# Patient Record
Sex: MALE | Race: WHITE | NOT HISPANIC OR LATINO | Employment: UNEMPLOYED | URBAN - METROPOLITAN AREA
[De-identification: names, ages, dates, MRNs, and addresses within clinical notes are randomized per-mention and may not be internally consistent; named-entity substitution may affect disease eponyms.]

---

## 2024-07-18 ENCOUNTER — APPOINTMENT (OUTPATIENT)
Dept: GENERAL RADIOLOGY | Facility: HOSPITAL | Age: 29
End: 2024-07-18
Payer: MEDICAID

## 2024-07-18 ENCOUNTER — HOSPITAL ENCOUNTER (INPATIENT)
Facility: HOSPITAL | Age: 29
LOS: 15 days | Discharge: HOME | End: 2024-08-02
Attending: INTERNAL MEDICINE | Admitting: INTERNAL MEDICINE
Payer: MEDICAID

## 2024-07-18 ENCOUNTER — APPOINTMENT (OUTPATIENT)
Dept: CT IMAGING | Facility: HOSPITAL | Age: 29
End: 2024-07-18
Payer: MEDICAID

## 2024-07-18 DIAGNOSIS — E87.6 ACUTE HYPOKALEMIA: Primary | ICD-10-CM

## 2024-07-18 DIAGNOSIS — F10.939 ALCOHOL WITHDRAWAL SYNDROME WITH COMPLICATION: ICD-10-CM

## 2024-07-18 DIAGNOSIS — R17 JAUNDICE: ICD-10-CM

## 2024-07-18 LAB
ALBUMIN SERPL-MCNC: 3 G/DL (ref 3.5–5.2)
ALBUMIN/GLOB SERPL: 0.9 G/DL
ALP SERPL-CCNC: 380 U/L (ref 39–117)
ALT SERPL W P-5'-P-CCNC: 33 U/L (ref 1–41)
ANION GAP SERPL CALCULATED.3IONS-SCNC: 16.3 MMOL/L (ref 5–15)
ANION GAP SERPL CALCULATED.3IONS-SCNC: 8.8 MMOL/L (ref 5–15)
APTT PPP: 41.9 SECONDS (ref 24–31)
AST SERPL-CCNC: 186 U/L (ref 1–40)
BACTERIA UR QL AUTO: ABNORMAL /HPF
BASOPHILS # BLD MANUAL: 0.2 10*3/MM3 (ref 0–0.2)
BASOPHILS NFR BLD MANUAL: 2 % (ref 0–1.5)
BILIRUB SERPL-MCNC: 15.8 MG/DL (ref 0–1.2)
BILIRUB UR QL STRIP: ABNORMAL
BUN SERPL-MCNC: 4 MG/DL (ref 6–20)
BUN SERPL-MCNC: 6 MG/DL (ref 6–20)
BUN/CREAT SERPL: 10.5 (ref 7–25)
BUN/CREAT SERPL: 15 (ref 7–25)
CALCIUM SPEC-SCNC: 7.4 MG/DL (ref 8.6–10.5)
CALCIUM SPEC-SCNC: 7.9 MG/DL (ref 8.6–10.5)
CHLORIDE SERPL-SCNC: 72 MMOL/L (ref 98–107)
CHLORIDE SERPL-SCNC: 80 MMOL/L (ref 98–107)
CK SERPL-CCNC: 45 U/L (ref 20–200)
CLARITY UR: ABNORMAL
CO2 SERPL-SCNC: 33.2 MMOL/L (ref 22–29)
CO2 SERPL-SCNC: 33.7 MMOL/L (ref 22–29)
COLOR UR: ABNORMAL
CORTIS SERPL-MCNC: 22.6 MCG/DL
CREAT SERPL-MCNC: 0.38 MG/DL (ref 0.76–1.27)
CREAT SERPL-MCNC: 0.4 MG/DL (ref 0.76–1.27)
D-LACTATE SERPL-SCNC: 1 MMOL/L (ref 0.5–2)
D-LACTATE SERPL-SCNC: 3.2 MMOL/L (ref 0.5–2)
D-LACTATE SERPL-SCNC: 4.9 MMOL/L (ref 0.3–2)
DACRYOCYTES BLD QL SMEAR: ABNORMAL
DEPRECATED RDW RBC AUTO: 40.9 FL (ref 37–54)
EGFRCR SERPLBLD CKD-EPI 2021: 151.5 ML/MIN/1.73
EGFRCR SERPLBLD CKD-EPI 2021: 153.8 ML/MIN/1.73
ERYTHROCYTE [DISTWIDTH] IN BLOOD BY AUTOMATED COUNT: 13 % (ref 12.3–15.4)
FERRITIN SERPL-MCNC: 3108 NG/ML (ref 30–400)
GLOBULIN UR ELPH-MCNC: 3.4 GM/DL
GLUCOSE SERPL-MCNC: 91 MG/DL (ref 65–99)
GLUCOSE SERPL-MCNC: 93 MG/DL (ref 65–99)
GLUCOSE UR STRIP-MCNC: NEGATIVE MG/DL
HCT VFR BLD AUTO: 26.4 % (ref 37.5–51)
HGB BLD-MCNC: 8.9 G/DL (ref 13–17.7)
HGB UR QL STRIP.AUTO: NEGATIVE
HOLD SPECIMEN: NORMAL
HYALINE CASTS UR QL AUTO: ABNORMAL /LPF
INR PPP: 1.59 (ref 0.93–1.1)
IRON 24H UR-MRATE: 100 MCG/DL (ref 59–158)
IRON SATN MFR SERPL: 92 % (ref 20–50)
KETONES UR QL STRIP: NEGATIVE
L PNEUMO1 AG UR QL IA: NEGATIVE
LEUKOCYTE ESTERASE UR QL STRIP.AUTO: ABNORMAL
LIPASE SERPL-CCNC: 100 U/L (ref 13–60)
LYMPHOCYTES # BLD MANUAL: 0.69 10*3/MM3 (ref 0.7–3.1)
LYMPHOCYTES NFR BLD MANUAL: 11 % (ref 5–12)
MAGNESIUM SERPL-MCNC: 1.2 MG/DL (ref 1.6–2.6)
MCH RBC QN AUTO: 29.3 PG (ref 26.6–33)
MCHC RBC AUTO-ENTMCNC: 33.6 G/DL (ref 31.5–35.7)
MCV RBC AUTO: 87.4 FL (ref 79–97)
METAMYELOCYTES NFR BLD MANUAL: 2 % (ref 0–0)
MONOCYTES # BLD: 1.08 10*3/MM3 (ref 0.1–0.9)
NEUTROPHILS # BLD AUTO: 7.69 10*3/MM3 (ref 1.7–7)
NEUTROPHILS NFR BLD MANUAL: 69 % (ref 42.7–76)
NEUTS BAND NFR BLD MANUAL: 9 % (ref 0–5)
NEUTS VAC BLD QL SMEAR: ABNORMAL
NITRITE UR QL STRIP: POSITIVE
OSMOLALITY UR: 338 MOSM/KG (ref 300–800)
PH UR STRIP.AUTO: 7 [PH] (ref 5–8)
PLATELET # BLD AUTO: 94 10*3/MM3 (ref 140–450)
PMV BLD AUTO: 9.8 FL (ref 6–12)
POTASSIUM SERPL-SCNC: 1.9 MMOL/L (ref 3.5–5.2)
POTASSIUM SERPL-SCNC: 2.7 MMOL/L (ref 3.5–5.2)
PROT SERPL-MCNC: 6.4 G/DL (ref 6–8.5)
PROT UR QL STRIP: ABNORMAL
PROTHROMBIN TIME: 16.7 SECONDS (ref 9.6–11.7)
QT INTERVAL: 383 MS
QTC INTERVAL: 533 MS
RBC # BLD AUTO: 3.02 10*6/MM3 (ref 4.14–5.8)
RBC # UR STRIP: ABNORMAL /HPF
REF LAB TEST METHOD: ABNORMAL
SCAN SLIDE: NORMAL
SMALL PLATELETS BLD QL SMEAR: ABNORMAL
SODIUM SERPL-SCNC: 122 MMOL/L (ref 136–145)
SODIUM SERPL-SCNC: 122 MMOL/L (ref 136–145)
SODIUM UR-SCNC: <20 MMOL/L
SP GR UR STRIP: 1.04 (ref 1–1.03)
SQUAMOUS #/AREA URNS HPF: ABNORMAL /HPF
TARGETS BLD QL SMEAR: ABNORMAL
TIBC SERPL-MCNC: 109 MCG/DL (ref 298–536)
TRANSFERRIN SERPL-MCNC: 73 MG/DL (ref 200–360)
TSH SERPL DL<=0.05 MIU/L-ACNC: 0.57 UIU/ML (ref 0.27–4.2)
URATE SERPL-MCNC: 1.3 MG/DL (ref 3.4–7)
UROBILINOGEN UR QL STRIP: ABNORMAL
VARIANT LYMPHS NFR BLD MANUAL: 7 % (ref 19.6–45.3)
WBC # UR STRIP: ABNORMAL /HPF
WBC NRBC COR # BLD AUTO: 9.86 10*3/MM3 (ref 3.4–10.8)
WHOLE BLOOD HOLD COAG: NORMAL
WHOLE BLOOD HOLD SPECIMEN: NORMAL
WHOLE BLOOD HOLD SPECIMEN: NORMAL

## 2024-07-18 PROCEDURE — 82533 TOTAL CORTISOL: CPT | Performed by: INTERNAL MEDICINE

## 2024-07-18 PROCEDURE — 80048 BASIC METABOLIC PNL TOTAL CA: CPT | Performed by: INTERNAL MEDICINE

## 2024-07-18 PROCEDURE — 82746 ASSAY OF FOLIC ACID SERUM: CPT | Performed by: INTERNAL MEDICINE

## 2024-07-18 PROCEDURE — 83935 ASSAY OF URINE OSMOLALITY: CPT | Performed by: INTERNAL MEDICINE

## 2024-07-18 PROCEDURE — 80050 GENERAL HEALTH PANEL: CPT | Performed by: INTERNAL MEDICINE

## 2024-07-18 PROCEDURE — 99291 CRITICAL CARE FIRST HOUR: CPT

## 2024-07-18 PROCEDURE — 85730 THROMBOPLASTIN TIME PARTIAL: CPT | Performed by: INTERNAL MEDICINE

## 2024-07-18 PROCEDURE — 83605 ASSAY OF LACTIC ACID: CPT | Performed by: INTERNAL MEDICINE

## 2024-07-18 PROCEDURE — 84550 ASSAY OF BLOOD/URIC ACID: CPT | Performed by: INTERNAL MEDICINE

## 2024-07-18 PROCEDURE — 84165 PROTEIN E-PHORESIS SERUM: CPT | Performed by: INTERNAL MEDICINE

## 2024-07-18 PROCEDURE — 83690 ASSAY OF LIPASE: CPT | Performed by: INTERNAL MEDICINE

## 2024-07-18 PROCEDURE — 87040 BLOOD CULTURE FOR BACTERIA: CPT | Performed by: INTERNAL MEDICINE

## 2024-07-18 PROCEDURE — 84155 ASSAY OF PROTEIN SERUM: CPT | Performed by: INTERNAL MEDICINE

## 2024-07-18 PROCEDURE — 25010000002 LORAZEPAM PER 2 MG: Performed by: PHYSICIAN ASSISTANT

## 2024-07-18 PROCEDURE — 82105 ALPHA-FETOPROTEIN SERUM: CPT | Performed by: NURSE PRACTITIONER

## 2024-07-18 PROCEDURE — 82728 ASSAY OF FERRITIN: CPT | Performed by: INTERNAL MEDICINE

## 2024-07-18 PROCEDURE — 85007 BL SMEAR W/DIFF WBC COUNT: CPT | Performed by: INTERNAL MEDICINE

## 2024-07-18 PROCEDURE — 84300 ASSAY OF URINE SODIUM: CPT | Performed by: INTERNAL MEDICINE

## 2024-07-18 PROCEDURE — 85610 PROTHROMBIN TIME: CPT | Performed by: INTERNAL MEDICINE

## 2024-07-18 PROCEDURE — 82607 VITAMIN B-12: CPT | Performed by: INTERNAL MEDICINE

## 2024-07-18 PROCEDURE — 25010000002 MAGNESIUM SULFATE 2 GM/50ML SOLUTION: Performed by: INTERNAL MEDICINE

## 2024-07-18 PROCEDURE — 82550 ASSAY OF CK (CPK): CPT | Performed by: INTERNAL MEDICINE

## 2024-07-18 PROCEDURE — P9047 ALBUMIN (HUMAN), 25%, 50ML: HCPCS | Performed by: INTERNAL MEDICINE

## 2024-07-18 PROCEDURE — 25810000003 SODIUM CHLORIDE 0.9 % SOLUTION: Performed by: PHYSICIAN ASSISTANT

## 2024-07-18 PROCEDURE — 83735 ASSAY OF MAGNESIUM: CPT | Performed by: PHYSICIAN ASSISTANT

## 2024-07-18 PROCEDURE — 25510000001 IOPAMIDOL PER 1 ML: Performed by: PHYSICIAN ASSISTANT

## 2024-07-18 PROCEDURE — 25010000002 POTASSIUM CHLORIDE PER 2 MEQ OF POTASSIUM: Performed by: INTERNAL MEDICINE

## 2024-07-18 PROCEDURE — 25010000002 ONDANSETRON PER 1 MG: Performed by: INTERNAL MEDICINE

## 2024-07-18 PROCEDURE — 25010000002 THIAMINE PER 100 MG: Performed by: PHYSICIAN ASSISTANT

## 2024-07-18 PROCEDURE — 25810000003 DEXTROSE 5 % AND SODIUM CHLORIDE 0.9 % 5-0.9 % SOLUTION 1,000 ML FLEX CONT: Performed by: INTERNAL MEDICINE

## 2024-07-18 PROCEDURE — 74177 CT ABD & PELVIS W/CONTRAST: CPT

## 2024-07-18 PROCEDURE — 25010000002 THIAMINE PER 100 MG: Performed by: INTERNAL MEDICINE

## 2024-07-18 PROCEDURE — 25010000002 ONDANSETRON PER 1 MG: Performed by: PHYSICIAN ASSISTANT

## 2024-07-18 PROCEDURE — 25010000002 ALBUMIN HUMAN 25% PER 50 ML: Performed by: INTERNAL MEDICINE

## 2024-07-18 PROCEDURE — 93010 ELECTROCARDIOGRAM REPORT: CPT | Performed by: INTERNAL MEDICINE

## 2024-07-18 PROCEDURE — 25010000002 CALCIUM GLUCONATE 2-0.675 GM/100ML-% SOLUTION: Performed by: INTERNAL MEDICINE

## 2024-07-18 PROCEDURE — 71046 X-RAY EXAM CHEST 2 VIEWS: CPT

## 2024-07-18 PROCEDURE — 93005 ELECTROCARDIOGRAM TRACING: CPT | Performed by: PHYSICIAN ASSISTANT

## 2024-07-18 PROCEDURE — 84466 ASSAY OF TRANSFERRIN: CPT | Performed by: INTERNAL MEDICINE

## 2024-07-18 PROCEDURE — 83540 ASSAY OF IRON: CPT | Performed by: INTERNAL MEDICINE

## 2024-07-18 PROCEDURE — 81001 URINALYSIS AUTO W/SCOPE: CPT | Performed by: INTERNAL MEDICINE

## 2024-07-18 PROCEDURE — 87449 NOS EACH ORGANISM AG IA: CPT | Performed by: INTERNAL MEDICINE

## 2024-07-18 RX ORDER — CALCIUM GLUCONATE 20 MG/ML
2000 INJECTION, SOLUTION INTRAVENOUS EVERY 12 HOURS
Qty: 200 ML | Refills: 0 | Status: COMPLETED | OUTPATIENT
Start: 2024-07-18 | End: 2024-07-19

## 2024-07-18 RX ORDER — LORAZEPAM 1 MG/1
1 TABLET ORAL EVERY 12 HOURS SCHEDULED
Qty: 2 TABLET | Refills: 0 | Status: COMPLETED | OUTPATIENT
Start: 2024-07-20 | End: 2024-07-21

## 2024-07-18 RX ORDER — ACETAMINOPHEN 160 MG/5ML
650 SOLUTION ORAL EVERY 4 HOURS PRN
Status: DISCONTINUED | OUTPATIENT
Start: 2024-07-18 | End: 2024-08-02 | Stop reason: HOSPADM

## 2024-07-18 RX ORDER — LORAZEPAM 2 MG/ML
2 INJECTION INTRAMUSCULAR
Status: ACTIVE | OUTPATIENT
Start: 2024-07-18 | End: 2024-07-23

## 2024-07-18 RX ORDER — BISACODYL 5 MG/1
5 TABLET, DELAYED RELEASE ORAL DAILY PRN
Status: DISCONTINUED | OUTPATIENT
Start: 2024-07-18 | End: 2024-08-02 | Stop reason: HOSPADM

## 2024-07-18 RX ORDER — LORAZEPAM 2 MG/ML
1 INJECTION INTRAMUSCULAR ONCE
Status: COMPLETED | OUTPATIENT
Start: 2024-07-18 | End: 2024-07-18

## 2024-07-18 RX ORDER — THIAMINE HYDROCHLORIDE 100 MG/ML
200 INJECTION, SOLUTION INTRAMUSCULAR; INTRAVENOUS EVERY 8 HOURS SCHEDULED
Qty: 30 ML | Refills: 0 | Status: COMPLETED | OUTPATIENT
Start: 2024-07-18 | End: 2024-07-23

## 2024-07-18 RX ORDER — SODIUM CHLORIDE 9 MG/ML
40 INJECTION, SOLUTION INTRAVENOUS AS NEEDED
Status: DISCONTINUED | OUTPATIENT
Start: 2024-07-18 | End: 2024-08-02 | Stop reason: HOSPADM

## 2024-07-18 RX ORDER — ONDANSETRON 2 MG/ML
4 INJECTION INTRAMUSCULAR; INTRAVENOUS ONCE
Status: COMPLETED | OUTPATIENT
Start: 2024-07-18 | End: 2024-07-18

## 2024-07-18 RX ORDER — FOLIC ACID 1 MG/1
1 TABLET ORAL DAILY
Status: DISCONTINUED | OUTPATIENT
Start: 2024-07-19 | End: 2024-08-02 | Stop reason: HOSPADM

## 2024-07-18 RX ORDER — ACETAMINOPHEN 650 MG/1
650 SUPPOSITORY RECTAL EVERY 4 HOURS PRN
Status: DISCONTINUED | OUTPATIENT
Start: 2024-07-18 | End: 2024-08-02 | Stop reason: HOSPADM

## 2024-07-18 RX ORDER — POTASSIUM CHLORIDE 1.5 G/1.58G
40 POWDER, FOR SOLUTION ORAL EVERY 4 HOURS
Qty: 6 PACKET | Refills: 0 | Status: COMPLETED | OUTPATIENT
Start: 2024-07-18 | End: 2024-07-18

## 2024-07-18 RX ORDER — ONDANSETRON 4 MG/1
4 TABLET, ORALLY DISINTEGRATING ORAL EVERY 6 HOURS PRN
Status: DISCONTINUED | OUTPATIENT
Start: 2024-07-18 | End: 2024-08-02 | Stop reason: HOSPADM

## 2024-07-18 RX ORDER — BISACODYL 10 MG
10 SUPPOSITORY, RECTAL RECTAL DAILY PRN
Status: DISCONTINUED | OUTPATIENT
Start: 2024-07-18 | End: 2024-08-02 | Stop reason: HOSPADM

## 2024-07-18 RX ORDER — AMOXICILLIN 250 MG
2 CAPSULE ORAL 2 TIMES DAILY PRN
Status: DISCONTINUED | OUTPATIENT
Start: 2024-07-18 | End: 2024-08-02 | Stop reason: HOSPADM

## 2024-07-18 RX ORDER — ALBUMIN (HUMAN) 12.5 G/50ML
25 SOLUTION INTRAVENOUS ONCE
Status: COMPLETED | OUTPATIENT
Start: 2024-07-18 | End: 2024-07-18

## 2024-07-18 RX ORDER — LORAZEPAM 1 MG/1
2 TABLET ORAL EVERY 6 HOURS
Qty: 8 TABLET | Refills: 0 | Status: COMPLETED | OUTPATIENT
Start: 2024-07-18 | End: 2024-07-19

## 2024-07-18 RX ORDER — PANTOPRAZOLE SODIUM 40 MG/10ML
80 INJECTION, POWDER, LYOPHILIZED, FOR SOLUTION INTRAVENOUS ONCE
Status: COMPLETED | OUTPATIENT
Start: 2024-07-18 | End: 2024-07-18

## 2024-07-18 RX ORDER — CHLORDIAZEPOXIDE HYDROCHLORIDE 25 MG/1
50 CAPSULE, GELATIN COATED ORAL 4 TIMES DAILY PRN
Status: ACTIVE | OUTPATIENT
Start: 2024-07-18 | End: 2024-07-23

## 2024-07-18 RX ORDER — LORAZEPAM 2 MG/ML
1 INJECTION INTRAMUSCULAR
Status: ACTIVE | OUTPATIENT
Start: 2024-07-18 | End: 2024-07-23

## 2024-07-18 RX ORDER — GABAPENTIN 100 MG/1
100 CAPSULE ORAL 2 TIMES DAILY
Status: DISCONTINUED | OUTPATIENT
Start: 2024-07-18 | End: 2024-07-18

## 2024-07-18 RX ORDER — THIAMINE HYDROCHLORIDE 100 MG/ML
200 INJECTION, SOLUTION INTRAMUSCULAR; INTRAVENOUS ONCE
Status: COMPLETED | OUTPATIENT
Start: 2024-07-18 | End: 2024-07-18

## 2024-07-18 RX ORDER — LORAZEPAM 1 MG/1
1 TABLET ORAL EVERY 6 HOURS
Qty: 4 TABLET | Refills: 0 | Status: COMPLETED | OUTPATIENT
Start: 2024-07-19 | End: 2024-07-20

## 2024-07-18 RX ORDER — SODIUM CHLORIDE 0.9 % (FLUSH) 0.9 %
10 SYRINGE (ML) INJECTION AS NEEDED
Status: DISCONTINUED | OUTPATIENT
Start: 2024-07-18 | End: 2024-08-02 | Stop reason: HOSPADM

## 2024-07-18 RX ORDER — LORAZEPAM 1 MG/1
1 TABLET ORAL DAILY
Qty: 1 TABLET | Refills: 0 | Status: COMPLETED | OUTPATIENT
Start: 2024-07-22 | End: 2024-07-22

## 2024-07-18 RX ORDER — POLYETHYLENE GLYCOL 3350 17 G/17G
17 POWDER, FOR SOLUTION ORAL DAILY PRN
Status: DISCONTINUED | OUTPATIENT
Start: 2024-07-18 | End: 2024-08-02 | Stop reason: HOSPADM

## 2024-07-18 RX ORDER — SODIUM CHLORIDE 0.9 % (FLUSH) 0.9 %
10 SYRINGE (ML) INJECTION EVERY 12 HOURS SCHEDULED
Status: DISCONTINUED | OUTPATIENT
Start: 2024-07-18 | End: 2024-08-02 | Stop reason: HOSPADM

## 2024-07-18 RX ORDER — LORAZEPAM 1 MG/1
2 TABLET ORAL
Status: ACTIVE | OUTPATIENT
Start: 2024-07-18 | End: 2024-07-23

## 2024-07-18 RX ORDER — SODIUM CHLORIDE 9 MG/ML
1000 INJECTION, SOLUTION INTRAVENOUS ONCE
Status: COMPLETED | OUTPATIENT
Start: 2024-07-18 | End: 2024-07-18

## 2024-07-18 RX ORDER — MULTIPLE VITAMINS W/ MINERALS TAB 9MG-400MCG
1 TAB ORAL DAILY
Status: DISCONTINUED | OUTPATIENT
Start: 2024-07-18 | End: 2024-08-02 | Stop reason: HOSPADM

## 2024-07-18 RX ORDER — LORAZEPAM 1 MG/1
1 TABLET ORAL
Status: DISPENSED | OUTPATIENT
Start: 2024-07-18 | End: 2024-07-23

## 2024-07-18 RX ORDER — ACETAMINOPHEN 325 MG/1
650 TABLET ORAL EVERY 4 HOURS PRN
Status: DISCONTINUED | OUTPATIENT
Start: 2024-07-18 | End: 2024-08-02 | Stop reason: HOSPADM

## 2024-07-18 RX ORDER — ONDANSETRON 2 MG/ML
4 INJECTION INTRAMUSCULAR; INTRAVENOUS EVERY 6 HOURS PRN
Status: DISCONTINUED | OUTPATIENT
Start: 2024-07-18 | End: 2024-08-02 | Stop reason: HOSPADM

## 2024-07-18 RX ORDER — PANTOPRAZOLE SODIUM 40 MG/10ML
40 INJECTION, POWDER, LYOPHILIZED, FOR SOLUTION INTRAVENOUS DAILY
Status: DISCONTINUED | OUTPATIENT
Start: 2024-07-19 | End: 2024-07-20

## 2024-07-18 RX ORDER — MAGNESIUM SULFATE HEPTAHYDRATE 40 MG/ML
2 INJECTION, SOLUTION INTRAVENOUS
Status: COMPLETED | OUTPATIENT
Start: 2024-07-18 | End: 2024-07-18

## 2024-07-18 RX ADMIN — PANTOPRAZOLE SODIUM 80 MG: 40 INJECTION, POWDER, FOR SOLUTION INTRAVENOUS at 09:55

## 2024-07-18 RX ADMIN — POTASSIUM CHLORIDE 40 MEQ: 1.5 POWDER, FOR SOLUTION ORAL at 21:48

## 2024-07-18 RX ADMIN — FOLIC ACID 1 MG: 5 INJECTION, SOLUTION INTRAMUSCULAR; INTRAVENOUS; SUBCUTANEOUS at 10:04

## 2024-07-18 RX ADMIN — SODIUM CHLORIDE 1000 ML: 9 INJECTION, SOLUTION INTRAVENOUS at 09:54

## 2024-07-18 RX ADMIN — MAGNESIUM SULFATE HEPTAHYDRATE 2 G: 40 INJECTION, SOLUTION INTRAVENOUS at 18:18

## 2024-07-18 RX ADMIN — LORAZEPAM 2 MG: 1 TABLET ORAL at 21:48

## 2024-07-18 RX ADMIN — ONDANSETRON 4 MG: 2 INJECTION INTRAMUSCULAR; INTRAVENOUS at 09:54

## 2024-07-18 RX ADMIN — MAGNESIUM SULFATE HEPTAHYDRATE 2 G: 40 INJECTION, SOLUTION INTRAVENOUS at 14:12

## 2024-07-18 RX ADMIN — LORAZEPAM 2 MG: 1 TABLET ORAL at 15:54

## 2024-07-18 RX ADMIN — POTASSIUM CHLORIDE 40 MEQ: 1.5 POWDER, FOR SOLUTION ORAL at 11:31

## 2024-07-18 RX ADMIN — Medication 1 TABLET: at 12:57

## 2024-07-18 RX ADMIN — MAGNESIUM SULFATE HEPTAHYDRATE 2 G: 40 INJECTION, SOLUTION INTRAVENOUS at 15:54

## 2024-07-18 RX ADMIN — IOPAMIDOL 100 ML: 755 INJECTION, SOLUTION INTRAVENOUS at 10:25

## 2024-07-18 RX ADMIN — POTASSIUM CHLORIDE: 2 INJECTION, SOLUTION, CONCENTRATE INTRAVENOUS at 19:40

## 2024-07-18 RX ADMIN — CALCIUM GLUCONATE 2000 MG: 20 INJECTION, SOLUTION INTRAVENOUS at 19:40

## 2024-07-18 RX ADMIN — THIAMINE HYDROCHLORIDE 200 MG: 100 INJECTION, SOLUTION INTRAMUSCULAR; INTRAVENOUS at 09:55

## 2024-07-18 RX ADMIN — THIAMINE HYDROCHLORIDE 200 MG: 100 INJECTION, SOLUTION INTRAMUSCULAR; INTRAVENOUS at 21:47

## 2024-07-18 RX ADMIN — LORAZEPAM 1 MG: 2 INJECTION INTRAMUSCULAR; INTRAVENOUS at 09:51

## 2024-07-18 RX ADMIN — SERTRALINE 50 MG: 50 TABLET, FILM COATED ORAL at 12:58

## 2024-07-18 RX ADMIN — ALBUMIN (HUMAN) 25 G: 0.25 INJECTION, SOLUTION INTRAVENOUS at 19:04

## 2024-07-18 RX ADMIN — GABAPENTIN 100 MG: 100 CAPSULE ORAL at 12:57

## 2024-07-18 RX ADMIN — ONDANSETRON 4 MG: 2 INJECTION INTRAMUSCULAR; INTRAVENOUS at 13:30

## 2024-07-18 RX ADMIN — POTASSIUM CHLORIDE 40 MEQ: 1.5 POWDER, FOR SOLUTION ORAL at 15:54

## 2024-07-18 NOTE — ED NOTES
"Patient states that he is here for ABD pain secondary to liver issue. He states that he is a alcoholic and has been for the last 9 years intermittently. He drinks almost a liter a day, last alcoholic drink was Wednesday morning around 0100. He also states that he has been having SI thoughts intermittently for the last 3-4. He states that his fiance left him 3-4 years ago and then his brother passed last year. Patient states \"the only thing keeping him from doing something, is the thought of his mom losing another son\" Dad and brother in triage with patient while room being prepared for patient, security called.  "

## 2024-07-18 NOTE — ED PROVIDER NOTES
Subjective   History of Present Illness  28-year-old male presents emergency department with family with complaints of abdominal pain.  Patient states that he has been a alcoholic for the past 9 years current alcohol consumption is around 750 mL/day.  States that he stopped drinking 2 days ago due to increased yellowing of skin and eyes, has been actively vomiting for 2 days.  Denies any recent seizure disorder or having seizure disorders when detoxing in the past.  Patient denies any head injury, neck stiffness neck pain.  Denies shortness of breath chest pain.  Patient denies headaches.        Review of Systems   Constitutional:  Positive for appetite change, diaphoresis and fatigue. Negative for fever and unexpected weight change.   Respiratory:  Negative for cough, chest tightness, shortness of breath and wheezing.    Cardiovascular:  Negative for chest pain, palpitations and leg swelling.   Gastrointestinal:  Positive for abdominal distention, abdominal pain, nausea and vomiting. Negative for blood in stool and diarrhea.   Genitourinary:  Negative for dysuria, frequency and hematuria.   Musculoskeletal:  Negative for neck pain.   Skin:  Positive for color change.   Neurological:  Positive for tremors and light-headedness. Negative for dizziness, seizures, syncope, speech difficulty and headaches.       History reviewed. No pertinent past medical history.    Allergies   Allergen Reactions    Reglan [Metoclopramide] Mental Status Change       History reviewed. No pertinent surgical history.    History reviewed. No pertinent family history.    Social History     Socioeconomic History    Marital status: Single           Objective   Physical Exam  Vitals and nursing note reviewed.   Constitutional:       General: He is not in acute distress.     Appearance: He is ill-appearing. He is not toxic-appearing.   Eyes:      General: Scleral icterus present.      Extraocular Movements:      Right eye: No nystagmus.       "Left eye: No nystagmus.      Conjunctiva/sclera:      Right eye: No hemorrhage.     Left eye: No hemorrhage.  Cardiovascular:      Rate and Rhythm: Normal rate and regular rhythm.      Heart sounds: Normal heart sounds.   Pulmonary:      Effort: Pulmonary effort is normal.      Breath sounds: Normal breath sounds.   Abdominal:      General: Bowel sounds are normal. There is distension.      Palpations: Abdomen is soft. There is hepatomegaly.      Tenderness: generalized  There is no guarding or rebound.   Skin:     Coloration: Skin is jaundiced.      Comments: Petechial rash around face secondary to vomiting   Neurological:      Mental Status: He is alert.         Procedures           ED Course    /83   Pulse (!) 121   Temp 98.4 °F (36.9 °C)   Resp 21   Ht 165.1 cm (65\")   Wt 55.7 kg (122 lb 12.7 oz)   SpO2 95%   BMI 20.43 kg/m²   Labs Reviewed   COMPREHENSIVE METABOLIC PANEL - Abnormal; Notable for the following components:       Result Value    Creatinine 0.40 (*)     Sodium 122 (*)     Potassium 1.9 (*)     Chloride 72 (*)     CO2 33.7 (*)     Calcium 7.9 (*)     Albumin 3.0 (*)     AST (SGOT) 186 (*)     Alkaline Phosphatase 380 (*)     Total Bilirubin 15.8 (*)     Anion Gap 16.3 (*)     All other components within normal limits    Narrative:     GFR Normal >60  Chronic Kidney Disease <60  Kidney Failure <15     CBC WITH AUTO DIFFERENTIAL - Abnormal; Notable for the following components:    RBC 3.02 (*)     Hemoglobin 8.9 (*)     Hematocrit 26.4 (*)     Platelets 94 (*)     All other components within normal limits   MANUAL DIFFERENTIAL - Abnormal; Notable for the following components:    Lymphocyte % 7.0 (*)     Basophil % 2.0 (*)     Bands %  9.0 (*)     Metamyelocyte % 2.0 (*)     Neutrophils Absolute 7.69 (*)     Lymphocytes Absolute 0.69 (*)     Monocytes Absolute 1.08 (*)     All other components within normal limits   PROTIME-INR - Abnormal; Notable for the following components:    Protime " 16.7 (*)     INR 1.59 (*)     All other components within normal limits   APTT - Abnormal; Notable for the following components:    PTT 41.9 (*)     All other components within normal limits   POC LACTATE - Abnormal; Notable for the following components:    Lactate 4.9 (*)     All other components within normal limits   BLOOD CULTURE   BLOOD CULTURE   RAINBOW DRAW    Narrative:     The following orders were created for panel order [place] Draw.  Procedure                               Abnormality         Status                     ---------                               -----------         ------                     Green Top (Gel)[[id number]]                                  Final result               Lavender Top[[id number]]                                     Final result               Gold Top - SST[[id number]]                                   Final result               Light Blue Top[[id number]]                                   Final result                 Please view results for these tests on the individual orders.   SCAN SLIDE   LACTIC ACID, REFLEX   MAGNESIUM   CBC AND DIFFERENTIAL    Narrative:     The following orders were created for panel order CBC & Differential.  Procedure                               Abnormality         Status                     ---------                               -----------         ------                     CBC Auto Differential[[id number]]        Abnormal            Final result               Scan Slide[[id number]]                                       Final result                 Please view results for these tests on the individual orders.   GREEN TOP   LAVENDER TOP   GOLD TOP - SST   LIGHT BLUE TOP   EXTRA TUBES    Narrative:     The following orders were created for panel order Extra Tubes.  Procedure                               Abnormality         Status                     ---------                               -----------         ------                     Lavender  Top[[id number]]                                     Final result               Green Top (Gel)[[id number]]                                  Final result                 Please view results for these tests on the individual orders.   LAVENDER TOP   GREEN TOP   EXTRA TUBES    Narrative:     The following orders were created for panel order Extra Tubes.  Procedure                               Abnormality         Status                     ---------                               -----------         ------                     Green Top (Gel)[[id number]]                                  Final result                 Please view results for these tests on the individual orders.   GREEN TOP     Medications   sodium chloride 0.9 % flush 10 mL (has no administration in time range)   Potassium Replacement - Follow Nurse / BPA Driven Protocol (has no administration in time range)   potassium chloride (KLOR-CON) packet 40 mEq (40 mEq Oral Given 7/18/24 1131)   thiamine (B-1) injection 200 mg (has no administration in time range)     Followed by   thiamine (VITAMIN B-1) tablet 100 mg (has no administration in time range)   folic acid (FOLVITE) tablet 1 mg (has no administration in time range)   multivitamin with minerals 1 tablet (has no administration in time range)   chlordiazePOXIDE (LIBRIUM) capsule 50 mg (has no administration in time range)   sertraline (ZOLOFT) tablet 50 mg (has no administration in time range)   gabapentin (NEURONTIN) capsule 100 mg (has no administration in time range)   sodium chloride 0.9 % bolus 1,000 mL (1,000 mL Intravenous New Bag 7/18/24 0954)   sodium chloride 0.9 % infusion 1,000 mL (0 mL Intravenous Stopped 7/18/24 1133)   folic acid 1 mg in sodium chloride 0.9 % 50 mL IVPB (0 mg Intravenous Stopped 7/18/24 1132)   thiamine (B-1) injection 200 mg (200 mg Intravenous Given 7/18/24 0955)   ondansetron (ZOFRAN) injection 4 mg (4 mg Intravenous Given 7/18/24 0954)   pantoprazole (PROTONIX) injection  80 mg (80 mg Intravenous Given 7/18/24 0922)   LORazepam (ATIVAN) injection 1 mg (1 mg Intravenous Given 7/18/24 0961)   iopamidol (ISOVUE-370) 76 % injection 100 mL (100 mL Intravenous Given 7/18/24 1025)     CT Abdomen Pelvis With Contrast    Result Date: 7/18/2024  1. Enlarged heterogeneous and markedly steatotic liver, which could reflect sequelae of alcoholic steatohepatitis. 2. Sequelae of portal hypertension noted including distal paraesophageal and intra-abdominal varices as well as trace ascites. Normal splenic size. Portal vasculature patent. 3. Gallbladder wall edema without other findings of acute cholecystitis likely secondary related to underlying hepatocellular dysfunction. 4. Colonic and rectal inflammatory changes which could reflect sequelae of portal colopathy. Differential would include a nonspecific infectious/inflammatory proctocolitis. No bowel obstruction.      Electronically Signed By-Armani Small MD On:7/18/2024 10:46 AM                                              Medical Decision Making  28-year-old male presents with family with complaint of abdominal pain abdominal distention, yellow skin and yellow eyes.  Patient states that he has been an alcoholic for the past 9 years typically consumes about 750 mL/day.  Patient states he has not had a drop of alcohol for the past 2 days is experiencing nausea and vomiting at this time.  No seizure disorder reported no active seizures in the past 48 hours.  Physical exam is consistent with an ill-appearing male is not acutely toxic.  He is jaundiced sclera is icterus.  Abdomen is soft and slightly distended.  Diffuse tenderness over right upper quadrant.  Positive hepatomegaly.  No rebound or guarding.  Lungs clear to auscultation heart regular rate and rhythm no swelling of the lower or upper extremities.  There is petechial rash noted on face secondary to vomiting.  Vital signs are blood pressure 141/83 temperature 98.4 heart rate is 121  respirations 21 right SpO2 on room air is 95%.  CBC, CMP show mild anemia and hypokalemia.  EKG was was obtained for hypokalemia, heart rate 116 sinus tachycardia no ST elevation no sign of STEMI.  EKG was also evaluated by Dr. Kuo.  Patient during the ER course was given Ativan 1 mg Zofran 4 mg IV, 1 L IV bolus normal saline.  80 mg Protonix.  IV, CT abdomen pelvis with IV contrast show hepatomegaly but no other acute processes noted.  Per radiologist.  Patient was admitted to the hospitalist diagnosis of hypokalemia EtOH withdrawal.  He was discharged in stable condition.  Treatment plan patient care was discussed and Dr. Kuo saw patient in ER.  Critical care time 38 minutes    Problems Addressed:  Acute hypokalemia: complicated acute illness or injury that poses a threat to life or bodily functions  Alcohol withdrawal syndrome with complication: complicated acute illness or injury that poses a threat to life or bodily functions  Jaundice: complicated acute illness or injury    Amount and/or Complexity of Data Reviewed  Labs: ordered. Decision-making details documented in ED Course.  Radiology: ordered. Decision-making details documented in ED Course.  ECG/medicine tests: ordered and independent interpretation performed. Decision-making details documented in ED Course.    Risk  Decision regarding hospitalization.    Critical Care  Total time providing critical care: 38 minutes        Final diagnoses:   Acute hypokalemia   Alcohol withdrawal syndrome with complication   Jaundice       ED Disposition  ED Disposition       ED Disposition   Decision to Admit    Condition   --    Comment   Level of Care: Telemetry [5]   Diagnosis: Alcohol withdrawal [291.81.ICD-9-CM]   Certification: I Certify That Inpatient Hospital Services Are Medically Necessary For Greater Than 2 Midnights                 No follow-up provider specified.       Medication List      No changes were made to your prescriptions during this  visit.            Micheal Deshpande PA-C  07/18/24 1229       Micheal Deshpande, DREW  07/18/24 1222

## 2024-07-18 NOTE — Clinical Note
Level of Care: Progressive Care [20]   Admitting Physician: LAILA RATLIFF [[id number]]   Attending Physician: LAILA RATLIFF [[id number]]

## 2024-07-18 NOTE — H&P
[facility] Medicine Services  History & Physical    Patient Name: Yonas Charles  : 1995  MRN: [id number]  Primary Care Physician:  Provider, No Known  Date of admission: 2024  Date and Time of Service: 2024    Subjective      Chief Complaint:  alcohol withdrawal    History of Present Illness:    This is a 28-year-old male who is alcoholic and also has history of acid reflux came to the hospital for alcohol detox.  Patient is also having abdominal pain and is jaundiced.  Patient is also going through personal issues.  Patient also has severe anxiety depression and has suicidal thoughts.  Patient started drinking 2 days ago and patient has been drinking alcohol since past 9 years.  Patient drinks almost 1 L hard liquor on a daily basis.  Patient has generalized weakness abdominal pain with episodes of nausea vomiting.  Patient has tremors and lightheadedness.      Lab work showed sodium of 122 with potassium 1.9 along with metabolic acidosis.  Patient has elevated AST and total bili is 15.8 and hemoglobin is 8.9.  In ER patient was tachycardic.  CT scan of abdomen showed enlarged liver along with portal hypertension and intra-abdominal varices along with gallbladder wall edema and chronic and rectal inflammatory changes    Review of Systems   Constitutional:  Negative for chills, diaphoresis and fever.   HENT:  Negative for dental problem, ear discharge, hearing loss, nosebleeds, rhinorrhea and sneezing.    Eyes:  Negative for photophobia, redness and itching.   Respiratory:  Negative for cough, chest tightness and stridor.    Cardiovascular:  Negative for leg swelling.   Gastrointestinal:  as above  Endocrine: Negative for heat intolerance.   Genitourinary: Negative for flank pain, frequency and hematuria.   Musculoskeletal:  Negative for back pain, joint swelling, and gait problem.   Skin:  Negative for color change.   Neurological:   speech difficulty, numbness and headaches.    Psychiatric/Behavioral:   as above    Personal History     History reviewed. No pertinent past medical history.    History reviewed. No pertinent surgical history.    Family History: family history is not on file. Otherwise pertinent FHx was reviewed and not pertinent to current issue.    Social History:      Home Medications:  Prior to Admission Medications       None              Allergies:  Allergies   Allergen Reactions    Reglan [Metoclopramide] Mental Status Change       Objective      Vitals:   Temp:  [98.4 °F (36.9 °C)] 98.4 °F (36.9 °C)  Heart Rate:  [114-132] 121  Resp:  [21] 21  BP: (119-158)/(61-91) 141/83  Body mass index is 20.43 kg/m².  Physical Exam:    Constitutional: Patient appears well-developed and well-nourished and in no acute distress   HEENT:   Head: Normocephalic and atraumatic.   Eyes:  Pupils are equal, round, and reactive to light. EOM are intact. Sclera are anicteric and non-injected.  Mouth and Throat: Patient has moist mucous membranes.      Neck: Neck supple.  No thyromegaly present. No lymphadenopathy present. No  masses.     Cardiovascular: Inspection: No JVD present. Palpation:bilaterally. No leg edema. Auscultation: Regular rate, regular rhythm, S1 normal and S2 normal. reveals no gallop and no friction rub. No Carotid bruit bilaterally.    Pulmonary/Chest: Inspection: No distress, no use of accessory muscles. Lungs are clear to auscultation bilaterally. No respiratory distress. No wheezes. No rhonchi. No rales.     Abdomen /Gastrointestinal: Inspection: no distension. Palpation: no masses, no organomegaly. Soft. There is no tenderness. Bowel sounds are normal.   Extremities no cyanosis clubbing or edema    Neurological: Patient is alert and oriented to person, place, and time. Cranial nerves II-XII are grossly intact with no focal deficits. Sensori-motor exam is normal. No cerebellar signs.    Skin: Skin is warm. No rash noted. Nails show no clubbing.  No cyanosis or erythema.  No bruising.      Diagnostic Data:  Results from last 7 days   Lab Units 07/18/24  1015   WBC 10*3/mm3 9.86   HEMOGLOBIN g/dL 8.9*   HEMATOCRIT % 26.4*   PLATELETS 10*3/mm3 94*   GLUCOSE mg/dL 93   CREATININE mg/dL 0.40*   BUN mg/dL 6   SODIUM mmol/L 122*   POTASSIUM mmol/L 1.9*   AST (SGOT) U/L 186*   ALT (SGPT) U/L 33   ALK PHOS U/L 380*   BILIRUBIN mg/dL 15.8*   ANION GAP mmol/L 16.3*       CT Abdomen Pelvis With Contrast    Result Date: 7/18/2024  1. Enlarged heterogeneous and markedly steatotic liver, which could reflect sequelae of alcoholic steatohepatitis. 2. Sequelae of portal hypertension noted including distal paraesophageal and intra-abdominal varices as well as trace ascites. Normal splenic size. Portal vasculature patent. 3. Gallbladder wall edema without other findings of acute cholecystitis likely secondary related to underlying hepatocellular dysfunction. 4. Colonic and rectal inflammatory changes which could reflect sequelae of portal colopathy. Differential would include a nonspecific infectious/inflammatory proctocolitis. No bowel obstruction.      Electronically Signed By-Armani Small MD On:7/18/2024 10:46 AM       No results found for this or any previous visit.    I have personally reviewed the patient's new results.       Assessment & Plan        Active and Resolved Problems    Alcohol withdrawal with delirium tremens  Normocytic anemia  Jaundice with elevated bilirubin level  Nausea vomiting  Severe hypokalemia  Hyponatremia   Metabolic acidosis  Esophagitis  Hepatomegaly with portal hypertension  Esophageal varices  Suicide ideation  Anxiety depression  Social issues    Suggestion    At this time will admit this patient in hospital  I will ask GI to see this patient   Replace electrolyte and check a magnesium level  Psychiatry consult   IV hydration      VTE Prophylaxis:  Mechanical VTE prophylaxis orders are signed & held.          The patient desires to be as follows:    CODE STATUS:     Code Status (Patient has no pulse and is not breathing): CPR (Attempt to Resuscitate)  Medical Interventions (Patient has pulse or is breathing): Full Support        Admission Status:      Expected Length of Stay:  4-5 days    PDMP and Medication Dispenses via Sidebar reviewed and consistent with patient reported medications.        Signature:     This document has been electronically signed by Mari Bustos MD on July 18, 2024 12:16 EDT   [facility] Hospitalist Team

## 2024-07-18 NOTE — ED NOTES
Security in room. Patient belongings locked up. Pt had phone, phone , wallet, lighter, red shorts, black top, shoes. Room SI safe, environment check list filled out. Charge nurse aware.

## 2024-07-18 NOTE — CONSULTS
NEPHROLOGY CONSULTATION-----[facility]    [facility]  [phone]  Navya Mcdonald MD    Patient Care Team:  Provider, No Known as PCP - General    CC/REASON FOR CONSULTATION: ELECTROLYTE ABNL    PHYSICIAN REQUESTING CONSULTATION:     History of Present Illness    HPI    Patient is a 28 y.o. WM whom I was asked to see in consultation for evaluation and management of renal failure/elevated serum creatinine. Patient was admitted after presenting to ER with c/o abdominal pain. Patient is an alcoholic with Cirrhosis.   No documented NSAIDs or recent IV dye exposure. No known h/o hepatitis, TB, rheumatic fever, jaundice, SLE, bleeding/bruising disorders.  No urinary sx. No edema or fluid retention.   Was not on diuretics prior to admission. Was not on ACE-I/ARB prior to admission. No herbal med use.     Review of Systems   Unable to perform ROS: Patient nonverbal          History reviewed. No pertinent past medical history.    History reviewed. No pertinent surgical history.    History reviewed. No pertinent family history.         Home Meds: (Not in a hospital admission)      Scheduled Meds:  [START ON 7/19/2024] folic acid, 1 mg, Oral, Daily  gabapentin, 100 mg, Oral, BID  LORazepam, 2 mg, Oral, Q6H   Followed by  [START ON 7/19/2024] LORazepam, 1 mg, Oral, Q6H   Followed by  [START ON 7/20/2024] LORazepam, 1 mg, Oral, Q12H   Followed by  [START ON 7/22/2024] LORazepam, 1 mg, Oral, Daily  magnesium sulfate, 2 g, Intravenous, Q2H  multivitamin with minerals, 1 tablet, Oral, Daily  potassium chloride, 40 mEq, Oral, Q4H  sertraline, 50 mg, Oral, Daily  sodium chloride, 10 mL, Intravenous, Q12H  thiamine (B-1) IV, 200 mg, Intravenous, Q8H   Followed by  [START ON 7/24/2024] thiamine, 100 mg, Oral, Daily        Continuous Infusions:       PRN Meds:    acetaminophen **OR** acetaminophen **OR** acetaminophen    senna-docusate sodium **AND**  "polyethylene glycol **AND** bisacodyl **AND** bisacodyl    Calcium Replacement - Follow Nurse / BPA Driven Protocol    chlordiazePOXIDE    LORazepam    LORazepam **OR** LORazepam **OR** LORazepam **OR** LORazepam **OR** LORazepam **OR** LORazepam    Magnesium Standard Dose Replacement - Follow Nurse / BPA Driven Protocol    ondansetron ODT **OR** ondansetron    Phosphorus Replacement - Follow Nurse / BPA Driven Protocol    Potassium Replacement - Follow Nurse / BPA Driven Protocol    sodium chloride    sodium chloride    sodium chloride    Allergies:  Reglan [metoclopramide]    OBJECTIVE    Vital Signs  Temp:  [98.4 °F (36.9 °C)-98.5 °F (36.9 °C)] 98.5 °F (36.9 °C)  Heart Rate:  [114-132] 124  Resp:  [21-24] 24  BP: (119-158)/(61-91) 122/69    I/O this shift:  In: 1050 [IV Piggyback:1050]  Out: -   No intake/output data recorded.    Physical Exam:  General Appearance: S/P ATIVAN AND WITHDRAWS TO PAIN ONLY  Head: normocephalic, without obvious abnormality and atraumatic +DRY OP  Eyes: conjunctivae and +ICTERIC SCLERAE  Neck: supple and no JVD  Lungs: +FEW SCATTERED RHONCHI  Heart: regular rhythm & normal rate and normal S1, S2   Chest Wall: no abnormalities observed  Abdomen: normal bowel sounds and soft   Extremities: moves extremities well, no edema, no cyanosis  Skin: +JAUNDICE  Neurologic: OBTUNDED POST ATIVAN GIVEN FOR AGITATION    Results Review:    I reviewed the patient's new clinical results.    WBC WBC   Date Value Ref Range Status   07/18/2024 9.86 3.40 - 10.80 10*3/mm3 Final      HGB Hemoglobin   Date Value Ref Range Status   07/18/2024 8.9 (L) 13.0 - 17.7 g/dL Final      HCT Hematocrit   Date Value Ref Range Status   07/18/2024 26.4 (L) 37.5 - 51.0 % Final      Platelets No results found for: \"LABPLAT\"   MCV MCV   Date Value Ref Range Status   07/18/2024 87.4 79.0 - 97.0 fL Final          Sodium Sodium   Date Value Ref Range Status   07/18/2024 122 (L) 136 - 145 mmol/L Final      Potassium Potassium " "  Date Value Ref Range Status   07/18/2024 1.9 (C) 3.5 - 5.2 mmol/L Final      Chloride Chloride   Date Value Ref Range Status   07/18/2024 72 (L) 98 - 107 mmol/L Final      CO2 CO2   Date Value Ref Range Status   07/18/2024 33.7 (H) 22.0 - 29.0 mmol/L Final      BUN BUN   Date Value Ref Range Status   07/18/2024 6 6 - 20 mg/dL Final      Creatinine Creatinine   Date Value Ref Range Status   07/18/2024 0.40 (L) 0.76 - 1.27 mg/dL Final      Calcium Calcium   Date Value Ref Range Status   07/18/2024 7.9 (L) 8.6 - 10.5 mg/dL Final      PO4 No results found for: \"CAPO4\"   Albumin Albumin   Date Value Ref Range Status   07/18/2024 3.0 (L) 3.5 - 5.2 g/dL Final      Magnesium Magnesium   Date Value Ref Range Status   07/18/2024 1.2 (L) 1.6 - 2.6 mg/dL Final      Uric Acid No results found for: \"URICACID\"       Imaging Results (Last 72 Hours)       Procedure Component Value Units Date/Time    XR Chest PA & Lateral [[id number]] Collected: 07/18/24 1259     Updated: 07/18/24 1302    Narrative:      DATE OF EXAM:  7/18/2024 12:36 PM     PROCEDURE:  XR CHEST PA AND LATERAL-     INDICATIONS:  rule out aspiration pneumonia; E87.6-Hypokalemia; F10.939-Alcohol use,  unspecified with withdrawal, unspecified; R17-Unspecified jaundice     COMPARISON:  No Comparisons Available     TECHNIQUE:   Two radiologic views of the chest , PA and lateral were obtained.     FINDINGS:  Heart size normal. Negative for lobar consolidation, edema, effusion or  pneumothorax. Osseous structures unremarkable.       Impression:      No active pulmonary process.        Electronically Signed By-Armani Small MD On:7/18/2024 1:00 PM       CT Abdomen Pelvis With Contrast [[id number]] Collected: 07/18/24 1036     Updated: 07/18/24 1048    Narrative:      EXAM: CT ABDOMEN PELVIS W CONTRAST-     DATE OF EXAM: 7/18/2024 10:24 AM     INDICATION: abd pain.     COMPARISON: None available.     TECHNIQUE: Contiguous axial CT images were obtained from the lung " bases  to the pubic symphysis obtained following the uneventful intravenous  administration of 100 mL of Isovue 370 contrast. Sagittal and coronal  reconstructions were performed.  Automated exposure control and  iterative reconstruction methods were used.     FINDINGS:  Heart size within normal limits. No pericardial effusion. Lower lungs  grossly clear.     Enlarged markedly heterogeneous and steatotic liver. Right hepatic lobe  measures nearly 22 cm in greatest CC dimension. Tiny low-density lesions  too small to accurately characterize favoring small cyst. 1.2 cm fluid  density lesion at hepatic dome again favoring cyst. Portal vasculature,  splenic vein and superior mesenteric vein patent. There are distal  paraesophageal, perigastric and perisplenic varices. Spleen within  normal limits in size. Trace intra-abdominal ascites.     Gallbladder wall edema without distention or visible stones most likely  related to adjacent hepatocellular dysfunction. No dilation of biliary  tree. No active pancreatitis or drainable collection. Adrenal glands  unremarkable. Normal size and contour of kidneys. No hydronephrosis or  solid mass. Urinary bladder and prostate unremarkable.     Probable small hiatal hernia. No evidence of bowel obstruction. Colonic  wall thickening and edema within ascending extending into hepatic  flexure, distribution favors sequelae of portal colopathy. In addition  there is mild thickening and edema as well as mucosal hyperemia in the  rectum and distal colon. No CT evidence of acute appendicitis.  Incidental areas of short segment jejunojejunal intussusception without  other complication by CT, most commonly transient and of no clinical  significance in adults.     Negative for abdominal aortic aneurysm. Minimal aortic atherosclerotic  disease. No suspicious adenopathy. No free air or organized collection.     Minimal reticular body wall edema. No acute displaced fracture or  aggressive bone  lesion.       Impression:      1. Enlarged heterogeneous and markedly steatotic liver, which could  reflect sequelae of alcoholic steatohepatitis.  2. Sequelae of portal hypertension noted including distal paraesophageal  and intra-abdominal varices as well as trace ascites. Normal splenic  size. Portal vasculature patent.  3. Gallbladder wall edema without other findings of acute cholecystitis  likely secondary related to underlying hepatocellular dysfunction.  4. Colonic and rectal inflammatory changes which could reflect sequelae  of portal colopathy. Differential would include a nonspecific  infectious/inflammatory proctocolitis. No bowel obstruction.                 Electronically Signed ByYuriy Small MD On:7/18/2024 10:46 AM                 Results for orders placed during the hospital encounter of 07/18/24    XR Chest PA & Lateral    Narrative  DATE OF EXAM:  7/18/2024 12:36 PM    PROCEDURE:  XR CHEST PA AND LATERAL-    INDICATIONS:  rule out aspiration pneumonia; E87.6-Hypokalemia; F10.939-Alcohol use,  unspecified with withdrawal, unspecified; R17-Unspecified jaundice    COMPARISON:  No Comparisons Available    TECHNIQUE:  Two radiologic views of the chest , PA and lateral were obtained.    FINDINGS:  Heart size normal. Negative for lobar consolidation, edema, effusion or  pneumothorax. Osseous structures unremarkable.    Impression  No active pulmonary process.      Electronically Signed ByYuriy Small MD On:7/18/2024 1:00 PM            ASSESSMENT / PLAN      Alcohol withdrawal    HYPONATREMIA------Acute. Hypotonic and clinically hypovolemic.  Secondary to poor solute intake and EtOH abuse (Beer Potomania). No pressing indication for use of hypertonic saline. Give IV NS and follow serial levels. Seizure/fall precautions. Neuro checks. On Telemetry. Check further urine and serum studies to rule out other contributing factors. No SSRI for now    2. HYPOKALEMIA-------Acute and severe whole body  total potassium depletion secondary to n/v and poor intake and severe magnesium deficiency. Replace po and IV. Give IV MgSO4 and follow    3. HYPOMAGNESEMIA-------Replace IV. Follow levels    4. HYPOCALCEMIA------Replace IV. Follow ionized levels. Give MgSO4    5. ALCOHOL ABUSE/CIRRHOSIS/JAUNDICE------Thiamine, Folate, IVFs, prn IV Ativan. Withdrawal prcautions    6. MIXED METABOLIC ALKALOSIS/METABOLIC ACIDOSIS-----  Elevated AGAP with elevated lactic and alcoholic ketoacidosis likely. Give Dextrose/IVFs. Avoid hypotension. Alkalosis secondary to effective intravascular volume depletion from n/v and poor oral intake    7. ANEMIA------Normocytic with normal RDW. Check Fe, SPEP, B12, Folate, and hemoccult. Follow H/H closely with hydration    8. DEPRESSION------Suicide precautions. Hold Zoloft given severe hyponatremia    9. HYPOALBUMINEMIA-----IV Albumin to temporize and po supplements    10. ELEVATED INR------Secondary to liver disease    11. THROMBOCYTOPENIA------No heparin. Secondary to liver disease    12. PUD PROPHYLAXIS-----IV PPI    13. DVT PROPHYLAXIS-----SCDs      I discussed the patient's findings and my recommendations with nursing staff    Will follow along closely. Thank you for allowing us to see this patient in renal consultation.    [facility]  [phone]  MD Navya Gilmore MD  07/18/24  17:39 EDT

## 2024-07-19 ENCOUNTER — APPOINTMENT (OUTPATIENT)
Dept: CT IMAGING | Facility: HOSPITAL | Age: 29
End: 2024-07-19
Payer: MEDICAID

## 2024-07-19 ENCOUNTER — INPATIENT HOSPITAL (OUTPATIENT)
Dept: URBAN - METROPOLITAN AREA HOSPITAL 84 | Facility: HOSPITAL | Age: 29
End: 2024-07-19
Payer: MEDICAID

## 2024-07-19 DIAGNOSIS — E80.6 OTHER DISORDERS OF BILIRUBIN METABOLISM: ICD-10-CM

## 2024-07-19 DIAGNOSIS — F10.139 ALCOHOL ABUSE WITH WITHDRAWAL, UNSPECIFIED: ICD-10-CM

## 2024-07-19 LAB
ALBUMIN SERPL-MCNC: 2.9 G/DL (ref 3.5–5.2)
ALBUMIN SERPL-MCNC: 3.1 G/DL (ref 3.5–5.2)
ALBUMIN/GLOB SERPL: 1 G/DL
ALBUMIN/GLOB SERPL: 1.1 G/DL
ALP SERPL-CCNC: 301 U/L (ref 39–117)
ALP SERPL-CCNC: 324 U/L (ref 39–117)
ALPHA-FETOPROTEIN: 5.94 NG/ML (ref 0–8.3)
ALPHA1 GLOB MFR UR ELPH: 214 MG/DL (ref 90–200)
ALT SERPL W P-5'-P-CCNC: 27 U/L (ref 1–41)
ALT SERPL W P-5'-P-CCNC: 28 U/L (ref 1–41)
AMMONIA BLD-SCNC: 90 UMOL/L (ref 16–60)
ANION GAP SERPL CALCULATED.3IONS-SCNC: 11.2 MMOL/L (ref 5–15)
ANION GAP SERPL CALCULATED.3IONS-SCNC: 7.5 MMOL/L (ref 5–15)
ANION GAP SERPL CALCULATED.3IONS-SCNC: 8.8 MMOL/L (ref 5–15)
AST SERPL-CCNC: 143 U/L (ref 1–40)
AST SERPL-CCNC: 159 U/L (ref 1–40)
BASOPHILS # BLD MANUAL: 0.18 10*3/MM3 (ref 0–0.2)
BASOPHILS NFR BLD MANUAL: 2 % (ref 0–1.5)
BILIRUB SERPL-MCNC: 17.4 MG/DL (ref 0–1.2)
BILIRUB SERPL-MCNC: 17.9 MG/DL (ref 0–1.2)
BUN SERPL-MCNC: 3 MG/DL (ref 6–20)
BUN/CREAT SERPL: 6.8 (ref 7–25)
BUN/CREAT SERPL: 7.3 (ref 7–25)
BUN/CREAT SERPL: 7.5 (ref 7–25)
C3 FRG RBC-MCNC: ABNORMAL
CA-I SERPL ISE-MCNC: 1.03 MMOL/L (ref 1.2–1.3)
CALCIUM SPEC-SCNC: 7.9 MG/DL (ref 8.6–10.5)
CALCIUM SPEC-SCNC: 7.9 MG/DL (ref 8.6–10.5)
CALCIUM SPEC-SCNC: 8.1 MG/DL (ref 8.6–10.5)
CERULOPLASMIN SERPL-MCNC: 18 MG/DL (ref 16–31)
CHLORIDE SERPL-SCNC: 86 MMOL/L (ref 98–107)
CHLORIDE SERPL-SCNC: 96 MMOL/L (ref 98–107)
CHLORIDE SERPL-SCNC: 96 MMOL/L (ref 98–107)
CO2 SERPL-SCNC: 23.8 MMOL/L (ref 22–29)
CO2 SERPL-SCNC: 29.2 MMOL/L (ref 22–29)
CO2 SERPL-SCNC: 32.5 MMOL/L (ref 22–29)
CREAT SERPL-MCNC: 0.4 MG/DL (ref 0.76–1.27)
CREAT SERPL-MCNC: 0.41 MG/DL (ref 0.76–1.27)
CREAT SERPL-MCNC: 0.44 MG/DL (ref 0.76–1.27)
D-LACTATE SERPL-SCNC: 1.3 MMOL/L (ref 0.5–2)
DACRYOCYTES BLD QL SMEAR: ABNORMAL
DEPRECATED RDW RBC AUTO: 45.1 FL (ref 37–54)
EGFRCR SERPLBLD CKD-EPI 2021: 147.2 ML/MIN/1.73
EGFRCR SERPLBLD CKD-EPI 2021: 150.3 ML/MIN/1.73
EGFRCR SERPLBLD CKD-EPI 2021: 151.5 ML/MIN/1.73
ERYTHROCYTE [DISTWIDTH] IN BLOOD BY AUTOMATED COUNT: 14.1 % (ref 12.3–15.4)
FOLATE SERPL-MCNC: 2.16 NG/ML (ref 4.78–24.2)
GLOBULIN UR ELPH-MCNC: 2.7 GM/DL
GLOBULIN UR ELPH-MCNC: 3 GM/DL
GLUCOSE SERPL-MCNC: 116 MG/DL (ref 65–99)
GLUCOSE SERPL-MCNC: 78 MG/DL (ref 65–99)
GLUCOSE SERPL-MCNC: 79 MG/DL (ref 65–99)
HAV IGM SERPL QL IA: NORMAL
HBV CORE IGM SERPL QL IA: NORMAL
HBV SURFACE AG SERPL QL IA: NORMAL
HCT VFR BLD AUTO: 24.5 % (ref 37.5–51)
HCV AB SER QL: NORMAL
HGB BLD-MCNC: 8.2 G/DL (ref 13–17.7)
INR PPP: 1.79 (ref 0.93–1.1)
LYMPHOCYTES # BLD MANUAL: 1.32 10*3/MM3 (ref 0.7–3.1)
LYMPHOCYTES NFR BLD MANUAL: 12 % (ref 5–12)
MAGNESIUM SERPL-MCNC: 1.7 MG/DL (ref 1.6–2.6)
MAGNESIUM SERPL-MCNC: 2.2 MG/DL (ref 1.6–2.6)
MCH RBC QN AUTO: 30 PG (ref 26.6–33)
MCHC RBC AUTO-ENTMCNC: 33.5 G/DL (ref 31.5–35.7)
MCV RBC AUTO: 89.4 FL (ref 79–97)
MONOCYTES # BLD: 1.05 10*3/MM3 (ref 0.1–0.9)
NEUTROPHILS # BLD AUTO: 6.23 10*3/MM3 (ref 1.7–7)
NEUTROPHILS NFR BLD MANUAL: 65 % (ref 42.7–76)
NEUTS BAND NFR BLD MANUAL: 6 % (ref 0–5)
PHOSPHATE SERPL-MCNC: 0.3 MG/DL (ref 2.5–4.5)
PHOSPHATE SERPL-MCNC: 1.6 MG/DL (ref 2.5–4.5)
PLATELET # BLD AUTO: 85 10*3/MM3 (ref 140–450)
PMV BLD AUTO: 10.1 FL (ref 6–12)
POTASSIUM SERPL-SCNC: 3 MMOL/L (ref 3.5–5.2)
POTASSIUM SERPL-SCNC: 3.6 MMOL/L (ref 3.5–5.2)
POTASSIUM SERPL-SCNC: 3.6 MMOL/L (ref 3.5–5.2)
POTASSIUM SERPL-SCNC: 3.8 MMOL/L (ref 3.5–5.2)
PROT SERPL-MCNC: 5.6 G/DL (ref 6–8.5)
PROT SERPL-MCNC: 6.1 G/DL (ref 6–8.5)
PROTHROMBIN TIME: 18.7 SECONDS (ref 9.6–11.7)
RBC # BLD AUTO: 2.74 10*6/MM3 (ref 4.14–5.8)
SMALL PLATELETS BLD QL SMEAR: ABNORMAL
SODIUM SERPL-SCNC: 126 MMOL/L (ref 136–145)
SODIUM SERPL-SCNC: 131 MMOL/L (ref 136–145)
SODIUM SERPL-SCNC: 134 MMOL/L (ref 136–145)
TARGETS BLD QL SMEAR: ABNORMAL
VARIANT LYMPHS NFR BLD MANUAL: 12 % (ref 19.6–45.3)
VARIANT LYMPHS NFR BLD MANUAL: 3 % (ref 0–5)
VIT B12 BLD-MCNC: 1997 PG/ML (ref 211–946)
WBC MORPH BLD: NORMAL
WBC NRBC COR # BLD AUTO: 8.78 10*3/MM3 (ref 3.4–10.8)

## 2024-07-19 PROCEDURE — 84132 ASSAY OF SERUM POTASSIUM: CPT | Performed by: INTERNAL MEDICINE

## 2024-07-19 PROCEDURE — 82330 ASSAY OF CALCIUM: CPT | Performed by: INTERNAL MEDICINE

## 2024-07-19 PROCEDURE — 80074 ACUTE HEPATITIS PANEL: CPT | Performed by: NURSE PRACTITIONER

## 2024-07-19 PROCEDURE — 84100 ASSAY OF PHOSPHORUS: CPT | Performed by: INTERNAL MEDICINE

## 2024-07-19 PROCEDURE — 25810000003 SODIUM CHLORIDE 0.9 % SOLUTION: Performed by: STUDENT IN AN ORGANIZED HEALTH CARE EDUCATION/TRAINING PROGRAM

## 2024-07-19 PROCEDURE — 85027 COMPLETE CBC AUTOMATED: CPT | Performed by: INTERNAL MEDICINE

## 2024-07-19 PROCEDURE — 86015 ACTIN ANTIBODY EACH: CPT | Performed by: NURSE PRACTITIONER

## 2024-07-19 PROCEDURE — 70450 CT HEAD/BRAIN W/O DYE: CPT

## 2024-07-19 PROCEDURE — 82140 ASSAY OF AMMONIA: CPT | Performed by: NURSE PRACTITIONER

## 2024-07-19 PROCEDURE — 82103 ALPHA-1-ANTITRYPSIN TOTAL: CPT | Performed by: NURSE PRACTITIONER

## 2024-07-19 PROCEDURE — 83605 ASSAY OF LACTIC ACID: CPT | Performed by: INTERNAL MEDICINE

## 2024-07-19 PROCEDURE — 25010000002 THIAMINE PER 100 MG: Performed by: INTERNAL MEDICINE

## 2024-07-19 PROCEDURE — 85610 PROTHROMBIN TIME: CPT | Performed by: INTERNAL MEDICINE

## 2024-07-19 PROCEDURE — 83735 ASSAY OF MAGNESIUM: CPT | Performed by: INTERNAL MEDICINE

## 2024-07-19 PROCEDURE — 51798 US URINE CAPACITY MEASURE: CPT

## 2024-07-19 PROCEDURE — 25810000003 DEXTROSE 5 % AND SODIUM CHLORIDE 0.9 % 5-0.9 % SOLUTION 1,000 ML FLEX CONT: Performed by: INTERNAL MEDICINE

## 2024-07-19 PROCEDURE — 86038 ANTINUCLEAR ANTIBODIES: CPT | Performed by: NURSE PRACTITIONER

## 2024-07-19 PROCEDURE — 25010000002 POTASSIUM CHLORIDE PER 2 MEQ OF POTASSIUM: Performed by: INTERNAL MEDICINE

## 2024-07-19 PROCEDURE — 80053 COMPREHEN METABOLIC PANEL: CPT | Performed by: INTERNAL MEDICINE

## 2024-07-19 PROCEDURE — 63710000001 PREDNISOLONE PER 5 MG: Performed by: NURSE PRACTITIONER

## 2024-07-19 PROCEDURE — 25810000003 SODIUM CHLORIDE 0.9 % SOLUTION: Performed by: INTERNAL MEDICINE

## 2024-07-19 PROCEDURE — 85007 BL SMEAR W/DIFF WBC COUNT: CPT | Performed by: INTERNAL MEDICINE

## 2024-07-19 PROCEDURE — 25010000002 CALCIUM GLUCONATE 2-0.675 GM/100ML-% SOLUTION: Performed by: INTERNAL MEDICINE

## 2024-07-19 PROCEDURE — 99223 1ST HOSP IP/OBS HIGH 75: CPT | Performed by: NURSE PRACTITIONER

## 2024-07-19 PROCEDURE — 25810000003 DEXTROSE 5 % AND SODIUM CHLORIDE 0.9 % 5-0.9 % SOLUTION: Performed by: INTERNAL MEDICINE

## 2024-07-19 PROCEDURE — 82390 ASSAY OF CERULOPLASMIN: CPT | Performed by: NURSE PRACTITIONER

## 2024-07-19 PROCEDURE — 86381 MITOCHONDRIAL ANTIBODY EACH: CPT | Performed by: NURSE PRACTITIONER

## 2024-07-19 PROCEDURE — 82977 ASSAY OF GGT: CPT | Performed by: NURSE PRACTITIONER

## 2024-07-19 RX ORDER — CALCIUM GLUCONATE 20 MG/ML
2000 INJECTION, SOLUTION INTRAVENOUS EVERY 8 HOURS
Status: COMPLETED | OUTPATIENT
Start: 2024-07-19 | End: 2024-07-20

## 2024-07-19 RX ORDER — FENTANYL/ROPIVACAINE/NS/PF 2-625MCG/1
15 PLASTIC BAG, INJECTION (ML) EPIDURAL
Status: COMPLETED | OUTPATIENT
Start: 2024-07-20 | End: 2024-07-20

## 2024-07-19 RX ORDER — POTASSIUM CHLORIDE 20 MEQ/1
40 TABLET, EXTENDED RELEASE ORAL EVERY 4 HOURS
Status: DISPENSED | OUTPATIENT
Start: 2024-07-19 | End: 2024-07-19

## 2024-07-19 RX ORDER — LACTULOSE 10 G/15ML
30 SOLUTION ORAL ONCE
Status: COMPLETED | OUTPATIENT
Start: 2024-07-19 | End: 2024-07-19

## 2024-07-19 RX ORDER — DEXTROSE MONOHYDRATE AND SODIUM CHLORIDE 5; .9 G/100ML; G/100ML
75 INJECTION, SOLUTION INTRAVENOUS CONTINUOUS
Status: DISCONTINUED | OUTPATIENT
Start: 2024-07-19 | End: 2024-07-21

## 2024-07-19 RX ORDER — ALBUMIN (HUMAN) 12.5 G/50ML
25 SOLUTION INTRAVENOUS ONCE
Status: DISCONTINUED | OUTPATIENT
Start: 2024-07-19 | End: 2024-07-21

## 2024-07-19 RX ORDER — ZINC SULFATE 50(220)MG
220 CAPSULE ORAL DAILY
Status: DISCONTINUED | OUTPATIENT
Start: 2024-07-19 | End: 2024-08-02 | Stop reason: HOSPADM

## 2024-07-19 RX ORDER — PREDNISOLONE SODIUM PHOSPHATE 15 MG/5ML
40 SOLUTION ORAL DAILY
Status: DISCONTINUED | OUTPATIENT
Start: 2024-07-19 | End: 2024-07-25

## 2024-07-19 RX ORDER — FENTANYL/ROPIVACAINE/NS/PF 2-625MCG/1
15 PLASTIC BAG, INJECTION (ML) EPIDURAL
Qty: 750 ML | Refills: 0 | Status: COMPLETED | OUTPATIENT
Start: 2024-07-19 | End: 2024-07-19

## 2024-07-19 RX ADMIN — POTASSIUM PHOSPHATE, MONOBASIC POTASSIUM PHOSPHATE, DIBASIC 15 MMOL: 224; 236 INJECTION, SOLUTION, CONCENTRATE INTRAVENOUS at 10:05

## 2024-07-19 RX ADMIN — DEXTROSE MONOHYDRATE AND SODIUM CHLORIDE 125 ML/HR: 5; .9 INJECTION, SOLUTION INTRAVENOUS at 20:16

## 2024-07-19 RX ADMIN — POTASSIUM CHLORIDE 40 MEQ: 1500 TABLET, EXTENDED RELEASE ORAL at 05:00

## 2024-07-19 RX ADMIN — Medication 1 TABLET: at 10:05

## 2024-07-19 RX ADMIN — THIAMINE HYDROCHLORIDE 200 MG: 100 INJECTION, SOLUTION INTRAMUSCULAR; INTRAVENOUS at 22:40

## 2024-07-19 RX ADMIN — POTASSIUM PHOSPHATE, MONOBASIC POTASSIUM PHOSPHATE, DIBASIC 15 MMOL: 224; 236 INJECTION, SOLUTION, CONCENTRATE INTRAVENOUS at 14:58

## 2024-07-19 RX ADMIN — LORAZEPAM 1 MG: 1 TABLET ORAL at 22:40

## 2024-07-19 RX ADMIN — PANTOPRAZOLE SODIUM 40 MG: 40 INJECTION, POWDER, FOR SOLUTION INTRAVENOUS at 10:05

## 2024-07-19 RX ADMIN — Medication 10 ML: at 20:17

## 2024-07-19 RX ADMIN — FOLIC ACID 1 MG: 1 TABLET ORAL at 10:05

## 2024-07-19 RX ADMIN — LACTULOSE 30 G: 10 SOLUTION ORAL at 05:00

## 2024-07-19 RX ADMIN — POTASSIUM PHOSPHATE, MONOBASIC POTASSIUM PHOSPHATE, DIBASIC 15 MMOL: 224; 236 INJECTION, SOLUTION, CONCENTRATE INTRAVENOUS at 04:31

## 2024-07-19 RX ADMIN — THIAMINE HYDROCHLORIDE 200 MG: 100 INJECTION, SOLUTION INTRAMUSCULAR; INTRAVENOUS at 05:48

## 2024-07-19 RX ADMIN — LORAZEPAM 1 MG: 1 TABLET ORAL at 15:40

## 2024-07-19 RX ADMIN — THIAMINE HYDROCHLORIDE 200 MG: 100 INJECTION, SOLUTION INTRAMUSCULAR; INTRAVENOUS at 14:58

## 2024-07-19 RX ADMIN — Medication 220 MG: at 16:23

## 2024-07-19 RX ADMIN — CALCIUM GLUCONATE 2000 MG: 20 INJECTION, SOLUTION INTRAVENOUS at 18:24

## 2024-07-19 RX ADMIN — Medication 10 ML: at 16:00

## 2024-07-19 RX ADMIN — POTASSIUM CHLORIDE: 2 INJECTION, SOLUTION, CONCENTRATE INTRAVENOUS at 04:30

## 2024-07-19 RX ADMIN — CALCIUM GLUCONATE 2000 MG: 20 INJECTION, SOLUTION INTRAVENOUS at 06:21

## 2024-07-19 RX ADMIN — LORAZEPAM 2 MG: 1 TABLET ORAL at 05:00

## 2024-07-19 RX ADMIN — POTASSIUM CHLORIDE 40 MEQ: 1500 TABLET, EXTENDED RELEASE ORAL at 10:05

## 2024-07-19 RX ADMIN — LORAZEPAM 2 MG: 1 TABLET ORAL at 10:05

## 2024-07-19 RX ADMIN — PREDNISOLONE SODIUM PHOSPHATE 40 MG: 15 SOLUTION ORAL at 16:23

## 2024-07-19 RX ADMIN — POTASSIUM PHOSPHATE, MONOBASIC POTASSIUM PHOSPHATE, DIBASIC 15 MMOL: 224; 236 INJECTION, SOLUTION, CONCENTRATE INTRAVENOUS at 23:51

## 2024-07-19 NOTE — PLAN OF CARE
Problem: Adult Inpatient Plan of Care  Goal: Optimal Comfort and Wellbeing  Intervention: Provide Person-Centered Care  Recent Flowsheet Documentation  Taken 7/18/2024 2000 by Sanjay Rojas, RN  Trust Relationship/Rapport:   care explained   questions answered     Problem: Adult Inpatient Plan of Care  Goal: Readiness for Transition of Care  Intervention: Mutually Develop Transition Plan  Recent Flowsheet Documentation  Taken 7/18/2024 2213 by Sanjay Rojas, RN  Transportation Anticipated:   car, drives self   family or friend will provide  Patient/Family Anticipated Services at Transition: none  Patient/Family Anticipates Transition to: home with family  Taken 7/18/2024 2208 by Sanjay Rojas, RN  Equipment Currently Used at Home: none   Goal Outcome Evaluation:

## 2024-07-19 NOTE — PROGRESS NOTES
"NEPHROLOGY PROGRESS NOTE------[facility]    [facility]  [phone]  Navya Mcdonald MD      Patient Care Team:  Provider, No Known as PCP - General      Provider:  Navya Mcdonald MD  Patient Name: Yonas Charles  :  1995    SUBJECTIVE:    F/U ELECTROLYTE ABNORMALITIES    Awake and alert this AM. No angina. No dysuria.     Medication:  folic acid, 1 mg, Oral, Daily  LORazepam, 2 mg, Oral, Q6H   Followed by  LORazepam, 1 mg, Oral, Q6H   Followed by  [START ON 2024] LORazepam, 1 mg, Oral, Q12H   Followed by  [START ON 2024] LORazepam, 1 mg, Oral, Daily  multivitamin with minerals, 1 tablet, Oral, Daily  pantoprazole, 40 mg, Intravenous, Daily  potassium chloride ER, 40 mEq, Oral, Q4H  potassium phosphate, 15 mmol, Intravenous, Q3H  [Held by provider] sertraline, 50 mg, Oral, Daily  sodium chloride, 10 mL, Intravenous, Q12H  thiamine (B-1) IV, 200 mg, Intravenous, Q8H   Followed by  [START ON 2024] thiamine, 100 mg, Oral, Daily      dextrose 5 % and sodium chloride 0.9 % 970 mL with multivitamin (adult) 10 mL, potassium chloride 40 mEq infusion, , Last Rate: 125 mL/hr at 24 0430        OBJECTIVE    Vital Sign Min/Max for last 24 hours  Temp  Min: 98 °F (36.7 °C)  Max: 98.6 °F (37 °C)   BP  Min: 110/58  Max: 158/87   Pulse  Min: 102  Max: 132   Resp  Min: 14  Max: 24   SpO2  Min: 90 %  Max: 97 %   No data recorded   Weight  Min: 55.7 kg (122 lb 12.7 oz)  Max: 55.7 kg (122 lb 12.7 oz)     Flowsheet Rows      Flowsheet Row First Filed Value   Admission Height 165.1 cm (65\") Documented at 2024 0855   Admission Weight 55.7 kg (122 lb 12.7 oz) Documented at 2024 0855            No intake/output data recorded.  I/O last 3 completed shifts:  In: 1050 [IV Piggyback:1050]  Out: 800 [Urine:800]    Physical Exam:  General Appearance: WEAK AND MALAISED  Head: normocephalic, without obvious abnormality and atraumatic +DRY " "OP  Eyes: conjunctivae and +ICTERIC SCLERAE  Neck: supple and no JVD  Lungs: +FEW SCATTERED RHONCHI  Heart: regular rhythm & normal rate and normal S1, S2   Chest Wall: no abnormalities observed  Abdomen: normal bowel sounds and soft   Extremities: moves extremities well, no edema, no cyanosis  Skin: +JAUNDICE  Neurologic: OBTUNDED POST ATIVAN GIVEN FOR AGITATION    Labs:    WBC WBC   Date Value Ref Range Status   07/19/2024 8.78 3.40 - 10.80 10*3/mm3 Final   07/18/2024 9.86 3.40 - 10.80 10*3/mm3 Final      HGB Hemoglobin   Date Value Ref Range Status   07/19/2024 8.2 (L) 13.0 - 17.7 g/dL Final   07/18/2024 8.9 (L) 13.0 - 17.7 g/dL Final      HCT Hematocrit   Date Value Ref Range Status   07/19/2024 24.5 (L) 37.5 - 51.0 % Final   07/18/2024 26.4 (L) 37.5 - 51.0 % Final      Platelets No results found for: \"LABPLAT\"   MCV MCV   Date Value Ref Range Status   07/19/2024 89.4 79.0 - 97.0 fL Final   07/18/2024 87.4 79.0 - 97.0 fL Final          Sodium Sodium   Date Value Ref Range Status   07/19/2024 126 (L) 136 - 145 mmol/L Final   07/18/2024 122 (L) 136 - 145 mmol/L Final   07/18/2024 122 (L) 136 - 145 mmol/L Final      Potassium Potassium   Date Value Ref Range Status   07/19/2024 3.0 (L) 3.5 - 5.2 mmol/L Final   07/18/2024 2.7 (L) 3.5 - 5.2 mmol/L Final   07/18/2024 1.9 (C) 3.5 - 5.2 mmol/L Final      Chloride Chloride   Date Value Ref Range Status   07/19/2024 86 (L) 98 - 107 mmol/L Final   07/18/2024 80 (L) 98 - 107 mmol/L Final   07/18/2024 72 (L) 98 - 107 mmol/L Final      CO2 CO2   Date Value Ref Range Status   07/19/2024 32.5 (H) 22.0 - 29.0 mmol/L Final   07/18/2024 33.2 (H) 22.0 - 29.0 mmol/L Final   07/18/2024 33.7 (H) 22.0 - 29.0 mmol/L Final      BUN BUN   Date Value Ref Range Status   07/19/2024 3 (L) 6 - 20 mg/dL Final   07/18/2024 4 (L) 6 - 20 mg/dL Final   07/18/2024 6 6 - 20 mg/dL Final      Creatinine Creatinine   Date Value Ref Range Status   07/19/2024 0.44 (L) 0.76 - 1.27 mg/dL Final   07/18/2024 " "0.38 (L) 0.76 - 1.27 mg/dL Final   07/18/2024 0.40 (L) 0.76 - 1.27 mg/dL Final      Calcium Calcium   Date Value Ref Range Status   07/19/2024 7.9 (L) 8.6 - 10.5 mg/dL Final   07/18/2024 7.4 (L) 8.6 - 10.5 mg/dL Final   07/18/2024 7.9 (L) 8.6 - 10.5 mg/dL Final      PO4 No components found for: \"PO4\"   Albumin Albumin   Date Value Ref Range Status   07/19/2024 3.1 (L) 3.5 - 5.2 g/dL Final   07/18/2024 3.0 (L) 3.5 - 5.2 g/dL Final      Magnesium Magnesium   Date Value Ref Range Status   07/19/2024 2.2 1.6 - 2.6 mg/dL Final   07/18/2024 1.2 (L) 1.6 - 2.6 mg/dL Final      Uric Acid No components found for: \"URIC ACID\"     Imaging Results (Last 72 Hours)       Procedure Component Value Units Date/Time    CT Head Without Contrast [[id number]] Collected: 07/19/24 0608     Updated: 07/19/24 0612    Narrative:      CT HEAD WO CONTRAST    Date of Exam: 7/19/2024 1:55 AM EDT    Indication: AMS, weakness LUE.    Comparison: None available.    Technique: Volumetric axial CT images of the head were obtained without administration of intravenous contrast. Coronal and sagittal reconstructions were performed.Automated exposure control and iterative reconstruction methods were utilized for CT dose   reduction.    Findings:  There is no evidence of hemorrhage. There is no mass effect or midline shift.    There is no extracerebral collection.    Ventricles are normal in size and configuration for patient's stated age.      Posterior fossa is within normal limits.    Calvarium and skull base appear intact.   Visualized sinuses show no air fluid levels. Visualized orbits are unremarkable.      Impression:      Impression:  No acute intracranial process.        Electronically Signed: Ghazala Ordonez MD    7/19/2024 6:10 AM EDT    Workstation ID: [id number]    XR Chest PA & Lateral [[id number]] Collected: 07/18/24 1259     Updated: 07/18/24 1302    Narrative:      DATE OF EXAM:  7/18/2024 12:36 PM     PROCEDURE:  XR CHEST PA AND LATERAL-   "   INDICATIONS:  rule out aspiration pneumonia; E87.6-Hypokalemia; F10.939-Alcohol use,  unspecified with withdrawal, unspecified; R17-Unspecified jaundice     COMPARISON:  No Comparisons Available     TECHNIQUE:   Two radiologic views of the chest , PA and lateral were obtained.     FINDINGS:  Heart size normal. Negative for lobar consolidation, edema, effusion or  pneumothorax. Osseous structures unremarkable.       Impression:      No active pulmonary process.        Electronically Signed By-Armani Small MD On:7/18/2024 1:00 PM       CT Abdomen Pelvis With Contrast [[id number]] Collected: 07/18/24 1036     Updated: 07/18/24 1048    Narrative:      EXAM: CT ABDOMEN PELVIS W CONTRAST-     DATE OF EXAM: 7/18/2024 10:24 AM     INDICATION: abd pain.     COMPARISON: None available.     TECHNIQUE: Contiguous axial CT images were obtained from the lung bases  to the pubic symphysis obtained following the uneventful intravenous  administration of 100 mL of Isovue 370 contrast. Sagittal and coronal  reconstructions were performed.  Automated exposure control and  iterative reconstruction methods were used.     FINDINGS:  Heart size within normal limits. No pericardial effusion. Lower lungs  grossly clear.     Enlarged markedly heterogeneous and steatotic liver. Right hepatic lobe  measures nearly 22 cm in greatest CC dimension. Tiny low-density lesions  too small to accurately characterize favoring small cyst. 1.2 cm fluid  density lesion at hepatic dome again favoring cyst. Portal vasculature,  splenic vein and superior mesenteric vein patent. There are distal  paraesophageal, perigastric and perisplenic varices. Spleen within  normal limits in size. Trace intra-abdominal ascites.     Gallbladder wall edema without distention or visible stones most likely  related to adjacent hepatocellular dysfunction. No dilation of biliary  tree. No active pancreatitis or drainable collection. Adrenal glands  unremarkable. Normal  size and contour of kidneys. No hydronephrosis or  solid mass. Urinary bladder and prostate unremarkable.     Probable small hiatal hernia. No evidence of bowel obstruction. Colonic  wall thickening and edema within ascending extending into hepatic  flexure, distribution favors sequelae of portal colopathy. In addition  there is mild thickening and edema as well as mucosal hyperemia in the  rectum and distal colon. No CT evidence of acute appendicitis.  Incidental areas of short segment jejunojejunal intussusception without  other complication by CT, most commonly transient and of no clinical  significance in adults.     Negative for abdominal aortic aneurysm. Minimal aortic atherosclerotic  disease. No suspicious adenopathy. No free air or organized collection.     Minimal reticular body wall edema. No acute displaced fracture or  aggressive bone lesion.       Impression:      1. Enlarged heterogeneous and markedly steatotic liver, which could  reflect sequelae of alcoholic steatohepatitis.  2. Sequelae of portal hypertension noted including distal paraesophageal  and intra-abdominal varices as well as trace ascites. Normal splenic  size. Portal vasculature patent.  3. Gallbladder wall edema without other findings of acute cholecystitis  likely secondary related to underlying hepatocellular dysfunction.  4. Colonic and rectal inflammatory changes which could reflect sequelae  of portal colopathy. Differential would include a nonspecific  infectious/inflammatory proctocolitis. No bowel obstruction.                 Electronically Signed By-Armani Small MD On:7/18/2024 10:46 AM               Results for orders placed during the hospital encounter of 07/18/24    XR Chest PA & Lateral    Narrative  DATE OF EXAM:  7/18/2024 12:36 PM    PROCEDURE:  XR CHEST PA AND LATERAL-    INDICATIONS:  rule out aspiration pneumonia; E87.6-Hypokalemia; F10.939-Alcohol use,  unspecified with withdrawal, unspecified; R17-Unspecified  jaundice    COMPARISON:  No Comparisons Available    TECHNIQUE:  Two radiologic views of the chest , PA and lateral were obtained.    FINDINGS:  Heart size normal. Negative for lobar consolidation, edema, effusion or  pneumothorax. Osseous structures unremarkable.    Impression  No active pulmonary process.      Electronically Signed By-Armani Small MD On:7/18/2024 1:00 PM            ASSESSMENT / PLAN      Alcohol withdrawal    HYPONATREMIA------Acute. Improving. Hypotonic and clinically hypovolemic.  Secondary to poor solute intake and EtOH abuse (Beer Potomania). No pressing indication for use of hypertonic saline. Give IV NS and follow serial levels. Seizure/fall precautions. Neuro checks. On Telemetry.  No SSRI for now     2. HYPOKALEMIA-------Improving. Acute and severe whole body total potassium depletion secondary to n/v and poor intake and severe magnesium deficiency. Replace po and IV. Give IV MgSO4 and follow     3. HYPOMAGNESEMIA-------Replaced     4. HYPOCALCEMIA------Replace IV. Follow ionized levels. Give MgSO4     5. ALCOHOL ABUSE/CIRRHOSIS/JAUNDICE------Thiamine, Folate, IVFs, prn IV Ativan. Withdrawal prcautions     6. MIXED METABOLIC ALKALOSIS/METABOLIC ACIDOSIS-----  Elevated AGAP with elevated lactic and alcoholic ketoacidosis likely. Give Dextrose/IVFs. Avoid hypotension. Alkalosis secondary to effective intravascular volume depletion from n/v and poor oral intake     7. ANEMIA------Normocytic with normal RDW. Check Fe, SPEP, B12, Folate, and hemoccult. Follow H/H closely with hydration     8. DEPRESSION------Suicide precautions. Hold Zoloft given severe hyponatremia     9. HYPOALBUMINEMIA-----IV Albumin to temporize and po supplements     10. ELEVATED INR------Secondary to liver disease     11. THROMBOCYTOPENIA------No heparin. Secondary to liver disease     12. PUD PROPHYLAXIS-----IV PPI     13. DVT PROPHYLAXIS-----SCDs        Navya Mcdonald MD  [facility]  [phone]  07/19/24  08:38 EDT

## 2024-07-19 NOTE — CASE MANAGEMENT/SOCIAL WORK
Social Work Assessment   Tio     Patient Name: Yonas Charles  MRN: [id number]  Today's Date: 7/19/2024    Admit Date: 7/18/2024     Substance Abuse       Row Name 07/19/24 1604       Substance Use    Substance Use Status current alcohol use      Substance Use Comment SW was consulted re: etoh abuse/SI. SW consulted with Psych and was informed pt will likely need IP psych placement but is not medically stable for d/c. Info placed in handoff.             Annamarie Montesinos, KATHERINE, LSW  Medical Social Worker  Ph [phone]  Fax [phone]  [email or web address]

## 2024-07-19 NOTE — PLAN OF CARE
Problem: Adult Inpatient Plan of Care  Goal: Patient-Specific Goal (Individualized)  7/19/2024 1825 by Floridalma Blandon, RN  Outcome: Ongoing, Progressing  7/19/2024 1824 by Floridalma Blandon, RN  Outcome: Ongoing, Progressing   Goal Outcome Evaluation:

## 2024-07-19 NOTE — PLAN OF CARE
Problem: Adult Inpatient Plan of Care  Goal: Absence of Hospital-Acquired Illness or Injury  Intervention: Identify and Manage Fall Risk  Recent Flowsheet Documentation  Taken 7/18/2024 2000 by Sanjay Rojas RN  Safety Promotion/Fall Prevention: safety round/check completed  Intervention: Prevent Skin Injury  Recent Flowsheet Documentation  Taken 7/18/2024 2000 by Sanjay Rojas RN  Body Position: position changed independently  Intervention: Prevent and Manage VTE (Venous Thromboembolism) Risk  Recent Flowsheet Documentation  Taken 7/18/2024 2000 by Sanjay Rojas RN  Activity Management: (CIWA Sleeping) --  VTE Prevention/Management:   compression stockings off   sequential compression devices off     Problem: Adult Inpatient Plan of Care  Goal: Absence of Hospital-Acquired Illness or Injury  Intervention: Identify and Manage Fall Risk  Recent Flowsheet Documentation  Taken 7/18/2024 2000 by Sanjay Rojas RN  Safety Promotion/Fall Prevention: safety round/check completed     Problem: Adult Inpatient Plan of Care  Goal: Optimal Comfort and Wellbeing  Intervention: Provide Person-Centered Care  Recent Flowsheet Documentation  Taken 7/18/2024 2000 by Sanjay Rojas RN  Trust Relationship/Rapport:   care explained   questions answered     Problem: Adult Inpatient Plan of Care  Goal: Optimal Comfort and Wellbeing  Intervention: Provide Person-Centered Care  Recent Flowsheet Documentation  Taken 7/18/2024 2000 by Sanjay Rojas RN  Trust Relationship/Rapport:   care explained   questions answered     Problem: Adult Inpatient Plan of Care  Goal: Readiness for Transition of Care  Intervention: Mutually Develop Transition Plan  Recent Flowsheet Documentation  Taken 7/18/2024 2213 by Sanjay Rojas RN  Transportation Anticipated:   car, drives self   family or friend will provide  Patient/Family Anticipated Services at Transition: none  Patient/Family Anticipates Transition to: home with family  Taken 7/18/2024  2208 by Sanjay Rojas, RN  Equipment Currently Used at Home: none   Goal Outcome Evaluation:

## 2024-07-20 ENCOUNTER — INPATIENT HOSPITAL (OUTPATIENT)
Dept: URBAN - METROPOLITAN AREA HOSPITAL 84 | Facility: HOSPITAL | Age: 29
End: 2024-07-20
Payer: MEDICAID

## 2024-07-20 DIAGNOSIS — K70.10 ALCOHOLIC HEPATITIS WITHOUT ASCITES: ICD-10-CM

## 2024-07-20 DIAGNOSIS — D69.59 OTHER SECONDARY THROMBOCYTOPENIA: ICD-10-CM

## 2024-07-20 DIAGNOSIS — D64.9 ANEMIA, UNSPECIFIED: ICD-10-CM

## 2024-07-20 DIAGNOSIS — E87.1 HYPO-OSMOLALITY AND HYPONATREMIA: ICD-10-CM

## 2024-07-20 DIAGNOSIS — R13.10 DYSPHAGIA, UNSPECIFIED: ICD-10-CM

## 2024-07-20 DIAGNOSIS — F10.230 ALCOHOL DEPENDENCE WITH WITHDRAWAL, UNCOMPLICATED: ICD-10-CM

## 2024-07-20 DIAGNOSIS — K21.9 GASTRO-ESOPHAGEAL REFLUX DISEASE WITHOUT ESOPHAGITIS: ICD-10-CM

## 2024-07-20 DIAGNOSIS — D72.829 ELEVATED WHITE BLOOD CELL COUNT, UNSPECIFIED: ICD-10-CM

## 2024-07-20 DIAGNOSIS — R79.1 ABNORMAL COAGULATION PROFILE: ICD-10-CM

## 2024-07-20 LAB
ALBUMIN SERPL-MCNC: 2.9 G/DL (ref 3.5–5.2)
ALBUMIN/GLOB SERPL: 1.1 G/DL
ALP SERPL-CCNC: 286 U/L (ref 39–117)
ALT SERPL W P-5'-P-CCNC: 23 U/L (ref 1–41)
ANION GAP SERPL CALCULATED.3IONS-SCNC: 8 MMOL/L (ref 5–15)
AST SERPL-CCNC: 122 U/L (ref 1–40)
BASOPHILS # BLD AUTO: 0.03 10*3/MM3 (ref 0–0.2)
BASOPHILS NFR BLD AUTO: 0.3 % (ref 0–1.5)
BILIRUB SERPL-MCNC: 18 MG/DL (ref 0–1.2)
BUN SERPL-MCNC: 3 MG/DL (ref 6–20)
BUN/CREAT SERPL: 6.5 (ref 7–25)
CA-I SERPL ISE-MCNC: 1.24 MMOL/L (ref 1.2–1.3)
CALCIUM SPEC-SCNC: 9.2 MG/DL (ref 8.6–10.5)
CHLORIDE SERPL-SCNC: 96 MMOL/L (ref 98–107)
CO2 SERPL-SCNC: 29 MMOL/L (ref 22–29)
CREAT SERPL-MCNC: 0.46 MG/DL (ref 0.76–1.27)
DEPRECATED RDW RBC AUTO: 49.1 FL (ref 37–54)
EGFRCR SERPLBLD CKD-EPI 2021: 145.2 ML/MIN/1.73
EOSINOPHIL # BLD AUTO: 0 10*3/MM3 (ref 0–0.4)
EOSINOPHIL NFR BLD AUTO: 0 % (ref 0.3–6.2)
ERYTHROCYTE [DISTWIDTH] IN BLOOD BY AUTOMATED COUNT: 15 % (ref 12.3–15.4)
GGT SERPL-CCNC: 777 U/L (ref 8–61)
GLOBULIN UR ELPH-MCNC: 2.6 GM/DL
GLUCOSE SERPL-MCNC: 126 MG/DL (ref 65–99)
HCT VFR BLD AUTO: 24.5 % (ref 37.5–51)
HGB BLD-MCNC: 9.1 G/DL (ref 13–17.7)
IMM GRANULOCYTES # BLD AUTO: 0.09 10*3/MM3 (ref 0–0.05)
IMM GRANULOCYTES NFR BLD AUTO: 0.8 % (ref 0–0.5)
INR PPP: 2.02 (ref 0.93–1.1)
LYMPHOCYTES # BLD AUTO: 1.08 10*3/MM3 (ref 0.7–3.1)
LYMPHOCYTES NFR BLD AUTO: 9.7 % (ref 19.6–45.3)
MCH RBC QN AUTO: 34.1 PG (ref 26.6–33)
MCHC RBC AUTO-ENTMCNC: 37.1 G/DL (ref 31.5–35.7)
MCV RBC AUTO: 91.8 FL (ref 79–97)
MONOCYTES # BLD AUTO: 0.75 10*3/MM3 (ref 0.1–0.9)
MONOCYTES NFR BLD AUTO: 6.7 % (ref 5–12)
NEUTROPHILS NFR BLD AUTO: 82.5 % (ref 42.7–76)
NEUTROPHILS NFR BLD AUTO: 9.23 10*3/MM3 (ref 1.7–7)
NRBC BLD AUTO-RTO: 0 /100 WBC (ref 0–0.2)
PHOSPHATE SERPL-MCNC: 2.6 MG/DL (ref 2.5–4.5)
PLATELET # BLD AUTO: 100 10*3/MM3 (ref 140–450)
PMV BLD AUTO: 10 FL (ref 6–12)
POTASSIUM SERPL-SCNC: 3.8 MMOL/L (ref 3.5–5.2)
PROT SERPL-MCNC: 5.5 G/DL (ref 6–8.5)
PROTHROMBIN TIME: 20.9 SECONDS (ref 9.6–11.7)
RBC # BLD AUTO: 2.67 10*6/MM3 (ref 4.14–5.8)
SODIUM SERPL-SCNC: 133 MMOL/L (ref 136–145)
WBC NRBC COR # BLD AUTO: 11.18 10*3/MM3 (ref 3.4–10.8)

## 2024-07-20 PROCEDURE — 99232 SBSQ HOSP IP/OBS MODERATE 35: CPT | Performed by: NURSE PRACTITIONER

## 2024-07-20 PROCEDURE — 25810000003 DEXTROSE 5 % AND SODIUM CHLORIDE 0.9 % 5-0.9 % SOLUTION: Performed by: INTERNAL MEDICINE

## 2024-07-20 PROCEDURE — 84100 ASSAY OF PHOSPHORUS: CPT | Performed by: STUDENT IN AN ORGANIZED HEALTH CARE EDUCATION/TRAINING PROGRAM

## 2024-07-20 PROCEDURE — 82330 ASSAY OF CALCIUM: CPT | Performed by: INTERNAL MEDICINE

## 2024-07-20 PROCEDURE — 25010000002 CALCIUM GLUCONATE 2-0.675 GM/100ML-% SOLUTION: Performed by: INTERNAL MEDICINE

## 2024-07-20 PROCEDURE — 25810000003 SODIUM CHLORIDE 0.9 % SOLUTION: Performed by: STUDENT IN AN ORGANIZED HEALTH CARE EDUCATION/TRAINING PROGRAM

## 2024-07-20 PROCEDURE — 63710000001 PREDNISOLONE PER 5 MG: Performed by: NURSE PRACTITIONER

## 2024-07-20 PROCEDURE — 25010000002 THIAMINE PER 100 MG: Performed by: INTERNAL MEDICINE

## 2024-07-20 PROCEDURE — 85610 PROTHROMBIN TIME: CPT | Performed by: NURSE PRACTITIONER

## 2024-07-20 PROCEDURE — 85025 COMPLETE CBC W/AUTO DIFF WBC: CPT | Performed by: NURSE PRACTITIONER

## 2024-07-20 PROCEDURE — 80053 COMPREHEN METABOLIC PANEL: CPT | Performed by: INTERNAL MEDICINE

## 2024-07-20 PROCEDURE — 25010000002 PHYTONADIONE 10 MG/ML SOLUTION: Performed by: NURSE PRACTITIONER

## 2024-07-20 PROCEDURE — 99232 SBSQ HOSP IP/OBS MODERATE 35: CPT

## 2024-07-20 RX ORDER — PANTOPRAZOLE SODIUM 40 MG/10ML
40 INJECTION, POWDER, LYOPHILIZED, FOR SOLUTION INTRAVENOUS
Status: DISCONTINUED | OUTPATIENT
Start: 2024-07-20 | End: 2024-07-23

## 2024-07-20 RX ORDER — LACTULOSE 10 G/15ML
10 SOLUTION ORAL 3 TIMES DAILY
Status: DISCONTINUED | OUTPATIENT
Start: 2024-07-20 | End: 2024-07-27

## 2024-07-20 RX ORDER — PHYTONADIONE 2 MG/ML
10 INJECTION, EMULSION INTRAMUSCULAR; INTRAVENOUS; SUBCUTANEOUS ONCE
Status: COMPLETED | OUTPATIENT
Start: 2024-07-20 | End: 2024-07-20

## 2024-07-20 RX ORDER — SUCRALFATE 1 G/1
1 TABLET ORAL
Status: DISCONTINUED | OUTPATIENT
Start: 2024-07-20 | End: 2024-08-02 | Stop reason: HOSPADM

## 2024-07-20 RX ADMIN — LACTULOSE 10 G: 20 SOLUTION ORAL at 16:24

## 2024-07-20 RX ADMIN — PHYTONADIONE 10 MG: 10 INJECTION, EMULSION INTRAMUSCULAR; INTRAVENOUS; SUBCUTANEOUS at 14:24

## 2024-07-20 RX ADMIN — LORAZEPAM 1 MG: 1 TABLET ORAL at 05:00

## 2024-07-20 RX ADMIN — PREDNISOLONE SODIUM PHOSPHATE 40 MG: 15 SOLUTION ORAL at 14:21

## 2024-07-20 RX ADMIN — Medication 220 MG: at 09:28

## 2024-07-20 RX ADMIN — PANTOPRAZOLE SODIUM 40 MG: 40 INJECTION, POWDER, FOR SOLUTION INTRAVENOUS at 09:28

## 2024-07-20 RX ADMIN — POTASSIUM PHOSPHATE, MONOBASIC POTASSIUM PHOSPHATE, DIBASIC 15 MMOL: 224; 236 INJECTION, SOLUTION, CONCENTRATE INTRAVENOUS at 03:54

## 2024-07-20 RX ADMIN — Medication 10 ML: at 09:30

## 2024-07-20 RX ADMIN — SUCRALFATE 1 G: 1 TABLET ORAL at 16:23

## 2024-07-20 RX ADMIN — LORAZEPAM 1 MG: 1 TABLET ORAL at 21:24

## 2024-07-20 RX ADMIN — SUCRALFATE 1 G: 1 TABLET ORAL at 21:24

## 2024-07-20 RX ADMIN — Medication 10 ML: at 21:25

## 2024-07-20 RX ADMIN — THIAMINE HYDROCHLORIDE 200 MG: 100 INJECTION, SOLUTION INTRAMUSCULAR; INTRAVENOUS at 05:09

## 2024-07-20 RX ADMIN — THIAMINE HYDROCHLORIDE 200 MG: 100 INJECTION, SOLUTION INTRAMUSCULAR; INTRAVENOUS at 13:35

## 2024-07-20 RX ADMIN — PANTOPRAZOLE SODIUM 40 MG: 40 INJECTION, POWDER, FOR SOLUTION INTRAVENOUS at 16:24

## 2024-07-20 RX ADMIN — FOLIC ACID 1 MG: 1 TABLET ORAL at 09:28

## 2024-07-20 RX ADMIN — CALCIUM GLUCONATE 2000 MG: 20 INJECTION, SOLUTION INTRAVENOUS at 09:30

## 2024-07-20 RX ADMIN — LACTULOSE 10 G: 20 SOLUTION ORAL at 21:29

## 2024-07-20 RX ADMIN — DEXTROSE MONOHYDRATE AND SODIUM CHLORIDE 75 ML/HR: 5; .9 INJECTION, SOLUTION INTRAVENOUS at 14:25

## 2024-07-20 RX ADMIN — Medication 1 TABLET: at 09:28

## 2024-07-20 RX ADMIN — CALCIUM GLUCONATE 2000 MG: 20 INJECTION, SOLUTION INTRAVENOUS at 02:37

## 2024-07-20 RX ADMIN — THIAMINE HYDROCHLORIDE 200 MG: 100 INJECTION, SOLUTION INTRAMUSCULAR; INTRAVENOUS at 21:25

## 2024-07-20 RX ADMIN — LORAZEPAM 1 MG: 1 TABLET ORAL at 09:29

## 2024-07-20 NOTE — NURSING NOTE
Mother of patient called and wanted to advise us that the patient has a hiatal hernia and esophageal sphincters. He has had vomiting all his life and dealing with these two issues which depressed him because he could not eat and started drinking.

## 2024-07-20 NOTE — PROGRESS NOTES
LOS: 2 days   Patient Care Team:  Provider, No Known as PCP - General      Subjective     Interval History:   LABS: Sodium 133, potassium 3.8, creatinine 0.46.  Total bilirubin up to 18, alk phos down to 286, AST slightly improved at 122 and ALT remains normal at 23.  INR up to 2.02.  WBCs 11.18, hemoglobin 9.1 (8.2), MCV 91.8, platelets 100.  Hepatitis panel negative.  Mother is at bedside and states that he has had problems with nausea and vomiting all of his life and used to see Dr. Newsome- pediatric GI in [place].   Mother states a lot of times he will eat and then have nausea and vomiting pretty frequently afterward.  Mother states he has a long history of reflux and takes Tums used to be on an antacid medicine.  Patient usually drinks 750 mL of vodka per day his last drink was 3 days ago.  Patient is having bowel movements.    ROS:   No chest pain, shortness of breath, or cough.         Medication Review:     Current Facility-Administered Medications:     acetaminophen (TYLENOL) tablet 650 mg, 650 mg, Oral, Q4H PRN **OR** acetaminophen (TYLENOL) 160 MG/5ML oral solution 650 mg, 650 mg, Oral, Q4H PRN **OR** acetaminophen (TYLENOL) suppository 650 mg, 650 mg, Rectal, Q4H PRN, Mari Bustos MD    albumin human 25 % IV SOLN 25 g, 25 g, Intravenous, Once, Allison Mcdonald MD    sennosides-docusate (PERICOLACE) 8.6-50 MG per tablet 2 tablet, 2 tablet, Oral, BID PRN **AND** polyethylene glycol (MIRALAX) packet 17 g, 17 g, Oral, Daily PRN **AND** bisacodyl (DULCOLAX) EC tablet 5 mg, 5 mg, Oral, Daily PRN **AND** bisacodyl (DULCOLAX) suppository 10 mg, 10 mg, Rectal, Daily PRN, Mari Bustos MD    Calcium Replacement - Follow Nurse / BPA Driven Protocol, , Does not apply, PRN, Mari Bustos MD    chlordiazePOXIDE (LIBRIUM) capsule 50 mg, 50 mg, Oral, 4x Daily PRN, Mari Bustos MD    dextrose 5 % and sodium chloride 0.9 % infusion, 75 mL/hr, Intravenous, Continuous, Harry,  MD Allison, Last Rate: 75 mL/hr at 07/20/24 1425, 75 mL/hr at 07/20/24 1425    folic acid (FOLVITE) tablet 1 mg, 1 mg, Oral, Daily, Mari Bustos MD, 1 mg at 07/20/24 0928    LORazepam (ATIVAN) injection 1 mg, 1 mg, Intravenous, Q2H PRN, Mari Bustos MD    LORazepam (ATIVAN) tablet 1 mg, 1 mg, Oral, Q1H PRN **OR** LORazepam (ATIVAN) injection 1 mg, 1 mg, Intravenous, Q1H PRN **OR** LORazepam (ATIVAN) tablet 2 mg, 2 mg, Oral, Q1H PRN **OR** LORazepam (ATIVAN) injection 2 mg, 2 mg, Intravenous, Q1H PRN **OR** LORazepam (ATIVAN) injection 2 mg, 2 mg, Intravenous, Q15 Min PRN **OR** LORazepam (ATIVAN) injection 2 mg, 2 mg, Intramuscular, Q15 Min PRN, Mari Bustos MD    [COMPLETED] LORazepam (ATIVAN) tablet 2 mg, 2 mg, Oral, Q6H, 2 mg at 07/19/24 1005 **FOLLOWED BY** [COMPLETED] LORazepam (ATIVAN) tablet 1 mg, 1 mg, Oral, Q6H, 1 mg at 07/20/24 0929 **FOLLOWED BY** LORazepam (ATIVAN) tablet 1 mg, 1 mg, Oral, Q12H **FOLLOWED BY** [START ON 7/22/2024] LORazepam (ATIVAN) tablet 1 mg, 1 mg, Oral, Daily, Mari Bustos MD    Magnesium Standard Dose Replacement - Follow Nurse / BPA Driven Protocol, , Does not apply, PRN, Mari Bustos MD    multivitamin with minerals 1 tablet, 1 tablet, Oral, Daily, Mari Bustos MD, 1 tablet at 07/20/24 0928    ondansetron ODT (ZOFRAN-ODT) disintegrating tablet 4 mg, 4 mg, Oral, Q6H PRN **OR** ondansetron (ZOFRAN) injection 4 mg, 4 mg, Intravenous, Q6H PRN, Mari Bustos MD, 4 mg at 07/18/24 1330    pantoprazole (PROTONIX) injection 40 mg, 40 mg, Intravenous, BID Christ GARCIA Donna Ann, APRN    Phosphorus Replacement - Follow Nurse / BPA Driven Protocol, , Does not apply, Juli HARTLEY Abbas Ali, MD    Potassium Replacement - Follow Nurse / BPA Driven Protocol, , Does not apply, Juli HARTLEY Abbas Ali, MD    prednisoLONE sodium phosphate (ORAPRED) 15 MG/5ML oral solution 40 mg, 40 mg, Oral, Daily, Waleska Chery APRN, 40 mg at 07/20/24  1421    [Held by provider] sertraline (ZOLOFT) tablet 50 mg, 50 mg, Oral, Daily, Mari Bustos MD, 50 mg at 07/18/24 1258    sodium chloride 0.9 % flush 10 mL, 10 mL, Intravenous, PRN, Mari Bustos MD    sodium chloride 0.9 % flush 10 mL, 10 mL, Intravenous, Q12H, Mari Bustos MD, 10 mL at 07/20/24 0930    sodium chloride 0.9 % flush 10 mL, 10 mL, Intravenous, PRN, Mari Bustos MD    sodium chloride 0.9 % infusion 40 mL, 40 mL, Intravenous, PRN, Mari Bustos MD    sucralfate (CARAFATE) tablet 1 g, 1 g, Oral, 4x Daily AC & at Bedtime, Kaci Polo APRN    thiamine (B-1) injection 200 mg, 200 mg, Intravenous, Q8H, 200 mg at 07/20/24 1335 **FOLLOWED BY** [START ON 7/24/2024] thiamine (VITAMIN B-1) tablet 100 mg, 100 mg, Oral, Daily, Mari Bustos MD    zinc sulfate (ZINCATE) capsule 220 mg, 220 mg, Oral, Daily, Waleska Chery APRN, 220 mg at 07/20/24 0928      Objective drowsy.  Resting in hospital bed.  Parents at bedside.  Room 264.    Vital Signs  Temp:  [96.8 °F (36 °C)-98 °F (36.7 °C)] 98 °F (36.7 °C)  Heart Rate:  [] 99  Resp:  [17-24] 19  BP: (100-143)/(55-92) 106/63  Physical Exam:    General Appearance:    Awake and alert, in no acute distress   Head:    Normocephalic, without obvious abnormality   Eyes:          Conjunctivae normal, icteric sclerae   Ears:    Hearing intact   Throat:   No oral lesions, no thrush, oral mucosa moist   Neck:   No adenopathy, supple, no JVD   Lungs:     respirations regular, even and unlabored        Abdomen:      soft, non-tender, no rebound or guarding, non-distended, no hepatosplenomegaly   Rectal:     Deferred   Extremities:   No edema, no cyanosis, no redness   Skin:   No bleeding, bruising or rash, + jaundice             Results Review:    CBC    Results from last 7 days   Lab Units 07/20/24  0509 07/19/24  0300 07/18/24  1015   WBC 10*3/mm3 11.18* 8.78 9.86   HEMOGLOBIN g/dL 9.1* 8.2* 8.9*   PLATELETS 10*3/mm3 100* 85*  "94*     CMP   Results from last 7 days   Lab Units 07/20/24  0509 07/19/24  1619 07/19/24  1458 07/19/24  0300 07/18/24  1829 07/18/24  1015   SODIUM mmol/L 133* 131* 134* 126* 122* 122*   POTASSIUM mmol/L 3.8 3.8  3.6 3.6 3.0* 2.7* 1.9*   CHLORIDE mmol/L 96* 96* 96* 86* 80* 72*   CO2 mmol/L 29.0 23.8 29.2* 32.5* 33.2* 33.7*   BUN mg/dL 3* 3* 3* 3* 4* 6   CREATININE mg/dL 0.46* 0.41* 0.40* 0.44* 0.38* 0.40*   GLUCOSE mg/dL 126* 78 79 116* 91 93   ALBUMIN g/dL 2.9*  --  2.9* 3.1*  --  3.0*   BILIRUBIN mg/dL 18.0*  --  17.9* 17.4*  --  15.8*   ALK PHOS U/L 286*  --  301* 324*  --  380*   AST (SGOT) U/L 122*  --  143* 159*  --  186*   ALT (SGPT) U/L 23  --  27 28  --  33   MAGNESIUM mg/dL  --   --  1.7 2.2  --  1.2*   PHOSPHORUS mg/dL 2.6 1.6*  --  0.3*  --   --    LIPASE U/L  --   --   --   --   --  100*   AMMONIA umol/L  --   --   --  90*  --   --      Cr Clearance Estimated Creatinine Clearance: 186.7 mL/min (A) (by C-G formula based on SCr of 0.46 mg/dL (L)).  Coag   Results from last 7 days   Lab Units 07/20/24  0509 07/19/24 0300 07/18/24  0923   INR  2.02* 1.79* 1.59*   APTT seconds  --   --  41.9*     HbA1C No results found for: \"HGBA1C\"  Blood Glucose No results found for: \"POCGLU\"  Infection   Results from last 7 days   Lab Units 07/18/24  0928 07/18/24  0923   BLOODCX  No growth at 2 days No growth at 2 days     UA    Results from last 7 days   Lab Units 07/18/24  1319   NITRITE UA  Positive*   WBC UA /HPF 0-2   BACTERIA UA /HPF None Seen   SQUAM EPITHEL UA /HPF 0-2     Radiology(recent) CT Head Without Contrast    Result Date: 7/19/2024  Impression: No acute intracranial process. Electronically Signed: Ghazala Ordonez MD  7/19/2024 6:10 AM EDT  Workstation ID: [id number]           Assessment & Plan   Alcohol hepatitis  Elevated LFTs related to above  Acute alcohol withdrawal  Nitrite positive UTI  Leukocytosis  Normocytic anemia  Thrombocytopenia consider related to alcohol abuse  Elevated INR due to alcohol " abuse  Hyponatremia related to alcohol abuse  Dysphagia could be related to esophageal stricture versus reflux esophagitis versus dysmotility  GERD    Plan:  Continue CIWA protocol  Increase PPI to twice a day.  Start Carafate.  Continue 2 g low-sodium diet with soft to chew meats.   Continue prednisolone, thiamine, zinc and folic acid.  Will consider EGD with possible dilation closer to discharge.  Platelets need to be at least 50.  Could consider barium swallow.           Kaci Polo, MARI  07/20/24  14:33 EDT

## 2024-07-20 NOTE — PLAN OF CARE
Goal Outcome Evaluation:  Plan of Care Reviewed With: patient        Progress: no change     Patient has been sleeping most of the shift did wake to take medication and a drink. Getting IV fluids and electrolytes replaced.  No nausea or vomiting this shift. Mother called to advise patient has had esophageal sphincters and a hiatal hernia that has always cause him nausea and vomiting.  Sitter at bedside. No issues or concerns noted at this time.

## 2024-07-20 NOTE — PROGRESS NOTES
"  Chief complaint suicidal ideation, alcohol withdrawal     Subjective .     History of present illness:  The patient is a 28 y.o. male who was admitted secondary to alcohol detox.     PMH: alcohol use disorder, GERD    Psych was consulted for depression, SI and alcohol withdrawal     The patient was seen this afternoon. He was accompanied by a sitter. The patient was lethargic, but did wake up to talk with me. He was alert and oriented. He reports that he has been drinking heavily for the past 10 years, up to 750ml of alcohol per day. He reports no periods of sobriety during that time. He recently moved here, 3 weeks ago from [place], to be closer to his family and have support. He is currently living with his parents.     The patient does want to stop drinking alcohol. I discussed an inpatient rehab treatment program with him as the best option for long term success at sobriety. He initially said no, and that he just wanted to go home with his parents. However, after discussing it with him more, he did agree to consider it and will let me know.     The patient does report a history of depression. He was supposed to start sertraline outpatient, but had not gotten to start it due to low sodium. His sodium is still low, but improving. Once medically appropriate, I do think starting an SSRI would benefit him. He does report suicidal ideation at time. He does state it is improving, and he last had thoughts about a week ago. At that time, he said he was having fleeting thoughts of killing himself, with fleeting plans. He mentioned \"it will just flash through my mind: guns, knives, needles.\" However, he states the thoughts quickly passed and he never came up with a definitive plan. He denies any serious SI for the last week. I did discuss with him discontinuing the sitter if he felt like he could tell nursing staff if the thoughts returned, and he was adamant that he would.       Review of Systems   All systems were " reviewed and negative except for:  Neurological: positive for  lethargic  Behavioral/Psych: positive for  depression    The following portions of the patient's history were reviewed and updated as appropriate: allergies, current medications, past family history, past medical history, past social history, past surgical history and problem list.    History    Past psychiatric history : depression, alcohol use        No medications prior to admission.        Scheduled Meds:  albumin human, 25 g, Intravenous, Once  folic acid, 1 mg, Oral, Daily  LORazepam, 1 mg, Oral, Q12H   Followed by  [START ON 7/22/2024] LORazepam, 1 mg, Oral, Daily  multivitamin with minerals, 1 tablet, Oral, Daily  pantoprazole, 40 mg, Intravenous, Daily  prednisoLONE sodium phosphate, 40 mg, Oral, Daily  [Held by provider] sertraline, 50 mg, Oral, Daily  sodium chloride, 10 mL, Intravenous, Q12H  thiamine (B-1) IV, 200 mg, Intravenous, Q8H   Followed by  [START ON 7/24/2024] thiamine, 100 mg, Oral, Daily  zinc sulfate, 220 mg, Oral, Daily         Continuous Infusions:  dextrose 5 % and sodium chloride 0.9 %, 75 mL/hr, Last Rate: 75 mL/hr (07/20/24 0914)        PRN Meds:    acetaminophen **OR** acetaminophen **OR** acetaminophen    senna-docusate sodium **AND** polyethylene glycol **AND** bisacodyl **AND** bisacodyl    Calcium Replacement - Follow Nurse / BPA Driven Protocol    chlordiazePOXIDE    LORazepam    LORazepam **OR** LORazepam **OR** LORazepam **OR** LORazepam **OR** LORazepam **OR** LORazepam    Magnesium Standard Dose Replacement - Follow Nurse / BPA Driven Protocol    ondansetron ODT **OR** ondansetron    Phosphorus Replacement - Follow Nurse / BPA Driven Protocol    Potassium Replacement - Follow Nurse / BPA Driven Protocol    sodium chloride    sodium chloride    sodium chloride      Allergies:  Reglan [metoclopramide]      Objective     Vital Signs   /66 (BP Location: Left arm, Patient Position: Lying)   Pulse 90   Temp 98  "°F (36.7 °C) (Oral)   Resp 19   Ht 165.1 cm (65\")   Wt 55.7 kg (122 lb 12.7 oz)   SpO2 93%   BMI 20.43 kg/m²     Physical Exam:    MENTAL STATUS EXAM   General Appearance:  Cleanly groomed and dressed  Eye Contact:  Fair  Attitude:  Cooperative  Muscle Strength:  Normal  Speech:  Other  Other Comment:  Slow to respond  Language:  Hesitant  Mood and affect:  Flat and depressed  Hopelessness:  Denies  Loneliness: Denies  Thought Process:  Goal-directed  Associations/ Thought Content:  No delusions  Hallucinations:  None  Suicidal Ideations:  Passive ideation  Homicidal Ideation:  Not present  Sensorium:  Alert  Orientation:  Person, place and time  Immediate Recall, Recent, and Remote Memory:  Intact  Attention Span/ Concentration:  Good  Fund of Knowledge:  Limited  Intellectual Functioning:  Average range  Insight:  Limited  Judgement:  Limited  Reliability:  Fair  Impulse Control:  Poor     Medications and allergies reviewed.    Result Review:  I have personally reviewed the results from the time of this admission to 7/20/2024 12:30 EDT and agree with these findings:  [x]  Laboratory  []  Microbiology  []  Radiology  [x]  EKG/Telemetry   []  Cardiology/Vascular   []  Pathology  []  Old records  []  Other:  Most notable findings include: Sodium 133    Assessment & Plan       Alcohol withdrawal     Assessment: major depressive disorder, alcohol use disorder, alcohol withdrawal   Treatment Plan: Patient presented with alcohol withdrawal, depression, SI. He reports heavy drinking for the past 10 years, but does desire to stop drinking. He also reports depression with SI at times. States last thoughts of suicide were about a week ago.     Discussed inpatient chemical dependency treatment with the patient. I tried to impress upon him the seriousness of his alcohol problem and the importance of getting treatment for successful long term sobriety. He was reluctant, but stated he would think about it.     He does " report depression and fleeting SI in the past, last a week ago. Patient would benefit from starting SSRI once sodium has normalized. Patient is agreeable. Ok to discontinue sitter and suicide precautions at this time as patient denies any current SI with any plan or intent. Patient advised to let nursing staff know if the thoughts return.     Will follow up with the patient regarding possible chemical dependency treatment when he is more medically stable, closer to discharge.     Continue supportive treatment.     Will continue to follow.   Treatment Plan discussed with: Patient    I discussed the patients findings and my recommendations with patient and nursing staff    I have reviewed and approved the behavioral health treatment plans and problem list. Yes     Referring MD has access to consult report and progress notes in EMR     MARI Loomis  07/20/24  12:30 EDT

## 2024-07-20 NOTE — PLAN OF CARE
Goal Outcome Evaluation:  Plan of Care Reviewed With: patient        Progress: improving  Outcome Evaluation: Pt is on seizure and fall precaution. Ciwar screening has been less than 4 the whole day. Pt complianed about difficulty swallow the food and acid reflux.  GI consulted. Planning to do EGD when Pt more stable to close to discharge.

## 2024-07-20 NOTE — PROGRESS NOTES
"NEPHROLOGY PROGRESS NOTE------[facility]    [facility]  [phone]  Navya Mcdonald MD      Patient Care Team:  Provider, No Known as PCP - General      Provider:  Navya Mcdonald MD  Patient Name: Yonas Charles  :  1995    SUBJECTIVE:    F/U ELECTROLYTE ABNORMALITIES    With sitter. Generally malaised. No n/v/d. No SOB    Medication:  albumin human, 25 g, Intravenous, Once  calcium gluconate, 2,000 mg, Intravenous, Q8H  folic acid, 1 mg, Oral, Daily  LORazepam, 1 mg, Oral, Q6H   Followed by  LORazepam, 1 mg, Oral, Q12H   Followed by  [START ON 2024] LORazepam, 1 mg, Oral, Daily  multivitamin with minerals, 1 tablet, Oral, Daily  pantoprazole, 40 mg, Intravenous, Daily  potassium phosphate, 15 mmol, Intravenous, Q3H  prednisoLONE sodium phosphate, 40 mg, Oral, Daily  [Held by provider] sertraline, 50 mg, Oral, Daily  sodium chloride, 10 mL, Intravenous, Q12H  thiamine (B-1) IV, 200 mg, Intravenous, Q8H   Followed by  [START ON 2024] thiamine, 100 mg, Oral, Daily  zinc sulfate, 220 mg, Oral, Daily      dextrose 5 % and sodium chloride 0.9 %, 125 mL/hr, Last Rate: 125 mL/hr (24)        OBJECTIVE    Vital Sign Min/Max for last 24 hours  Temp  Min: 97.1 °F (36.2 °C)  Max: 98.6 °F (37 °C)   BP  Min: 100/55  Max: 143/92   Pulse  Min: 90  Max: 123   Resp  Min: 17  Max: 24   SpO2  Min: 93 %  Max: 98 %   No data recorded   No data recorded     Flowsheet Rows      Flowsheet Row First Filed Value   Admission Height 165.1 cm (65\") Documented at 2024 0855   Admission Weight 55.7 kg (122 lb 12.7 oz) Documented at 2024 0855            I/O this shift:  In: 120 [P.O.:120]  Out: -   I/O last 3 completed shifts:  In: 1050 [IV Piggyback:1050]  Out: 800 [Urine:800]    Physical Exam:  General Appearance: WEAK AND MALAISED  Head: normocephalic, without obvious abnormality and atraumatic    Eyes: conjunctivae and +ICTERIC " "SCLERAE  Neck: supple and no JVD  Lungs: +FEW SCATTERED RHONCHI  Heart: regular rhythm & normal rate and normal S1, S2   Chest Wall: no abnormalities observed  Abdomen: normal bowel sounds and soft   Extremities: moves extremities well, no edema, no cyanosis  Skin: +JAUNDICE  Neurologic: A AND O X 4 WITHOUT FOCAL DEFICIT    Labs:    WBC WBC   Date Value Ref Range Status   07/20/2024 11.18 (H) 3.40 - 10.80 10*3/mm3 Final   07/19/2024 8.78 3.40 - 10.80 10*3/mm3 Final   07/18/2024 9.86 3.40 - 10.80 10*3/mm3 Final      HGB Hemoglobin   Date Value Ref Range Status   07/20/2024 9.1 (L) 13.0 - 17.7 g/dL Final   07/19/2024 8.2 (L) 13.0 - 17.7 g/dL Final   07/18/2024 8.9 (L) 13.0 - 17.7 g/dL Final      HCT Hematocrit   Date Value Ref Range Status   07/20/2024 24.5 (L) 37.5 - 51.0 % Final   07/19/2024 24.5 (L) 37.5 - 51.0 % Final   07/18/2024 26.4 (L) 37.5 - 51.0 % Final      Platelets No results found for: \"LABPLAT\"   MCV MCV   Date Value Ref Range Status   07/20/2024 91.8 79.0 - 97.0 fL Final   07/19/2024 89.4 79.0 - 97.0 fL Final   07/18/2024 87.4 79.0 - 97.0 fL Final          Sodium Sodium   Date Value Ref Range Status   07/20/2024 133 (L) 136 - 145 mmol/L Final   07/19/2024 131 (L) 136 - 145 mmol/L Final   07/19/2024 134 (L) 136 - 145 mmol/L Final   07/19/2024 126 (L) 136 - 145 mmol/L Final   07/18/2024 122 (L) 136 - 145 mmol/L Final   07/18/2024 122 (L) 136 - 145 mmol/L Final      Potassium Potassium   Date Value Ref Range Status   07/20/2024 3.8 3.5 - 5.2 mmol/L Final   07/19/2024 3.8 3.5 - 5.2 mmol/L Final   07/19/2024 3.6 3.5 - 5.2 mmol/L Final   07/19/2024 3.6 3.5 - 5.2 mmol/L Final   07/19/2024 3.0 (L) 3.5 - 5.2 mmol/L Final   07/18/2024 2.7 (L) 3.5 - 5.2 mmol/L Final   07/18/2024 1.9 (C) 3.5 - 5.2 mmol/L Final      Chloride Chloride   Date Value Ref Range Status   07/20/2024 96 (L) 98 - 107 mmol/L Final   07/19/2024 96 (L) 98 - 107 mmol/L Final   07/19/2024 96 (L) 98 - 107 mmol/L Final   07/19/2024 86 (L) 98 - 107 " "mmol/L Final   07/18/2024 80 (L) 98 - 107 mmol/L Final   07/18/2024 72 (L) 98 - 107 mmol/L Final      CO2 CO2   Date Value Ref Range Status   07/20/2024 29.0 22.0 - 29.0 mmol/L Final   07/19/2024 23.8 22.0 - 29.0 mmol/L Final   07/19/2024 29.2 (H) 22.0 - 29.0 mmol/L Final   07/19/2024 32.5 (H) 22.0 - 29.0 mmol/L Final   07/18/2024 33.2 (H) 22.0 - 29.0 mmol/L Final   07/18/2024 33.7 (H) 22.0 - 29.0 mmol/L Final      BUN BUN   Date Value Ref Range Status   07/20/2024 3 (L) 6 - 20 mg/dL Final   07/19/2024 3 (L) 6 - 20 mg/dL Final   07/19/2024 3 (L) 6 - 20 mg/dL Final   07/19/2024 3 (L) 6 - 20 mg/dL Final   07/18/2024 4 (L) 6 - 20 mg/dL Final   07/18/2024 6 6 - 20 mg/dL Final      Creatinine Creatinine   Date Value Ref Range Status   07/20/2024 0.46 (L) 0.76 - 1.27 mg/dL Final   07/19/2024 0.41 (L) 0.76 - 1.27 mg/dL Final   07/19/2024 0.40 (L) 0.76 - 1.27 mg/dL Final   07/19/2024 0.44 (L) 0.76 - 1.27 mg/dL Final   07/18/2024 0.38 (L) 0.76 - 1.27 mg/dL Final   07/18/2024 0.40 (L) 0.76 - 1.27 mg/dL Final      Calcium Calcium   Date Value Ref Range Status   07/20/2024 9.2 8.6 - 10.5 mg/dL Final   07/19/2024 7.9 (L) 8.6 - 10.5 mg/dL Final   07/19/2024 8.1 (L) 8.6 - 10.5 mg/dL Final   07/19/2024 7.9 (L) 8.6 - 10.5 mg/dL Final   07/18/2024 7.4 (L) 8.6 - 10.5 mg/dL Final   07/18/2024 7.9 (L) 8.6 - 10.5 mg/dL Final      PO4 No components found for: \"PO4\"   Albumin Albumin   Date Value Ref Range Status   07/20/2024 2.9 (L) 3.5 - 5.2 g/dL Final   07/19/2024 2.9 (L) 3.5 - 5.2 g/dL Final   07/19/2024 3.1 (L) 3.5 - 5.2 g/dL Final   07/18/2024 3.0 (L) 3.5 - 5.2 g/dL Final      Magnesium Magnesium   Date Value Ref Range Status   07/19/2024 1.7 1.6 - 2.6 mg/dL Final   07/19/2024 2.2 1.6 - 2.6 mg/dL Final   07/18/2024 1.2 (L) 1.6 - 2.6 mg/dL Final      Uric Acid No components found for: \"URIC ACID\"     Imaging Results (Last 72 Hours)       Procedure Component Value Units Date/Time    CT Head Without Contrast [[id number]] Collected: " 07/19/24 0608     Updated: 07/19/24 0612    Narrative:      CT HEAD WO CONTRAST    Date of Exam: 7/19/2024 1:55 AM EDT    Indication: AMS, weakness LUE.    Comparison: None available.    Technique: Volumetric axial CT images of the head were obtained without administration of intravenous contrast. Coronal and sagittal reconstructions were performed.Automated exposure control and iterative reconstruction methods were utilized for CT dose   reduction.    Findings:  There is no evidence of hemorrhage. There is no mass effect or midline shift.    There is no extracerebral collection.    Ventricles are normal in size and configuration for patient's stated age.      Posterior fossa is within normal limits.    Calvarium and skull base appear intact.   Visualized sinuses show no air fluid levels. Visualized orbits are unremarkable.      Impression:      Impression:  No acute intracranial process.        Electronically Signed: Ghazala Ordonez MD    7/19/2024 6:10 AM EDT    Workstation ID: [id number]    XR Chest PA & Lateral [[id number]] Collected: 07/18/24 1259     Updated: 07/18/24 1302    Narrative:      DATE OF EXAM:  7/18/2024 12:36 PM     PROCEDURE:  XR CHEST PA AND LATERAL-     INDICATIONS:  rule out aspiration pneumonia; E87.6-Hypokalemia; F10.939-Alcohol use,  unspecified with withdrawal, unspecified; R17-Unspecified jaundice     COMPARISON:  No Comparisons Available     TECHNIQUE:   Two radiologic views of the chest , PA and lateral were obtained.     FINDINGS:  Heart size normal. Negative for lobar consolidation, edema, effusion or  pneumothorax. Osseous structures unremarkable.       Impression:      No active pulmonary process.        Electronically Signed By-Armani Small MD On:7/18/2024 1:00 PM       CT Abdomen Pelvis With Contrast [[id number]] Collected: 07/18/24 1036     Updated: 07/18/24 1048    Narrative:      EXAM: CT ABDOMEN PELVIS W CONTRAST-     DATE OF EXAM: 7/18/2024 10:24 AM     INDICATION: abd pain.      COMPARISON: None available.     TECHNIQUE: Contiguous axial CT images were obtained from the lung bases  to the pubic symphysis obtained following the uneventful intravenous  administration of 100 mL of Isovue 370 contrast. Sagittal and coronal  reconstructions were performed.  Automated exposure control and  iterative reconstruction methods were used.     FINDINGS:  Heart size within normal limits. No pericardial effusion. Lower lungs  grossly clear.     Enlarged markedly heterogeneous and steatotic liver. Right hepatic lobe  measures nearly 22 cm in greatest CC dimension. Tiny low-density lesions  too small to accurately characterize favoring small cyst. 1.2 cm fluid  density lesion at hepatic dome again favoring cyst. Portal vasculature,  splenic vein and superior mesenteric vein patent. There are distal  paraesophageal, perigastric and perisplenic varices. Spleen within  normal limits in size. Trace intra-abdominal ascites.     Gallbladder wall edema without distention or visible stones most likely  related to adjacent hepatocellular dysfunction. No dilation of biliary  tree. No active pancreatitis or drainable collection. Adrenal glands  unremarkable. Normal size and contour of kidneys. No hydronephrosis or  solid mass. Urinary bladder and prostate unremarkable.     Probable small hiatal hernia. No evidence of bowel obstruction. Colonic  wall thickening and edema within ascending extending into hepatic  flexure, distribution favors sequelae of portal colopathy. In addition  there is mild thickening and edema as well as mucosal hyperemia in the  rectum and distal colon. No CT evidence of acute appendicitis.  Incidental areas of short segment jejunojejunal intussusception without  other complication by CT, most commonly transient and of no clinical  significance in adults.     Negative for abdominal aortic aneurysm. Minimal aortic atherosclerotic  disease. No suspicious adenopathy. No free air or organized  collection.     Minimal reticular body wall edema. No acute displaced fracture or  aggressive bone lesion.       Impression:      1. Enlarged heterogeneous and markedly steatotic liver, which could  reflect sequelae of alcoholic steatohepatitis.  2. Sequelae of portal hypertension noted including distal paraesophageal  and intra-abdominal varices as well as trace ascites. Normal splenic  size. Portal vasculature patent.  3. Gallbladder wall edema without other findings of acute cholecystitis  likely secondary related to underlying hepatocellular dysfunction.  4. Colonic and rectal inflammatory changes which could reflect sequelae  of portal colopathy. Differential would include a nonspecific  infectious/inflammatory proctocolitis. No bowel obstruction.                 Electronically Signed ByYuriy Small MD On:7/18/2024 10:46 AM               Results for orders placed during the hospital encounter of 07/18/24    XR Chest PA & Lateral    Narrative  DATE OF EXAM:  7/18/2024 12:36 PM    PROCEDURE:  XR CHEST PA AND LATERAL-    INDICATIONS:  rule out aspiration pneumonia; E87.6-Hypokalemia; F10.939-Alcohol use,  unspecified with withdrawal, unspecified; R17-Unspecified jaundice    COMPARISON:  No Comparisons Available    TECHNIQUE:  Two radiologic views of the chest , PA and lateral were obtained.    FINDINGS:  Heart size normal. Negative for lobar consolidation, edema, effusion or  pneumothorax. Osseous structures unremarkable.    Impression  No active pulmonary process.      Electronically Signed ByYuriy Small MD On:7/18/2024 1:00 PM            ASSESSMENT / PLAN      Alcohol withdrawal    HYPONATREMIA------Acute. Improving nicely. Hypotonic and clinically hypovolemic.  Secondary to poor solute intake and EtOH abuse (Beer Potomania). No pressing indication for use of hypertonic saline. Give IV NS and follow serial levels. Seizure/fall precautions. Neuro checks. On Telemetry.  No SSRI for now     2.  HYPOKALEMIA-------Resolved. Acute and severe whole body total potassium depletion secondary to n/v and poor intake and severe magnesium deficiency. Replace po and IV. Give IV MgSO4 and follow     3. HYPOMAGNESEMIA-------Replaced     4. HYPOCALCEMIA------Replaced     5. ALCOHOL ABUSE/CIRRHOSIS/JAUNDICE------Thiamine, Folate, IVFs, prn IV Ativan. Withdrawal prcautions     6. MIXED METABOLIC ALKALOSIS/METABOLIC ACIDOSIS-----  Elevated AGAP with elevated lactic and alcoholic ketoacidosis likely. Give Dextrose/IVFs. Avoid hypotension. Alkalosis secondary to effective intravascular volume depletion from n/v and poor oral intake     7. ANEMIA------Normocytic with normal RDW. Check Fe, SPEP, B12, Folate, and hemoccult. Follow H/H closely with hydration     8. DEPRESSION------Suicide precautions. Hold Zoloft given severe hyponatremia     9. HYPOALBUMINEMIA-----IV Albumin to temporize and po supplements     10. ELEVATED INR------Secondary to liver disease     11. THROMBOCYTOPENIA------No heparin. Secondary to liver disease     12. PUD PROPHYLAXIS-----IV PPI     13. DVT PROPHYLAXIS-----SCDs    14. HYPOPHOSPHATEMIA------Replaced        Navya Mcdonald MD  [facility]  [phone]  07/20/24  06:46 EDT

## 2024-07-20 NOTE — PROGRESS NOTES
[facility] MEDICINE SERVICE  DAILY PROGRESS NOTE    NAME: Yonas Charles  : 1995  MRN: [id number]      LOS: 2 days     PROVIDER OF SERVICE: Vicente Moscoso MD    Chief Complaint: Alcohol withdrawal    Subjective:     Interval History:  History taken from: patient  Patient Complaints: Seen and examined at bedside this morning. States that he is a bit foggy with the timeline of events that led to him coming to the hospital. Has been drinking alcohol for 9 years now, it has been months since he is having trouble walking. Drinks about 750ml of vodka daily  Patient Denies:       Review of Systems: negative except as daily  Review of Systems    Objective:     Vital Signs  Temp:  [96.8 °F (36 °C)-98 °F (36.7 °C)] 98 °F (36.7 °C)  Heart Rate:  [] 99  Resp:  [17-24] 19  BP: (100-143)/(55-92) 106/63  Flow (L/min):  [2] 2   Body mass index is 20.43 kg/m².    Physical Exam   Physical Exam  HENT:      Head: Normocephalic.   Eyes:      General: Scleral icterus present.   Cardiovascular:      Rate and Rhythm: Normal rate and regular rhythm.   Pulmonary:      Breath sounds: Normal breath sounds.   Abdominal:      General: Bowel sounds are normal.   Musculoskeletal:         General: Normal range of motion.   Skin:     Coloration: Skin is jaundiced.   Neurological:      General: No focal deficit present.      Mental Status: He is alert. Mental status is at baseline.   Psychiatric:         Mood and Affect: Mood normal.         Scheduled Meds   albumin human, 25 g, Intravenous, Once  folic acid, 1 mg, Oral, Daily  LORazepam, 1 mg, Oral, Q12H   Followed by  [START ON 2024] LORazepam, 1 mg, Oral, Daily  multivitamin with minerals, 1 tablet, Oral, Daily  pantoprazole, 40 mg, Intravenous, BID AC  prednisoLONE sodium phosphate, 40 mg, Oral, Daily  [Held by provider] sertraline, 50 mg, Oral, Daily  sodium chloride, 10 mL, Intravenous, Q12H  sucralfate, 1 g, Oral, 4x Daily AC & at Bedtime  thiamine (B-1)  IV, 200 mg, Intravenous, Q8H   Followed by  [START ON 7/24/2024] thiamine, 100 mg, Oral, Daily  zinc sulfate, 220 mg, Oral, Daily       PRN Meds     acetaminophen **OR** acetaminophen **OR** acetaminophen    senna-docusate sodium **AND** polyethylene glycol **AND** bisacodyl **AND** bisacodyl    Calcium Replacement - Follow Nurse / BPA Driven Protocol    chlordiazePOXIDE    LORazepam    LORazepam **OR** LORazepam **OR** LORazepam **OR** LORazepam **OR** LORazepam **OR** LORazepam    Magnesium Standard Dose Replacement - Follow Nurse / BPA Driven Protocol    ondansetron ODT **OR** ondansetron    Phosphorus Replacement - Follow Nurse / BPA Driven Protocol    Potassium Replacement - Follow Nurse / BPA Driven Protocol    sodium chloride    sodium chloride    sodium chloride   Infusions  dextrose 5 % and sodium chloride 0.9 %, 75 mL/hr, Last Rate: 75 mL/hr (07/20/24 1425)          Diagnostic Data    Results from last 7 days   Lab Units 07/20/24  0509   WBC 10*3/mm3 11.18*   HEMOGLOBIN g/dL 9.1*   HEMATOCRIT % 24.5*   PLATELETS 10*3/mm3 100*   GLUCOSE mg/dL 126*   CREATININE mg/dL 0.46*   BUN mg/dL 3*   SODIUM mmol/L 133*   POTASSIUM mmol/L 3.8   AST (SGOT) U/L 122*   ALT (SGPT) U/L 23   ALK PHOS U/L 286*   BILIRUBIN mg/dL 18.0*   ANION GAP mmol/L 8.0       CT Head Without Contrast    Result Date: 7/19/2024  Impression: No acute intracranial process. Electronically Signed: Ghazala Ordonez MD  7/19/2024 6:10 AM EDT  Workstation ID: [id number]       I reviewed the patient's new clinical results.    Assessment/Plan:     Active and Resolved Problems  Active Hospital Problems    Diagnosis  POA    **Alcohol withdrawal [F10.939]  Yes      Resolved Hospital Problems   No resolved problems to display.       Impression   Alcoholic Cirrhosis  Elevated liver enzymes 2/2 Alcoholic hepatitis  Hyperbilirubinemia 2/2 Cirrhosis due to chronic alcoholism  Hyponatremia 2/2 beer potomania  Elevated INR & thrombocytopenia 2/2  Cirrhosis  Leukocytosis, is on steroids  Dysphagia, h/o esophageal stricture?  Hepatic encepalopathy  Alcohol Withdrawal  Alcohol Abuse    Plan  [facility] Protocol  Psych consulted  GI following, state will need EGD down the road to r/o varices  Meld score= 28  MDF score= 42, currently on prednisolone, assess Lille score on Day 7  Hepatitis panel non reactive  Continue thiamine, folic acid  Continue PPI  Check Daily INR  Start patient on lactulose, titrate to 3 BM daily  PT/OT        VTE Prophylaxis:  Mechanical VTE prophylaxis orders are present.         Code status is   Code Status and Medical Interventions:   Ordered at: 07/18/24 1214     Code Status (Patient has no pulse and is not breathing):    CPR (Attempt to Resuscitate)     Medical Interventions (Patient has pulse or is breathing):    Full Support            Time: 30 minutes    Signature: Electronically signed by Vicente Moscoso MD, 07/20/24, 14:49 EDT.  [facility] Hospitalist Team

## 2024-07-21 ENCOUNTER — INPATIENT HOSPITAL (OUTPATIENT)
Dept: URBAN - METROPOLITAN AREA HOSPITAL 84 | Facility: HOSPITAL | Age: 29
End: 2024-07-21
Payer: MEDICAID

## 2024-07-21 DIAGNOSIS — D72.829 ELEVATED WHITE BLOOD CELL COUNT, UNSPECIFIED: ICD-10-CM

## 2024-07-21 DIAGNOSIS — K70.10 ALCOHOLIC HEPATITIS WITHOUT ASCITES: ICD-10-CM

## 2024-07-21 DIAGNOSIS — D64.9 ANEMIA, UNSPECIFIED: ICD-10-CM

## 2024-07-21 DIAGNOSIS — F10.230 ALCOHOL DEPENDENCE WITH WITHDRAWAL, UNCOMPLICATED: ICD-10-CM

## 2024-07-21 DIAGNOSIS — E87.1 HYPO-OSMOLALITY AND HYPONATREMIA: ICD-10-CM

## 2024-07-21 DIAGNOSIS — R13.10 DYSPHAGIA, UNSPECIFIED: ICD-10-CM

## 2024-07-21 DIAGNOSIS — D69.59 OTHER SECONDARY THROMBOCYTOPENIA: ICD-10-CM

## 2024-07-21 DIAGNOSIS — R79.1 ABNORMAL COAGULATION PROFILE: ICD-10-CM

## 2024-07-21 DIAGNOSIS — K21.9 GASTRO-ESOPHAGEAL REFLUX DISEASE WITHOUT ESOPHAGITIS: ICD-10-CM

## 2024-07-21 LAB
ALBUMIN SERPL-MCNC: 2.8 G/DL (ref 3.5–5.2)
ALBUMIN/GLOB SERPL: 1.1 G/DL
ALP SERPL-CCNC: 272 U/L (ref 39–117)
ALT SERPL W P-5'-P-CCNC: 23 U/L (ref 1–41)
ANION GAP SERPL CALCULATED.3IONS-SCNC: 10.1 MMOL/L (ref 5–15)
AST SERPL-CCNC: 115 U/L (ref 1–40)
BILIRUB SERPL-MCNC: 16.6 MG/DL (ref 0–1.2)
BUN SERPL-MCNC: 3 MG/DL (ref 6–20)
BUN/CREAT SERPL: 6.5 (ref 7–25)
CALCIUM SPEC-SCNC: 8.6 MG/DL (ref 8.6–10.5)
CHLORIDE SERPL-SCNC: 100 MMOL/L (ref 98–107)
CO2 SERPL-SCNC: 24.9 MMOL/L (ref 22–29)
CREAT SERPL-MCNC: 0.46 MG/DL (ref 0.76–1.27)
DEPRECATED RDW RBC AUTO: 50.8 FL (ref 37–54)
EGFRCR SERPLBLD CKD-EPI 2021: 145.2 ML/MIN/1.73
ERYTHROCYTE [DISTWIDTH] IN BLOOD BY AUTOMATED COUNT: 15.4 % (ref 12.3–15.4)
GLOBULIN UR ELPH-MCNC: 2.5 GM/DL
GLUCOSE SERPL-MCNC: 130 MG/DL (ref 65–99)
HCT VFR BLD AUTO: 23 % (ref 37.5–51)
HGB BLD-MCNC: 8.5 G/DL (ref 13–17.7)
INR PPP: 1.71 (ref 0.93–1.1)
MAGNESIUM SERPL-MCNC: 1.3 MG/DL (ref 1.6–2.6)
MCH RBC QN AUTO: 34.1 PG (ref 26.6–33)
MCHC RBC AUTO-ENTMCNC: 37 G/DL (ref 31.5–35.7)
MCV RBC AUTO: 92.4 FL (ref 79–97)
PHOSPHATE SERPL-MCNC: 2.8 MG/DL (ref 2.5–4.5)
PLATELET # BLD AUTO: 148 10*3/MM3 (ref 140–450)
PMV BLD AUTO: 10.2 FL (ref 6–12)
POTASSIUM SERPL-SCNC: 3.7 MMOL/L (ref 3.5–5.2)
PROT SERPL-MCNC: 5.3 G/DL (ref 6–8.5)
PROTHROMBIN TIME: 17.9 SECONDS (ref 9.6–11.7)
RBC # BLD AUTO: 2.49 10*6/MM3 (ref 4.14–5.8)
SODIUM SERPL-SCNC: 135 MMOL/L (ref 136–145)
WBC NRBC COR # BLD AUTO: 14.8 10*3/MM3 (ref 3.4–10.8)

## 2024-07-21 PROCEDURE — 63710000001 PREDNISOLONE PER 5 MG: Performed by: NURSE PRACTITIONER

## 2024-07-21 PROCEDURE — 99232 SBSQ HOSP IP/OBS MODERATE 35: CPT

## 2024-07-21 PROCEDURE — 99232 SBSQ HOSP IP/OBS MODERATE 35: CPT | Performed by: NURSE PRACTITIONER

## 2024-07-21 PROCEDURE — 25010000002 MAGNESIUM SULFATE 2 GM/50ML SOLUTION

## 2024-07-21 PROCEDURE — 84100 ASSAY OF PHOSPHORUS: CPT | Performed by: INTERNAL MEDICINE

## 2024-07-21 PROCEDURE — 25810000003 DEXTROSE 5 % AND SODIUM CHLORIDE 0.9 % 5-0.9 % SOLUTION: Performed by: INTERNAL MEDICINE

## 2024-07-21 PROCEDURE — P9047 ALBUMIN (HUMAN), 25%, 50ML: HCPCS | Performed by: INTERNAL MEDICINE

## 2024-07-21 PROCEDURE — 97166 OT EVAL MOD COMPLEX 45 MIN: CPT | Performed by: OCCUPATIONAL THERAPIST

## 2024-07-21 PROCEDURE — 85610 PROTHROMBIN TIME: CPT | Performed by: NURSE PRACTITIONER

## 2024-07-21 PROCEDURE — 85027 COMPLETE CBC AUTOMATED: CPT | Performed by: INTERNAL MEDICINE

## 2024-07-21 PROCEDURE — 25010000002 THIAMINE PER 100 MG: Performed by: INTERNAL MEDICINE

## 2024-07-21 PROCEDURE — 25010000002 ALBUMIN HUMAN 25% PER 50 ML: Performed by: INTERNAL MEDICINE

## 2024-07-21 PROCEDURE — 83735 ASSAY OF MAGNESIUM: CPT | Performed by: INTERNAL MEDICINE

## 2024-07-21 PROCEDURE — 80053 COMPREHEN METABOLIC PANEL: CPT | Performed by: INTERNAL MEDICINE

## 2024-07-21 RX ORDER — MAGNESIUM SULFATE HEPTAHYDRATE 40 MG/ML
2 INJECTION, SOLUTION INTRAVENOUS
Status: COMPLETED | OUTPATIENT
Start: 2024-07-21 | End: 2024-07-21

## 2024-07-21 RX ORDER — ALBUMIN (HUMAN) 12.5 G/50ML
25 SOLUTION INTRAVENOUS EVERY 8 HOURS
Status: COMPLETED | OUTPATIENT
Start: 2024-07-21 | End: 2024-07-21

## 2024-07-21 RX ORDER — MAGNESIUM SULFATE HEPTAHYDRATE 40 MG/ML
2 INJECTION, SOLUTION INTRAVENOUS
Qty: 150 ML | Refills: 0 | Status: DISCONTINUED | OUTPATIENT
Start: 2024-07-21 | End: 2024-07-21

## 2024-07-21 RX ADMIN — SUCRALFATE 1 G: 1 TABLET ORAL at 10:55

## 2024-07-21 RX ADMIN — LORAZEPAM 1 MG: 1 TABLET ORAL at 09:06

## 2024-07-21 RX ADMIN — PANTOPRAZOLE SODIUM 40 MG: 40 INJECTION, POWDER, FOR SOLUTION INTRAVENOUS at 16:07

## 2024-07-21 RX ADMIN — DEXTROSE MONOHYDRATE AND SODIUM CHLORIDE 75 ML/HR: 5; .9 INJECTION, SOLUTION INTRAVENOUS at 03:16

## 2024-07-21 RX ADMIN — Medication 10 ML: at 09:07

## 2024-07-21 RX ADMIN — PANTOPRAZOLE SODIUM 40 MG: 40 INJECTION, POWDER, FOR SOLUTION INTRAVENOUS at 07:22

## 2024-07-21 RX ADMIN — Medication 1000 MG: at 09:19

## 2024-07-21 RX ADMIN — ALBUMIN (HUMAN) 25 G: 0.25 INJECTION, SOLUTION INTRAVENOUS at 16:08

## 2024-07-21 RX ADMIN — Medication 220 MG: at 09:06

## 2024-07-21 RX ADMIN — THIAMINE HYDROCHLORIDE 200 MG: 100 INJECTION, SOLUTION INTRAMUSCULAR; INTRAVENOUS at 21:14

## 2024-07-21 RX ADMIN — LACTULOSE 10 G: 20 SOLUTION ORAL at 09:05

## 2024-07-21 RX ADMIN — MAGNESIUM SULFATE HEPTAHYDRATE 2 G: 40 INJECTION, SOLUTION INTRAVENOUS at 10:55

## 2024-07-21 RX ADMIN — LACTULOSE 10 G: 20 SOLUTION ORAL at 20:23

## 2024-07-21 RX ADMIN — FOLIC ACID 1 MG: 1 TABLET ORAL at 09:06

## 2024-07-21 RX ADMIN — LACTULOSE 10 G: 20 SOLUTION ORAL at 16:07

## 2024-07-21 RX ADMIN — SUCRALFATE 1 G: 1 TABLET ORAL at 07:22

## 2024-07-21 RX ADMIN — PREDNISOLONE SODIUM PHOSPHATE 40 MG: 15 SOLUTION ORAL at 09:06

## 2024-07-21 RX ADMIN — THIAMINE HYDROCHLORIDE 200 MG: 100 INJECTION, SOLUTION INTRAMUSCULAR; INTRAVENOUS at 05:08

## 2024-07-21 RX ADMIN — THIAMINE HYDROCHLORIDE 200 MG: 100 INJECTION, SOLUTION INTRAMUSCULAR; INTRAVENOUS at 13:12

## 2024-07-21 RX ADMIN — ALBUMIN (HUMAN) 25 G: 0.25 INJECTION, SOLUTION INTRAVENOUS at 09:19

## 2024-07-21 RX ADMIN — SUCRALFATE 1 G: 1 TABLET ORAL at 16:08

## 2024-07-21 RX ADMIN — Medication 10 ML: at 20:24

## 2024-07-21 RX ADMIN — MAGNESIUM SULFATE HEPTAHYDRATE 2 G: 40 INJECTION, SOLUTION INTRAVENOUS at 07:22

## 2024-07-21 RX ADMIN — Medication 1 TABLET: at 09:06

## 2024-07-21 RX ADMIN — MAGNESIUM SULFATE HEPTAHYDRATE 2 G: 40 INJECTION, SOLUTION INTRAVENOUS at 09:05

## 2024-07-21 RX ADMIN — SUCRALFATE 1 G: 1 TABLET ORAL at 20:23

## 2024-07-21 NOTE — PLAN OF CARE
Goal Outcome Evaluation:  Plan of Care Reviewed With: patient           Outcome Evaluation: Pt is a 28-year-old male admit to [facility] on 24 with alcohol withdrawal s/s  & c/o abd pain. Pt also expressing suicidal ideation upon admission. He is hypokalemic & has jaundice. At baseline, pt lives with his parents. He just recently moved back home from [place], [place]. He admits to stressors including a difficult breakup with his fiance a few years back & one of his brothers  last year. He is ind with all ADLs & mobility. He is not currently employed. His parents are home at all times. Upon eval, pt is A&O X4. He has no c/o pain. He requires min A for ambulation due to generalized weakness & dec activity tolerance. His mobility improved to CGA using a RW. He is supervision for toileting & LB dressing for safety when standing.  Anticipate pt will make good & quick progress as his activity  increases & medical status improves. Pt is considering and discussing the option of going to inpt psych rehab for his substance abuse. Pt should be safe to attend inpt treatment program, but if pt refuses inpt psych rehab, recommend home with HH or outpt therapy to inc strength & endurance.      Anticipated Discharge Disposition (OT): other (see comments) (inpt chemical dependency tx center; if refuses Home with HH or outpt therapy)

## 2024-07-21 NOTE — PROGRESS NOTES
[facility] MEDICINE SERVICE  DAILY PROGRESS NOTE    NAME: Yonas Charles  : 1995  MRN: [id number]      LOS: 3 days     PROVIDER OF SERVICE: Vicente Moscoso MD    Chief Complaint: Alcohol withdrawal    Subjective:     Interval History:  History taken from: patient  Patient Complaints: Patient seen and examined at bedside this morning.  States that he wants to stop drinking and move on with his life.  Patient Denies:       Review of Systems: Negative except described above  Review of Systems    Objective:     Vital Signs  Temp:  [97.4 °F (36.3 °C)-98.1 °F (36.7 °C)] 98.1 °F (36.7 °C)  Heart Rate:  [100-117] 104  Resp:  [16-31] 31  BP: (100-118)/(48-77) 118/77  Flow (L/min):  [2] 2   Body mass index is 20.43 kg/m².    Physical Exam  HENT:      Head: Normocephalic.   Eyes:      General: Scleral icterus present.   Cardiovascular:      Rate and Rhythm: Normal rate and regular rhythm.   Pulmonary:      Breath sounds: Normal breath sounds.   Abdominal:      General: Bowel sounds are normal.   Musculoskeletal:         General: Normal range of motion.   Skin:     Coloration: Skin is jaundiced.   Neurological:      General: No focal deficit present.      Mental Status: He is alert. Mental status is at baseline.   Psychiatric:         Mood and Affect: Mood normal.   Physical Exam    Scheduled Meds   albumin human, 25 g, Intravenous, Q8H  folic acid, 1 mg, Oral, Daily  lactulose, 10 g, Oral, TID  [START ON 2024] LORazepam, 1 mg, Oral, Daily  multivitamin with minerals, 1 tablet, Oral, Daily  pantoprazole, 40 mg, Intravenous, BID AC  prednisoLONE sodium phosphate, 40 mg, Oral, Daily  [Held by provider] sertraline, 50 mg, Oral, Daily  sodium chloride, 10 mL, Intravenous, Q12H  sucralfate, 1 g, Oral, 4x Daily AC & at Bedtime  thiamine (B-1) IV, 200 mg, Intravenous, Q8H   Followed by  [START ON 2024] thiamine, 100 mg, Oral, Daily  zinc sulfate, 220 mg, Oral, Daily       PRN Meds     acetaminophen  **OR** acetaminophen **OR** acetaminophen    senna-docusate sodium **AND** polyethylene glycol **AND** bisacodyl **AND** bisacodyl    Calcium Replacement - Follow Nurse / BPA Driven Protocol    chlordiazePOXIDE    LORazepam    LORazepam **OR** LORazepam **OR** LORazepam **OR** LORazepam **OR** LORazepam **OR** LORazepam    Magnesium Standard Dose Replacement - Follow Nurse / BPA Driven Protocol    ondansetron ODT **OR** ondansetron    Phosphorus Replacement - Follow Nurse / BPA Driven Protocol    Potassium Replacement - Follow Nurse / BPA Driven Protocol    sodium chloride    sodium chloride    sodium chloride   Infusions         Diagnostic Data    Results from last 7 days   Lab Units 07/21/24  0514   WBC 10*3/mm3 14.80*   HEMOGLOBIN g/dL 8.5*   HEMATOCRIT % 23.0*   PLATELETS 10*3/mm3 148   GLUCOSE mg/dL 130*   CREATININE mg/dL 0.46*   BUN mg/dL 3*   SODIUM mmol/L 135*   POTASSIUM mmol/L 3.7   AST (SGOT) U/L 115*   ALT (SGPT) U/L 23   ALK PHOS U/L 272*   BILIRUBIN mg/dL 16.6*   ANION GAP mmol/L 10.1       No radiology results for the last day      I reviewed the patient's new clinical results.    Assessment/Plan:     Active and Resolved Problems  Active Hospital Problems    Diagnosis  POA    **Alcohol withdrawal [F10.939]  Yes      Resolved Hospital Problems   No resolved problems to display.       Impression   Alcoholic Cirrhosis  Elevated liver enzymes 2/2 Alcoholic hepatitis  Hyperbilirubinemia 2/2 Cirrhosis due to chronic alcoholism  Hyponatremia 2/2 beer potomania  Elevated INR & thrombocytopenia 2/2 Cirrhosis  Leukocytosis, is on steroids  Dysphagia, h/o esophageal stricture?  Hepatic encepalopathy  Alcohol Withdrawal  Alcohol Abuse     Plan  [facility] Protocol  Psych consulted  GI following, state will need EGD down the road to r/o varices  Meld score= 28  MDF score= 42, currently on prednisolone, assess Lille score on Day 7  Hepatitis panel non reactive  Continue thiamine, folic acid  Continue PPI  Continue  patient on lactulose, titrate to 3 BM daily  Nephrology following for hyponatremia  PT/OT    VTE Prophylaxis:  Mechanical VTE prophylaxis orders are present.         Code status is   Code Status and Medical Interventions:   Ordered at: 07/18/24 1214     Code Status (Patient has no pulse and is not breathing):    CPR (Attempt to Resuscitate)     Medical Interventions (Patient has pulse or is breathing):    Full Support            Time: 30 minutes    Signature: Electronically signed by Vicente Moscoso MD, 07/21/24, 13:05 EDT.  [facility] Hospitalist Team

## 2024-07-21 NOTE — PLAN OF CARE
Goal Outcome Evaluation:  Plan of Care Reviewed With: patient        Progress: improving  Outcome Evaluation: low CIWAR score the whole day. Pt rested well with no complaints. Patient and family expressed the interest of going to rehab facility.  Runs tachycardia at times

## 2024-07-21 NOTE — THERAPY EVALUATION
Patient Name: Yonas Charles  : 1995    MRN: [id number]                              Today's Date: 2024       Admit Date: 2024    Visit Dx:     ICD-10-CM ICD-9-CM   1. Acute hypokalemia  E87.6 276.8   2. Alcohol withdrawal syndrome with complication  F10.939 291.81   3. Jaundice  R17 782.4     Patient Active Problem List   Diagnosis    Alcohol withdrawal     History reviewed. No pertinent past medical history.  History reviewed. No pertinent surgical history.   General Information       Row Name 24 1033          OT Time and Intention    Document Type evaluation  -DT     Mode of Treatment occupational therapy  -DT       Row Name 24 1033          General Information    Patient Profile Reviewed yes  -DT     Prior Level of Function independent:;all household mobility;ADL's;home management;yard work;driving  -DT     Existing Precautions/Restrictions seizures;fall  -DT     Barriers to Rehab medically complex;ineffective coping  -DT       Row Name 24 1033          Living Environment    People in Home parent(s)  Mom has MS. Dad works from home  -DT       Row Name 24 1033          Home Main Entrance    Number of Stairs, Main Entrance other (see comments)  ramp  -DT       Row Name 24 1033          Stairs Within Home, Primary    Number of Stairs, Within Home, Primary none  -DT       Row Name 24 1033          Cognition    Orientation Status (Cognition) oriented x 4  -DT       Row Name 24 1033          Safety Issues, Functional Mobility    Impairments Affecting Function (Mobility) balance;endurance/activity tolerance;strength  -DT               User Key  (r) = Recorded By, (t) = Taken By, (c) = Cosigned By      Initials Name Provider Type    DT Jackie Moon, OT Occupational Therapist                     Mobility/ADL's       Row Name 24 1035          Bed Mobility    Bed Mobility bed mobility (all) activities  -DT     All Activities, [place] (Bed  Mobility) independent  -DT       Row Name 07/21/24 1035          Transfers    Transfers sit-stand transfer;stand-sit transfer;toilet transfer  -DT       Row Name 07/21/24 1035          Sit-Stand Transfer    Sit-Stand [place] (Transfers) contact guard  -DT     Assistive Device (Sit-Stand Transfers) walker, front-wheeled  -DT       Row Name 07/21/24 1035          Stand-Sit Transfer    Stand-Sit [place] (Transfers) contact guard  -DT     Assistive Device (Stand-Sit Transfers) walker, front-wheeled  -DT       Row Name 07/21/24 1035          Toilet Transfer    [place] Level (Toilet Transfer) contact guard  -DT     Assistive Device (Toilet Transfer) commode;walker, front-wheeled  -DT       Row Name 07/21/24 1035          Functional Mobility    Functional Mobility- Ind. Level contact guard assist  -DT     Functional Mobility- Device walker, front-wheeled  -DT     Functional Mobility-Distance (Feet) 30  -DT     Patient was able to Ambulate yes  -DT       Row Name 07/21/24 1035          Activities of Daily Living    BADL Assessment/Intervention lower body dressing;feeding;toileting  -DT       Row Name 07/21/24 1035          Lower Body Dressing Assessment/Training    [place] Level (Lower Body Dressing) lower body dressing skills;modified independence  -DT       Row Name 07/21/24 1035          Self-Feeding Assessment/Training    [place] Level (Feeding) feeding skills;independent  -DT       Row Name 07/21/24 1035          Toileting Assessment/Training    [place] Level (Toileting) toileting skills;supervision  -DT               User Key  (r) = Recorded By, (t) = Taken By, (c) = Cosigned By      Initials Name Provider Type    DT Jackie Moon OT Occupational Therapist                   Obj/Interventions       Row Name 07/21/24 1036          Range of Motion Comprehensive    General Range of Motion bilateral upper extremity ROM WNL  -DT       Row Name 07/21/24 1036          Strength  Comprehensive (MMT)    General Manual Muscle Testing (MMT) Assessment upper extremity strength deficits identified  -DT     Comment, General Manual Muscle Testing (MMT) Assessment BUE=4/5  -DT       Row Name 07/21/24 1036          Balance    Balance Assessment sitting static balance;sitting dynamic balance;standing static balance;standing dynamic balance  -DT     Static Sitting Balance independent  -DT     Dynamic Sitting Balance supervision  -DT     Position, Sitting Balance sitting edge of bed  -DT     Static Standing Balance contact guard  -DT     Dynamic Standing Balance minimal assist;contact guard  -DT     Position/Device Used, Standing Balance walker, front-wheeled  -DT               User Key  (r) = Recorded By, (t) = Taken By, (c) = Cosigned By      Initials Name Provider Type    DT Jackie Moon, OT Occupational Therapist                   Goals/Plan       Row Name 07/21/24 1040          Transfer Goal 1 (OT)    Activity/Assistive Device (Transfer Goal 1, OT) transfers, all  -DT     [place] Level/Cues Needed (Transfer Goal 1, OT) modified independence  -DT     Time Frame (Transfer Goal 1, OT) 2 weeks  -DT       Row Name 07/21/24 1040          Bathing Goal 1 (OT)    Activity/Device (Bathing Goal 1, OT) bathing skills, all  -DT     [place] Level/Cues Needed (Bathing Goal 1, OT) modified independence  -DT     Time Frame (Bathing Goal 1, OT) 2 weeks  -DT       Row Name 07/21/24 1040          Problem Specific Goal 1 (OT)    Problem Specific Goal 1 (OT) Increase dynamic stand balance to good & endurance to 7-10 min for safe sinkside ADLs.  -DT     Time Frame (Problem Specific Goal 1, OT) 2 weeks  -DT       Row Name 07/21/24 1040          Therapy Assessment/Plan (OT)    Planned Therapy Interventions (OT) activity tolerance training;BADL retraining;functional balance retraining;occupation/activity based interventions;patient/caregiver education/training;ROM/therapeutic exercise;strengthening  exercise;transfer/mobility retraining  -DT               User Key  (r) = Recorded By, (t) = Taken By, (c) = Cosigned By      Initials Name Provider Type    DT Jackie Moon, OT Occupational Therapist                   Clinical Impression       Row Name 24 1037          Pain Assessment    Pretreatment Pain Rating 0/10 - no pain  -DT     Posttreatment Pain Rating 0/10 - no pain  -DT       Row Name 24 1037          Plan of Care Review    Plan of Care Reviewed With patient  -DT     Outcome Evaluation Pt is a 28-year-old male admit to [facility] on 24 with alcohol withdrawal s/s  & c/o abd pain. Pt also expressing suicidal ideation upon admission. He is hypokalemic & has jaundice. At baseline, pt lives with his parents. He just recently moved back home from [place], [place]. He admits to stressors including a difficult breakup with his fiance a few years back & one of his brothers  last year. He is ind with all ADLs & mobility. He is not currently employed. His parents are home at all times. Upon eval, pt is A&O X4. He has no c/o pain. He requires min A for ambulation due to generalized weakness & dec activity tolerance. His mobility improved to CGA using a RW. He is supervision for toileting & LB dressing for safety when standing.  Anticipate pt will make good & quick progress as his activity  increases & medical status improves. Pt is considering and discussing the option of going to inpt psych rehab for his substance abuse. Pt should be safe to attend inpt treatment program, but if pt refuses inpt psych rehab, recommend home with  or outpt therapy to inc strength & endurance.  -DT       Row Name 24 1037          Therapy Assessment/Plan (OT)    Rehab Potential (OT) good, to achieve stated therapy goals  -DT     Criteria for Skilled Therapeutic Interventions Met (OT) yes;meets criteria;skilled treatment is necessary  -DT     Therapy Frequency (OT) 5 times/wk  -DT     Predicted Duration of  Therapy Intervention (OT) until d/c  -DT       Row Name 07/21/24 1037          Therapy Plan Review/Discharge Plan (OT)    Anticipated Discharge Disposition (OT) other (see comments)  inpt chemical dependency tx center; if refuses Home with HH or outpt therapy  -DT       Row Name 07/21/24 1037          Vital Signs    Pre Systolic BP Rehab 127  -DT     Pre Treatment Diastolic BP 81  -DT     Pretreatment Heart Rate (beats/min) 111  -DT     Intratreatment Heart Rate (beats/min) 120  -DT     Posttreatment Heart Rate (beats/min) 112  -DT     Pre SpO2 (%) 93  -DT     O2 Delivery Pre Treatment room air  -DT     O2 Delivery Intra Treatment room air  -DT     Post SpO2 (%) 94  -DT     O2 Delivery Post Treatment room air  -DT       Row Name 07/21/24 1037          Positioning and Restraints    Pre-Treatment Position in bed  -DT     Post Treatment Position chair  -DT     In Chair notified nsg;sitting;call light within reach;encouraged to call for assist;exit alarm on;legs elevated  -DT               User Key  (r) = Recorded By, (t) = Taken By, (c) = Cosigned By      Initials Name Provider Type    Jackie Martinez, OT Occupational Therapist                   Outcome Measures    No documentation.                   Occupational Therapy Education       Title: PT OT SLP Therapies (Done)       Topic: Occupational Therapy (Done)       Point: ADL training (Done)       Description:   Instruct learner(s) on proper safety adaptation and remediation techniques during self care or transfers.   Instruct in proper use of assistive devices.                  Learning Progress Summary             Patient Acceptance, E,TB, VU by DT at 7/21/2024 1042    Comment: Role of OT, goals & POC; safety awareness                         Point: Home exercise program (Done)       Description:   Instruct learner(s) on appropriate technique for monitoring, assisting and/or progressing therapeutic exercises/activities.                  Learning Progress  Summary             Patient Acceptance, E,TB, VU by DT at 2024 1042    Comment: Role of OT, goals & POC; safety awareness                         Point: Precautions (Done)       Description:   Instruct learner(s) on prescribed precautions during self-care and functional transfers.                  Learning Progress Summary             Patient Acceptance, E,TB, VU by DT at 2024 1042    Comment: Role of OT, goals & POC; safety awareness                         Point: Body mechanics (Done)       Description:   Instruct learner(s) on proper positioning and spine alignment during self-care, functional mobility activities and/or exercises.                  Learning Progress Summary             Patient Acceptance, E,TB, VU by DT at 2024 1042    Comment: Role of OT, goals & POC; safety awareness                                         User Key       Initials Effective Dates Name Provider Type Discipline    DT 23 -  Jackie Moon, OT Occupational Therapist OT                  OT Recommendation and Plan  Planned Therapy Interventions (OT): activity tolerance training, BADL retraining, functional balance retraining, occupation/activity based interventions, patient/caregiver education/training, ROM/therapeutic exercise, strengthening exercise, transfer/mobility retraining  Therapy Frequency (OT): 5 times/wk  Plan of Care Review  Plan of Care Reviewed With: patient  Outcome Evaluation: Pt is a 28-year-old male admit to [facility] on 24 with alcohol withdrawal s/s  & c/o abd pain. Pt also expressing suicidal ideation upon admission. He is hypokalemic & has jaundice. At baseline, pt lives with his parents. He just recently moved back home from [place], [place]. He admits to stressors including a difficult breakup with his fiance a few years back & one of his brothers  last year. He is ind with all ADLs & mobility. He is not currently employed. His parents are home at all times. Upon eval, pt is A&O X4.  He has no c/o pain. He requires min A for ambulation due to generalized weakness & dec activity tolerance. His mobility improved to CGA using a RW. He is supervision for toileting & LB dressing for safety when standing.  Anticipate pt will make good & quick progress as his activity  increases & medical status improves. Pt is considering and discussing the option of going to inpt psych rehab for his substance abuse. Pt should be safe to attend inpt treatment program, but if pt refuses inpt psych rehab, recommend home with  or outpt therapy to inc strength & endurance.     Time Calculation:         Time Calculation- OT       Row Name 07/21/24 1042             Time Calculation- OT    OT Start Time 0822  -DT      OT Stop Time 0857  -DT      OT Time Calculation (min) 35 min  -DT      OT Received On 07/21/24  -DT      OT - Next Appointment 07/22/24  -DT      OT Goal Re-Cert Due Date 08/04/24  -DT                User Key  (r) = Recorded By, (t) = Taken By, (c) = Cosigned By      Initials Name Provider Type    Jackie Martinez, OT Occupational Therapist                           Jackie Moon, OT  7/21/2024

## 2024-07-21 NOTE — PROGRESS NOTES
" LOS: 3 days   Patient Care Team:  Provider, No Known as PCP - General      Subjective   \"Eating better than I have in a while\"    Interval History:   LABS: Sodium 135, creatinine 0.46.  TP 16.6 (18), alk phos 272 (286),  (122), ALT remains normal at 23.  WBCs 14.8 (11), hemoglobin 8.5 (9.1), platelets 148.  Hepatitis panel negative.  INR 1.71.  DF 45.1  Plan is for Inpatient rehab  Patient has not had any nausea or vomiting.  Dysphagia has improved.  Has had 2 bowel movements today.  No blood in stool.    ROS:   No chest pain, shortness of breath, or cough.         Medication Review:     Current Facility-Administered Medications:     acetaminophen (TYLENOL) tablet 650 mg, 650 mg, Oral, Q4H PRN **OR** acetaminophen (TYLENOL) 160 MG/5ML oral solution 650 mg, 650 mg, Oral, Q4H PRN **OR** acetaminophen (TYLENOL) suppository 650 mg, 650 mg, Rectal, Q4H PRN, Mari Bustos MD    albumin human 25 % IV SOLN 25 g, 25 g, Intravenous, Q8H, Allison Mcdonald MD, 25 g at 07/21/24 0919    sennosides-docusate (PERICOLACE) 8.6-50 MG per tablet 2 tablet, 2 tablet, Oral, BID PRN **AND** polyethylene glycol (MIRALAX) packet 17 g, 17 g, Oral, Daily PRN **AND** bisacodyl (DULCOLAX) EC tablet 5 mg, 5 mg, Oral, Daily PRN **AND** bisacodyl (DULCOLAX) suppository 10 mg, 10 mg, Rectal, Daily PRN, Mari Bustos MD    Calcium Replacement - Follow Nurse / BPA Driven Protocol, , Does not apply, PRN, Mari Bustos MD    chlordiazePOXIDE (LIBRIUM) capsule 50 mg, 50 mg, Oral, 4x Daily PRN, Mari Bustos MD    folic acid (FOLVITE) tablet 1 mg, 1 mg, Oral, Daily, Mari Bustos MD, 1 mg at 07/21/24 0906    lactulose (CHRONULAC) 10 GM/15ML solution 10 g, 10 g, Oral, TID, Vicente Moscsoo MD, 10 g at 07/21/24 0905    LORazepam (ATIVAN) injection 1 mg, 1 mg, Intravenous, Q2H PRN, Mari Bustos MD    LORazepam (ATIVAN) tablet 1 mg, 1 mg, Oral, Q1H PRN **OR** LORazepam (ATIVAN) injection 1 mg, 1 " mg, Intravenous, Q1H PRN **OR** LORazepam (ATIVAN) tablet 2 mg, 2 mg, Oral, Q1H PRN **OR** LORazepam (ATIVAN) injection 2 mg, 2 mg, Intravenous, Q1H PRN **OR** LORazepam (ATIVAN) injection 2 mg, 2 mg, Intravenous, Q15 Min PRN **OR** LORazepam (ATIVAN) injection 2 mg, 2 mg, Intramuscular, Q15 Min PRN, Mari Bustos MD    [COMPLETED] LORazepam (ATIVAN) tablet 2 mg, 2 mg, Oral, Q6H, 2 mg at 07/19/24 1005 **FOLLOWED BY** [COMPLETED] LORazepam (ATIVAN) tablet 1 mg, 1 mg, Oral, Q6H, 1 mg at 07/20/24 0929 **FOLLOWED BY** [COMPLETED] LORazepam (ATIVAN) tablet 1 mg, 1 mg, Oral, Q12H, 1 mg at 07/21/24 0906 **FOLLOWED BY** [START ON 7/22/2024] LORazepam (ATIVAN) tablet 1 mg, 1 mg, Oral, Daily, Mari Bustos MD    Magnesium Standard Dose Replacement - Follow Nurse / BPA Driven Protocol, , Does not apply, Juli HARTLEY Abbas Ali, MD    multivitamin with minerals 1 tablet, 1 tablet, Oral, Daily, Mari Bustos MD, 1 tablet at 07/21/24 0906    ondansetron ODT (ZOFRAN-ODT) disintegrating tablet 4 mg, 4 mg, Oral, Q6H PRN **OR** ondansetron (ZOFRAN) injection 4 mg, 4 mg, Intravenous, Q6H PRN, Mari Bustos MD, 4 mg at 07/18/24 1330    pantoprazole (PROTONIX) injection 40 mg, 40 mg, Intravenous, BID Christ GARCIA Donna Ann, APRN, 40 mg at 07/21/24 0722    Phosphorus Replacement - Follow Nurse / BPA Driven Protocol, , Does not apply, Juli HARTLEY Abbas Ali, MD    Potassium Replacement - Follow Nurse / BPA Driven Protocol, , Does not apply, Juli HARTLEY Abbas Ali, MD    prednisoLONE sodium phosphate (ORAPRED) 15 MG/5ML oral solution 40 mg, 40 mg, Oral, Daily, Waleska Chery APRN, 40 mg at 07/21/24 0906    [Held by provider] sertraline (ZOLOFT) tablet 50 mg, 50 mg, Oral, Daily, Mari Bustos MD, 50 mg at 07/18/24 1258    sodium chloride 0.9 % flush 10 mL, 10 mL, Intravenous, PRN, Mari Bustos MD    sodium chloride 0.9 % flush 10 mL, 10 mL, Intravenous, Q12H, Mari Bustos MD, 10 mL at 07/21/24  0907    sodium chloride 0.9 % flush 10 mL, 10 mL, Intravenous, PRN, Mari Bustos MD    sodium chloride 0.9 % infusion 40 mL, 40 mL, Intravenous, PRN, Mari Bustos MD    sucralfate (CARAFATE) tablet 1 g, 1 g, Oral, 4x Daily AC & at Bedtime, Christ Kacivasquez Sandoval, APRN, 1 g at 07/21/24 1055    thiamine (B-1) injection 200 mg, 200 mg, Intravenous, Q8H, 200 mg at 07/21/24 1312 **FOLLOWED BY** [START ON 7/24/2024] thiamine (VITAMIN B-1) tablet 100 mg, 100 mg, Oral, Daily, Mari Bustos MD    zinc sulfate (ZINCATE) capsule 220 mg, 220 mg, Oral, Daily, Waleska Chery, APRN, 220 mg at 07/21/24 0906      Objective more awake and alert today.  Resting in hospital bed. Room 264.    Vital Signs  Temp:  [97.4 °F (36.3 °C)-98.1 °F (36.7 °C)] 98.1 °F (36.7 °C)  Heart Rate:  [100-117] 104  Resp:  [16-31] 31  BP: (100-118)/(48-77) 118/77  Physical Exam:    General Appearance:    Awake and alert, in no acute distress   Head:    Normocephalic, without obvious abnormality   Eyes:          Conjunctivae normal, icteric sclerae   Ears:    Hearing intact   Throat:   No oral lesions, no thrush, oral mucosa moist   Neck:   No adenopathy, supple, no JVD   Lungs:     respirations regular, even and unlabored        Abdomen:      soft, non-tender, no rebound or guarding, non-distended, no hepatosplenomegaly   Rectal:     Deferred   Extremities:   No edema, no cyanosis, no redness   Skin:   No bleeding, bruising or rash, + jaundice             Results Review:    CBC    Results from last 7 days   Lab Units 07/21/24  0514 07/20/24  0509 07/19/24  0300 07/18/24  1015   WBC 10*3/mm3 14.80* 11.18* 8.78 9.86   HEMOGLOBIN g/dL 8.5* 9.1* 8.2* 8.9*   PLATELETS 10*3/mm3 148 100* 85* 94*     CMP   Results from last 7 days   Lab Units 07/21/24  0514 07/20/24  0509 07/19/24  1619 07/19/24  1458 07/19/24  0300 07/18/24  1829 07/18/24  1015   SODIUM mmol/L 135* 133* 131* 134* 126* 122* 122*   POTASSIUM mmol/L 3.7 3.8 3.8  3.6 3.6 3.0* 2.7* 1.9*  "  CHLORIDE mmol/L 100 96* 96* 96* 86* 80* 72*   CO2 mmol/L 24.9 29.0 23.8 29.2* 32.5* 33.2* 33.7*   BUN mg/dL 3* 3* 3* 3* 3* 4* 6   CREATININE mg/dL 0.46* 0.46* 0.41* 0.40* 0.44* 0.38* 0.40*   GLUCOSE mg/dL 130* 126* 78 79 116* 91 93   ALBUMIN g/dL 2.8* 2.9*  --  2.9* 3.1*  --  3.0*   BILIRUBIN mg/dL 16.6* 18.0*  --  17.9* 17.4*  --  15.8*   ALK PHOS U/L 272* 286*  --  301* 324*  --  380*   AST (SGOT) U/L 115* 122*  --  143* 159*  --  186*   ALT (SGPT) U/L 23 23  --  27 28  --  33   MAGNESIUM mg/dL 1.3*  --   --  1.7 2.2  --  1.2*   PHOSPHORUS mg/dL 2.8 2.6 1.6*  --  0.3*  --   --    LIPASE U/L  --   --   --   --   --   --  100*   AMMONIA umol/L  --   --   --   --  90*  --   --      Cr Clearance Estimated Creatinine Clearance: 186.7 mL/min (A) (by C-G formula based on SCr of 0.46 mg/dL (L)).  Coag   Results from last 7 days   Lab Units 07/20/24  0509 07/19/24  0300 07/18/24  0923   INR  2.02* 1.79* 1.59*   APTT seconds  --   --  41.9*     HbA1C No results found for: \"HGBA1C\"  Blood Glucose No results found for: \"POCGLU\"  Infection   Results from last 7 days   Lab Units 07/18/24  0928 07/18/24  0923   BLOODCX  No growth at 3 days No growth at 3 days     UA    Results from last 7 days   Lab Units 07/18/24  1319   NITRITE UA  Positive*   WBC UA /HPF 0-2   BACTERIA UA /HPF None Seen   SQUAM EPITHEL UA /HPF 0-2     Radiology(recent) No radiology results for the last day          Assessment & Plan   Alcohol hepatitis  Elevated LFTs related to above  Acute alcohol withdrawal  Nitrite positive UTI  Leukocytosis  Normocytic anemia  Thrombocytopenia consider related to alcohol abuse  Elevated INR due to alcohol abuse  Hyponatremia related to alcohol abuse  Dysphagia could be related to esophageal stricture versus reflux esophagitis versus dysmotility  GERD    Plan:  Patient is swallowing better.  PPI twice a day and Carafate seem to be helping.  Continue 2 g low-sodium diet with soft to chew meats.  Continue CIWA " protocol  Discriminant function 45.1.  Continue prednisolone, thiamine, zinc and folic acid.  Check [place] score tomorrow which will be day 4.  Will hold on endoscopy for now.   Complete alcohol cessation.  He is considering inpatient rehab at discharge which I do think is a good idea.    Kaci Polo, MARI  07/21/24  13:46 EDT

## 2024-07-21 NOTE — PLAN OF CARE
Goal Outcome Evaluation:  Plan of Care Reviewed With: patient        Progress: improving          Patient has had no issues or concerns noted this shift, is able to make needs known. Awaiting to see if patient will be willing to go through rehab.

## 2024-07-21 NOTE — PROGRESS NOTES
"NEPHROLOGY PROGRESS NOTE------[facility]    [facility]  [phone]  Navya Mcdonald MD      Patient Care Team:  Provider, No Known as PCP - General      Provider:  Navya Mcdonald MD  Patient Name: Yonas Charles  :  1995    SUBJECTIVE:    F/U ELECTROLYTE ABNORMALITIES    No complaints this AM. No SOB, CP, dysuria.     Medication:  albumin human, 25 g, Intravenous, Once  folic acid, 1 mg, Oral, Daily  lactulose, 10 g, Oral, TID  LORazepam, 1 mg, Oral, Q12H   Followed by  [START ON 2024] LORazepam, 1 mg, Oral, Daily  magnesium sulfate, 2 g, Intravenous, Q2H  multivitamin with minerals, 1 tablet, Oral, Daily  pantoprazole, 40 mg, Intravenous, BID AC  prednisoLONE sodium phosphate, 40 mg, Oral, Daily  [Held by provider] sertraline, 50 mg, Oral, Daily  sodium chloride, 10 mL, Intravenous, Q12H  sucralfate, 1 g, Oral, 4x Daily AC & at Bedtime  thiamine (B-1) IV, 200 mg, Intravenous, Q8H   Followed by  [START ON 2024] thiamine, 100 mg, Oral, Daily  zinc sulfate, 220 mg, Oral, Daily      dextrose 5 % and sodium chloride 0.9 %, 75 mL/hr, Last Rate: 75 mL/hr (24 0316)        OBJECTIVE    Vital Sign Min/Max for last 24 hours  Temp  Min: 97.4 °F (36.3 °C)  Max: 98 °F (36.7 °C)   BP  Min: 100/48  Max: 110/76   Pulse  Min: 99  Max: 117   Resp  Min: 16  Max: 29   SpO2  Min: 89 %  Max: 94 %   No data recorded   No data recorded     Flowsheet Rows      Flowsheet Row First Filed Value   Admission Height 165.1 cm (65\") Documented at 2024 0855   Admission Weight 55.7 kg (122 lb 12.7 oz) Documented at 2024 0855            No intake/output data recorded.  I/O last 3 completed shifts:  In: 4040 [P.O.:560; I.V.:3480]  Out: -     Physical Exam:  General Appearance: WEAK AND MALAISED  Head: normocephalic, without obvious abnormality and atraumatic    Eyes: conjunctivae and +ICTERIC SCLERAE  Neck: supple and no JVD  Lungs: +FEW " "SCATTERED RHONCHI  Heart: regular rhythm & normal rate and normal S1, S2   Chest Wall: no abnormalities observed  Abdomen: normal bowel sounds and soft   Extremities: moves extremities well, no edema, no cyanosis  Skin: +JAUNDICE  Neurologic: A AND O X 4 WITHOUT FOCAL DEFICIT    Labs:    WBC WBC   Date Value Ref Range Status   07/21/2024 14.80 (H) 3.40 - 10.80 10*3/mm3 Final   07/20/2024 11.18 (H) 3.40 - 10.80 10*3/mm3 Final   07/19/2024 8.78 3.40 - 10.80 10*3/mm3 Final   07/18/2024 9.86 3.40 - 10.80 10*3/mm3 Final      HGB Hemoglobin   Date Value Ref Range Status   07/21/2024 8.5 (L) 13.0 - 17.7 g/dL Final   07/20/2024 9.1 (L) 13.0 - 17.7 g/dL Final   07/19/2024 8.2 (L) 13.0 - 17.7 g/dL Final   07/18/2024 8.9 (L) 13.0 - 17.7 g/dL Final      HCT Hematocrit   Date Value Ref Range Status   07/21/2024 23.0 (L) 37.5 - 51.0 % Final   07/20/2024 24.5 (L) 37.5 - 51.0 % Final   07/19/2024 24.5 (L) 37.5 - 51.0 % Final   07/18/2024 26.4 (L) 37.5 - 51.0 % Final      Platelets No results found for: \"LABPLAT\"   MCV MCV   Date Value Ref Range Status   07/21/2024 92.4 79.0 - 97.0 fL Final   07/20/2024 91.8 79.0 - 97.0 fL Final   07/19/2024 89.4 79.0 - 97.0 fL Final   07/18/2024 87.4 79.0 - 97.0 fL Final          Sodium Sodium   Date Value Ref Range Status   07/21/2024 135 (L) 136 - 145 mmol/L Final   07/20/2024 133 (L) 136 - 145 mmol/L Final   07/19/2024 131 (L) 136 - 145 mmol/L Final   07/19/2024 134 (L) 136 - 145 mmol/L Final   07/19/2024 126 (L) 136 - 145 mmol/L Final   07/18/2024 122 (L) 136 - 145 mmol/L Final   07/18/2024 122 (L) 136 - 145 mmol/L Final      Potassium Potassium   Date Value Ref Range Status   07/21/2024 3.7 3.5 - 5.2 mmol/L Final   07/20/2024 3.8 3.5 - 5.2 mmol/L Final   07/19/2024 3.8 3.5 - 5.2 mmol/L Final   07/19/2024 3.6 3.5 - 5.2 mmol/L Final   07/19/2024 3.6 3.5 - 5.2 mmol/L Final   07/19/2024 3.0 (L) 3.5 - 5.2 mmol/L Final   07/18/2024 2.7 (L) 3.5 - 5.2 mmol/L Final   07/18/2024 1.9 (C) 3.5 - 5.2 mmol/L " "Final      Chloride Chloride   Date Value Ref Range Status   07/21/2024 100 98 - 107 mmol/L Final   07/20/2024 96 (L) 98 - 107 mmol/L Final   07/19/2024 96 (L) 98 - 107 mmol/L Final   07/19/2024 96 (L) 98 - 107 mmol/L Final   07/19/2024 86 (L) 98 - 107 mmol/L Final   07/18/2024 80 (L) 98 - 107 mmol/L Final   07/18/2024 72 (L) 98 - 107 mmol/L Final      CO2 CO2   Date Value Ref Range Status   07/21/2024 24.9 22.0 - 29.0 mmol/L Final   07/20/2024 29.0 22.0 - 29.0 mmol/L Final   07/19/2024 23.8 22.0 - 29.0 mmol/L Final   07/19/2024 29.2 (H) 22.0 - 29.0 mmol/L Final   07/19/2024 32.5 (H) 22.0 - 29.0 mmol/L Final   07/18/2024 33.2 (H) 22.0 - 29.0 mmol/L Final   07/18/2024 33.7 (H) 22.0 - 29.0 mmol/L Final      BUN BUN   Date Value Ref Range Status   07/21/2024 3 (L) 6 - 20 mg/dL Final   07/20/2024 3 (L) 6 - 20 mg/dL Final   07/19/2024 3 (L) 6 - 20 mg/dL Final   07/19/2024 3 (L) 6 - 20 mg/dL Final   07/19/2024 3 (L) 6 - 20 mg/dL Final   07/18/2024 4 (L) 6 - 20 mg/dL Final   07/18/2024 6 6 - 20 mg/dL Final      Creatinine Creatinine   Date Value Ref Range Status   07/21/2024 0.46 (L) 0.76 - 1.27 mg/dL Final   07/20/2024 0.46 (L) 0.76 - 1.27 mg/dL Final   07/19/2024 0.41 (L) 0.76 - 1.27 mg/dL Final   07/19/2024 0.40 (L) 0.76 - 1.27 mg/dL Final   07/19/2024 0.44 (L) 0.76 - 1.27 mg/dL Final   07/18/2024 0.38 (L) 0.76 - 1.27 mg/dL Final   07/18/2024 0.40 (L) 0.76 - 1.27 mg/dL Final      Calcium Calcium   Date Value Ref Range Status   07/21/2024 8.6 8.6 - 10.5 mg/dL Final   07/20/2024 9.2 8.6 - 10.5 mg/dL Final   07/19/2024 7.9 (L) 8.6 - 10.5 mg/dL Final   07/19/2024 8.1 (L) 8.6 - 10.5 mg/dL Final   07/19/2024 7.9 (L) 8.6 - 10.5 mg/dL Final   07/18/2024 7.4 (L) 8.6 - 10.5 mg/dL Final   07/18/2024 7.9 (L) 8.6 - 10.5 mg/dL Final      PO4 No components found for: \"PO4\"   Albumin Albumin   Date Value Ref Range Status   07/21/2024 2.8 (L) 3.5 - 5.2 g/dL Final   07/20/2024 2.9 (L) 3.5 - 5.2 g/dL Final   07/19/2024 2.9 (L) 3.5 - 5.2 " "g/dL Final   07/19/2024 3.1 (L) 3.5 - 5.2 g/dL Final   07/18/2024 3.0 (L) 3.5 - 5.2 g/dL Final      Magnesium Magnesium   Date Value Ref Range Status   07/21/2024 1.3 (L) 1.6 - 2.6 mg/dL Final   07/19/2024 1.7 1.6 - 2.6 mg/dL Final   07/19/2024 2.2 1.6 - 2.6 mg/dL Final   07/18/2024 1.2 (L) 1.6 - 2.6 mg/dL Final      Uric Acid No components found for: \"URIC ACID\"     Imaging Results (Last 72 Hours)       Procedure Component Value Units Date/Time    CT Head Without Contrast [[id number]] Collected: 07/19/24 0608     Updated: 07/19/24 0612    Narrative:      CT HEAD WO CONTRAST    Date of Exam: 7/19/2024 1:55 AM EDT    Indication: AMS, weakness LUE.    Comparison: None available.    Technique: Volumetric axial CT images of the head were obtained without administration of intravenous contrast. Coronal and sagittal reconstructions were performed.Automated exposure control and iterative reconstruction methods were utilized for CT dose   reduction.    Findings:  There is no evidence of hemorrhage. There is no mass effect or midline shift.    There is no extracerebral collection.    Ventricles are normal in size and configuration for patient's stated age.      Posterior fossa is within normal limits.    Calvarium and skull base appear intact.   Visualized sinuses show no air fluid levels. Visualized orbits are unremarkable.      Impression:      Impression:  No acute intracranial process.        Electronically Signed: Ghazala Ordonez MD    7/19/2024 6:10 AM EDT    Workstation ID: [id number]    XR Chest PA & Lateral [[id number]] Collected: 07/18/24 1259     Updated: 07/18/24 1302    Narrative:      DATE OF EXAM:  7/18/2024 12:36 PM     PROCEDURE:  XR CHEST PA AND LATERAL-     INDICATIONS:  rule out aspiration pneumonia; E87.6-Hypokalemia; F10.939-Alcohol use,  unspecified with withdrawal, unspecified; R17-Unspecified jaundice     COMPARISON:  No Comparisons Available     TECHNIQUE:   Two radiologic views of the chest , PA and " lateral were obtained.     FINDINGS:  Heart size normal. Negative for lobar consolidation, edema, effusion or  pneumothorax. Osseous structures unremarkable.       Impression:      No active pulmonary process.        Electronically Signed By-Armani Small MD On:7/18/2024 1:00 PM       CT Abdomen Pelvis With Contrast [[id number]] Collected: 07/18/24 1036     Updated: 07/18/24 1048    Narrative:      EXAM: CT ABDOMEN PELVIS W CONTRAST-     DATE OF EXAM: 7/18/2024 10:24 AM     INDICATION: abd pain.     COMPARISON: None available.     TECHNIQUE: Contiguous axial CT images were obtained from the lung bases  to the pubic symphysis obtained following the uneventful intravenous  administration of 100 mL of Isovue 370 contrast. Sagittal and coronal  reconstructions were performed.  Automated exposure control and  iterative reconstruction methods were used.     FINDINGS:  Heart size within normal limits. No pericardial effusion. Lower lungs  grossly clear.     Enlarged markedly heterogeneous and steatotic liver. Right hepatic lobe  measures nearly 22 cm in greatest CC dimension. Tiny low-density lesions  too small to accurately characterize favoring small cyst. 1.2 cm fluid  density lesion at hepatic dome again favoring cyst. Portal vasculature,  splenic vein and superior mesenteric vein patent. There are distal  paraesophageal, perigastric and perisplenic varices. Spleen within  normal limits in size. Trace intra-abdominal ascites.     Gallbladder wall edema without distention or visible stones most likely  related to adjacent hepatocellular dysfunction. No dilation of biliary  tree. No active pancreatitis or drainable collection. Adrenal glands  unremarkable. Normal size and contour of kidneys. No hydronephrosis or  solid mass. Urinary bladder and prostate unremarkable.     Probable small hiatal hernia. No evidence of bowel obstruction. Colonic  wall thickening and edema within ascending extending into hepatic  flexure,  distribution favors sequelae of portal colopathy. In addition  there is mild thickening and edema as well as mucosal hyperemia in the  rectum and distal colon. No CT evidence of acute appendicitis.  Incidental areas of short segment jejunojejunal intussusception without  other complication by CT, most commonly transient and of no clinical  significance in adults.     Negative for abdominal aortic aneurysm. Minimal aortic atherosclerotic  disease. No suspicious adenopathy. No free air or organized collection.     Minimal reticular body wall edema. No acute displaced fracture or  aggressive bone lesion.       Impression:      1. Enlarged heterogeneous and markedly steatotic liver, which could  reflect sequelae of alcoholic steatohepatitis.  2. Sequelae of portal hypertension noted including distal paraesophageal  and intra-abdominal varices as well as trace ascites. Normal splenic  size. Portal vasculature patent.  3. Gallbladder wall edema without other findings of acute cholecystitis  likely secondary related to underlying hepatocellular dysfunction.  4. Colonic and rectal inflammatory changes which could reflect sequelae  of portal colopathy. Differential would include a nonspecific  infectious/inflammatory proctocolitis. No bowel obstruction.                 Electronically Signed By-Armani Small MD On:7/18/2024 10:46 AM               Results for orders placed during the hospital encounter of 07/18/24    XR Chest PA & Lateral    Narrative  DATE OF EXAM:  7/18/2024 12:36 PM    PROCEDURE:  XR CHEST PA AND LATERAL-    INDICATIONS:  rule out aspiration pneumonia; E87.6-Hypokalemia; F10.939-Alcohol use,  unspecified with withdrawal, unspecified; R17-Unspecified jaundice    COMPARISON:  No Comparisons Available    TECHNIQUE:  Two radiologic views of the chest , PA and lateral were obtained.    FINDINGS:  Heart size normal. Negative for lobar consolidation, edema, effusion or  pneumothorax. Osseous structures  unremarkable.    Impression  No active pulmonary process.      Electronically Signed By-Armani Small MD On:7/18/2024 1:00 PM            ASSESSMENT / PLAN      Alcohol withdrawal    HYPONATREMIA------Acute. Improving nicely. Follow off of NS     2. HYPOKALEMIA-------Resolved.       3. HYPOMAGNESEMIA-------Replace IV     4. HYPOCALCEMIA------Replaced     5. ALCOHOL ABUSE/CIRRHOSIS/JAUNDICE------Thiamine, Folate, IVFs, prn IV Ativan. Withdrawal prcautions     6. MIXED METABOLIC ALKALOSIS/METABOLIC ACIDOSIS-----Better     7. ANEMIA------Normocytic with normal RDW. Follow for PRBC need     8. DEPRESSION------Suicide precautions. Hold Zoloft given severe hyponatremia     9. HYPOALBUMINEMIA-----IV Albumin to temporize and po supplements     10. ELEVATED INR------Secondary to liver disease     11. THROMBOCYTOPENIA------No heparin. Secondary to liver disease     12. PUD PROPHYLAXIS-----IV PPI     13. DVT PROPHYLAXIS-----SCDs    14. HYPOPHOSPHATEMIA------Replaced        Navya Mcdonald MD  [facility]  [phone]  07/21/24  08:56 EDT

## 2024-07-21 NOTE — PROGRESS NOTES
"  Chief complaint suicidal ideation, alcohol withdrawal     Subjective .     History of present illness:  The patient is a 28 y.o. male who was admitted secondary to alcohol detox.     PMH: alcohol use disorder, GERD    Psych was consulted for depression, SI and alcohol withdrawal     The patient was seen this afternoon. He was accompanied by a sitter. The patient was lethargic, but did wake up to talk with me. He was alert and oriented. He reports that he has been drinking heavily for the past 10 years, up to 750ml of alcohol per day. He reports no periods of sobriety during that time. He recently moved here, 3 weeks ago from [place], to be closer to his family and have support. He is currently living with his parents.     The patient does want to stop drinking alcohol. I discussed an inpatient rehab treatment program with him as the best option for long term success at sobriety. He initially said no, and that he just wanted to go home with his parents. However, after discussing it with him more, he did agree to consider it and will let me know.     The patient does report a history of depression. He was supposed to start sertraline outpatient, but had not gotten to start it due to low sodium. His sodium is still low, but improving. Once medically appropriate, I do think starting an SSRI would benefit him. He does report suicidal ideation at time. He does state it is improving, and he last had thoughts about a week ago. At that time, he said he was having fleeting thoughts of killing himself, with fleeting plans. He mentioned \"it will just flash through my mind: guns, knives, needles.\" However, he states the thoughts quickly passed and he never came up with a definitive plan. He denies any serious SI for the last week. I did discuss with him discontinuing the sitter if he felt like he could tell nursing staff if the thoughts returned, and he was adamant that he would.     7/21/24: Patient seen this morning. He was " accompanied by his brother. I offered him privacy, but he agreed to have his brother stay in the room. The patient states he is going to defer to his parents and family about whether he does inpatient treatment. His brother states his family want him to do a rehab program. The patient is also agreeable to starting a medication for depression once his sodium normalizes. I advised the patient I would discuss possible options with case management, and follow up with him tomorrow with options. He has to be medically stable for discharge before facilities will consider him.       Review of Systems   All systems were reviewed and negative except for:  Neurological: positive for  lethargic  Behavioral/Psych: positive for  depression    The following portions of the patient's history were reviewed and updated as appropriate: allergies, current medications, past family history, past medical history, past social history, past surgical history and problem list.    History    Past psychiatric history : depression, alcohol use        No medications prior to admission.        Scheduled Meds:  albumin human, 25 g, Intravenous, Q8H  folic acid, 1 mg, Oral, Daily  lactulose, 10 g, Oral, TID  [START ON 7/22/2024] LORazepam, 1 mg, Oral, Daily  magnesium sulfate, 2 g, Intravenous, Q2H  multivitamin with minerals, 1 tablet, Oral, Daily  pantoprazole, 40 mg, Intravenous, BID AC  prednisoLONE sodium phosphate, 40 mg, Oral, Daily  [Held by provider] sertraline, 50 mg, Oral, Daily  sodium chloride, 10 mL, Intravenous, Q12H  sucralfate, 1 g, Oral, 4x Daily AC & at Bedtime  thiamine (B-1) IV, 200 mg, Intravenous, Q8H   Followed by  [START ON 7/24/2024] thiamine, 100 mg, Oral, Daily  zinc sulfate, 220 mg, Oral, Daily         Continuous Infusions:         PRN Meds:    acetaminophen **OR** acetaminophen **OR** acetaminophen    senna-docusate sodium **AND** polyethylene glycol **AND** bisacodyl **AND** bisacodyl    Calcium Replacement - Follow  "Nurse / BPA Driven Protocol    [email or web address]    LORazepam    LORazepam **OR** LORazepam **OR** LORazepam **OR** LORazepam **OR** LORazepam **OR** LORazepam    Magnesium Standard Dose Replacement - Follow Nurse / BPA Driven Protocol    ondansetron ODT **OR** ondansetron    Phosphorus Replacement - Follow Nurse / BPA Driven Protocol    Potassium Replacement - Follow Nurse / BPA Driven Protocol    sodium chloride    sodium chloride    sodium chloride      Allergies:  Reglan [metoclopramide]      Objective     Vital Signs   /77 (BP Location: Left arm, Patient Position: Lying)   Pulse 104   Temp 98.1 °F (36.7 °C) (Oral)   Resp (!) 31   Ht 165.1 cm (65\")   Wt 55.7 kg (122 lb 12.7 oz)   SpO2 91%   BMI 20.43 kg/m²     Physical Exam:    MENTAL STATUS EXAM   General Appearance:  Cleanly groomed and dressed  Eye Contact:  Fair  Attitude:  Cooperative  Muscle Strength:  Normal  Speech:  Other  Other Comment:  Slow to respond  Language:  Hesitant  Mood and affect:  Flat and depressed  Hopelessness:  Denies  Loneliness: Denies  Thought Process:  Goal-directed  Associations/ Thought Content:  No delusions  Hallucinations:  None  Suicidal Ideations:  Passive ideation  Homicidal Ideation:  Not present  Sensorium:  Alert  Orientation:  Person, place and time  Immediate Recall, Recent, and Remote Memory:  Intact  Attention Span/ Concentration:  Good  Fund of Knowledge:  Limited  Intellectual Functioning:  Average range  Insight:  Limited  Judgement:  Limited  Reliability:  Fair  Impulse Control:  Poor     Medications and allergies reviewed.    Result Review:  I have personally reviewed the results from the time of this admission to 7/21/2024 12:28 EDT and agree with these findings:  [x]  Laboratory  []  Microbiology  []  Radiology  [x]  EKG/Telemetry   []  Cardiology/Vascular   []  Pathology  []  Old records  []  Other:  Most notable findings include: Sodium 133    Assessment & Plan       Alcohol withdrawal   "   Assessment: major depressive disorder, alcohol use disorder, alcohol withdrawal   Treatment Plan: Patient presented with alcohol withdrawal, depression, SI. He reports heavy drinking for the past 10 years, but does desire to stop drinking. He also reports depression with SI at times. States last thoughts of suicide were about a week ago.     Discussed inpatient chemical dependency treatment with the patient. He initially was reluctant to do any treatment. Today, he states he will defer to his family and what they think is best for him because he says he knows his judgement is clouded. The  patient does report wanting to stop drinking alcohol.     He does report depression and fleeting SI in the past, last a week ago. Patient would benefit from starting SSRI once sodium has normalized. Patient is agreeable. Ok to discontinue sitter and suicide precautions at this time as patient denies any current SI with any plan or intent. Patient advised to let nursing staff know if the thoughts return.     Will follow up with the patient regarding possible chemical dependency treatment when he is more medically stable, closer to discharge. Will discuss options with case management.      Continue supportive treatment.     Will continue to follow.   Treatment Plan discussed with: Patient    I discussed the patients findings and my recommendations with patient and nursing staff    I have reviewed and approved the behavioral health treatment plans and problem list. Yes     Referring MD has access to consult report and progress notes in EMR     MARI Loomis  07/21/24  12:28 EDT

## 2024-07-21 NOTE — NURSING NOTE
Patient mentioned about wanting to get his belongings from security. Since he is out of suicide precaution, called security. Returned all belongs to patient at bedside

## 2024-07-22 ENCOUNTER — INPATIENT HOSPITAL (OUTPATIENT)
Dept: URBAN - METROPOLITAN AREA HOSPITAL 84 | Facility: HOSPITAL | Age: 29
End: 2024-07-22
Payer: MEDICAID

## 2024-07-22 DIAGNOSIS — F10.230 ALCOHOL DEPENDENCE WITH WITHDRAWAL, UNCOMPLICATED: ICD-10-CM

## 2024-07-22 DIAGNOSIS — D69.59 OTHER SECONDARY THROMBOCYTOPENIA: ICD-10-CM

## 2024-07-22 DIAGNOSIS — K70.10 ALCOHOLIC HEPATITIS WITHOUT ASCITES: ICD-10-CM

## 2024-07-22 DIAGNOSIS — E87.1 HYPO-OSMOLALITY AND HYPONATREMIA: ICD-10-CM

## 2024-07-22 DIAGNOSIS — R79.1 ABNORMAL COAGULATION PROFILE: ICD-10-CM

## 2024-07-22 DIAGNOSIS — K70.30 ALCOHOLIC CIRRHOSIS OF LIVER WITHOUT ASCITES: ICD-10-CM

## 2024-07-22 DIAGNOSIS — D72.829 ELEVATED WHITE BLOOD CELL COUNT, UNSPECIFIED: ICD-10-CM

## 2024-07-22 DIAGNOSIS — D64.9 ANEMIA, UNSPECIFIED: ICD-10-CM

## 2024-07-22 LAB
ALBUMIN SERPL ELPH-MCNC: 2.4 G/DL (ref 2.9–4.4)
ALBUMIN SERPL-MCNC: 3.1 G/DL (ref 3.5–5.2)
ALBUMIN/GLOB SERPL: 0.8 {RATIO} (ref 0.7–1.7)
ALBUMIN/GLOB SERPL: 1.2 G/DL
ALP SERPL-CCNC: 258 U/L (ref 39–117)
ALPHA1 GLOB SERPL ELPH-MCNC: 0.4 G/DL (ref 0–0.4)
ALPHA2 GLOB SERPL ELPH-MCNC: 0.7 G/DL (ref 0.4–1)
ALT SERPL W P-5'-P-CCNC: 27 U/L (ref 1–41)
ANA SER QL: NEGATIVE
ANION GAP SERPL CALCULATED.3IONS-SCNC: 8.1 MMOL/L (ref 5–15)
AST SERPL-CCNC: 107 U/L (ref 1–40)
B-GLOBULIN SERPL ELPH-MCNC: 0.5 G/DL (ref 0.7–1.3)
BILIRUB SERPL-MCNC: 16 MG/DL (ref 0–1.2)
BUN SERPL-MCNC: 4 MG/DL (ref 6–20)
BUN/CREAT SERPL: 8.5 (ref 7–25)
CALCIUM SPEC-SCNC: 8.5 MG/DL (ref 8.6–10.5)
CHLORIDE SERPL-SCNC: 98 MMOL/L (ref 98–107)
CO2 SERPL-SCNC: 26.9 MMOL/L (ref 22–29)
CREAT SERPL-MCNC: 0.47 MG/DL (ref 0.76–1.27)
DEPRECATED RDW RBC AUTO: 53 FL (ref 37–54)
EGFRCR SERPLBLD CKD-EPI 2021: 144.3 ML/MIN/1.73
ERYTHROCYTE [DISTWIDTH] IN BLOOD BY AUTOMATED COUNT: 16 % (ref 12.3–15.4)
GAMMA GLOB SERPL ELPH-MCNC: 1.5 G/DL (ref 0.4–1.8)
GLOBULIN SER CALC-MCNC: 3.1 G/DL (ref 2.2–3.9)
GLOBULIN UR ELPH-MCNC: 2.5 GM/DL
GLUCOSE SERPL-MCNC: 89 MG/DL (ref 65–99)
HCT VFR BLD AUTO: 23.2 % (ref 37.5–51)
HGB BLD-MCNC: 8.4 G/DL (ref 13–17.7)
INR PPP: 1.71 (ref 0.93–1.1)
LABORATORY COMMENT REPORT: ABNORMAL
M PROTEIN SERPL ELPH-MCNC: ABNORMAL G/DL
MAGNESIUM SERPL-MCNC: 2.2 MG/DL (ref 1.6–2.6)
MCH RBC QN AUTO: 33.9 PG (ref 26.6–33)
MCHC RBC AUTO-ENTMCNC: 36.2 G/DL (ref 31.5–35.7)
MCV RBC AUTO: 93.5 FL (ref 79–97)
MITOCHONDRIA M2 IGG SER-ACNC: <20 UNITS (ref 0–20)
PHOSPHATE SERPL-MCNC: 2.5 MG/DL (ref 2.5–4.5)
PLATELET # BLD AUTO: 147 10*3/MM3 (ref 140–450)
PMV BLD AUTO: 9.8 FL (ref 6–12)
POTASSIUM SERPL-SCNC: 3.6 MMOL/L (ref 3.5–5.2)
POTASSIUM SERPL-SCNC: 4.5 MMOL/L (ref 3.5–5.2)
PROT PATTERN SERPL ELPH-IMP: ABNORMAL
PROT SERPL-MCNC: 5.5 G/DL (ref 6–8.5)
PROT SERPL-MCNC: 5.6 G/DL (ref 6–8.5)
PROTHROMBIN TIME: 17.9 SECONDS (ref 9.6–11.7)
RBC # BLD AUTO: 2.48 10*6/MM3 (ref 4.14–5.8)
SMA IGG SER-ACNC: 16 UNITS (ref 0–19)
SODIUM SERPL-SCNC: 133 MMOL/L (ref 136–145)
WBC NRBC COR # BLD AUTO: 15.08 10*3/MM3 (ref 3.4–10.8)

## 2024-07-22 PROCEDURE — 84132 ASSAY OF SERUM POTASSIUM: CPT | Performed by: INTERNAL MEDICINE

## 2024-07-22 PROCEDURE — 63710000001 PREDNISOLONE PER 5 MG: Performed by: NURSE PRACTITIONER

## 2024-07-22 PROCEDURE — 99232 SBSQ HOSP IP/OBS MODERATE 35: CPT | Performed by: NURSE PRACTITIONER

## 2024-07-22 PROCEDURE — 85027 COMPLETE CBC AUTOMATED: CPT | Performed by: INTERNAL MEDICINE

## 2024-07-22 PROCEDURE — 25010000002 CALCIUM GLUCONATE-NACL 1-0.675 GM/50ML-% SOLUTION: Performed by: INTERNAL MEDICINE

## 2024-07-22 PROCEDURE — 80053 COMPREHEN METABOLIC PANEL: CPT | Performed by: NURSE PRACTITIONER

## 2024-07-22 PROCEDURE — 99232 SBSQ HOSP IP/OBS MODERATE 35: CPT

## 2024-07-22 PROCEDURE — 25810000003 SODIUM CHLORIDE 0.9 % SOLUTION: Performed by: INTERNAL MEDICINE

## 2024-07-22 PROCEDURE — 97161 PT EVAL LOW COMPLEX 20 MIN: CPT

## 2024-07-22 PROCEDURE — 85610 PROTHROMBIN TIME: CPT | Performed by: NURSE PRACTITIONER

## 2024-07-22 PROCEDURE — 97110 THERAPEUTIC EXERCISES: CPT | Performed by: OCCUPATIONAL THERAPIST

## 2024-07-22 PROCEDURE — 84100 ASSAY OF PHOSPHORUS: CPT | Performed by: INTERNAL MEDICINE

## 2024-07-22 PROCEDURE — 25010000002 THIAMINE PER 100 MG: Performed by: INTERNAL MEDICINE

## 2024-07-22 PROCEDURE — 83735 ASSAY OF MAGNESIUM: CPT | Performed by: INTERNAL MEDICINE

## 2024-07-22 RX ORDER — FAMOTIDINE 20 MG/1
40 TABLET, FILM COATED ORAL NIGHTLY
Status: DISCONTINUED | OUTPATIENT
Start: 2024-07-22 | End: 2024-08-02 | Stop reason: HOSPADM

## 2024-07-22 RX ORDER — CODEINE PHOSPHATE/GUAIFENESIN 10-100MG/5
5 LIQUID (ML) ORAL EVERY 4 HOURS PRN
Status: DISCONTINUED | OUTPATIENT
Start: 2024-07-22 | End: 2024-08-02 | Stop reason: HOSPADM

## 2024-07-22 RX ORDER — SODIUM CHLORIDE 9 MG/ML
60 INJECTION, SOLUTION INTRAVENOUS CONTINUOUS
Status: DISCONTINUED | OUTPATIENT
Start: 2024-07-22 | End: 2024-07-23

## 2024-07-22 RX ORDER — CALCIUM GLUCONATE 20 MG/ML
1000 INJECTION, SOLUTION INTRAVENOUS EVERY 12 HOURS
Status: COMPLETED | OUTPATIENT
Start: 2024-07-22 | End: 2024-07-22

## 2024-07-22 RX ORDER — POTASSIUM CHLORIDE 20 MEQ/1
40 TABLET, EXTENDED RELEASE ORAL EVERY 4 HOURS
Status: COMPLETED | OUTPATIENT
Start: 2024-07-22 | End: 2024-07-22

## 2024-07-22 RX ADMIN — PANTOPRAZOLE SODIUM 40 MG: 40 INJECTION, POWDER, FOR SOLUTION INTRAVENOUS at 09:12

## 2024-07-22 RX ADMIN — LACTULOSE 10 G: 20 SOLUTION ORAL at 20:27

## 2024-07-22 RX ADMIN — POTASSIUM CHLORIDE 40 MEQ: 1500 TABLET, EXTENDED RELEASE ORAL at 09:12

## 2024-07-22 RX ADMIN — SUCRALFATE 1 G: 1 TABLET ORAL at 20:27

## 2024-07-22 RX ADMIN — PREDNISOLONE SODIUM PHOSPHATE 40 MG: 15 SOLUTION ORAL at 09:12

## 2024-07-22 RX ADMIN — THIAMINE HYDROCHLORIDE 200 MG: 100 INJECTION, SOLUTION INTRAMUSCULAR; INTRAVENOUS at 04:50

## 2024-07-22 RX ADMIN — FOLIC ACID 1 MG: 1 TABLET ORAL at 09:25

## 2024-07-22 RX ADMIN — LORAZEPAM 1 MG: 1 TABLET ORAL at 09:13

## 2024-07-22 RX ADMIN — SODIUM CHLORIDE 60 ML/HR: 9 INJECTION, SOLUTION INTRAVENOUS at 09:25

## 2024-07-22 RX ADMIN — SUCRALFATE 1 G: 1 TABLET ORAL at 09:13

## 2024-07-22 RX ADMIN — LACTULOSE 10 G: 20 SOLUTION ORAL at 15:55

## 2024-07-22 RX ADMIN — PANTOPRAZOLE SODIUM 40 MG: 40 INJECTION, POWDER, FOR SOLUTION INTRAVENOUS at 16:43

## 2024-07-22 RX ADMIN — CALCIUM GLUCONATE 1000 MG: 20 INJECTION, SOLUTION INTRAVENOUS at 20:27

## 2024-07-22 RX ADMIN — Medication 1 TABLET: at 09:12

## 2024-07-22 RX ADMIN — Medication 220 MG: at 09:13

## 2024-07-22 RX ADMIN — THIAMINE HYDROCHLORIDE 200 MG: 100 INJECTION, SOLUTION INTRAMUSCULAR; INTRAVENOUS at 14:08

## 2024-07-22 RX ADMIN — Medication 10 ML: at 20:40

## 2024-07-22 RX ADMIN — Medication 10 ML: at 09:14

## 2024-07-22 RX ADMIN — FAMOTIDINE 40 MG: 20 TABLET, FILM COATED ORAL at 20:27

## 2024-07-22 RX ADMIN — CALCIUM GLUCONATE 1000 MG: 20 INJECTION, SOLUTION INTRAVENOUS at 09:14

## 2024-07-22 RX ADMIN — LACTULOSE 10 G: 20 SOLUTION ORAL at 09:12

## 2024-07-22 RX ADMIN — SUCRALFATE 1 G: 1 TABLET ORAL at 10:53

## 2024-07-22 RX ADMIN — THIAMINE HYDROCHLORIDE 200 MG: 100 INJECTION, SOLUTION INTRAMUSCULAR; INTRAVENOUS at 21:00

## 2024-07-22 RX ADMIN — POTASSIUM CHLORIDE 40 MEQ: 1500 TABLET, EXTENDED RELEASE ORAL at 10:53

## 2024-07-22 RX ADMIN — GUAIFENESIN AND CODEINE PHOSPHATE 5 ML: 100; 10 SOLUTION ORAL at 10:53

## 2024-07-22 RX ADMIN — SUCRALFATE 1 G: 1 TABLET ORAL at 16:43

## 2024-07-22 NOTE — CONSULTS
Nutrition Services    Patient Name: Yonas Charles  YOB: 1995  MRN: [id number]  Admission date: 7/18/2024    Nutrition Counseling & Education       Reason for Visit: APRN/PA Consult     Session topic: Diet rationale, Key food habit change, and Sodium Restriction     Session comments: Patient accepting of diet education at time of RD visit.  Patient reports eating most meals at home that his mother cooks.  Discussed changes such as discontinue using the salt shaker at the table, buying no salt added canned vegetables, limited processed meats.  Patient appreciative of information and reports his mother is on board to help him with these changes.  RD provided low Na+ handout and RD contact information.       Provided print material via: [employer]-Nutrition Care Manual      Goals: Increase knowledge on diet/nutrition effects on condition/status      Monitoring/Follow Up: RD to follow up PRN    Patient to follow up PRN on outpatient basis       Electronically signed by:  Joyce Gardiner RD  07/22/24 11:02 EDT

## 2024-07-22 NOTE — THERAPY EVALUATION
Patient Name: Yonas Charles  : 1995    MRN: [id number]                              Today's Date: 2024       Admit Date: 2024    Visit Dx:     ICD-10-CM ICD-9-CM   1. Acute hypokalemia  E87.6 276.8   2. Alcohol withdrawal syndrome with complication  F10.939 291.81   3. Jaundice  R17 782.4     Patient Active Problem List   Diagnosis    Alcohol withdrawal     History reviewed. No pertinent past medical history.  History reviewed. No pertinent surgical history.   General Information       [facility] Name 24 1608          Physical Therapy Time and Intention    Document Type evaluation  -     Mode of Treatment physical therapy  -[facility] Name 24 1608          General Information    Patient Profile Reviewed yes  -     Prior Level of Function independent:;all household mobility;community mobility;ADL's;driving;yard work  -     Existing Precautions/Restrictions seizures;fall  -     Barriers to Rehab medically complex;impaired sensation;ineffective coping  -[facility] Name 24 1608          Living Environment    People in Home parent(s)  -AH       Row Name 24 1608          Home Main Entrance    Number of Stairs, Main Entrance none  ramp entry  -AH       Row Name 24 1608          Stairs Within Home, Primary    Number of Stairs, Within Home, Primary none  -[facility] Name 24 1608          Cognition    Orientation Status (Cognition) oriented x 4  -[facility] Name 24 1608          Safety Issues, Functional Mobility    Impairments Affecting Function (Mobility) balance;endurance/activity tolerance;strength  -     Comment, Safety Issues/Impairments (Mobility) no safety issues noted.  -               User Key  (r) = Recorded By, (t) = Taken By, (c) = Cosigned By      Initials Name Provider Type     Paige Melvin PT Physical Therapist                   Mobility       Row Name 24 1609          Bed Mobility    Bed Mobility bed mobility (all) activities   -     All Activities, [place] (Bed Mobility) independent  -[facility] Name 07/22/24 1609          Sit-Stand Transfer    Sit-Stand [place] (Transfers) contact guard  -     Assistive Device (Sit-Stand Transfers) walker, front-wheeled  -[facility] Name 07/22/24 1609          Gait/Stairs (Locomotion)    Patient was able to Ambulate yes  -     Distance in Feet (Gait) 30  -     Deviations/Abnormal Patterns (Gait) festinating/shuffling;gait speed decreased;stride length decreased  -     Bilateral Gait Deviations decreased arm swing  -     Comment, (Gait/Stairs) 2nd gait trial completed with RW with improved rakel and distance (180'). Pt continues to shuffle feet slightly - likely due to sensation deficits.  -               User Key  (r) = Recorded By, (t) = Taken By, (c) = Cosigned By      Initials Name Provider Type     Paige Melvin PT Physical Therapist                   Obj/Interventions       [facility] Name 07/22/24 1610          Range of Motion Comprehensive    General Range of Motion bilateral lower extremity ROM WFL  -AH       Row Name 07/22/24 1610          Strength Comprehensive (MMT)    General Manual Muscle Testing (MMT) Assessment lower extremity strength deficits identified  -     Comment, General Manual Muscle Testing (MMT) Assessment grossly 4/5 per seated screening.  -[facility] Name 07/22/24 1610          Balance    Balance Assessment sitting static balance;sitting dynamic balance;standing static balance;standing dynamic balance  -     Static Sitting Balance independent  -     Dynamic Sitting Balance independent  -     Position, Sitting Balance sitting edge of bed  -     Static Standing Balance contact guard  -     Dynamic Standing Balance contact guard  -     Position/Device Used, Standing Balance unsupported  -[facility] Name 07/22/24 1610          Sensory Assessment (Somatosensory)    Sensory Assessment (Somatosensory) bilateral LE  -AH     Bilateral LE  Sensory Assessment proprioception;impaired  neuropathy in bilateral feet, R worse than L.  -               User Key  (r) = Recorded By, (t) = Taken By, (c) = Cosigned By      Initials Name Provider Type     Paige Melvin, PT Physical Therapist                   Goals/Plan       Row Name 07/22/24 1622          Transfer Goal 1 (PT)    Activity/Assistive Device (Transfer Goal 1, PT) transfers, all  -     [place] Level/Cues Needed (Transfer Goal 1, PT) independent  -     Time Frame (Transfer Goal 1, PT) long term goal (LTG);2 weeks  -AH       Row Name 07/22/24 1622          Gait Training Goal 1 (PT)    Activity/Assistive Device (Gait Training Goal 1, PT) gait (walking locomotion);diminish gait deviation  without walker  -     [place] Level (Gait Training Goal 1, PT) standby assist  -     Distance (Gait Training Goal 1, PT) 200'  -     Time Frame (Gait Training Goal 1, PT) long term goal (LTG);2 weeks  -AH       Row Name 07/22/24 1622          Problem Specific Goal 1 (PT)    Problem Specific Goal 1 (PT) Tinetti score >=25/28  -     Time Frame (Problem Specific Goal 1, PT) long-term goal (LTG);2 weeks  -AH       Row Name 07/22/24 1622          Therapy Assessment/Plan (PT)    Planned Therapy Interventions (PT) balance training;bed mobility training;gait training;home exercise program;patient/family education;strengthening;transfer training  -               User Key  (r) = Recorded By, (t) = Taken By, (c) = Cosigned By      Initials Name Provider Type    Paige Aguirre, PT Physical Therapist                   Clinical Impression       Row Name 07/22/24 1612          Pain    Pretreatment Pain Rating 3/10  -     Posttreatment Pain Rating 3/10  -     Pain Location - Side/Orientation Right  -     Pain Location - foot  -     Pain Intervention(s) Repositioned;Therapeutic presence  -AH       Row Name 07/22/24 1612          Plan of Care Review    Plan of Care Reviewed With patient  -      Outcome Evaluation 27 y/o male admitted to [facility] on 7/18/24 with alcohol withdrawal symptoms. Also with acute hypokalemia, jaundice, electrolyte abnormality, and severe alcohol hepatitis. Pt is usually independent at home with mobility and ADLs without AD. Pt reports family recently noticed impairments in his walking and balance. Pt presents with impaired sensation B feet, impaired standing balance, and impaired gait. Gait was initially assessed without AD due to pt's baseline; however he presents with increased fall risk. Quality significantly improved today with walker use. Pt is pleasant and cooperative. His goal is to get back to normal and to stop drinking. PT will follow for balance activities and additional gait training. Pt will benefit from RW use at DC. He will be safe to DC to inpatient alcohol rehab. If pt decides against inpatient rehab, he'll benefit from outpatient physical therapy.  -       Row Name 07/22/24 1612          Therapy Assessment/Plan (PT)    Patient/Family Therapy Goals Statement (PT) get back to normal and to stop drinking  -     Rehab Potential (PT) good, to achieve stated therapy goals  -     Criteria for Skilled Interventions Met (PT) yes;meets criteria  -     Therapy Frequency (PT) 3 times/wk  -     Predicted Duration of Therapy Intervention (PT) until DC or goals met.  -       Row Name 07/22/24 1612          Vital Signs    Pretreatment Heart Rate (beats/min) 108  -     Intratreatment Heart Rate (beats/min) 118  -     Posttreatment Heart Rate (beats/min) 105  -     Intra SpO2 (%) 98  -AH     O2 Delivery Intra Treatment room air  -               User Key  (r) = Recorded By, (t) = Taken By, (c) = Cosigned By      Initials Name Provider Type     Paige Melvin, PT Physical Therapist                   Outcome Measures       Row Name 07/22/24 1624 07/22/24 0825       How much help from another person do you currently need...    Turning from your back to your side while  in flat bed without using bedrails? 4  - 4  -TA    Moving from lying on back to sitting on the side of a flat bed without bedrails? 4  - 4  -TA    Moving to and from a bed to a chair (including a wheelchair)? 4  - 3  -TA    Standing up from a chair using your arms (e.g., wheelchair, bedside chair)? 4  - 3  -TA    Climbing 3-5 steps with a railing? 3  - 3  -TA    To walk in hospital room? 3  - 3  -TA    AM-[facility] 6 Clicks Score (PT) 22  - 20  -TA    Highest Level of Mobility Goal 7 --> Walk 25 feet or more  - 6 --> Walk 10 steps or more  -      Row Name 07/22/24 1624          Functional Assessment    Outcome Measure Options AM-[facility] 6 Clicks Basic Mobility (PT)  -               User Key  (r) = Recorded By, (t) = Taken By, (c) = Cosigned By      Initials Name Provider Type     Isabell Ibarra, RN Registered Nurse     Paige Melvin, PT Physical Therapist                                 Physical Therapy Education       Title: PT OT SLP Therapies (Done)       Topic: Physical Therapy (Done)       Point: Mobility training (Done)       Learning Progress Summary             Patient Acceptance, E, VU by  at 7/22/2024 1624                         Point: Home exercise program (Done)       Learning Progress Summary             Patient Acceptance, E, VU by  at 7/22/2024 1624                         Point: Body mechanics (Done)       Learning Progress Summary             Patient Acceptance, E, VU by  at 7/22/2024 1624                         Point: Precautions (Done)       Learning Progress Summary             Patient Acceptance, E, VU by  at 7/22/2024 1624                                         User Key       Initials Effective Dates Name Provider Type [facility] 12/04/23 -  Paige Melvin, PT Physical Therapist PT                  PT Recommendation and Plan  Planned Therapy Interventions (PT): balance training, bed mobility training, gait training, home exercise program, patient/family education,  strengthening, transfer training  Plan of Care Reviewed With: patient  Outcome Evaluation: 27 y/o male admitted to [facility] on 7/18/24 with alcohol withdrawal symptoms. Also with acute hypokalemia, jaundice, electrolyte abnormality, and severe alcohol hepatitis. Pt is usually independent at home with mobility and ADLs without AD. Pt reports family recently noticed impairments in his walking and balance. Pt presents with impaired sensation B feet, impaired standing balance, and impaired gait. Gait was initially assessed without AD due to pt's baseline; however he presents with increased fall risk. Quality significantly improved today with walker use. Pt is pleasant and cooperative. His goal is to get back to normal and to stop drinking. PT will follow for balance activities and additional gait training. Pt will benefit from RW use at SD. He will be safe to DC to inpatient alcohol rehab. If pt decides against inpatient rehab, he'll benefit from outpatient physical therapy.     Time Calculation:         PT Charges       Row Name 07/22/24 1625             Time Calculation    Start Time 1100  -      Stop Time 1123  -      Time Calculation (min) 23 min  -      PT Non-Billable Time (min) 0 min  -      PT Received On 07/22/24  -      PT - Next Appointment 07/24/24  -      PT Goal Re-Cert Due Date 08/05/24  -         Time Calculation- PT    Total Timed Code Minutes- PT 0 minute(s)  -                User Key  (r) = Recorded By, (t) = Taken By, (c) = Cosigned By      Initials Name Provider Type     Paige Melvin, PT Physical Therapist                  Therapy Charges for Today       Code Description Service Date Service Provider Modifiers Qty    [id number]  PT EVAL LOW COMPLEXITY 4 7/22/2024 Paige Melvin, PT GP 1            PT G-Codes  Outcome Measure Options: AM-PAC 6 Clicks Basic Mobility (PT)  AM-PAC 6 Clicks Score (PT): 22  PT Discharge Summary  Anticipated Discharge Disposition (PT):  (TBD - inpatient  alcohol rehab vs. home with family and outpatient therapy.)    Paige Melvin, PT  7/22/2024

## 2024-07-22 NOTE — PROGRESS NOTES
LOS: 4 days   Patient Care Team:  Provider, No Known as PCP - General      Subjective     Interval History:     Subjective: Patient reports that he is feeling some better.  Tolerating diet.  Reports bowel movements overnight without overt GI bleeding.  No nausea/vomiting.  His only complaint is some wheezing when he wakes from sleeping.      ROS:   No chest pain, shortness of breath, or cough.        Medication Review:     Current Facility-Administered Medications:     acetaminophen (TYLENOL) tablet 650 mg, 650 mg, Oral, Q4H PRN **OR** acetaminophen (TYLENOL) 160 MG/5ML oral solution 650 mg, 650 mg, Oral, Q4H PRN **OR** acetaminophen (TYLENOL) suppository 650 mg, 650 mg, Rectal, Q4H PRN, Mari Bustos MD    sennosides-docusate (PERICOLACE) 8.6-50 MG per tablet 2 tablet, 2 tablet, Oral, BID PRN **AND** polyethylene glycol (MIRALAX) packet 17 g, 17 g, Oral, Daily PRN **AND** bisacodyl (DULCOLAX) EC tablet 5 mg, 5 mg, Oral, Daily PRN **AND** bisacodyl (DULCOLAX) suppository 10 mg, 10 mg, Rectal, Daily PRN, Mari Bustos MD    calcium gluconate 1000 Mg/50ml 0.675% NaCl IV SOLN, 1,000 mg, Intravenous, Q12H, Allison Mcdonald MD, 1,000 mg at 07/22/24 0914    Calcium Replacement - Follow Nurse / BPA Driven Protocol, , Does not apply, PRN, Mari Bustos MD    chlordiazePOXIDE (LIBRIUM) capsule 50 mg, 50 mg, Oral, 4x Daily PRN, Mari Bustos MD    folic acid (FOLVITE) tablet 1 mg, 1 mg, Oral, Daily, Mari Bustos MD, 1 mg at 07/22/24 0925    guaiFENesin-codeine (GUAIFENESIN AC) 100-10 MG/5ML liquid 5 mL, 5 mL, Oral, Q4H PRN, Frank Wild MD, 5 mL at 07/22/24 1053    lactulose (CHRONULAC) 10 GM/15ML solution 10 g, 10 g, Oral, TID, Vicnete Moscoso MD, 10 g at 07/22/24 0912    LORazepam (ATIVAN) injection 1 mg, 1 mg, Intravenous, Q2H PRN, Mari Bustos MD    LORazepam (ATIVAN) tablet 1 mg, 1 mg, Oral, Q1H PRN **OR** LORazepam (ATIVAN) injection 1 mg, 1 mg, Intravenous,  Q1H PRN **OR** LORazepam (ATIVAN) tablet 2 mg, 2 mg, Oral, Q1H PRN **OR** LORazepam (ATIVAN) injection 2 mg, 2 mg, Intravenous, Q1H PRN **OR** LORazepam (ATIVAN) injection 2 mg, 2 mg, Intravenous, Q15 Min PRN **OR** LORazepam (ATIVAN) injection 2 mg, 2 mg, Intramuscular, Q15 Min PRN, Mari Bustos MD    Magnesium Standard Dose Replacement - Follow Nurse / BPA Driven Protocol, , Does not apply, Juli HARTLEY Abbas Ali, MD    multivitamin with minerals 1 tablet, 1 tablet, Oral, Daily, Mari Bustos MD, 1 tablet at 07/22/24 0912    ondansetron ODT (ZOFRAN-ODT) disintegrating tablet 4 mg, 4 mg, Oral, Q6H PRN **OR** ondansetron (ZOFRAN) injection 4 mg, 4 mg, Intravenous, Q6H PRN, Mari Bustos MD, 4 mg at 07/18/24 1330    pantoprazole (PROTONIX) injection 40 mg, 40 mg, Intravenous, BID AC, Kaci Polo APRN, 40 mg at 07/22/24 0912    Phosphorus Replacement - Follow Nurse / BPA Driven Protocol, , Does not apply, NAEEM, Mari Bustos MD    Potassium Replacement - Follow Nurse / BPA Driven Protocol, , Does not apply, NAEEM, Mari Bustos MD    prednisoLONE sodium phosphate (ORAPRED) 15 MG/5ML oral solution 40 mg, 40 mg, Oral, Daily, Waleska Chery APRN, 40 mg at 07/22/24 0912    [Held by provider] sertraline (ZOLOFT) tablet 50 mg, 50 mg, Oral, Daily, Mari Bustos MD, 50 mg at 07/18/24 1258    sodium chloride 0.9 % flush 10 mL, 10 mL, Intravenous, PRN, Mari Bustos MD    sodium chloride 0.9 % flush 10 mL, 10 mL, Intravenous, Q12H, Mari Bustos MD, 10 mL at 07/22/24 0914    sodium chloride 0.9 % flush 10 mL, 10 mL, Intravenous, PRN, Mari Bustos MD    sodium chloride 0.9 % infusion 40 mL, 40 mL, Intravenous, PRN, Mari Bustos MD    sodium chloride 0.9 % infusion, 60 mL/hr, Intravenous, Continuous, Allison Mcdonald MD, Last Rate: 60 mL/hr at 07/22/24 0925, 60 mL/hr at 07/22/24 0925    sucralfate (CARAFATE) tablet 1 g, 1 g, Oral, 4x Daily AC & at  Bedtime, Albino PoloMARI Jimenez, 1 g at 07/22/24 1053    thiamine (B-1) injection 200 mg, 200 mg, Intravenous, Q8H, 200 mg at 07/22/24 0450 **FOLLOWED BY** [START ON 7/24/2024] thiamine (VITAMIN B-1) tablet 100 mg, 100 mg, Oral, Daily, Mari Bustos MD    zinc sulfate (ZINCATE) capsule 220 mg, 220 mg, Oral, Daily, Waleska Chery APRN, 220 mg at 07/22/24 0913      Objective     Vital Signs  Vitals:    07/21/24 1926 07/21/24 2355 07/22/24 0352 07/22/24 1037   BP: 113/71 115/72 104/64 123/78   BP Location: Left arm Left arm Left arm Left arm   Patient Position: Sitting Sitting Lying Lying   Pulse: 120 108 118 112   Resp: 19 18 18 19   Temp: 97.7 °F (36.5 °C) 97.9 °F (36.6 °C) 98.1 °F (36.7 °C) 98 °F (36.7 °C)   TempSrc: Oral Oral Axillary Oral   SpO2: 93% 90% 90% 97%   Weight:       Height:           Physical Exam:     General Appearance:    Awake and alert, in no acute distress   Head:    Normocephalic, without obvious abnormality   Eyes:          Conjunctivae normal, icteric sclera   Throat:   No oral lesions, no thrush, oral mucosa moist   Neck:   No adenopathy, supple, no JVD   Lungs:     respirations regular, even and unlabored   Abdomen:     Soft, non-tender, no rebound or guarding, non-distended   Rectal:     Deferred   Extremities:   No edema, no cyanosis   Skin:   No bruising or rash, jaundice        Results Review:    CBC    Results from last 7 days   Lab Units 07/22/24  0449 07/21/24  0514 07/20/24  0509 07/19/24  0300 07/18/24  1015   WBC 10*3/mm3 15.08* 14.80* 11.18* 8.78 9.86   HEMOGLOBIN g/dL 8.4* 8.5* 9.1* 8.2* 8.9*   PLATELETS 10*3/mm3 147 148 100* 85* 94*     CMP   Results from last 7 days   Lab Units 07/22/24  0449 07/21/24  0514 07/20/24  0509 07/19/24  1619 07/19/24  1458 07/19/24  0300 07/18/24  1829 07/18/24  1015   SODIUM mmol/L 133* 135* 133* 131* 134* 126* 122* 122*   POTASSIUM mmol/L 3.6 3.7 3.8 3.8  3.6 3.6 3.0* 2.7* 1.9*   CHLORIDE mmol/L 98 100 96* 96* 96* 86* 80* 72*   CO2 mmol/L  "26.9 24.9 29.0 23.8 29.2* 32.5* 33.2* 33.7*   BUN mg/dL 4* 3* 3* 3* 3* 3* 4* 6   CREATININE mg/dL 0.47* 0.46* 0.46* 0.41* 0.40* 0.44* 0.38* 0.40*   GLUCOSE mg/dL 89 130* 126* 78 79 116* 91 93   ALBUMIN g/dL 3.1* 2.8* 2.9*  --  2.9* 3.1*  --  3.0*   BILIRUBIN mg/dL 16.0* 16.6* 18.0*  --  17.9* 17.4*  --  15.8*   ALK PHOS U/L 258* 272* 286*  --  301* 324*  --  380*   AST (SGOT) U/L 107* 115* 122*  --  143* 159*  --  186*   ALT (SGPT) U/L 27 23 23  --  27 28  --  33   MAGNESIUM mg/dL 2.2 1.3*  --   --  1.7 2.2  --  1.2*   PHOSPHORUS mg/dL 2.5 2.8 2.6 1.6*  --  0.3*  --   --    LIPASE U/L  --   --   --   --   --   --   --  100*   AMMONIA umol/L  --   --   --   --   --  90*  --   --      Cr Clearance Estimated Creatinine Clearance: 182.7 mL/min (A) (by C-G formula based on SCr of 0.47 mg/dL (L)).  Coag   Results from last 7 days   Lab Units 07/22/24  0449 07/21/24  1506 07/20/24  0509 07/19/24  0300 07/18/24  0923   INR  1.71* 1.71* 2.02* 1.79* 1.59*   APTT seconds  --   --   --   --  41.9*     HbA1C No results found for: \"HGBA1C\"  Results from last 7 days   Lab Units 07/18/24  1015   CK TOTAL U/L 45     Infection   Results from last 7 days   Lab Units 07/18/24  0928 07/18/24  0923   BLOODCX  No growth at 4 days No growth at 4 days     UA    Results from last 7 days   Lab Units 07/18/24  1319   NITRITE UA  Positive*   WBC UA /HPF 0-2   BACTERIA UA /HPF None Seen   SQUAM EPITHEL UA /HPF 0-2     Microbiology Results (last 10 days)       Procedure Component Value - Date/Time    Legionella Antigen, Urine - Urine, Urine, Clean Catch [[id number]]  (Normal) Collected: 07/18/24 1319    Lab Status: Final result Specimen: Urine, Clean Catch Updated: 07/18/24 1852     LEGIONELLA ANTIGEN, URINE Negative    Blood Culture - Blood, Hand, Right [[id number]]  (Normal) Collected: 07/18/24 0928    Lab Status: Preliminary result Specimen: Blood from Hand, Right Updated: 07/22/24 0942     Blood Culture No growth at 4 days    Blood Culture - " Blood, Arm, Right [[id number]]  (Normal) Collected: 07/18/24 0923    Lab Status: Preliminary result Specimen: Blood from Arm, Right Updated: 07/22/24 0942     Blood Culture No growth at 4 days          Imaging Results (Last 72 Hours)       ** No results found for the last 72 hours. **            Assessment & Plan     ASSESSMENT:  -Alcohol hepatitis/cirrhosis   -Elevated LFTs -related to above  -Acute alcohol withdrawal  -Nitrite positive UTI  -Leukocytosis  -Normocytic anemia  -Thrombocytopenia - due to cirrhosis  -Elevated INR - due to cirrhosis  -Hyponatremia related to alcohol abuse  -Dysphagia could be related to esophageal stricture versus reflux esophagitis versus dysmotility  -GERD    PLAN:  Patient is a 29 y/o male with history of ETOH abuse who presented on 7/18 with complaints of ETOH withdrawal. Found to have significantly elevated LFTs on admission with total bilirubin 17.4, alk phos 324, , and ALT 28. CT abd/pelvis W on admission showed fatty liver,  sequelae of portal hypertension noted including distal paraesophageal  and intra-abdominal varices as well as trace ascites , and patent portal vasculature. He was started on prednisolone on 7/19 due to Maddrey's DF of 33.    Patient reports that he is feeling some better. No nausea/vomiting, abdominal pain, or overt GI bleeding.   LFTs improving with total bilirubin 16.0 from 16.6, alk phos 258 from 272,  from 115, and ALT 27 from 23.  Continue prednisolone. Will calculate Lille score on day 7.   Encourage nutrition.   Continue 2g low-sodium diet.   Continue thiamine, zinc, and folic acid.   Recommend complete ETOH cessation.   Liver serologies unremarkable. AFP normal.   Will need screening EGD at some point as inpatient vs outpatient.   Continue supportive care.     Electronically signed by MARI Mobley, 07/22/24, 2:07 PM EDT.

## 2024-07-22 NOTE — PLAN OF CARE
"Goal Outcome Evaluation:  Assessment: Yonas Charles presents with ADL impairments affecting function including endurance / activity tolerance and strength. Pt demo generalized weakness. Focused on UB & LB strengthening using resistive theraband today. Instructed pt on HEP that he can continue on his own in room & upon discharge. Pt alert, attentive & asking appropriate questions. He averaged between tolerating 5-10 reps & required freq rest breaks due to general fatigue, but tolerated exercises well. Demonstrated functioning below baseline abilities indicate the need for continued skilled intervention while inpatient. Tolerating session today without incident. Will continue to follow and progress as tolerated.     Plan/Recommendations:   Low Intensity Therapy recommended post-acute care - This is recommended as therapy feels this patient would require 2-3 visits per week. OP or HH would be the best option depending on patient's home bound status. Consider, if the patient has other  \"skilled\" needs such as wounds, IV antibiotics, etc. Combined with \"low intensity\" could also equate to a SNF. If patient is medically complex, consider LTAC.. Pt requires no DME at discharge.     Pt desires inpt substance abuse program at discharge. Pt cooperative; agreeable to therapeutic recommendations and plan of care.     "

## 2024-07-22 NOTE — PROGRESS NOTES
"  Chief complaint suicidal ideation, alcohol withdrawal     Subjective .     History of present illness:  The patient is a 28 y.o. male who was admitted secondary to alcohol detox.     PMH: alcohol use disorder, GERD    Psych was consulted for depression, SI and alcohol withdrawal     The patient was seen this afternoon. He was accompanied by a sitter. The patient was lethargic, but did wake up to talk with me. He was alert and oriented. He reports that he has been drinking heavily for the past 10 years, up to 750ml of alcohol per day. He reports no periods of sobriety during that time. He recently moved here, 3 weeks ago from [place], to be closer to his family and have support. He is currently living with his parents.     The patient does want to stop drinking alcohol. I discussed an inpatient rehab treatment program with him as the best option for long term success at sobriety. He initially said no, and that he just wanted to go home with his parents. However, after discussing it with him more, he did agree to consider it and will let me know.     The patient does report a history of depression. He was supposed to start sertraline outpatient, but had not gotten to start it due to low sodium. His sodium is still low, but improving. Once medically appropriate, I do think starting an SSRI would benefit him. He does report suicidal ideation at time. He does state it is improving, and he last had thoughts about a week ago. At that time, he said he was having fleeting thoughts of killing himself, with fleeting plans. He mentioned \"it will just flash through my mind: guns, knives, needles.\" However, he states the thoughts quickly passed and he never came up with a definitive plan. He denies any serious SI for the last week. I did discuss with him discontinuing the sitter if he felt like he could tell nursing staff if the thoughts returned, and he was adamant that he would.     7/21/24: Patient seen this morning. He was " accompanied by his brother. I offered him privacy, but he agreed to have his brother stay in the room. The patient states he is going to defer to his parents and family about whether he does inpatient treatment. His brother states his family want him to do a rehab program. The patient is also agreeable to starting a medication for depression once his sodium normalizes. I advised the patient I would discuss possible options with case management, and follow up with him tomorrow with options. He has to be medically stable for discharge before facilities will consider him.     7/22/24: Patient seen this morning. He was alert and oriented. He states he has talked with his family and they have agreed he needs treatment for the alcohol use. I discussed with him that he has to be medically stable before facilities will consider him, so we will just have to follow until that time. If he becomes ineligible for inpatient rehab, due to length of stay, I discussed possible partial hospitalization programs with him. He is agreeable to either. Denies any SI with any plan or intent.       Review of Systems   All systems were reviewed and negative except for:  Neurological: positive for  lethargic  Behavioral/Psych: positive for  depression    The following portions of the patient's history were reviewed and updated as appropriate: allergies, current medications, past family history, past medical history, past social history, past surgical history and problem list.    History    Past psychiatric history : depression, alcohol use        No medications prior to admission.        Scheduled Meds:  calcium gluconate, 1,000 mg, Intravenous, Q12H  folic acid, 1 mg, Oral, Daily  lactulose, 10 g, Oral, TID  LORazepam, 1 mg, Oral, Daily  multivitamin with minerals, 1 tablet, Oral, Daily  pantoprazole, 40 mg, Intravenous, BID AC  potassium chloride ER, 40 mEq, Oral, Q4H  prednisoLONE sodium phosphate, 40 mg, Oral, Daily  [Held by provider]  "sertraline, 50 mg, Oral, Daily  sodium chloride, 10 mL, Intravenous, Q12H  sucralfate, 1 g, Oral, 4x Daily AC & at Bedtime  thiamine (B-1) IV, 200 mg, Intravenous, Q8H   Followed by  [START ON 7/24/2024] thiamine, 100 mg, Oral, Daily  zinc sulfate, 220 mg, Oral, Daily        Continuous Infusions:  sodium chloride, 60 mL/hr      PRN Meds:    acetaminophen **OR** acetaminophen **OR** acetaminophen    senna-docusate sodium **AND** polyethylene glycol **AND** bisacodyl **AND** bisacodyl    Calcium Replacement - Follow Nurse / BPA Driven Protocol    chlordiazePOXIDE    LORazepam    LORazepam **OR** LORazepam **OR** LORazepam **OR** LORazepam **OR** LORazepam **OR** LORazepam    Magnesium Standard Dose Replacement - Follow Nurse / BPA Driven Protocol    ondansetron ODT **OR** ondansetron    Phosphorus Replacement - Follow Nurse / BPA Driven Protocol    Potassium Replacement - Follow Nurse / BPA Driven Protocol    sodium chloride    sodium chloride    sodium chloride      Allergies:  Reglan [metoclopramide]      Objective     Vital Signs   /64 (BP Location: Left arm, Patient Position: Lying)   Pulse 118   Temp 98.1 °F (36.7 °C) (Axillary)   Resp 18   Ht 165.1 cm (65\")   Wt 55.7 kg (122 lb 12.7 oz)   SpO2 90%   BMI 20.43 kg/m²     Physical Exam:    MENTAL STATUS EXAM   General Appearance:  Cleanly groomed and dressed  Eye Contact:  Good eye contact  Attitude:  Cooperative  Motor Activity:  Normal gait, posture  Speech:  Normal rate, tone, volume  Language:  Spontaneous  Mood and affect:  Depressed  Hopelessness:  Denies  Loneliness: Denies  Thought Process:  Logical and goal-directed  Associations/ Thought Content:  No delusions  Hallucinations:  None  Suicidal Ideations:  Not present  Homicidal Ideation:  Not present  Sensorium:  Alert  Orientation:  Person, place, time and situation  Immediate Recall, Recent, and Remote Memory:  Intact  Attention Span/ Concentration:  Good  Fund of Knowledge:  Appropriate for " age and educational level  Intellectual Functioning:  Average range  Insight:  Fair  Judgement:  Fair  Reliability:  Fair  Impulse Control:  Fair     Medications and allergies reviewed.    Result Review:  I have personally reviewed the results from the time of this admission to 7/22/2024 07:56 EDT and agree with these findings:  [x]  Laboratory  []  Microbiology  []  Radiology  [x]  EKG/Telemetry   []  Cardiology/Vascular   []  Pathology  []  Old records  []  Other:  Most notable findings include: Sodium 133 today     Assessment & Plan       Alcohol withdrawal     Assessment: major depressive disorder, alcohol use disorder, alcohol withdrawal   Treatment Plan: Patient presented with alcohol withdrawal, depression, SI. He reports heavy drinking for the past 10 years, but does desire to stop drinking. He also reports depression with SI at times. States last thoughts of suicide were about a week ago.     Patient is agreeable to inpatient rehab for chemical dependency. Facilities will not consider him until he is medically stable, but I have discussed his desires with case management. Once he is closer to being medically ready for discharge, case management can explore either 30 day alcohol programs or possibly an intensive outpatient setting.     He does report depression and fleeting SI in the past, last a week ago. Patient would benefit from starting SSRI once sodium has normalized.      Continue supportive treatment.     Will continue to follow.   Treatment Plan discussed with: Patient    I discussed the patients findings and my recommendations with patient and nursing staff    I have reviewed and approved the behavioral health treatment plans and problem list. Yes     Referring MD has access to consult report and progress notes in EMR     MARI Loomis  07/22/24  07:56 EDT

## 2024-07-22 NOTE — PLAN OF CARE
Problem: Adult Inpatient Plan of Care  Goal: Absence of Hospital-Acquired Illness or Injury  Intervention: Identify and Manage Fall Risk  Recent Flowsheet Documentation  Taken 7/22/2024 1615 by Isabell Ibarra RN  Safety Promotion/Fall Prevention:   assistive device/personal items within reach   clutter free environment maintained   fall prevention program maintained   lighting adjusted   nonskid shoes/slippers when out of bed   room organization consistent   safety round/check completed  Taken 7/22/2024 1409 by Isabell Ibarra, URSULA  Safety Promotion/Fall Prevention:   clutter free environment maintained   assistive device/personal items within reach   fall prevention program maintained   lighting adjusted   nonskid shoes/slippers when out of bed   room organization consistent   safety round/check completed  Taken 7/22/2024 1241 by Isabell Ibarra RN  Safety Promotion/Fall Prevention:   assistive device/personal items within reach   clutter free environment maintained   fall prevention program maintained   lighting adjusted   nonskid shoes/slippers when out of bed   room organization consistent   safety round/check completed  Taken 7/22/2024 1032 by Isabell Ibarra RN  Safety Promotion/Fall Prevention:   assistive device/personal items within reach   clutter free environment maintained   fall prevention program maintained   lighting adjusted   nonskid shoes/slippers when out of bed   room organization consistent   safety round/check completed  Taken 7/22/2024 0850 by Isabell Ibarra RN  Safety Promotion/Fall Prevention:   assistive device/personal items within reach   clutter free environment maintained   fall prevention program maintained   lighting adjusted   nonskid shoes/slippers when out of bed   room organization consistent   safety round/check completed  Intervention: Prevent Skin Injury  Recent Flowsheet Documentation  Taken 7/22/2024 1615 by Isabell Ibarra, RN  Body Position: position changed independently  Skin  Protection: adhesive use limited  Taken 7/22/2024 1409 by Isabell Ibarra RN  Body Position: position changed independently  Taken 7/22/2024 1241 by Isabell Ibarra RN  Body Position: position changed independently  Taken 7/22/2024 1032 by Isabell Ibarra RN  Body Position: position changed independently  Taken 7/22/2024 0850 by Isabell Ibarra RN  Body Position: dangle, side of bed  Skin Protection: adhesive use limited  Intervention: Prevent and Manage VTE (Venous Thromboembolism) Risk  Recent Flowsheet Documentation  Taken 7/22/2024 1615 by Isabell Ibarra RN  Activity Management: activity encouraged  Taken 7/22/2024 1409 by Isabell Ibarra RN  Activity Management: activity encouraged  Taken 7/22/2024 1241 by Isabell Ibarra RN  Activity Management: activity encouraged  Taken 7/22/2024 1032 by Isabell Ibarra RN  Activity Management: activity encouraged  Taken 7/22/2024 0850 by Isabell Ibarra RN  Activity Management: activity encouraged  Range of Motion: active ROM (range of motion) encouraged  Intervention: Prevent Infection  Recent Flowsheet Documentation  Taken 7/22/2024 1615 by Isabell Ibarra RN  Infection Prevention:   hand hygiene promoted   personal protective equipment utilized  Taken 7/22/2024 1409 by Isabell Ibarra RN  Infection Prevention:   personal protective equipment utilized   hand hygiene promoted  Taken 7/22/2024 1241 by sIabell Ibarra RN  Infection Prevention:   hand hygiene promoted   personal protective equipment utilized  Taken 7/22/2024 1032 by Isabell Ibarra RN  Infection Prevention:   hand hygiene promoted   personal protective equipment utilized  Taken 7/22/2024 0850 by Isabell Ibarra RN  Infection Prevention:   hand hygiene promoted   personal protective equipment utilized  Goal: Optimal Comfort and Wellbeing  Intervention: Provide Person-Centered Care  Recent Flowsheet Documentation  Taken 7/22/2024 0850 by Isabell Ibarra RN  Trust Relationship/Rapport: care explained   Goal Outcome Evaluation:         Pt  with no complaints at this time. CIWA score 0 this afternoon. Pt resting at this time. Call light in reach. Plan of care to continue.

## 2024-07-22 NOTE — PAYOR COMM NOTE
"Trenton Charles (28 y.o. Male)       Date of Birth   1995    Social Security Number       Address   [address] [place] [address] [place] [facility]    Home Phone   [phone]    MRN   [id number]       Caodaism   None    Marital Status   Single                            Admission Date   24    Admission Type   Emergency    Admitting Provider   Raquel Puri MD    Attending Provider   Frank Wild MD    Department, Room/Bed   [facility] 2D, 264/1       Discharge Date       Discharge Disposition       Discharge Destination                                 Attending Provider: Frank Wild MD    Allergies: Reglan [Metoclopramide]    Isolation: None   Infection: None   Code Status: CPR    Ht: 165.1 cm (65\")   Wt: 55.7 kg (122 lb 12.7 oz)    Admission Cmt: None   Principal Problem: Alcohol withdrawal [F10.939]                   Active Insurance as of 2024       Primary Coverage       Payor Plan Insurance Group Employer/Plan Group    [place]        Payor Plan Address Payor Plan Phone Number Payor Plan Fax Number Effective Dates    [address]   2024 - None Entered    [place] [address]         Subscriber Name Subscriber Birth Date Member ID       TRENTON CHARLES 1995 [id number]                     Emergency Contacts        (Rel.) Home Phone Work Phone Mobile Phone    Wilbur Charles (Father) [phone] -- --                 History & Physical        Mari Bustos MD at [address] [place] [address] Medicine Services  History & Physical    Patient Name: Trenton Charles  : 1995  MRN: [id number]  Primary Care Physician:  Provider, No Known  Date of admission: 2024  Date and Time of Service: 2024    Subjective      Chief Complaint:  alcohol withdrawal    History of Present Illness:    This is a 28-year-old male who is alcoholic and also has history of acid reflux came to the hospital for alcohol " detox.  Patient is also having abdominal pain and is jaundiced.  Patient is also going through personal issues.  Patient also has severe anxiety depression and has suicidal thoughts.  Patient started drinking 2 days ago and patient has been drinking alcohol since past 9 years.  Patient drinks almost 1 L hard liquor on a daily basis.  Patient has generalized weakness abdominal pain with episodes of nausea vomiting.  Patient has tremors and lightheadedness.      Lab work showed sodium of 122 with potassium 1.9 along with metabolic acidosis.  Patient has elevated AST and total bili is 15.8 and hemoglobin is 8.9.  In ER patient was tachycardic.  CT scan of abdomen showed enlarged liver along with portal hypertension and intra-abdominal varices along with gallbladder wall edema and chronic and rectal inflammatory changes    Review of Systems   Constitutional:  Negative for chills, diaphoresis and fever.   HENT:  Negative for dental problem, ear discharge, hearing loss, nosebleeds, rhinorrhea and sneezing.    Eyes:  Negative for photophobia, redness and itching.   Respiratory:  Negative for cough, chest tightness and stridor.    Cardiovascular:  Negative for leg swelling.   Gastrointestinal:  as above  Endocrine: Negative for heat intolerance.   Genitourinary: Negative for flank pain, frequency and hematuria.   Musculoskeletal:  Negative for back pain, joint swelling, and gait problem.   Skin:  Negative for color change.   Neurological:   speech difficulty, numbness and headaches.   Psychiatric/Behavioral:   as above    Personal History     History reviewed. No pertinent past medical history.    History reviewed. No pertinent surgical history.    Family History: family history is not on file. Otherwise pertinent FHx was reviewed and not pertinent to current issue.    Social History:      Home Medications:  Prior to Admission Medications       None              Allergies:  Allergies   Allergen Reactions    Reglan  [Metoclopramide] Mental Status Change       Objective      Vitals:   Temp:  [98.4 °F (36.9 °C)] 98.4 °F (36.9 °C)  Heart Rate:  [114-132] 121  Resp:  [21] 21  BP: (119-158)/(61-91) 141/83  Body mass index is 20.43 kg/m².  Physical Exam:    Constitutional: Patient appears well-developed and well-nourished and in no acute distress   HEENT:   Head: Normocephalic and atraumatic.   Eyes:  Pupils are equal, round, and reactive to light. EOM are intact. Sclera are anicteric and non-injected.  Mouth and Throat: Patient has moist mucous membranes.      Neck: Neck supple.  No thyromegaly present. No lymphadenopathy present. No  masses.     Cardiovascular: Inspection: No JVD present. Palpation:bilaterally. No leg edema. Auscultation: Regular rate, regular rhythm, S1 normal and S2 normal. reveals no gallop and no friction rub. No Carotid bruit bilaterally.    Pulmonary/Chest: Inspection: No distress, no use of accessory muscles. Lungs are clear to auscultation bilaterally. No respiratory distress. No wheezes. No rhonchi. No rales.     Abdomen /Gastrointestinal: Inspection: no distension. Palpation: no masses, no organomegaly. Soft. There is no tenderness. Bowel sounds are normal.   Extremities no cyanosis clubbing or edema    Neurological: Patient is alert and oriented to person, place, and time. Cranial nerves II-XII are grossly intact with no focal deficits. Sensori-motor exam is normal. No cerebellar signs.    Skin: Skin is warm. No rash noted. Nails show no clubbing.  No cyanosis or erythema. No bruising.      Diagnostic Data:  Results from last 7 days   Lab Units 07/18/24  1015   WBC 10*3/mm3 9.86   HEMOGLOBIN g/dL 8.9*   HEMATOCRIT % 26.4*   PLATELETS 10*3/mm3 94*   GLUCOSE mg/dL 93   CREATININE mg/dL 0.40*   BUN mg/dL 6   SODIUM mmol/L 122*   POTASSIUM mmol/L 1.9*   AST (SGOT) U/L 186*   ALT (SGPT) U/L 33   ALK PHOS U/L 380*   BILIRUBIN mg/dL 15.8*   ANION GAP mmol/L 16.3*       CT Abdomen Pelvis With Contrast    Result  Date: 7/18/2024  1. Enlarged heterogeneous and markedly steatotic liver, which could reflect sequelae of alcoholic steatohepatitis. 2. Sequelae of portal hypertension noted including distal paraesophageal and intra-abdominal varices as well as trace ascites. Normal splenic size. Portal vasculature patent. 3. Gallbladder wall edema without other findings of acute cholecystitis likely secondary related to underlying hepatocellular dysfunction. 4. Colonic and rectal inflammatory changes which could reflect sequelae of portal colopathy. Differential would include a nonspecific infectious/inflammatory proctocolitis. No bowel obstruction.      Electronically Signed By-Armani Small MD On:7/18/2024 10:46 AM       No results found for this or any previous visit.    I have personally reviewed the patient's new results.       Assessment & Plan        Active and Resolved Problems    Alcohol withdrawal with delirium tremens  Normocytic anemia  Jaundice with elevated bilirubin level  Nausea vomiting  Severe hypokalemia  Hyponatremia   Metabolic acidosis  Esophagitis  Hepatomegaly with portal hypertension  Esophageal varices  Suicide ideation  Anxiety depression  Social issues    Suggestion    At this time will admit this patient in hospital  I will ask GI to see this patient   Replace electrolyte and check a magnesium level  Psychiatry consult   IV hydration      VTE Prophylaxis:  Mechanical VTE prophylaxis orders are signed & held.          The patient desires to be as follows:    CODE STATUS:    Code Status (Patient has no pulse and is not breathing): CPR (Attempt to Resuscitate)  Medical Interventions (Patient has pulse or is breathing): Full Support        Admission Status:      Expected Length of Stay:  4-5 days    PDMP and Medication Dispenses via Sidebar reviewed and consistent with patient reported medications.        Signature:     This document has been electronically signed by Mari Bustos MD on July 18,  2024 12:16 EDT   [facility] [place] Hospitalist Team    Electronically signed by Mari Bustos MD at 07/18/24 1729          Emergency Department Notes        Olivia Flowers RN at 07/18/24 1451          Gave report to lalo st still at bedside     Electronically signed by Olivia Flowers RN at 07/18/24 1451       Olivia Flowers RN at 07/18/24 1006          Tech tonia watching pt. While security is gone     Electronically signed by Olivia Flowers RN at 07/18/24 1007       Micheal Deshpande PA-C at 07/18/24 0964          Subjective   History of Present Illness  28-year-old male presents emergency department with family with complaints of abdominal pain.  Patient states that he has been a alcoholic for the past 9 years current alcohol consumption is around 750 mL/day.  States that he stopped drinking 2 days ago due to increased yellowing of skin and eyes, has been actively vomiting for 2 days.  Denies any recent seizure disorder or having seizure disorders when detoxing in the past.  Patient denies any head injury, neck stiffness neck pain.  Denies shortness of breath chest pain.  Patient denies headaches.        Review of Systems   Constitutional:  Positive for appetite change, diaphoresis and fatigue. Negative for fever and unexpected weight change.   Respiratory:  Negative for cough, chest tightness, shortness of breath and wheezing.    Cardiovascular:  Negative for chest pain, palpitations and leg swelling.   Gastrointestinal:  Positive for abdominal distention, abdominal pain, nausea and vomiting. Negative for blood in stool and diarrhea.   Genitourinary:  Negative for dysuria, frequency and hematuria.   Musculoskeletal:  Negative for neck pain.   Skin:  Positive for color change.   Neurological:  Positive for tremors and light-headedness. Negative for dizziness, seizures, syncope, speech difficulty and headaches.       History reviewed. No pertinent past medical history.    Allergies   Allergen Reactions  "   Reglan [Metoclopramide] Mental Status Change       History reviewed. No pertinent surgical history.    History reviewed. No pertinent family history.    Social History     Socioeconomic History    Marital status: Single           Objective   Physical Exam  Vitals and nursing note reviewed.   Constitutional:       General: He is not in acute distress.     Appearance: He is ill-appearing. He is not toxic-appearing.   Eyes:      General: Scleral icterus present.      Extraocular Movements:      Right eye: No nystagmus.      Left eye: No nystagmus.      Conjunctiva/sclera:      Right eye: No hemorrhage.     Left eye: No hemorrhage.  Cardiovascular:      Rate and Rhythm: Normal rate and regular rhythm.      Heart sounds: Normal heart sounds.   Pulmonary:      Effort: Pulmonary effort is normal.      Breath sounds: Normal breath sounds.   Abdominal:      General: Bowel sounds are normal. There is distension.      Palpations: Abdomen is soft. There is hepatomegaly.      Tenderness: generalized  There is no guarding or rebound.   Skin:     Coloration: Skin is jaundiced.      Comments: Petechial rash around face secondary to vomiting   Neurological:      Mental Status: He is alert.         Procedures          ED Course    /83   Pulse (!) 121   Temp 98.4 °F (36.9 °C)   Resp 21   Ht 165.1 cm (65\")   Wt 55.7 kg (122 lb 12.7 oz)   SpO2 95%   BMI 20.43 kg/m²   Labs Reviewed   COMPREHENSIVE METABOLIC PANEL - Abnormal; Notable for the following components:       Result Value    Creatinine 0.40 (*)     Sodium 122 (*)     Potassium 1.9 (*)     Chloride 72 (*)     CO2 33.7 (*)     Calcium 7.9 (*)     Albumin 3.0 (*)     AST (SGOT) 186 (*)     Alkaline Phosphatase 380 (*)     Total Bilirubin 15.8 (*)     Anion Gap 16.3 (*)     All other components within normal limits    Narrative:     GFR Normal >60  Chronic Kidney Disease <60  Kidney Failure <15     CBC WITH AUTO DIFFERENTIAL - Abnormal; Notable for the following " components:    RBC 3.02 (*)     Hemoglobin 8.9 (*)     Hematocrit 26.4 (*)     Platelets 94 (*)     All other components within normal limits   MANUAL DIFFERENTIAL - Abnormal; Notable for the following components:    Lymphocyte % 7.0 (*)     Basophil % 2.0 (*)     Bands %  9.0 (*)     Metamyelocyte % 2.0 (*)     Neutrophils Absolute 7.69 (*)     Lymphocytes Absolute 0.69 (*)     Monocytes Absolute 1.08 (*)     All other components within normal limits   PROTIME-INR - Abnormal; Notable for the following components:    Protime 16.7 (*)     INR 1.59 (*)     All other components within normal limits   APTT - Abnormal; Notable for the following components:    PTT 41.9 (*)     All other components within normal limits   POC LACTATE - Abnormal; Notable for the following components:    Lactate 4.9 (*)     All other components within normal limits   BLOOD CULTURE   BLOOD CULTURE   RAINBOW DRAW    Narrative:     The following orders were created for panel order [place] Draw.  Procedure                               Abnormality         Status                     ---------                               -----------         ------                     Green Top (Gel)[[id number]]                                  Final result               Lavender Top[[id number]]                                     Final result               Gold Top - SST[[id number]]                                   Final result               Light Blue Top[[id number]]                                   Final result                 Please view results for these tests on the individual orders.   SCAN SLIDE   LACTIC ACID, REFLEX   MAGNESIUM   CBC AND DIFFERENTIAL    Narrative:     The following orders were created for panel order CBC & Differential.  Procedure                               Abnormality         Status                     ---------                               -----------         ------                     CBC Auto Differential[[id number]]        Abnormal             Final result               Scan Slide[[id number]]                                       Final result                 Please view results for these tests on the individual orders.   GREEN TOP   LAVENDER TOP   GOLD TOP - SST   LIGHT BLUE TOP   EXTRA TUBES    Narrative:     The following orders were created for panel order Extra Tubes.  Procedure                               Abnormality         Status                     ---------                               -----------         ------                     Lavender Top[[id number]]                                     Final result               Green Top (Gel)[[id number]]                                  Final result                 Please view results for these tests on the individual orders.   LAVENDER TOP   GREEN TOP   EXTRA TUBES    Narrative:     The following orders were created for panel order Extra Tubes.  Procedure                               Abnormality         Status                     ---------                               -----------         ------                     Green Top (Gel)[[id number]]                                  Final result                 Please view results for these tests on the individual orders.   GREEN TOP     Medications   sodium chloride 0.9 % flush 10 mL (has no administration in time range)   Potassium Replacement - Follow Nurse / BPA Driven Protocol (has no administration in time range)   potassium chloride (KLOR-CON) packet 40 mEq (40 mEq Oral Given 7/18/24 1131)   thiamine (B-1) injection 200 mg (has no administration in time range)     Followed by   thiamine (VITAMIN B-1) tablet 100 mg (has no administration in time range)   folic acid (FOLVITE) tablet 1 mg (has no administration in time range)   multivitamin with minerals 1 tablet (has no administration in time range)   chlordiazePOXIDE (LIBRIUM) capsule 50 mg (has no administration in time range)   sertraline (ZOLOFT) tablet 50 mg (has no administration in time  range)   gabapentin (NEURONTIN) capsule 100 mg (has no administration in time range)   sodium chloride 0.9 % bolus 1,000 mL (1,000 mL Intravenous New Bag 7/18/24 0954)   sodium chloride 0.9 % infusion 1,000 mL (0 mL Intravenous Stopped 7/18/24 1133)   folic acid 1 mg in sodium chloride 0.9 % 50 mL IVPB (0 mg Intravenous Stopped 7/18/24 1132)   thiamine (B-1) injection 200 mg (200 mg Intravenous Given 7/18/24 0955)   ondansetron (ZOFRAN) injection 4 mg (4 mg Intravenous Given 7/18/24 0954)   pantoprazole (PROTONIX) injection 80 mg (80 mg Intravenous Given 7/18/24 0955)   LORazepam (ATIVAN) injection 1 mg (1 mg Intravenous Given 7/18/24 0951)   iopamidol (ISOVUE-370) 76 % injection 100 mL (100 mL Intravenous Given 7/18/24 1025)     CT Abdomen Pelvis With Contrast    Result Date: 7/18/2024  1. Enlarged heterogeneous and markedly steatotic liver, which could reflect sequelae of alcoholic steatohepatitis. 2. Sequelae of portal hypertension noted including distal paraesophageal and intra-abdominal varices as well as trace ascites. Normal splenic size. Portal vasculature patent. 3. Gallbladder wall edema without other findings of acute cholecystitis likely secondary related to underlying hepatocellular dysfunction. 4. Colonic and rectal inflammatory changes which could reflect sequelae of portal colopathy. Differential would include a nonspecific infectious/inflammatory proctocolitis. No bowel obstruction.      Electronically Signed By-Armani Small MD On:7/18/2024 10:46 AM                                              Medical Decision Making  28-year-old male presents with family with complaint of abdominal pain abdominal distention, yellow skin and yellow eyes.  Patient states that he has been an alcoholic for the past 9 years typically consumes about 750 mL/day.  Patient states he has not had a drop of alcohol for the past 2 days is experiencing nausea and vomiting at this time.  No seizure disorder reported no  active seizures in the past 48 hours.  Physical exam is consistent with an ill-appearing male is not acutely toxic.  He is jaundiced sclera is icterus.  Abdomen is soft and slightly distended.  Diffuse tenderness over right upper quadrant.  Positive hepatomegaly.  No rebound or guarding.  Lungs clear to auscultation heart regular rate and rhythm no swelling of the lower or upper extremities.  There is petechial rash noted on face secondary to vomiting.  Vital signs are blood pressure 141/83 temperature 98.4 heart rate is 121 respirations 21 right SpO2 on room air is 95%.  CBC, CMP show mild anemia and hypokalemia.  EKG was was obtained for hypokalemia, heart rate 116 sinus tachycardia no ST elevation no sign of STEMI.  EKG was also evaluated by Dr. Kuo.  Patient during the ER course was given Ativan 1 mg Zofran 4 mg IV, 1 L IV bolus normal saline.  80 mg Protonix.  IV, CT abdomen pelvis with IV contrast show hepatomegaly but no other acute processes noted.  Per radiologist.  Patient was admitted to the hospitalist diagnosis of hypokalemia EtOH withdrawal.  He was discharged in stable condition.  Treatment plan patient care was discussed and Dr. Kuo saw patient in ER.  Critical care time 38 minutes    Problems Addressed:  Acute hypokalemia: complicated acute illness or injury that poses a threat to life or bodily functions  Alcohol withdrawal syndrome with complication: complicated acute illness or injury that poses a threat to life or bodily functions  Jaundice: complicated acute illness or injury    Amount and/or Complexity of Data Reviewed  Labs: ordered. Decision-making details documented in ED Course.  Radiology: ordered. Decision-making details documented in ED Course.  ECG/medicine tests: ordered and independent interpretation performed. Decision-making details documented in ED Course.    Risk  Decision regarding hospitalization.    Critical Care  Total time providing critical care: 38  "minutes        Final diagnoses:   Acute hypokalemia   Alcohol withdrawal syndrome with complication   Jaundice       ED Disposition  ED Disposition       ED Disposition   Decision to Admit    Condition   --    Comment   Level of Care: Telemetry [5]   Diagnosis: Alcohol withdrawal [291.81.ICD-9-CM]   Certification: I Certify That Inpatient Hospital Services Are Medically Necessary For Greater Than 2 Midnights                 No follow-up provider specified.       Medication List      No changes were made to your prescriptions during this visit.            Micheal Deshpande PA-C  07/18/24 1225       Micheal Deshpande PA-C  07/18/24 1229      Electronically signed by Micheal Deshpande PA-C at 07/18/24 1229       Olivia Flowers RN at 07/18/24 0944          Pt. Is on monitor due to sepsis.     Electronically signed by Olivia Flowers RN at 07/18/24 0945       Olivia Flowers RN at 07/18/24 0943          Pt screened simple sepsis. Immediatly got 2 IV's sent down cultures.     Electronically signed by Olivia Flowers RN at 07/18/24 0944       Olivia Flowers RN at 07/18/24 0917          Security in room. Patient belongings locked up. Pt had phone, phone , wallet, lighter, red shorts, black top, shoes. Room SI safe, environment check list filled out. Charge nurse aware.     Electronically signed by Olivia Flowers RN at 07/18/24 0918       Padma Brady RN at 07/18/24 0903          Patient states that he is here for ABD pain secondary to liver issue. He states that he is a alcoholic and has been for the last 9 years intermittently. He drinks almost a liter a day, last alcoholic drink was Wednesday morning around 0100. He also states that he has been having SI thoughts intermittently for the last 3-4. He states that his fiance left him 3-4 years ago and then his brother passed last year. Patient states \"the only thing keeping him from doing something, is the thought of his mom losing another son\" Dad and brother in " triage with patient while room being prepared for patient, security called.    Electronically signed by Padma Brady RN at 07/18/24 0907       Vital Signs (last 3 days)       Date/Time Temp Temp src Pulse Resp BP Patient Position SpO2    07/22/24 1037 98 (36.7) Oral 112 19 123/78 Lying 97    07/22/24 0352 98.1 (36.7) Axillary 118 18 104/64 Lying 90    07/21/24 2355 97.9 (36.6) Oral 108 18 115/72 Sitting 90    07/21/24 1926 97.7 (36.5) Oral 120 19 113/71 Sitting 93     SpO2: SpO2 was 93 at 07/21/24 1926    07/21/24 1700 -- -- 109 16 -- -- --    07/21/24 1553 97.9 (36.6) Oral 104 27 107/63 Lying 93    07/21/24 1209 98.1 (36.7) Oral 104 31 118/77 Lying 91    07/21/24 0718 97.4 (36.3) Oral 100 29 110/76 Sitting 90    07/21/24 0343 97.5 (36.4) Oral 109 29 108/71 Lying 94    07/21/24 0019 97.6 (36.4) Oral 117 22 102/65 Lying 91    07/20/24 1941 97.4 (36.3) Oral 114 16 103/62 Lying 93    07/20/24 1700 -- -- 103 -- -- -- 93    07/20/24 1549 98 (36.7) Oral 105 16 100/48 Lying 89    07/20/24 1540 -- -- 110 -- -- -- 91    07/20/24 1539 -- -- 108 -- -- -- 91    07/20/24 1130 98 (36.7) Oral 99 19 106/63 Lying 93    07/20/24 0719 -- -- -- -- -- -- 93    07/20/24 0718 96.8 (36) Oral -- 24 101/66 Lying 93    07/20/24 0400 97.1 (36.2) Oral 90 17 115/74 Lying 97    07/20/24 0000 97.8 (36.6) Oral 105 20 100/55 Lying 97    07/19/24 2300 -- -- 105 -- -- -- 98    07/19/24 2200 -- -- 103 -- 107/60 -- 95    07/19/24 2100 -- -- 104 -- -- -- 95    07/19/24 2000 -- -- 120 -- 111/70 -- 97    07/19/24 1926 97.5 (36.4) Oral 123 24 143/92 Lying 93    07/19/24 1752 -- -- 115 -- 119/78 -- 95    07/19/24 0807 98.6 (37) Axillary 111 19 112/67 Lying 96    07/19/24 0000 -- -- 109 22 134/86 -- 97          Oxygen Therapy (last 3 days)       Date/Time SpO2 Device (Oxygen Therapy) Flow (L/min) Oxygen Concentration (%) ETCO2 (mmHg)    07/22/24 1037 97 room air -- -- --    07/22/24 0850 -- room air -- -- --    07/22/24 0400 -- nasal cannula 2 -- --     07/22/24 0352 90 -- -- -- --    07/21/24 2355 90 -- -- -- --    07/21/24 2000 -- nasal cannula 2 -- --    07/21/24 1926 93 nasal cannula 2 -- --    07/21/24 1600 -- room air -- -- --    07/21/24 1553 93 room air -- -- --    07/21/24 1209 91 room air -- -- --    07/21/24 1203 -- room air -- -- --    07/21/24 0732 -- room air -- -- --    07/21/24 0718 90 room air -- -- --    07/21/24 0400 -- nasal cannula 2 -- --    07/21/24 0343 94 nasal cannula 2 -- --    07/21/24 0200 -- nasal cannula 2 -- --    07/21/24 0019 91 nasal cannula 2 -- --    07/21/24 0000 -- nasal cannula 2 -- --    07/20/24 2200 -- nasal cannula 2 -- --    07/20/24 2000 -- nasal cannula 2 -- --    07/20/24 1941 93 nasal cannula 2 -- --    07/20/24 1700 93 nasal cannula 2 -- --    07/20/24 1549 89 room air -- -- --    07/20/24 1540 91 -- -- -- --    07/20/24 1539 91 -- -- -- --    07/20/24 1401 -- room air -- -- --    07/20/24 1130 93 nasal cannula 2 -- --    07/20/24 0846 -- room air -- -- --    07/20/24 0719 93 -- -- -- --    07/20/24 0718 93 room air -- -- --    07/20/24 0400 97 nasal cannula 2 -- --    07/20/24 0000 97 nasal cannula 2 -- --    07/19/24 2300 98 -- -- -- --    07/19/24 2200 95 nasal cannula 2 -- --    07/19/24 2100 95 -- -- -- --    07/19/24 2000 97 nasal cannula 2 -- --    07/19/24 1926 93 nasal cannula 2 -- --    07/19/24 1757 -- nasal cannula 2 -- --    07/19/24 1752 95 -- -- -- --    07/19/24 0807 96 nasal cannula 2 -- --    07/19/24 0030 -- nasal cannula 2 -- --    07/19/24 0000 97 nasal cannula 2 -- --          CIWA (since admission)        Date/Time CIWA-Ar Score    07/22/24 0850 4     07/22/24 0400 0     07/21/24 2000 5     07/21/24 1600 0     07/21/24 1203 0     07/21/24 0732 1     07/20/24 2000 6     07/20/24 1600 1     07/20/24 0846 3     07/20/24 0000 2     07/19/24 2000 1     07/19/24 1757 2     07/19/24 1610 5     07/19/24 0800 5     07/19/24 0400 5     07/19/24 0030 0     07/18/24 2000 0     07/18/24 1543 11      07/18/24 12:47:26 6                   Facility-Administered Medications as of 7/22/2024   Medication Dose Route Frequency Provider Last Rate Last Admin    acetaminophen (TYLENOL) tablet 650 mg  650 mg Oral Q4H PRN Mari Bustos MD        Or    acetaminophen (TYLENOL) 160 MG/5ML oral solution 650 mg  650 mg Oral Q4H PRN Mari Bustos MD        Or    acetaminophen (TYLENOL) suppository 650 mg  650 mg Rectal Q4H PRN Mari Bustos MD        [COMPLETED] albumin human 25 % IV SOLN 25 g  25 g Intravenous Once Allison Mcdonald MD   Stopped at 07/18/24 1930    [COMPLETED] albumin human 25 % IV SOLN 25 g  25 g Intravenous Q8H Allison Mcdonald MD   25 g at 07/21/24 1608    sennosides-docusate (PERICOLACE) 8.6-50 MG per tablet 2 tablet  2 tablet Oral BID PRN Mari Bustos MD        And    polyethylene glycol (MIRALAX) packet 17 g  17 g Oral Daily PRN Mari Bustos MD        And    bisacodyl (DULCOLAX) EC tablet 5 mg  5 mg Oral Daily PRN Mari Bustos MD        And    bisacodyl (DULCOLAX) suppository 10 mg  10 mg Rectal Daily PRN Mari Bustos MD        calcium gluconate 1000 Mg/50ml 0.675% NaCl IV SOLN  1,000 mg Intravenous Q12H Allison Mcdonald MD   1,000 mg at 07/22/24 0914    [COMPLETED] calcium gluconate 2000-675 MG/100ML NACL IVPB  2,000 mg Intravenous Q12H Allison Mcdonald MD 0 mL/hr at 07/18/24 2100 2,000 mg at 07/19/24 0621    [COMPLETED] calcium gluconate 2000-675 MG/100ML NACL IVPB  2,000 mg Intravenous Q8H Allison Mcdonald MD   2,000 mg at 07/20/24 0930    Calcium Replacement - Follow Nurse / BPA Driven Protocol   Does not apply PRN Mari Bustos MD        chlordiazePOXIDE (LIBRIUM) capsule 50 mg  50 mg Oral 4x Daily PRN Mari Bustos MD        famotidine (PEPCID) tablet 40 mg  40 mg Oral Nightly Waleska Chery APRN        folic acid (FOLVITE) tablet 1 mg  1 mg Oral Daily Mari Bustos MD   1 mg at  07/22/24 0925    [COMPLETED] folic acid 1 mg in sodium chloride 0.9 % 50 mL IVPB  1 mg Intravenous Once Micheal Deshpande PA-C   Stopped at 07/18/24 1132    guaiFENesin-codeine (GUAIFENESIN AC) 100-10 MG/5ML liquid 5 mL  5 mL Oral Q4H PRN Frank Wild MD   5 mL at 07/22/24 1053    [COMPLETED] iopamidol (ISOVUE-370) 76 % injection 100 mL  100 mL Intravenous Once in imaging Micheal Deshpande PA-C   100 mL at 07/18/24 1025    lactulose (CHRONULAC) 10 GM/15ML solution 10 g  10 g Oral TID Vicente Moscoso MD   10 g at 07/22/24 0912    [COMPLETED] lactulose (CHRONULAC) 10 GM/15ML solution 30 g  30 g Oral Once Genie Ybarra APRN   30 g at 07/19/24 0500    [COMPLETED] LORazepam (ATIVAN) injection 1 mg  1 mg Intravenous Once Micheal Deshpande PA-C   1 mg at 07/18/24 0951    LORazepam (ATIVAN) injection 1 mg  1 mg Intravenous Q2H PRN Mari Bustos MD        LORazepam (ATIVAN) tablet 1 mg  1 mg Oral Q1H PRN Mari Bustos MD        Or    LORazepam (ATIVAN) injection 1 mg  1 mg Intravenous Q1H PRN Mari Bustos MD        Or    LORazepam (ATIVAN) tablet 2 mg  2 mg Oral Q1H PRN Mari Bustos MD        Or    LORazepam (ATIVAN) injection 2 mg  2 mg Intravenous Q1H PRN Mari Bustos MD        Or    LORazepam (ATIVAN) injection 2 mg  2 mg Intravenous Q15 Min PRN Mari Bustos MD        Or    LORazepam (ATIVAN) injection 2 mg  2 mg Intramuscular Q15 Min PRN Mari Bustos MD        [COMPLETED] LORazepam (ATIVAN) tablet 2 mg  2 mg Oral Q6H Mari Bustos MD   2 mg at 07/19/24 1005    Followed by    [COMPLETED] LORazepam (ATIVAN) tablet 1 mg  1 mg Oral Q6H Mari Bustos MD   1 mg at 07/20/24 0929    Followed by    [COMPLETED] LORazepam (ATIVAN) tablet 1 mg  1 mg Oral Q12H Mari Bustos MD   1 mg at 07/21/24 0906    Followed by    [COMPLETED] LORazepam (ATIVAN) tablet 1 mg  1 mg Oral Daily Mari Bustos MD   1 mg at 07/22/24 0913    Magnesium Standard Dose  Replacement - Follow Nurse / BPA Driven Protocol   Does not apply PRN Mari Bustos MD        [COMPLETED] magnesium sulfate 2g/50 mL (PREMIX) infusion  2 g Intravenous Q2H Mari Bustos MD   Stopped at 24 1930    [COMPLETED] magnesium sulfate 2g/50 mL (PREMIX) infusion  2 g Intravenous Q2H Vicente Moscoso MD   2 g at 24 1055    multivitamin with minerals 1 tablet  1 tablet Oral Daily Mari Bustos MD   1 tablet at 24 0912    [COMPLETED] ondansetron (ZOFRAN) injection 4 mg  4 mg Intravenous Once Micheal Deshpande PA-C   4 mg at 24 0954    ondansetron ODT (ZOFRAN-ODT) disintegrating tablet 4 mg  4 mg Oral Q6H PRN Mari Bustos MD        Or    ondansetron (ZOFRAN) injection 4 mg  4 mg Intravenous Q6H PRN Mari Bustos MD   4 mg at 24 1330    pantoprazole (PROTONIX) injection 40 mg  40 mg Intravenous BID AC Kaci Polo APRN   40 mg at 24 0912    [COMPLETED] pantoprazole (PROTONIX) injection 80 mg  80 mg Intravenous Once Micheal Deshpande PA-C   80 mg at 24 0955    Phosphorus Replacement - Follow Nurse / BPA Driven Protocol   Does not apply PRN Mari Bustos MD        [] potassium chloride (KLOR-CON M20) CR tablet 40 mEq  40 mEq Oral Q4H Mari Bustos MD   40 mEq at 24 1005    [COMPLETED] potassium chloride (KLOR-CON M20) CR tablet 40 mEq  40 mEq Oral Q4H Frank Wild MD   40 mEq at 24 1053    [COMPLETED] potassium chloride (KLOR-CON) packet 40 mEq  40 mEq Oral Q4H Mari Bustos MD   40 mEq at 24 2148    [COMPLETED] potassium phosphate (monobasic) (K-PHOS) tablet 1,000 mg  1,000 mg Oral Once Allison Mcdonald MD   1,000 mg at 24 0919    [COMPLETED] potassium phosphate 15 mmol in 0.9% normal saline 250 mL IVPB  15 mmol Intravenous Q3H Mari Bustos MD   15 mmol at 24 1458    [COMPLETED] potassium phosphate 15 mmol in 0.9% normal saline 250 mL IVPB  15 mmol Intravenous  Q3H Codie Rivas MD   15 mmol at 07/20/24 0354    Potassium Replacement - Follow Nurse / BPA Driven Protocol   Does not apply PRN Mari Bustos MD        prednisoLONE sodium phosphate (ORAPRED) 15 MG/5ML oral solution 40 mg  40 mg Oral Daily Waleska Chery APRN   40 mg at 07/22/24 0912    [Held by provider] sertraline (ZOLOFT) tablet 50 mg  50 mg Oral Daily Mari Bustos MD   50 mg at 07/18/24 1258    [COMPLETED] sodium chloride 0.9 % bolus 1,000 mL  1,000 mL Intravenous Once Micheal Deshpande PA-C   Stopped at 07/18/24 1300    sodium chloride 0.9 % flush 10 mL  10 mL Intravenous PRN Mari Bustos MD        sodium chloride 0.9 % flush 10 mL  10 mL Intravenous Q12H Mari Bustos MD   10 mL at 07/22/24 0914    sodium chloride 0.9 % flush 10 mL  10 mL Intravenous PRN Mari Bustos MD        [COMPLETED] sodium chloride 0.9 % infusion 1,000 mL  1,000 mL Intravenous Once Micheal Deshpande PA-C   Stopped at 07/18/24 1133    sodium chloride 0.9 % infusion 40 mL  40 mL Intravenous PRN Mari Bustos MD        sodium chloride 0.9 % infusion  60 mL/hr Intravenous Continuous Allison Mcdonald MD 60 mL/hr at 07/22/24 0925 60 mL/hr at 07/22/24 0925    sucralfate (CARAFATE) tablet 1 g  1 g Oral 4x Daily AC & at Bedtime Kaci Polo APRN   1 g at 07/22/24 1053    [COMPLETED] thiamine (B-1) injection 200 mg  200 mg Intravenous Once Micheal Deshpande PA-C   200 mg at 07/18/24 0955    thiamine (B-1) injection 200 mg  200 mg Intravenous Q8H Mari Bustos MD   200 mg at 07/22/24 1408    Followed by    [START ON 7/24/2024] thiamine (VITAMIN B-1) tablet 100 mg  100 mg Oral Daily Mari Bustos MD        [COMPLETED] vitamin K1 (PHYTONADIONE) 1 MG/1 ML oral solution 10 mg  10 mg Oral Once Kaci Polo APRN   10 mg at 07/20/24 1424    zinc sulfate (ZINCATE) capsule 220 mg  220 mg Oral Daily Waleska Chery APRN   220 mg at 07/22/24 0913     Operative/Procedure Notes (all)     No notes of this type exist for this encounter.          Physician Progress Notes (all)        Waleska Chery, APRN at 07/22/24 1406           LOS: 4 days   Patient Care Team:  Provider, No Known as PCP - General      Subjective     Interval History:     Subjective: Patient reports that he is feeling some better.  Tolerating diet.  Reports bowel movements overnight without overt GI bleeding.  No nausea/vomiting.  His only complaint is some wheezing when he wakes from sleeping.      ROS:   No chest pain, shortness of breath, or cough.        Medication Review:     Current Facility-Administered Medications:     acetaminophen (TYLENOL) tablet 650 mg, 650 mg, Oral, Q4H PRN **OR** acetaminophen (TYLENOL) 160 MG/5ML oral solution 650 mg, 650 mg, Oral, Q4H PRN **OR** acetaminophen (TYLENOL) suppository 650 mg, 650 mg, Rectal, Q4H PRN, Mari Bustos MD    sennosides-docusate (PERICOLACE) 8.6-50 MG per tablet 2 tablet, 2 tablet, Oral, BID PRN **AND** polyethylene glycol (MIRALAX) packet 17 g, 17 g, Oral, Daily PRN **AND** bisacodyl (DULCOLAX) EC tablet 5 mg, 5 mg, Oral, Daily PRN **AND** bisacodyl (DULCOLAX) suppository 10 mg, 10 mg, Rectal, Daily PRN, Mari Bustos MD    calcium gluconate 1000 Mg/50ml 0.675% NaCl IV SOLN, 1,000 mg, Intravenous, Q12H, Allison Mcdonald MD, 1,000 mg at 07/22/24 0914    Calcium Replacement - Follow Nurse / BPA Driven Protocol, , Does not apply, PRN, Mari Bustos MD    chlordiazePOXIDE (LIBRIUM) capsule 50 mg, 50 mg, Oral, 4x Daily PRN, Mari Bustos MD    folic acid (FOLVITE) tablet 1 mg, 1 mg, Oral, Daily, Mari Bustos MD, 1 mg at 07/22/24 0925    guaiFENesin-codeine (GUAIFENESIN AC) 100-10 MG/5ML liquid 5 mL, 5 mL, Oral, Q4H PRN, Frank Wild MD, 5 mL at 07/22/24 1053    lactulose (CHRONULAC) 10 GM/15ML solution 10 g, 10 g, Oral, TID, Vicente Moscoso MD, 10 g at 07/22/24 0912    LORazepam (ATIVAN) injection 1 mg, 1 mg, Intravenous, Q2H  Juli HARTLEY Abbas Ali, MD    LORazepam (ATIVAN) tablet 1 mg, 1 mg, Oral, Q1H PRN **OR** LORazepam (ATIVAN) injection 1 mg, 1 mg, Intravenous, Q1H PRN **OR** LORazepam (ATIVAN) tablet 2 mg, 2 mg, Oral, Q1H PRN **OR** LORazepam (ATIVAN) injection 2 mg, 2 mg, Intravenous, Q1H PRN **OR** LORazepam (ATIVAN) injection 2 mg, 2 mg, Intravenous, Q15 Min PRN **OR** LORazepam (ATIVAN) injection 2 mg, 2 mg, Intramuscular, Q15 Min PRN, Mari Bustos MD    Magnesium Standard Dose Replacement - Follow Nurse / BPA Driven Protocol, , Does not apply, Juli HARTLEY Abbas Ali, MD    multivitamin with minerals 1 tablet, 1 tablet, Oral, Daily, Mari Bustos MD, 1 tablet at 07/22/24 0912    ondansetron ODT (ZOFRAN-ODT) disintegrating tablet 4 mg, 4 mg, Oral, Q6H PRN **OR** ondansetron (ZOFRAN) injection 4 mg, 4 mg, Intravenous, Q6H PRN, Mari Bustos MD, 4 mg at 07/18/24 1330    pantoprazole (PROTONIX) injection 40 mg, 40 mg, Intravenous, BID Christ GARCIA Donna Ann APRN, 40 mg at 07/22/24 0912    Phosphorus Replacement - Follow Nurse / BPA Driven Protocol, , Does not apply, Juli HARTLEY Abbas Ali, MD    Potassium Replacement - Follow Nurse / BPA Driven Protocol, , Does not apply, Juli HARTLEY Abbas Ali, MD    prednisoLONE sodium phosphate (ORAPRED) 15 MG/5ML oral solution 40 mg, 40 mg, Oral, Daily, Waleska Chery APRN, 40 mg at 07/22/24 0912    [Held by provider] sertraline (ZOLOFT) tablet 50 mg, 50 mg, Oral, Daily, Mari Bustos MD, 50 mg at 07/18/24 1258    sodium chloride 0.9 % flush 10 mL, 10 mL, Intravenous, PRN, Mari Bustos MD    sodium chloride 0.9 % flush 10 mL, 10 mL, Intravenous, Q12H, Mari Bustos MD, 10 mL at 07/22/24 0914    sodium chloride 0.9 % flush 10 mL, 10 mL, Intravenous, PRN, Mari Bustos MD    sodium chloride 0.9 % infusion 40 mL, 40 mL, Intravenous, PRN, Mari Bustos MD    sodium chloride 0.9 % infusion, 60 mL/hr, Intravenous, Continuous, Harry,  MD Allison, Last Rate: 60 mL/hr at 07/22/24 0925, 60 mL/hr at 07/22/24 0925    sucralfate (CARAFATE) tablet 1 g, 1 g, Oral, 4x Daily AC & at Bedtime, Kaci Polo APRN, 1 g at 07/22/24 1053    thiamine (B-1) injection 200 mg, 200 mg, Intravenous, Q8H, 200 mg at 07/22/24 0450 **FOLLOWED BY** [START ON 7/24/2024] thiamine (VITAMIN B-1) tablet 100 mg, 100 mg, Oral, Daily, Mari Bustos MD    zinc sulfate (ZINCATE) capsule 220 mg, 220 mg, Oral, Daily, Waleska Chery APRN, 220 mg at 07/22/24 0913      Objective     Vital Signs  Vitals:    07/21/24 1926 07/21/24 2355 07/22/24 0352 07/22/24 1037   BP: 113/71 115/72 104/64 123/78   BP Location: Left arm Left arm Left arm Left arm   Patient Position: Sitting Sitting Lying Lying   Pulse: 120 108 118 112   Resp: 19 18 18 19   Temp: 97.7 °F (36.5 °C) 97.9 °F (36.6 °C) 98.1 °F (36.7 °C) 98 °F (36.7 °C)   TempSrc: Oral Oral Axillary Oral   SpO2: 93% 90% 90% 97%   Weight:       Height:           Physical Exam:     General Appearance:    Awake and alert, in no acute distress   Head:    Normocephalic, without obvious abnormality   Eyes:          Conjunctivae normal, icteric sclera   Throat:   No oral lesions, no thrush, oral mucosa moist   Neck:   No adenopathy, supple, no JVD   Lungs:     respirations regular, even and unlabored   Abdomen:     Soft, non-tender, no rebound or guarding, non-distended   Rectal:     Deferred   Extremities:   No edema, no cyanosis   Skin:   No bruising or rash, jaundice        Results Review:    CBC    Results from last 7 days   Lab Units 07/22/24  0449 07/21/24  0514 07/20/24  0509 07/19/24  0300 07/18/24  1015   WBC 10*3/mm3 15.08* 14.80* 11.18* 8.78 9.86   HEMOGLOBIN g/dL 8.4* 8.5* 9.1* 8.2* 8.9*   PLATELETS 10*3/mm3 147 148 100* 85* 94*     CMP   Results from last 7 days   Lab Units 07/22/24  0449 07/21/24  0514 07/20/24  0509 07/19/24  1619 07/19/24  1458 07/19/24  0300 07/18/24  1829 07/18/24  1015   SODIUM mmol/L 133* 135*  "133* 131* 134* 126* 122* 122*   POTASSIUM mmol/L 3.6 3.7 3.8 3.8  3.6 3.6 3.0* 2.7* 1.9*   CHLORIDE mmol/L 98 100 96* 96* 96* 86* 80* 72*   CO2 mmol/L 26.9 24.9 29.0 23.8 29.2* 32.5* 33.2* 33.7*   BUN mg/dL 4* 3* 3* 3* 3* 3* 4* 6   CREATININE mg/dL 0.47* 0.46* 0.46* 0.41* 0.40* 0.44* 0.38* 0.40*   GLUCOSE mg/dL 89 130* 126* 78 79 116* 91 93   ALBUMIN g/dL 3.1* 2.8* 2.9*  --  2.9* 3.1*  --  3.0*   BILIRUBIN mg/dL 16.0* 16.6* 18.0*  --  17.9* 17.4*  --  15.8*   ALK PHOS U/L 258* 272* 286*  --  301* 324*  --  380*   AST (SGOT) U/L 107* 115* 122*  --  143* 159*  --  186*   ALT (SGPT) U/L 27 23 23  --  27 28  --  33   MAGNESIUM mg/dL 2.2 1.3*  --   --  1.7 2.2  --  1.2*   PHOSPHORUS mg/dL 2.5 2.8 2.6 1.6*  --  0.3*  --   --    LIPASE U/L  --   --   --   --   --   --   --  100*   AMMONIA umol/L  --   --   --   --   --  90*  --   --      Cr Clearance Estimated Creatinine Clearance: 182.7 mL/min (A) (by C-G formula based on SCr of 0.47 mg/dL (L)).  Coag   Results from last 7 days   Lab Units 07/22/24  0449 07/21/24  1506 07/20/24  0509 07/19/24  0300 07/18/24  0923   INR  1.71* 1.71* 2.02* 1.79* 1.59*   APTT seconds  --   --   --   --  41.9*     HbA1C No results found for: \"HGBA1C\"  Results from last 7 days   Lab Units 07/18/24  1015   CK TOTAL U/L 45     Infection   Results from last 7 days   Lab Units 07/18/24  0928 07/18/24  0923   BLOODCX  No growth at 4 days No growth at 4 days     UA    Results from last 7 days   Lab Units 07/18/24  1319   NITRITE UA  Positive*   WBC UA /HPF 0-2   BACTERIA UA /HPF None Seen   SQUAM EPITHEL UA /HPF 0-2     Microbiology Results (last 10 days)       Procedure Component Value - Date/Time    Legionella Antigen, Urine - Urine, Urine, Clean Catch [[id number]]  (Normal) Collected: 07/18/24 1319    Lab Status: Final result Specimen: Urine, Clean Catch Updated: 07/18/24 1852     LEGIONELLA ANTIGEN, URINE Negative    Blood Culture - Blood, Hand, Right [[id number]]  (Normal) Collected: 07/18/24 " 0928    Lab Status: Preliminary result Specimen: Blood from Hand, Right Updated: 07/22/24 0942     Blood Culture No growth at 4 days    Blood Culture - Blood, Arm, Right [[id number]]  (Normal) Collected: 07/18/24 0923    Lab Status: Preliminary result Specimen: Blood from Arm, Right Updated: 07/22/24 0942     Blood Culture No growth at 4 days          Imaging Results (Last 72 Hours)       ** No results found for the last 72 hours. **            Assessment & Plan     ASSESSMENT:  -Alcohol hepatitis/cirrhosis   -Elevated LFTs -related to above  -Acute alcohol withdrawal  -Nitrite positive UTI  -Leukocytosis  -Normocytic anemia  -Thrombocytopenia - due to cirrhosis  -Elevated INR - due to cirrhosis  -Hyponatremia related to alcohol abuse  -Dysphagia could be related to esophageal stricture versus reflux esophagitis versus dysmotility  -GERD    PLAN:  Patient is a 29 y/o male with history of ETOH abuse who presented on 7/18 with complaints of ETOH withdrawal. Found to have significantly elevated LFTs on admission with total bilirubin 17.4, alk phos 324, , and ALT 28. CT abd/pelvis W on admission showed fatty liver,  sequelae of portal hypertension noted including distal paraesophageal  and intra-abdominal varices as well as trace ascites , and patent portal vasculature. He was started on prednisolone on 7/19 due to Maddrey's DF of 33.    Patient reports that he is feeling some better. No nausea/vomiting, abdominal pain, or overt GI bleeding.   LFTs improving with total bilirubin 16.0 from 16.6, alk phos 258 from 272,  from 115, and ALT 27 from 23.  Continue prednisolone. Will calculate Lille score on day 7.   Encourage nutrition.   Continue 2g low-sodium diet.   Continue thiamine, zinc, and folic acid.   Recommend complete ETOH cessation.   Liver serologies unremarkable. AFP normal.   Will need screening EGD at some point as inpatient vs outpatient.   Continue supportive care.     Electronically signed by  "MARI Mobley, 24, 2:07 PM EDT.             Electronically signed by Waleska Chery APRN at 24 1426       Allison Mcdonald MD at 24 0788          NEPHROLOGY PROGRESS NOTE------[facility]    [facility]  [phone]  Navya Mcdonald MD      Patient Care Team:  Provider, No Known as PCP - General      Provider:  Navya Mcdonald MD  Patient Name: Yonas Charles  :  1995    SUBJECTIVE:    F/U ELECTROLYTE ABNORMALITIES    Feeling fairly well. No angina. No dysuria. Appetite ok. Getting up and moving around    Medication:  folic acid, 1 mg, Oral, Daily  lactulose, 10 g, Oral, TID  LORazepam, 1 mg, Oral, Daily  multivitamin with minerals, 1 tablet, Oral, Daily  pantoprazole, 40 mg, Intravenous, BID AC  potassium chloride ER, 40 mEq, Oral, Q4H  prednisoLONE sodium phosphate, 40 mg, Oral, Daily  [Held by provider] sertraline, 50 mg, Oral, Daily  sodium chloride, 10 mL, Intravenous, Q12H  sucralfate, 1 g, Oral, 4x Daily AC & at Bedtime  thiamine (B-1) IV, 200 mg, Intravenous, Q8H   Followed by  [START ON 2024] thiamine, 100 mg, Oral, Daily  zinc sulfate, 220 mg, Oral, Daily             OBJECTIVE    Vital Sign Min/Max for last 24 hours  Temp  Min: 97.7 °F (36.5 °C)  Max: 98.1 °F (36.7 °C)   BP  Min: 104/64  Max: 118/77   Pulse  Min: 104  Max: 120   Resp  Min: 16  Max: 31   SpO2  Min: 90 %  Max: 93 %   No data recorded   No data recorded     Flowsheet Rows      Flowsheet Row First Filed Value   Admission Height 165.1 cm (65\") Documented at 2024 0855   Admission Weight 55.7 kg (122 lb 12.7 oz) Documented at 2024 0855            No intake/output data recorded.  I/O last 3 completed shifts:  In: 1946 [P.O.:560; I.V.:1386]  Out: -     Physical Exam:  General Appearance: WEAK AND MALAISED  Head: normocephalic, without obvious abnormality and atraumatic    Eyes: conjunctivae and +ICTERIC SCLERAE  Neck: " "supple and no JVD  Lungs: +FEW SCATTERED RHONCHI  Heart: regular rhythm & normal rate and normal S1, S2   Chest Wall: no abnormalities observed  Abdomen: normal bowel sounds and soft   Extremities: moves extremities well, no edema, no cyanosis  Skin: +JAUNDICE  Neurologic: A AND O X 4 WITHOUT FOCAL DEFICIT    Labs:    WBC WBC   Date Value Ref Range Status   07/22/2024 15.08 (H) 3.40 - 10.80 10*3/mm3 Final   07/21/2024 14.80 (H) 3.40 - 10.80 10*3/mm3 Final   07/20/2024 11.18 (H) 3.40 - 10.80 10*3/mm3 Final      HGB Hemoglobin   Date Value Ref Range Status   07/22/2024 8.4 (L) 13.0 - 17.7 g/dL Final   07/21/2024 8.5 (L) 13.0 - 17.7 g/dL Final   07/20/2024 9.1 (L) 13.0 - 17.7 g/dL Final      HCT Hematocrit   Date Value Ref Range Status   07/22/2024 23.2 (L) 37.5 - 51.0 % Final   07/21/2024 23.0 (L) 37.5 - 51.0 % Final   07/20/2024 24.5 (L) 37.5 - 51.0 % Final      Platelets No results found for: \"LABPLAT\"   MCV MCV   Date Value Ref Range Status   07/22/2024 93.5 79.0 - 97.0 fL Final   07/21/2024 92.4 79.0 - 97.0 fL Final   07/20/2024 91.8 79.0 - 97.0 fL Final          Sodium Sodium   Date Value Ref Range Status   07/22/2024 133 (L) 136 - 145 mmol/L Final   07/21/2024 135 (L) 136 - 145 mmol/L Final   07/20/2024 133 (L) 136 - 145 mmol/L Final   07/19/2024 131 (L) 136 - 145 mmol/L Final   07/19/2024 134 (L) 136 - 145 mmol/L Final      Potassium Potassium   Date Value Ref Range Status   07/22/2024 3.6 3.5 - 5.2 mmol/L Final   07/21/2024 3.7 3.5 - 5.2 mmol/L Final   07/20/2024 3.8 3.5 - 5.2 mmol/L Final   07/19/2024 3.8 3.5 - 5.2 mmol/L Final   07/19/2024 3.6 3.5 - 5.2 mmol/L Final   07/19/2024 3.6 3.5 - 5.2 mmol/L Final      Chloride Chloride   Date Value Ref Range Status   07/22/2024 98 98 - 107 mmol/L Final   07/21/2024 100 98 - 107 mmol/L Final   07/20/2024 96 (L) 98 - 107 mmol/L Final   07/19/2024 96 (L) 98 - 107 mmol/L Final   07/19/2024 96 (L) 98 - 107 mmol/L Final      CO2 CO2   Date Value Ref Range Status " "  07/22/2024 26.9 22.0 - 29.0 mmol/L Final   07/21/2024 24.9 22.0 - 29.0 mmol/L Final   07/20/2024 29.0 22.0 - 29.0 mmol/L Final   07/19/2024 23.8 22.0 - 29.0 mmol/L Final   07/19/2024 29.2 (H) 22.0 - 29.0 mmol/L Final      BUN BUN   Date Value Ref Range Status   07/22/2024 4 (L) 6 - 20 mg/dL Final   07/21/2024 3 (L) 6 - 20 mg/dL Final   07/20/2024 3 (L) 6 - 20 mg/dL Final   07/19/2024 3 (L) 6 - 20 mg/dL Final   07/19/2024 3 (L) 6 - 20 mg/dL Final      Creatinine Creatinine   Date Value Ref Range Status   07/22/2024 0.47 (L) 0.76 - 1.27 mg/dL Final   07/21/2024 0.46 (L) 0.76 - 1.27 mg/dL Final   07/20/2024 0.46 (L) 0.76 - 1.27 mg/dL Final   07/19/2024 0.41 (L) 0.76 - 1.27 mg/dL Final   07/19/2024 0.40 (L) 0.76 - 1.27 mg/dL Final      Calcium Calcium   Date Value Ref Range Status   07/22/2024 8.5 (L) 8.6 - 10.5 mg/dL Final   07/21/2024 8.6 8.6 - 10.5 mg/dL Final   07/20/2024 9.2 8.6 - 10.5 mg/dL Final   07/19/2024 7.9 (L) 8.6 - 10.5 mg/dL Final   07/19/2024 8.1 (L) 8.6 - 10.5 mg/dL Final      PO4 No components found for: \"PO4\"   Albumin Albumin   Date Value Ref Range Status   07/22/2024 3.1 (L) 3.5 - 5.2 g/dL Final   07/21/2024 2.8 (L) 3.5 - 5.2 g/dL Final   07/20/2024 2.9 (L) 3.5 - 5.2 g/dL Final   07/19/2024 2.9 (L) 3.5 - 5.2 g/dL Final      Magnesium Magnesium   Date Value Ref Range Status   07/22/2024 2.2 1.6 - 2.6 mg/dL Final   07/21/2024 1.3 (L) 1.6 - 2.6 mg/dL Final   07/19/2024 1.7 1.6 - 2.6 mg/dL Final      Uric Acid No components found for: \"URIC ACID\"     Imaging Results (Last 72 Hours)       ** No results found for the last 72 hours. **            Results for orders placed during the hospital encounter of 07/18/24    XR Chest PA & Lateral    Narrative  DATE OF EXAM:  7/18/2024 12:36 PM    PROCEDURE:  XR CHEST PA AND LATERAL-    INDICATIONS:  rule out aspiration pneumonia; E87.6-Hypokalemia; F10.939-Alcohol use,  unspecified with withdrawal, unspecified; R17-Unspecified jaundice    COMPARISON:  No " Comparisons Available    TECHNIQUE:  Two radiologic views of the chest , PA and lateral were obtained.    FINDINGS:  Heart size normal. Negative for lobar consolidation, edema, effusion or  pneumothorax. Osseous structures unremarkable.    Impression  No active pulmonary process.      Electronically Signed By-Armani Small MD On:7/18/2024 1:00 PM            ASSESSMENT / PLAN      Alcohol withdrawal    HYPONATREMIA------Acute.Restart little NS     2. HYPOKALEMIA-------Resolved.       3. HYPOMAGNESEMIA-------Replaced     4. HYPOCALCEMIA------Replace IV     5. ALCOHOL ABUSE/CIRRHOSIS/JAUNDICE------Thiamine, Folate, IVFs, prn IV Ativan. Withdrawal prcautions     6. MIXED METABOLIC ALKALOSIS/METABOLIC ACIDOSIS-----Better     7. ANEMIA------Normocytic with normal RDW. Follow for PRBC need     8. DEPRESSION------Suicide precautions. Hold Zoloft given severe hyponatremia     9. HYPOALBUMINEMIA-----IV Albumin to temporize and po supplements     10. ELEVATED INR------Secondary to liver disease     11. THROMBOCYTOPENIA------No heparin. Secondary to liver disease     12. PUD PROPHYLAXIS-----IV PPI     13. DVT PROPHYLAXIS-----SCDs    14. HYPOPHOSPHATEMIA------Replaced        Navya Mcdonald MD  [facility]  [phone]  07/22/24  07:31 EDT      Electronically signed by Allison Mcdonald MD at 07/22/24 0920       Leanna Rose APRN at 07/22/24 0715       Attestation signed by Ana Gupta MD at 07/22/24 1102    I have reviewed the mid-level documentation, and agree with the documentation, medical decision making and treatment plan as outlined by the mid-level provider.    This document has been electronically signed by Ana Gupta MD  July 22, 2024 11:02 EDT                                                                 Chief complaint suicidal ideation, alcohol withdrawal     Subjective .     History of present illness:  The patient is a 28 y.o. male who was  "admitted secondary to alcohol detox.     PMH: alcohol use disorder, GERD    Psych was consulted for depression, SI and alcohol withdrawal     The patient was seen this afternoon. He was accompanied by a sitter. The patient was lethargic, but did wake up to talk with me. He was alert and oriented. He reports that he has been drinking heavily for the past 10 years, up to 750ml of alcohol per day. He reports no periods of sobriety during that time. He recently moved here, 3 weeks ago from [place], to be closer to his family and have support. He is currently living with his parents.     The patient does want to stop drinking alcohol. I discussed an inpatient rehab treatment program with him as the best option for long term success at sobriety. He initially said no, and that he just wanted to go home with his parents. However, after discussing it with him more, he did agree to consider it and will let me know.     The patient does report a history of depression. He was supposed to start sertraline outpatient, but had not gotten to start it due to low sodium. His sodium is still low, but improving. Once medically appropriate, I do think starting an SSRI would benefit him. He does report suicidal ideation at time. He does state it is improving, and he last had thoughts about a week ago. At that time, he said he was having fleeting thoughts of killing himself, with fleeting plans. He mentioned \"it will just flash through my mind: guns, knives, needles.\" However, he states the thoughts quickly passed and he never came up with a definitive plan. He denies any serious SI for the last week. I did discuss with him discontinuing the sitter if he felt like he could tell nursing staff if the thoughts returned, and he was adamant that he would.     7/21/24: Patient seen this morning. He was accompanied by his brother. I offered him privacy, but he agreed to have his brother stay in the room. The patient states he is going to defer " to his parents and family about whether he does inpatient treatment. His brother states his family want him to do a rehab program. The patient is also agreeable to starting a medication for depression once his sodium normalizes. I advised the patient I would discuss possible options with case management, and follow up with him tomorrow with options. He has to be medically stable for discharge before facilities will consider him.     7/22/24: Patient seen this morning. He was alert and oriented. He states he has talked with his family and they have agreed he needs treatment for the alcohol use. I discussed with him that he has to be medically stable before facilities will consider him, so we will just have to follow until that time. If he becomes ineligible for inpatient rehab, due to length of stay, I discussed possible partial hospitalization programs with him. He is agreeable to either. Denies any SI with any plan or intent.       Review of Systems   All systems were reviewed and negative except for:  Neurological: positive for  lethargic  Behavioral/Psych: positive for  depression    The following portions of the patient's history were reviewed and updated as appropriate: allergies, current medications, past family history, past medical history, past social history, past surgical history and problem list.    History    Past psychiatric history : depression, alcohol use        No medications prior to admission.        Scheduled Meds:  calcium gluconate, 1,000 mg, Intravenous, Q12H  folic acid, 1 mg, Oral, Daily  lactulose, 10 g, Oral, TID  LORazepam, 1 mg, Oral, Daily  multivitamin with minerals, 1 tablet, Oral, Daily  pantoprazole, 40 mg, Intravenous, BID AC  potassium chloride ER, 40 mEq, Oral, Q4H  prednisoLONE sodium phosphate, 40 mg, Oral, Daily  [Held by provider] sertraline, 50 mg, Oral, Daily  sodium chloride, 10 mL, Intravenous, Q12H  sucralfate, 1 g, Oral, 4x Daily AC & at Bedtime  thiamine (B-1) IV, 200  "mg, Intravenous, Q8H   Followed by  [START ON 7/24/2024] thiamine, 100 mg, Oral, Daily  zinc sulfate, 220 mg, Oral, Daily        Continuous Infusions:  sodium chloride, 60 mL/hr      PRN Meds:    acetaminophen **OR** acetaminophen **OR** acetaminophen    senna-docusate sodium **AND** polyethylene glycol **AND** bisacodyl **AND** bisacodyl    Calcium Replacement - Follow Nurse / BPA Driven Protocol    chlordiazePOXIDE    LORazepam    LORazepam **OR** LORazepam **OR** LORazepam **OR** LORazepam **OR** LORazepam **OR** LORazepam    Magnesium Standard Dose Replacement - Follow Nurse / BPA Driven Protocol    ondansetron ODT **OR** ondansetron    Phosphorus Replacement - Follow Nurse / BPA Driven Protocol    Potassium Replacement - Follow Nurse / BPA Driven Protocol    sodium chloride    sodium chloride    sodium chloride      Allergies:  Reglan [metoclopramide]      Objective     Vital Signs   /64 (BP Location: Left arm, Patient Position: Lying)   Pulse 118   Temp 98.1 °F (36.7 °C) (Axillary)   Resp 18   Ht 165.1 cm (65\")   Wt 55.7 kg (122 lb 12.7 oz)   SpO2 90%   BMI 20.43 kg/m²     Physical Exam:    MENTAL STATUS EXAM   General Appearance:  Cleanly groomed and dressed  Eye Contact:  Good eye contact  Attitude:  Cooperative  Motor Activity:  Normal gait, posture  Speech:  Normal rate, tone, volume  Language:  Spontaneous  Mood and affect:  Depressed  Hopelessness:  Denies  Loneliness: Denies  Thought Process:  Logical and goal-directed  Associations/ Thought Content:  No delusions  Hallucinations:  None  Suicidal Ideations:  Not present  Homicidal Ideation:  Not present  Sensorium:  Alert  Orientation:  Person, place, time and situation  Immediate Recall, Recent, and Remote Memory:  Intact  Attention Span/ Concentration:  Good  Fund of Knowledge:  Appropriate for age and educational level  Intellectual Functioning:  Average range  Insight:  Fair  Judgement:  Fair  Reliability:  Fair  Impulse Control:  " Fair     Medications and allergies reviewed.    Result Review:  I have personally reviewed the results from the time of this admission to 7/22/2024 07:56 EDT and agree with these findings:  [x]  Laboratory  []  Microbiology  []  Radiology  [x]  EKG/Telemetry   []  Cardiology/Vascular   []  Pathology  []  Old records  []  Other:  Most notable findings include: Sodium 133 today     Assessment & Plan       Alcohol withdrawal     Assessment: major depressive disorder, alcohol use disorder, alcohol withdrawal   Treatment Plan: Patient presented with alcohol withdrawal, depression, SI. He reports heavy drinking for the past 10 years, but does desire to stop drinking. He also reports depression with SI at times. States last thoughts of suicide were about a week ago.     Patient is agreeable to inpatient rehab for chemical dependency. Facilities will not consider him until he is medically stable, but I have discussed his desires with case management. Once he is closer to being medically ready for discharge, case management can explore either 30 day alcohol programs or possibly an intensive outpatient setting.     He does report depression and fleeting SI in the past, last a week ago. Patient would benefit from starting SSRI once sodium has normalized.      Continue supportive treatment.     Will continue to follow.   Treatment Plan discussed with: Patient    I discussed the patients findings and my recommendations with patient and nursing staff    I have reviewed and approved the behavioral health treatment plans and problem list. Yes     Referring MD has access to consult report and progress notes in EMR     MARI Loomis  07/22/24  07:56 EDT              Electronically signed by Ana Gupta MD at 07/22/24 1102       Kaci Polo APRN at 07/21/24 1344       Attestation signed by HAYDEE Sylvester MD at 07/21/24 1915    Electronically signed on 07/21/24 19:14 EDT by CRYSTAL Sylvester MD  The patient was  "seen and examined with an APRN. I personally performed the entire medical decision making, and physical exam. Labs and imaging and outpatient records reviewed, ordered.    Subjectively eating better, still with mild dysphagia.  On exam abdomen is soft and nontender.  Labs and imaging reviewed, bilirubin 16 from 18 yesterday, not much improvement from admission.  INR 1.71.  For severe alcoholic hepatitis continue encouraging nutrition.  Remains on steroids, calculate Mai score tomorrow, day 4, suspect nonresponder and may consider stopping steroids.  Needs EGD soon to assess for esophageal varices or esophageal stricture and possible dilation, but without any overt bleeding this is nonurgent and can be done prior to discharge.  Will reassess tomorrow and decide on timing.  Needs complete cessation from alcohol.  Agree with inpatient rehab.                   LOS: 3 days   Patient Care Team:  Provider, No Known as PCP - General      Subjective   \"Eating better than I have in a while\"    Interval History:   LABS: Sodium 135, creatinine 0.46.  TP 16.6 (18), alk phos 272 (286),  (122), ALT remains normal at 23.  WBCs 14.8 (11), hemoglobin 8.5 (9.1), platelets 148.  Hepatitis panel negative.  INR 1.71.  DF 45.1  Plan is for Inpatient rehab  Patient has not had any nausea or vomiting.  Dysphagia has improved.  Has had 2 bowel movements today.  No blood in stool.    ROS:   No chest pain, shortness of breath, or cough.         Medication Review:     Current Facility-Administered Medications:     acetaminophen (TYLENOL) tablet 650 mg, 650 mg, Oral, Q4H PRN **OR** acetaminophen (TYLENOL) 160 MG/5ML oral solution 650 mg, 650 mg, Oral, Q4H PRN **OR** acetaminophen (TYLENOL) suppository 650 mg, 650 mg, Rectal, Q4H PRN, Mari Bustos MD    albumin human 25 % IV SOLN 25 g, 25 g, Intravenous, Q8H, Allison Mcdonald MD, 25 g at 07/21/24 0919    sennosides-docusate (PERICOLACE) 8.6-50 MG per tablet 2 tablet, 2 " tablet, Oral, BID PRN **AND** polyethylene glycol (MIRALAX) packet 17 g, 17 g, Oral, Daily PRN **AND** bisacodyl (DULCOLAX) EC tablet 5 mg, 5 mg, Oral, Daily PRN **AND** bisacodyl (DULCOLAX) suppository 10 mg, 10 mg, Rectal, Daily PRN, Mari Bustos MD    Calcium Replacement - Follow Nurse / BPA Driven Protocol, , Does not apply, PRN, Mari Bustos MD    chlordiazePOXIDE (LIBRIUM) capsule 50 mg, 50 mg, Oral, 4x Daily PRN, Mari Bustos MD    folic acid (FOLVITE) tablet 1 mg, 1 mg, Oral, Daily, Mari Bustos MD, 1 mg at 07/21/24 0906    lactulose (CHRONULAC) 10 GM/15ML solution 10 g, 10 g, Oral, TID, Vicente Moscoso MD, 10 g at 07/21/24 0905    LORazepam (ATIVAN) injection 1 mg, 1 mg, Intravenous, Q2H PRN, Mari Bustos MD    LORazepam (ATIVAN) tablet 1 mg, 1 mg, Oral, Q1H PRN **OR** LORazepam (ATIVAN) injection 1 mg, 1 mg, Intravenous, Q1H PRN **OR** LORazepam (ATIVAN) tablet 2 mg, 2 mg, Oral, Q1H PRN **OR** LORazepam (ATIVAN) injection 2 mg, 2 mg, Intravenous, Q1H PRN **OR** LORazepam (ATIVAN) injection 2 mg, 2 mg, Intravenous, Q15 Min PRN **OR** LORazepam (ATIVAN) injection 2 mg, 2 mg, Intramuscular, Q15 Min PRN, Mari Bustos MD    [COMPLETED] LORazepam (ATIVAN) tablet 2 mg, 2 mg, Oral, Q6H, 2 mg at 07/19/24 1005 **FOLLOWED BY** [COMPLETED] LORazepam (ATIVAN) tablet 1 mg, 1 mg, Oral, Q6H, 1 mg at 07/20/24 0929 **FOLLOWED BY** [COMPLETED] LORazepam (ATIVAN) tablet 1 mg, 1 mg, Oral, Q12H, 1 mg at 07/21/24 0906 **FOLLOWED BY** [START ON 7/22/2024] LORazepam (ATIVAN) tablet 1 mg, 1 mg, Oral, Daily, Mari Bustos MD    Magnesium Standard Dose Replacement - Follow Nurse / BPA Driven Protocol, , Does not apply, PRN, Mari Bustos MD    multivitamin with minerals 1 tablet, 1 tablet, Oral, Daily, Mari Bustos MD, 1 tablet at 07/21/24 0906    ondansetron ODT (ZOFRAN-ODT) disintegrating tablet 4 mg, 4 mg, Oral, Q6H PRN **OR** ondansetron (ZOFRAN) injection 4  mg, 4 mg, Intravenous, Q6H PRN, Mari Bustos MD, 4 mg at 07/18/24 1330    pantoprazole (PROTONIX) injection 40 mg, 40 mg, Intravenous, BID AC, Kaci Polo APRN, 40 mg at 07/21/24 0722    Phosphorus Replacement - Follow Nurse / BPA Driven Protocol, , Does not apply, Juli HARTLEY Abbas Ali, MD    Potassium Replacement - Follow Nurse / BPA Driven Protocol, , Does not apply, Juli HARTLEY Abbas Ali, MD    prednisoLONE sodium phosphate (ORAPRED) 15 MG/5ML oral solution 40 mg, 40 mg, Oral, Daily, Waleska Chery, APRN, 40 mg at 07/21/24 0906    [Held by provider] sertraline (ZOLOFT) tablet 50 mg, 50 mg, Oral, Daily, Mari Bustos MD, 50 mg at 07/18/24 1258    sodium chloride 0.9 % flush 10 mL, 10 mL, Intravenous, PRN, Mari Bustos MD    sodium chloride 0.9 % flush 10 mL, 10 mL, Intravenous, Q12H, Mari Bustos MD, 10 mL at 07/21/24 0907    sodium chloride 0.9 % flush 10 mL, 10 mL, Intravenous, PRN, Mari Bustos MD    sodium chloride 0.9 % infusion 40 mL, 40 mL, Intravenous, PRN, Mari uBstos MD    sucralfate (CARAFATE) tablet 1 g, 1 g, Oral, 4x Daily AC & at Bedtime, Kaci Polo APRN, 1 g at 07/21/24 1055    thiamine (B-1) injection 200 mg, 200 mg, Intravenous, Q8H, 200 mg at 07/21/24 1312 **FOLLOWED BY** [START ON 7/24/2024] thiamine (VITAMIN B-1) tablet 100 mg, 100 mg, Oral, Daily, Mari Bustos MD    zinc sulfate (ZINCATE) capsule 220 mg, 220 mg, Oral, Daily, Waleska Chery APRN, 220 mg at 07/21/24 0906      Objective more awake and alert today.  Resting in hospital bed. Room 264.    Vital Signs  Temp:  [97.4 °F (36.3 °C)-98.1 °F (36.7 °C)] 98.1 °F (36.7 °C)  Heart Rate:  [100-117] 104  Resp:  [16-31] 31  BP: (100-118)/(48-77) 118/77  Physical Exam:    General Appearance:    Awake and alert, in no acute distress   Head:    Normocephalic, without obvious abnormality   Eyes:          Conjunctivae normal, icteric sclerae   Ears:    Hearing intact   Throat:   No  "oral lesions, no thrush, oral mucosa moist   Neck:   No adenopathy, supple, no JVD   Lungs:     respirations regular, even and unlabored        Abdomen:      soft, non-tender, no rebound or guarding, non-distended, no hepatosplenomegaly   Rectal:     Deferred   Extremities:   No edema, no cyanosis, no redness   Skin:   No bleeding, bruising or rash, + jaundice             Results Review:    CBC    Results from last 7 days   Lab Units 07/21/24  0514 07/20/24  0509 07/19/24  0300 07/18/24  1015   WBC 10*3/mm3 14.80* 11.18* 8.78 9.86   HEMOGLOBIN g/dL 8.5* 9.1* 8.2* 8.9*   PLATELETS 10*3/mm3 148 100* 85* 94*     CMP   Results from last 7 days   Lab Units 07/21/24  0514 07/20/24  0509 07/19/24  1619 07/19/24  1458 07/19/24  0300 07/18/24  1829 07/18/24  1015   SODIUM mmol/L 135* 133* 131* 134* 126* 122* 122*   POTASSIUM mmol/L 3.7 3.8 3.8  3.6 3.6 3.0* 2.7* 1.9*   CHLORIDE mmol/L 100 96* 96* 96* 86* 80* 72*   CO2 mmol/L 24.9 29.0 23.8 29.2* 32.5* 33.2* 33.7*   BUN mg/dL 3* 3* 3* 3* 3* 4* 6   CREATININE mg/dL 0.46* 0.46* 0.41* 0.40* 0.44* 0.38* 0.40*   GLUCOSE mg/dL 130* 126* 78 79 116* 91 93   ALBUMIN g/dL 2.8* 2.9*  --  2.9* 3.1*  --  3.0*   BILIRUBIN mg/dL 16.6* 18.0*  --  17.9* 17.4*  --  15.8*   ALK PHOS U/L 272* 286*  --  301* 324*  --  380*   AST (SGOT) U/L 115* 122*  --  143* 159*  --  186*   ALT (SGPT) U/L 23 23  --  27 28  --  33   MAGNESIUM mg/dL 1.3*  --   --  1.7 2.2  --  1.2*   PHOSPHORUS mg/dL 2.8 2.6 1.6*  --  0.3*  --   --    LIPASE U/L  --   --   --   --   --   --  100*   AMMONIA umol/L  --   --   --   --  90*  --   --      Cr Clearance Estimated Creatinine Clearance: 186.7 mL/min (A) (by C-G formula based on SCr of 0.46 mg/dL (L)).  Coag   Results from last 7 days   Lab Units 07/20/24  0509 07/19/24  0300 07/18/24  0923   INR  2.02* 1.79* 1.59*   APTT seconds  --   --  41.9*     HbA1C No results found for: \"HGBA1C\"  Blood Glucose No results found for: \"POCGLU\"  Infection   Results from last 7 days "   Lab Units 24  0928 24  0923   BLOODCX  No growth at 3 days No growth at 3 days     UA    Results from last 7 days   Lab Units 24  1319   NITRITE UA  Positive*   WBC UA /HPF 0-2   BACTERIA UA /HPF None Seen   SQUAM EPITHEL UA /HPF 0-2     Radiology(recent) No radiology results for the last day          Assessment & Plan   Alcohol hepatitis  Elevated LFTs related to above  Acute alcohol withdrawal  Nitrite positive UTI  Leukocytosis  Normocytic anemia  Thrombocytopenia consider related to alcohol abuse  Elevated INR due to alcohol abuse  Hyponatremia related to alcohol abuse  Dysphagia could be related to esophageal stricture versus reflux esophagitis versus dysmotility  GERD    Plan:  Patient is swallowing better.  PPI twice a day and Carafate seem to be helping.  Continue 2 g low-sodium diet with soft to chew meats.  Continue CIWA protocol  Discriminant function 45.1.  Continue prednisolone, thiamine, zinc and folic acid.  Check Lille score tomorrow which will be day 4.  Will hold on endoscopy for now.   Complete alcohol cessation.  He is considering inpatient rehab at discharge which I do think is a good idea.    Kaci Polo, APRN  24  13:46 EDT              Electronically signed by HAYDEE Sylvester MD at 24 191       Vicente Moscoso MD at 24 1305              [facility] MEDICINE SERVICE  DAILY PROGRESS NOTE    NAME: Yonas Charles  : 1995  MRN: [id number]      LOS: 3 days     PROVIDER OF SERVICE: Vicente Moscoso MD    Chief Complaint: Alcohol withdrawal    Subjective:     Interval History:  History taken from: patient  Patient Complaints: Patient seen and examined at bedside this morning.  States that he wants to stop drinking and move on with his life.  Patient Denies:       Review of Systems: Negative except described above  Review of Systems    Objective:     Vital Signs  Temp:  [97.4 °F (36.3 °C)-98.1 °F (36.7 °C)] 98.1 °F (36.7  °C)  Heart Rate:  [100-117] 104  Resp:  [16-31] 31  BP: (100-118)/(48-77) 118/77  Flow (L/min):  [2] 2   Body mass index is 20.43 kg/m².    Physical Exam  HENT:      Head: Normocephalic.   Eyes:      General: Scleral icterus present.   Cardiovascular:      Rate and Rhythm: Normal rate and regular rhythm.   Pulmonary:      Breath sounds: Normal breath sounds.   Abdominal:      General: Bowel sounds are normal.   Musculoskeletal:         General: Normal range of motion.   Skin:     Coloration: Skin is jaundiced.   Neurological:      General: No focal deficit present.      Mental Status: He is alert. Mental status is at baseline.   Psychiatric:         Mood and Affect: Mood normal.   Physical Exam    Scheduled Meds   albumin human, 25 g, Intravenous, Q8H  folic acid, 1 mg, Oral, Daily  lactulose, 10 g, Oral, TID  [START ON 7/22/2024] LORazepam, 1 mg, Oral, Daily  multivitamin with minerals, 1 tablet, Oral, Daily  pantoprazole, 40 mg, Intravenous, BID AC  prednisoLONE sodium phosphate, 40 mg, Oral, Daily  [Held by provider] sertraline, 50 mg, Oral, Daily  sodium chloride, 10 mL, Intravenous, Q12H  sucralfate, 1 g, Oral, 4x Daily AC & at Bedtime  thiamine (B-1) IV, 200 mg, Intravenous, Q8H   Followed by  [START ON 7/24/2024] thiamine, 100 mg, Oral, Daily  zinc sulfate, 220 mg, Oral, Daily       PRN Meds     acetaminophen **OR** acetaminophen **OR** acetaminophen    senna-docusate sodium **AND** polyethylene glycol **AND** bisacodyl **AND** bisacodyl    Calcium Replacement - Follow Nurse / BPA Driven Protocol    chlordiazePOXIDE    LORazepam    LORazepam **OR** LORazepam **OR** LORazepam **OR** LORazepam **OR** LORazepam **OR** LORazepam    Magnesium Standard Dose Replacement - Follow Nurse / BPA Driven Protocol    ondansetron ODT **OR** ondansetron    Phosphorus Replacement - Follow Nurse / BPA Driven Protocol    Potassium Replacement - Follow Nurse / BPA Driven Protocol    sodium chloride    sodium chloride    sodium  chloride   Infusions         Diagnostic Data    Results from last 7 days   Lab Units 07/21/24  0514   WBC 10*3/mm3 14.80*   HEMOGLOBIN g/dL 8.5*   HEMATOCRIT % 23.0*   PLATELETS 10*3/mm3 148   GLUCOSE mg/dL 130*   CREATININE mg/dL 0.46*   BUN mg/dL 3*   SODIUM mmol/L 135*   POTASSIUM mmol/L 3.7   AST (SGOT) U/L 115*   ALT (SGPT) U/L 23   ALK PHOS U/L 272*   BILIRUBIN mg/dL 16.6*   ANION GAP mmol/L 10.1       No radiology results for the last day      I reviewed the patient's new clinical results.    Assessment/Plan:     Active and Resolved Problems  Active Hospital Problems    Diagnosis  POA    **Alcohol withdrawal [F10.939]  Yes      Resolved Hospital Problems   No resolved problems to display.       Impression   Alcoholic Cirrhosis  Elevated liver enzymes 2/2 Alcoholic hepatitis  Hyperbilirubinemia 2/2 Cirrhosis due to chronic alcoholism  Hyponatremia 2/2 beer potomania  Elevated INR & thrombocytopenia 2/2 Cirrhosis  Leukocytosis, is on steroids  Dysphagia, h/o esophageal stricture?  Hepatic encepalopathy  Alcohol Withdrawal  Alcohol Abuse     Plan  [facility] Protocol  Psych consulted  GI following, state will need EGD down the road to r/o varices  Meld score= 28  MDF score= 42, currently on prednisolone, assess Lille score on Day 7  Hepatitis panel non reactive  Continue thiamine, folic acid  Continue PPI  Continue patient on lactulose, titrate to 3 BM daily  Nephrology following for hyponatremia  PT/OT    VTE Prophylaxis:  Mechanical VTE prophylaxis orders are present.         Code status is   Code Status and Medical Interventions:   Ordered at: 07/18/24 1214     Code Status (Patient has no pulse and is not breathing):    CPR (Attempt to Resuscitate)     Medical Interventions (Patient has pulse or is breathing):    Full Support            Time: 30 minutes    Signature: Electronically signed by Vicente Moscoso MD, 07/21/24, 13:05 EDT.  [facility] Hospitalist Team      Electronically signed by Danis  Vicente Judd MD at 07/21/24 1308       Leanna Rose APRN at 07/21/24 1228       Attestation signed by Ana Gupta MD at 07/22/24 1101    I have reviewed the mid-level documentation, and agree with the documentation, medical decision making and treatment plan as outlined by the mid-level provider.    This document has been electronically signed by Ana Gupta MD  July 22, 2024 11:01 EDT                                                                 Chief complaint suicidal ideation, alcohol withdrawal     Subjective .     History of present illness:  The patient is a 28 y.o. male who was admitted secondary to alcohol detox.     PMH: alcohol use disorder, GERD    Psych was consulted for depression, SI and alcohol withdrawal     The patient was seen this afternoon. He was accompanied by a sitter. The patient was lethargic, but did wake up to talk with me. He was alert and oriented. He reports that he has been drinking heavily for the past 10 years, up to 750ml of alcohol per day. He reports no periods of sobriety during that time. He recently moved here, 3 weeks ago from [place], to be closer to his family and have support. He is currently living with his parents.     The patient does want to stop drinking alcohol. I discussed an inpatient rehab treatment program with him as the best option for long term success at sobriety. He initially said no, and that he just wanted to go home with his parents. However, after discussing it with him more, he did agree to consider it and will let me know.     The patient does report a history of depression. He was supposed to start sertraline outpatient, but had not gotten to start it due to low sodium. His sodium is still low, but improving. Once medically appropriate, I do think starting an SSRI would benefit him. He does report suicidal ideation at time. He does state it is improving, and he last had thoughts about a week ago. At that time, he said he  "was having fleeting thoughts of killing himself, with fleeting plans. He mentioned \"it will just flash through my mind: guns, knives, needles.\" However, he states the thoughts quickly passed and he never came up with a definitive plan. He denies any serious SI for the last week. I did discuss with him discontinuing the sitter if he felt like he could tell nursing staff if the thoughts returned, and he was adamant that he would.     7/21/24: Patient seen this morning. He was accompanied by his brother. I offered him privacy, but he agreed to have his brother stay in the room. The patient states he is going to defer to his parents and family about whether he does inpatient treatment. His brother states his family want him to do a rehab program. The patient is also agreeable to starting a medication for depression once his sodium normalizes. I advised the patient I would discuss possible options with case management, and follow up with him tomorrow with options. He has to be medically stable for discharge before facilities will consider him.       Review of Systems   All systems were reviewed and negative except for:  Neurological: positive for  lethargic  Behavioral/Psych: positive for  depression    The following portions of the patient's history were reviewed and updated as appropriate: allergies, current medications, past family history, past medical history, past social history, past surgical history and problem list.    History    Past psychiatric history : depression, alcohol use        No medications prior to admission.        Scheduled Meds:  albumin human, 25 g, Intravenous, Q8H  folic acid, 1 mg, Oral, Daily  lactulose, 10 g, Oral, TID  [START ON 7/22/2024] LORazepam, 1 mg, Oral, Daily  magnesium sulfate, 2 g, Intravenous, Q2H  multivitamin with minerals, 1 tablet, Oral, Daily  pantoprazole, 40 mg, Intravenous, BID AC  prednisoLONE sodium phosphate, 40 mg, Oral, Daily  [Held by provider] sertraline, 50 mg, " "Oral, Daily  sodium chloride, 10 mL, Intravenous, Q12H  sucralfate, 1 g, Oral, 4x Daily AC & at Bedtime  thiamine (B-1) IV, 200 mg, Intravenous, Q8H   Followed by  [START ON 7/24/2024] thiamine, 100 mg, Oral, Daily  zinc sulfate, 220 mg, Oral, Daily         Continuous Infusions:         PRN Meds:    acetaminophen **OR** acetaminophen **OR** acetaminophen    senna-docusate sodium **AND** polyethylene glycol **AND** bisacodyl **AND** bisacodyl    Calcium Replacement - Follow Nurse / BPA Driven Protocol    chlordiazePOXIDE    LORazepam    LORazepam **OR** LORazepam **OR** LORazepam **OR** LORazepam **OR** LORazepam **OR** LORazepam    Magnesium Standard Dose Replacement - Follow Nurse / BPA Driven Protocol    ondansetron ODT **OR** ondansetron    Phosphorus Replacement - Follow Nurse / BPA Driven Protocol    Potassium Replacement - Follow Nurse / BPA Driven Protocol    sodium chloride    sodium chloride    sodium chloride      Allergies:  Reglan [metoclopramide]      Objective     Vital Signs   /77 (BP Location: Left arm, Patient Position: Lying)   Pulse 104   Temp 98.1 °F (36.7 °C) (Oral)   Resp (!) 31   Ht 165.1 cm (65\")   Wt 55.7 kg (122 lb 12.7 oz)   SpO2 91%   BMI 20.43 kg/m²     Physical Exam:    MENTAL STATUS EXAM   General Appearance:  Cleanly groomed and dressed  Eye Contact:  Fair  Attitude:  Cooperative  Muscle Strength:  Normal  Speech:  Other  Other Comment:  Slow to respond  Language:  Hesitant  Mood and affect:  Flat and depressed  Hopelessness:  Denies  Loneliness: Denies  Thought Process:  Goal-directed  Associations/ Thought Content:  No delusions  Hallucinations:  None  Suicidal Ideations:  Passive ideation  Homicidal Ideation:  Not present  Sensorium:  Alert  Orientation:  Person, place and time  Immediate Recall, Recent, and Remote Memory:  Intact  Attention Span/ Concentration:  Good  Fund of Knowledge:  Limited  Intellectual Functioning:  Average range  Insight:  Limited  Judgement:  " Limited  Reliability:  Fair  Impulse Control:  Poor     Medications and allergies reviewed.    Result Review:  I have personally reviewed the results from the time of this admission to 7/21/2024 12:28 EDT and agree with these findings:  [x]  Laboratory  []  Microbiology  []  Radiology  [x]  EKG/Telemetry   []  Cardiology/Vascular   []  Pathology  []  Old records  []  Other:  Most notable findings include: Sodium 133    Assessment & Plan       Alcohol withdrawal     Assessment: major depressive disorder, alcohol use disorder, alcohol withdrawal   Treatment Plan: Patient presented with alcohol withdrawal, depression, SI. He reports heavy drinking for the past 10 years, but does desire to stop drinking. He also reports depression with SI at times. States last thoughts of suicide were about a week ago.     Discussed inpatient chemical dependency treatment with the patient. He initially was reluctant to do any treatment. Today, he states he will defer to his family and what they think is best for him because he says he knows his judgement is clouded. The  patient does report wanting to stop drinking alcohol.     He does report depression and fleeting SI in the past, last a week ago. Patient would benefit from starting SSRI once sodium has normalized. Patient is agreeable. Ok to discontinue sitter and suicide precautions at this time as patient denies any current SI with any plan or intent. Patient advised to let nursing staff know if the thoughts return.     Will follow up with the patient regarding possible chemical dependency treatment when he is more medically stable, closer to discharge. Will discuss options with case management.      Continue supportive treatment.     Will continue to follow.   Treatment Plan discussed with: Patient    I discussed the patients findings and my recommendations with patient and nursing staff    I have reviewed and approved the behavioral health treatment plans and problem list. Yes    "  Referring MD has access to consult report and progress notes in EMR     MARI Loomis  24  12:28 EDT              Electronically signed by Ana Gupta MD at 24 1109       Allison Mcdonald MD at 24 0856          NEPHROLOGY PROGRESS NOTE------[facility]    [facility]  [phone]  Navya Mcdonald MD      Patient Care Team:  Provider, No Known as PCP - General      Provider:  Navya Mcdonald MD  Patient Name: Yonas Charles  :  1995    SUBJECTIVE:    F/U ELECTROLYTE ABNORMALITIES    No complaints this AM. No SOB, CP, dysuria.     Medication:  albumin human, 25 g, Intravenous, Once  folic acid, 1 mg, Oral, Daily  lactulose, 10 g, Oral, TID  LORazepam, 1 mg, Oral, Q12H   Followed by  [START ON 2024] LORazepam, 1 mg, Oral, Daily  magnesium sulfate, 2 g, Intravenous, Q2H  multivitamin with minerals, 1 tablet, Oral, Daily  pantoprazole, 40 mg, Intravenous, BID AC  prednisoLONE sodium phosphate, 40 mg, Oral, Daily  [Held by provider] sertraline, 50 mg, Oral, Daily  sodium chloride, 10 mL, Intravenous, Q12H  sucralfate, 1 g, Oral, 4x Daily AC & at Bedtime  thiamine (B-1) IV, 200 mg, Intravenous, Q8H   Followed by  [START ON 2024] thiamine, 100 mg, Oral, Daily  zinc sulfate, 220 mg, Oral, Daily      dextrose 5 % and sodium chloride 0.9 %, 75 mL/hr, Last Rate: 75 mL/hr (24 0316)        OBJECTIVE    Vital Sign Min/Max for last 24 hours  Temp  Min: 97.4 °F (36.3 °C)  Max: 98 °F (36.7 °C)   BP  Min: 100/48  Max: 110/76   Pulse  Min: 99  Max: 117   Resp  Min: 16  Max: 29   SpO2  Min: 89 %  Max: 94 %   No data recorded   No data recorded     Flowsheet Rows      Flowsheet Row First Filed Value   Admission Height 165.1 cm (65\") Documented at 2024 0855   Admission Weight 55.7 kg (122 lb 12.7 oz) Documented at 2024 0855            No intake/output data recorded.  I/O last 3 completed " "shifts:  In: 4040 [P.O.:560; I.V.:3480]  Out: -     Physical Exam:  General Appearance: WEAK AND MALAISED  Head: normocephalic, without obvious abnormality and atraumatic    Eyes: conjunctivae and +ICTERIC SCLERAE  Neck: supple and no JVD  Lungs: +FEW SCATTERED RHONCHI  Heart: regular rhythm & normal rate and normal S1, S2   Chest Wall: no abnormalities observed  Abdomen: normal bowel sounds and soft   Extremities: moves extremities well, no edema, no cyanosis  Skin: +JAUNDICE  Neurologic: A AND O X 4 WITHOUT FOCAL DEFICIT    Labs:    WBC WBC   Date Value Ref Range Status   07/21/2024 14.80 (H) 3.40 - 10.80 10*3/mm3 Final   07/20/2024 11.18 (H) 3.40 - 10.80 10*3/mm3 Final   07/19/2024 8.78 3.40 - 10.80 10*3/mm3 Final   07/18/2024 9.86 3.40 - 10.80 10*3/mm3 Final      HGB Hemoglobin   Date Value Ref Range Status   07/21/2024 8.5 (L) 13.0 - 17.7 g/dL Final   07/20/2024 9.1 (L) 13.0 - 17.7 g/dL Final   07/19/2024 8.2 (L) 13.0 - 17.7 g/dL Final   07/18/2024 8.9 (L) 13.0 - 17.7 g/dL Final      HCT Hematocrit   Date Value Ref Range Status   07/21/2024 23.0 (L) 37.5 - 51.0 % Final   07/20/2024 24.5 (L) 37.5 - 51.0 % Final   07/19/2024 24.5 (L) 37.5 - 51.0 % Final   07/18/2024 26.4 (L) 37.5 - 51.0 % Final      Platelets No results found for: \"LABPLAT\"   MCV MCV   Date Value Ref Range Status   07/21/2024 92.4 79.0 - 97.0 fL Final   07/20/2024 91.8 79.0 - 97.0 fL Final   07/19/2024 89.4 79.0 - 97.0 fL Final   07/18/2024 87.4 79.0 - 97.0 fL Final          Sodium Sodium   Date Value Ref Range Status   07/21/2024 135 (L) 136 - 145 mmol/L Final   07/20/2024 133 (L) 136 - 145 mmol/L Final   07/19/2024 131 (L) 136 - 145 mmol/L Final   07/19/2024 134 (L) 136 - 145 mmol/L Final   07/19/2024 126 (L) 136 - 145 mmol/L Final   07/18/2024 122 (L) 136 - 145 mmol/L Final   07/18/2024 122 (L) 136 - 145 mmol/L Final      Potassium Potassium   Date Value Ref Range Status   07/21/2024 3.7 3.5 - 5.2 mmol/L Final   07/20/2024 3.8 3.5 - 5.2 mmol/L " Final   07/19/2024 3.8 3.5 - 5.2 mmol/L Final   07/19/2024 3.6 3.5 - 5.2 mmol/L Final   07/19/2024 3.6 3.5 - 5.2 mmol/L Final   07/19/2024 3.0 (L) 3.5 - 5.2 mmol/L Final   07/18/2024 2.7 (L) 3.5 - 5.2 mmol/L Final   07/18/2024 1.9 (C) 3.5 - 5.2 mmol/L Final      Chloride Chloride   Date Value Ref Range Status   07/21/2024 100 98 - 107 mmol/L Final   07/20/2024 96 (L) 98 - 107 mmol/L Final   07/19/2024 96 (L) 98 - 107 mmol/L Final   07/19/2024 96 (L) 98 - 107 mmol/L Final   07/19/2024 86 (L) 98 - 107 mmol/L Final   07/18/2024 80 (L) 98 - 107 mmol/L Final   07/18/2024 72 (L) 98 - 107 mmol/L Final      CO2 CO2   Date Value Ref Range Status   07/21/2024 24.9 22.0 - 29.0 mmol/L Final   07/20/2024 29.0 22.0 - 29.0 mmol/L Final   07/19/2024 23.8 22.0 - 29.0 mmol/L Final   07/19/2024 29.2 (H) 22.0 - 29.0 mmol/L Final   07/19/2024 32.5 (H) 22.0 - 29.0 mmol/L Final   07/18/2024 33.2 (H) 22.0 - 29.0 mmol/L Final   07/18/2024 33.7 (H) 22.0 - 29.0 mmol/L Final      BUN BUN   Date Value Ref Range Status   07/21/2024 3 (L) 6 - 20 mg/dL Final   07/20/2024 3 (L) 6 - 20 mg/dL Final   07/19/2024 3 (L) 6 - 20 mg/dL Final   07/19/2024 3 (L) 6 - 20 mg/dL Final   07/19/2024 3 (L) 6 - 20 mg/dL Final   07/18/2024 4 (L) 6 - 20 mg/dL Final   07/18/2024 6 6 - 20 mg/dL Final      Creatinine Creatinine   Date Value Ref Range Status   07/21/2024 0.46 (L) 0.76 - 1.27 mg/dL Final   07/20/2024 0.46 (L) 0.76 - 1.27 mg/dL Final   07/19/2024 0.41 (L) 0.76 - 1.27 mg/dL Final   07/19/2024 0.40 (L) 0.76 - 1.27 mg/dL Final   07/19/2024 0.44 (L) 0.76 - 1.27 mg/dL Final   07/18/2024 0.38 (L) 0.76 - 1.27 mg/dL Final   07/18/2024 0.40 (L) 0.76 - 1.27 mg/dL Final      Calcium Calcium   Date Value Ref Range Status   07/21/2024 8.6 8.6 - 10.5 mg/dL Final   07/20/2024 9.2 8.6 - 10.5 mg/dL Final   07/19/2024 7.9 (L) 8.6 - 10.5 mg/dL Final   07/19/2024 8.1 (L) 8.6 - 10.5 mg/dL Final   07/19/2024 7.9 (L) 8.6 - 10.5 mg/dL Final   07/18/2024 7.4 (L) 8.6 - 10.5 mg/dL  "Final   07/18/2024 7.9 (L) 8.6 - 10.5 mg/dL Final      PO4 No components found for: \"PO4\"   Albumin Albumin   Date Value Ref Range Status   07/21/2024 2.8 (L) 3.5 - 5.2 g/dL Final   07/20/2024 2.9 (L) 3.5 - 5.2 g/dL Final   07/19/2024 2.9 (L) 3.5 - 5.2 g/dL Final   07/19/2024 3.1 (L) 3.5 - 5.2 g/dL Final   07/18/2024 3.0 (L) 3.5 - 5.2 g/dL Final      Magnesium Magnesium   Date Value Ref Range Status   07/21/2024 1.3 (L) 1.6 - 2.6 mg/dL Final   07/19/2024 1.7 1.6 - 2.6 mg/dL Final   07/19/2024 2.2 1.6 - 2.6 mg/dL Final   07/18/2024 1.2 (L) 1.6 - 2.6 mg/dL Final      Uric Acid No components found for: \"URIC ACID\"     Imaging Results (Last 72 Hours)       Procedure Component Value Units Date/Time    CT Head Without Contrast [[id number]] Collected: 07/19/24 0608     Updated: 07/19/24 0612    Narrative:      CT HEAD WO CONTRAST    Date of Exam: 7/19/2024 1:55 AM EDT    Indication: AMS, weakness LUE.    Comparison: None available.    Technique: Volumetric axial CT images of the head were obtained without administration of intravenous contrast. Coronal and sagittal reconstructions were performed.Automated exposure control and iterative reconstruction methods were utilized for CT dose   reduction.    Findings:  There is no evidence of hemorrhage. There is no mass effect or midline shift.    There is no extracerebral collection.    Ventricles are normal in size and configuration for patient's stated age.      Posterior fossa is within normal limits.    Calvarium and skull base appear intact.   Visualized sinuses show no air fluid levels. Visualized orbits are unremarkable.      Impression:      Impression:  No acute intracranial process.        Electronically Signed: Ghazala Ordonez MD    7/19/2024 6:10 AM EDT    Workstation ID: [id number]    XR Chest PA & Lateral [[id number]] Collected: 07/18/24 1259     Updated: 07/18/24 1302    Narrative:      DATE OF EXAM:  7/18/2024 12:36 PM     PROCEDURE:  XR CHEST PA AND LATERAL-   "   INDICATIONS:  rule out aspiration pneumonia; E87.6-Hypokalemia; F10.939-Alcohol use,  unspecified with withdrawal, unspecified; R17-Unspecified jaundice     COMPARISON:  No Comparisons Available     TECHNIQUE:   Two radiologic views of the chest , PA and lateral were obtained.     FINDINGS:  Heart size normal. Negative for lobar consolidation, edema, effusion or  pneumothorax. Osseous structures unremarkable.       Impression:      No active pulmonary process.        Electronically Signed By-Armani Small MD On:7/18/2024 1:00 PM       CT Abdomen Pelvis With Contrast [[id number]] Collected: 07/18/24 1036     Updated: 07/18/24 1048    Narrative:      EXAM: CT ABDOMEN PELVIS W CONTRAST-     DATE OF EXAM: 7/18/2024 10:24 AM     INDICATION: abd pain.     COMPARISON: None available.     TECHNIQUE: Contiguous axial CT images were obtained from the lung bases  to the pubic symphysis obtained following the uneventful intravenous  administration of 100 mL of Isovue 370 contrast. Sagittal and coronal  reconstructions were performed.  Automated exposure control and  iterative reconstruction methods were used.     FINDINGS:  Heart size within normal limits. No pericardial effusion. Lower lungs  grossly clear.     Enlarged markedly heterogeneous and steatotic liver. Right hepatic lobe  measures nearly 22 cm in greatest CC dimension. Tiny low-density lesions  too small to accurately characterize favoring small cyst. 1.2 cm fluid  density lesion at hepatic dome again favoring cyst. Portal vasculature,  splenic vein and superior mesenteric vein patent. There are distal  paraesophageal, perigastric and perisplenic varices. Spleen within  normal limits in size. Trace intra-abdominal ascites.     Gallbladder wall edema without distention or visible stones most likely  related to adjacent hepatocellular dysfunction. No dilation of biliary  tree. No active pancreatitis or drainable collection. Adrenal glands  unremarkable. Normal  size and contour of kidneys. No hydronephrosis or  solid mass. Urinary bladder and prostate unremarkable.     Probable small hiatal hernia. No evidence of bowel obstruction. Colonic  wall thickening and edema within ascending extending into hepatic  flexure, distribution favors sequelae of portal colopathy. In addition  there is mild thickening and edema as well as mucosal hyperemia in the  rectum and distal colon. No CT evidence of acute appendicitis.  Incidental areas of short segment jejunojejunal intussusception without  other complication by CT, most commonly transient and of no clinical  significance in adults.     Negative for abdominal aortic aneurysm. Minimal aortic atherosclerotic  disease. No suspicious adenopathy. No free air or organized collection.     Minimal reticular body wall edema. No acute displaced fracture or  aggressive bone lesion.       Impression:      1. Enlarged heterogeneous and markedly steatotic liver, which could  reflect sequelae of alcoholic steatohepatitis.  2. Sequelae of portal hypertension noted including distal paraesophageal  and intra-abdominal varices as well as trace ascites. Normal splenic  size. Portal vasculature patent.  3. Gallbladder wall edema without other findings of acute cholecystitis  likely secondary related to underlying hepatocellular dysfunction.  4. Colonic and rectal inflammatory changes which could reflect sequelae  of portal colopathy. Differential would include a nonspecific  infectious/inflammatory proctocolitis. No bowel obstruction.                 Electronically Signed By-Armani Small MD On:7/18/2024 10:46 AM               Results for orders placed during the hospital encounter of 07/18/24    XR Chest PA & Lateral    Narrative  DATE OF EXAM:  7/18/2024 12:36 PM    PROCEDURE:  XR CHEST PA AND LATERAL-    INDICATIONS:  rule out aspiration pneumonia; E87.6-Hypokalemia; F10.939-Alcohol use,  unspecified with withdrawal, unspecified; R17-Unspecified  jaundice    COMPARISON:  No Comparisons Available    TECHNIQUE:  Two radiologic views of the chest , PA and lateral were obtained.    FINDINGS:  Heart size normal. Negative for lobar consolidation, edema, effusion or  pneumothorax. Osseous structures unremarkable.    Impression  No active pulmonary process.      Electronically Signed By-Armani Small MD On:2024 1:00 PM            ASSESSMENT / PLAN      Alcohol withdrawal    HYPONATREMIA------Acute. Improving nicely. Follow off of NS     2. HYPOKALEMIA-------Resolved.       3. HYPOMAGNESEMIA-------Replace IV     4. HYPOCALCEMIA------Replaced     5. ALCOHOL ABUSE/CIRRHOSIS/JAUNDICE------Thiamine, Folate, IVFs, prn IV Ativan. Withdrawal prcautions     6. MIXED METABOLIC ALKALOSIS/METABOLIC ACIDOSIS-----Better     7. ANEMIA------Normocytic with normal RDW. Follow for PRBC need     8. DEPRESSION------Suicide precautions. Hold Zoloft given severe hyponatremia     9. HYPOALBUMINEMIA-----IV Albumin to temporize and po supplements     10. ELEVATED INR------Secondary to liver disease     11. THROMBOCYTOPENIA------No heparin. Secondary to liver disease     12. PUD PROPHYLAXIS-----IV PPI     13. DVT PROPHYLAXIS-----SCDs    14. HYPOPHOSPHATEMIA------Replaced        Navya Mcdonald MD  [facility]  [phone]  24  08:56 EDT      Electronically signed by Allison Mcdonald MD at 24 1042       Vicente Moscoso MD at 24 1449              [facility] MEDICINE SERVICE  DAILY PROGRESS NOTE    NAME: Yonas Charles  : 1995  MRN: [id number]      LOS: 2 days     PROVIDER OF SERVICE: Vicente Moscoso MD    Chief Complaint: Alcohol withdrawal    Subjective:     Interval History:  History taken from: patient  Patient Complaints: Seen and examined at bedside this morning. States that he is a bit foggy with the timeline of events that led to him coming to the hospital. Has been drinking alcohol  for 9 years now, it has been months since he is having trouble walking. Drinks about 750ml of vodka daily  Patient Denies:       Review of Systems: negative except as daily  Review of Systems    Objective:     Vital Signs  Temp:  [96.8 °F (36 °C)-98 °F (36.7 °C)] 98 °F (36.7 °C)  Heart Rate:  [] 99  Resp:  [17-24] 19  BP: (100-143)/(55-92) 106/63  Flow (L/min):  [2] 2   Body mass index is 20.43 kg/m².    Physical Exam   Physical Exam  HENT:      Head: Normocephalic.   Eyes:      General: Scleral icterus present.   Cardiovascular:      Rate and Rhythm: Normal rate and regular rhythm.   Pulmonary:      Breath sounds: Normal breath sounds.   Abdominal:      General: Bowel sounds are normal.   Musculoskeletal:         General: Normal range of motion.   Skin:     Coloration: Skin is jaundiced.   Neurological:      General: No focal deficit present.      Mental Status: He is alert. Mental status is at baseline.   Psychiatric:         Mood and Affect: Mood normal.         Scheduled Meds   albumin human, 25 g, Intravenous, Once  folic acid, 1 mg, Oral, Daily  LORazepam, 1 mg, Oral, Q12H   Followed by  [START ON 7/22/2024] LORazepam, 1 mg, Oral, Daily  multivitamin with minerals, 1 tablet, Oral, Daily  pantoprazole, 40 mg, Intravenous, BID AC  prednisoLONE sodium phosphate, 40 mg, Oral, Daily  [Held by provider] sertraline, 50 mg, Oral, Daily  sodium chloride, 10 mL, Intravenous, Q12H  sucralfate, 1 g, Oral, 4x Daily AC & at Bedtime  thiamine (B-1) IV, 200 mg, Intravenous, Q8H   Followed by  [START ON 7/24/2024] thiamine, 100 mg, Oral, Daily  zinc sulfate, 220 mg, Oral, Daily       PRN Meds     acetaminophen **OR** acetaminophen **OR** acetaminophen    senna-docusate sodium **AND** polyethylene glycol **AND** bisacodyl **AND** bisacodyl    Calcium Replacement - Follow Nurse / BPA Driven Protocol    chlordiazePOXIDE    LORazepam    LORazepam **OR** LORazepam **OR** LORazepam **OR** LORazepam **OR** LORazepam **OR**  LORazepam    Magnesium Standard Dose Replacement - Follow Nurse / BPA Driven Protocol    ondansetron ODT **OR** ondansetron    Phosphorus Replacement - Follow Nurse / BPA Driven Protocol    Potassium Replacement - Follow Nurse / BPA Driven Protocol    sodium chloride    sodium chloride    sodium chloride   Infusions  dextrose 5 % and sodium chloride 0.9 %, 75 mL/hr, Last Rate: 75 mL/hr (07/20/24 1425)          Diagnostic Data    Results from last 7 days   Lab Units 07/20/24  0509   WBC 10*3/mm3 11.18*   HEMOGLOBIN g/dL 9.1*   HEMATOCRIT % 24.5*   PLATELETS 10*3/mm3 100*   GLUCOSE mg/dL 126*   CREATININE mg/dL 0.46*   BUN mg/dL 3*   SODIUM mmol/L 133*   POTASSIUM mmol/L 3.8   AST (SGOT) U/L 122*   ALT (SGPT) U/L 23   ALK PHOS U/L 286*   BILIRUBIN mg/dL 18.0*   ANION GAP mmol/L 8.0       CT Head Without Contrast    Result Date: 7/19/2024  Impression: No acute intracranial process. Electronically Signed: Ghazala Ordonez MD  7/19/2024 6:10 AM EDT  Workstation ID: [id number]       I reviewed the patient's new clinical results.    Assessment/Plan:     Active and Resolved Problems  Active Hospital Problems    Diagnosis  POA    **Alcohol withdrawal [F10.939]  Yes      Resolved Hospital Problems   No resolved problems to display.       Impression   Alcoholic Cirrhosis  Elevated liver enzymes 2/2 Alcoholic hepatitis  Hyperbilirubinemia 2/2 Cirrhosis due to chronic alcoholism  Hyponatremia 2/2 beer potomania  Elevated INR & thrombocytopenia 2/2 Cirrhosis  Leukocytosis, is on steroids  Dysphagia, h/o esophageal stricture?  Hepatic encepalopathy  Alcohol Withdrawal  Alcohol Abuse    Plan  [facility] Protocol  Psych consulted  GI following, state will need EGD down the road to r/o varices  Meld score= 28  MDF score= 42, currently on prednisolone, assess Lille score on Day 7  Hepatitis panel non reactive  Continue thiamine, folic acid  Continue PPI  Check Daily INR  Start patient on lactulose, titrate to 3 BM daily  PT/OT        VTE  Prophylaxis:  Mechanical VTE prophylaxis orders are present.         Code status is   Code Status and Medical Interventions:   Ordered at: 07/18/24 1214     Code Status (Patient has no pulse and is not breathing):    CPR (Attempt to Resuscitate)     Medical Interventions (Patient has pulse or is breathing):    Full Support            Time: 30 minutes    Signature: Electronically signed by Vicente Moscoso MD, 07/20/24, 14:49 EDT.  [facility] Hospitalist Team      Electronically signed by Vicente Moscoso MD at 07/20/24 1503       Kaci Polo APRN at 07/20/24 1433       Attestation signed by HAYDEE Sylvester MD at 07/20/24 1445    Electronically signed on 07/20/24 14:43 EDT by CRYSTAL Sylvester MD  The patient was seen and examined with an APRN. I personally performed the entire medical decision making, and physical exam. Labs and imaging and outpatient records reviewed, ordered.    Subjectively doing about the same, not eating much, complaining of dysphagia.  Feels nauseous but denies vomiting.  On exam he is severely jaundiced with scleral icterus, awake but slow to respond.  Abdomen is distended, soft and nontender.  Labs and imaging reviewed, bilirubin remains high 18.  Slight improvement in alk phos and AST.  INR remains high, worse 2.0.  Hemoglobin remains low but stable 9.1.  Suspect alcoholic hepatitis, likely portal hypertension.  Needs EGD soon to evaluate for esophageal varices when INR is lower.  Suspect vitamin K deficiency, will give vitamin K today and monitor.  Continue soft food diet, encourage nutrition.  Continue PPI twice a day, start Carafate.  Continue prednisolone, calculate Lille score on day 4, so far no benefit.  Needs complete cessation from alcohol.                   LOS: 2 days   Patient Care Team:  Provider, No Known as PCP - General      Subjective     Interval History:   LABS: Sodium 133, potassium 3.8, creatinine 0.46.  Total bilirubin up to 18, alk phos down  to 286, AST slightly improved at 122 and ALT remains normal at 23.  INR up to 2.02.  WBCs 11.18, hemoglobin 9.1 (8.2), MCV 91.8, platelets 100.  Hepatitis panel negative.  Mother is at bedside and states that he has had problems with nausea and vomiting all of his life and used to see Dr. Newsome- pediatric GI in [place].   Mother states a lot of times he will eat and then have nausea and vomiting pretty frequently afterward.  Mother states he has a long history of reflux and takes Tums used to be on an antacid medicine.  Patient usually drinks 750 mL of vodka per day his last drink was 3 days ago.  Patient is having bowel movements.    ROS:   No chest pain, shortness of breath, or cough.         Medication Review:     Current Facility-Administered Medications:     acetaminophen (TYLENOL) tablet 650 mg, 650 mg, Oral, Q4H PRN **OR** acetaminophen (TYLENOL) 160 MG/5ML oral solution 650 mg, 650 mg, Oral, Q4H PRN **OR** acetaminophen (TYLENOL) suppository 650 mg, 650 mg, Rectal, Q4H PRN, Mari Bustos MD    albumin human 25 % IV SOLN 25 g, 25 g, Intravenous, Once, Allison Mcdonald MD    sennosides-docusate (PERICOLACE) 8.6-50 MG per tablet 2 tablet, 2 tablet, Oral, BID PRN **AND** polyethylene glycol (MIRALAX) packet 17 g, 17 g, Oral, Daily PRN **AND** bisacodyl (DULCOLAX) EC tablet 5 mg, 5 mg, Oral, Daily PRN **AND** bisacodyl (DULCOLAX) suppository 10 mg, 10 mg, Rectal, Daily PRN, Mari Bustos MD    Calcium Replacement - Follow Nurse / BPA Driven Protocol, , Does not apply, PRN, Mari Bustos MD    chlordiazePOXIDE (LIBRIUM) capsule 50 mg, 50 mg, Oral, 4x Daily PRN, Mari Bustos MD    dextrose 5 % and sodium chloride 0.9 % infusion, 75 mL/hr, Intravenous, Continuous, Allison Mcdonald MD, Last Rate: 75 mL/hr at 07/20/24 1425, 75 mL/hr at 07/20/24 1425    folic acid (FOLVITE) tablet 1 mg, 1 mg, Oral, Daily, Mari Bustos MD, 1 mg at 07/20/24 0928    LORazepam  (ATIVAN) injection 1 mg, 1 mg, Intravenous, Q2H PRN, Mari Bustos MD    LORazepam (ATIVAN) tablet 1 mg, 1 mg, Oral, Q1H PRN **OR** LORazepam (ATIVAN) injection 1 mg, 1 mg, Intravenous, Q1H PRN **OR** LORazepam (ATIVAN) tablet 2 mg, 2 mg, Oral, Q1H PRN **OR** LORazepam (ATIVAN) injection 2 mg, 2 mg, Intravenous, Q1H PRN **OR** LORazepam (ATIVAN) injection 2 mg, 2 mg, Intravenous, Q15 Min PRN **OR** LORazepam (ATIVAN) injection 2 mg, 2 mg, Intramuscular, Q15 Min PRN, Mari Bustos MD    [COMPLETED] LORazepam (ATIVAN) tablet 2 mg, 2 mg, Oral, Q6H, 2 mg at 07/19/24 1005 **FOLLOWED BY** [COMPLETED] LORazepam (ATIVAN) tablet 1 mg, 1 mg, Oral, Q6H, 1 mg at 07/20/24 0929 **FOLLOWED BY** LORazepam (ATIVAN) tablet 1 mg, 1 mg, Oral, Q12H **FOLLOWED BY** [START ON 7/22/2024] LORazepam (ATIVAN) tablet 1 mg, 1 mg, Oral, Daily, Mari Bustos MD    Magnesium Standard Dose Replacement - Follow Nurse / BPA Driven Protocol, , Does not apply, Juli HARTLEY Abbas Ali, MD    multivitamin with minerals 1 tablet, 1 tablet, Oral, Daily, Mari Bustos MD, 1 tablet at 07/20/24 0928    ondansetron ODT (ZOFRAN-ODT) disintegrating tablet 4 mg, 4 mg, Oral, Q6H PRN **OR** ondansetron (ZOFRAN) injection 4 mg, 4 mg, Intravenous, Q6H PRN, Mari Bustos MD, 4 mg at 07/18/24 1330    pantoprazole (PROTONIX) injection 40 mg, 40 mg, Intravenous, BID Christ GARCIA Donna Ann, APRN    Phosphorus Replacement - Follow Nurse / BPA Driven Protocol, , Does not apply, Juli HARTLEY Abbas Ali, MD    Potassium Replacement - Follow Nurse / BPA Driven Protocol, , Does not apply, PRN, Mari Bustos MD    prednisoLONE sodium phosphate (ORAPRED) 15 MG/5ML oral solution 40 mg, 40 mg, Oral, Daily, Waleska Chery APRN, 40 mg at 07/20/24 1421    [Held by provider] sertraline (ZOLOFT) tablet 50 mg, 50 mg, Oral, Daily, Mari Bustos MD, 50 mg at 07/18/24 1258    sodium chloride 0.9 % flush 10 mL, 10 mL, Intravenous, PRN, Mari Bustos  MD Nadira    sodium chloride 0.9 % flush 10 mL, 10 mL, Intravenous, Q12H, Mari Bustos MD, 10 mL at 07/20/24 0930    sodium chloride 0.9 % flush 10 mL, 10 mL, Intravenous, PRN, Mari Bustos MD    sodium chloride 0.9 % infusion 40 mL, 40 mL, Intravenous, PRN, Mari Bustos MD    sucralfate (CARAFATE) tablet 1 g, 1 g, Oral, 4x Daily AC & at Bedtime, Kaci Polo APRN    thiamine (B-1) injection 200 mg, 200 mg, Intravenous, Q8H, 200 mg at 07/20/24 1335 **FOLLOWED BY** [START ON 7/24/2024] thiamine (VITAMIN B-1) tablet 100 mg, 100 mg, Oral, Daily, Mari Bustos MD    zinc sulfate (ZINCATE) capsule 220 mg, 220 mg, Oral, Daily, Waleska Chery APRN, 220 mg at 07/20/24 0928      Objective drowsy.  Resting in hospital bed.  Parents at bedside.  Room 264.    Vital Signs  Temp:  [96.8 °F (36 °C)-98 °F (36.7 °C)] 98 °F (36.7 °C)  Heart Rate:  [] 99  Resp:  [17-24] 19  BP: (100-143)/(55-92) 106/63  Physical Exam:    General Appearance:    Awake and alert, in no acute distress   Head:    Normocephalic, without obvious abnormality   Eyes:          Conjunctivae normal, icteric sclerae   Ears:    Hearing intact   Throat:   No oral lesions, no thrush, oral mucosa moist   Neck:   No adenopathy, supple, no JVD   Lungs:     respirations regular, even and unlabored        Abdomen:      soft, non-tender, no rebound or guarding, non-distended, no hepatosplenomegaly   Rectal:     Deferred   Extremities:   No edema, no cyanosis, no redness   Skin:   No bleeding, bruising or rash, + jaundice             Results Review:    CBC    Results from last 7 days   Lab Units 07/20/24  0509 07/19/24  0300 07/18/24  1015   WBC 10*3/mm3 11.18* 8.78 9.86   HEMOGLOBIN g/dL 9.1* 8.2* 8.9*   PLATELETS 10*3/mm3 100* 85* 94*     CMP   Results from last 7 days   Lab Units 07/20/24  0509 07/19/24  1619 07/19/24  1458 07/19/24  0300 07/18/24  1829 07/18/24  1015   SODIUM mmol/L 133* 131* 134* 126* 122* 122*   POTASSIUM mmol/L  "3.8 3.8  3.6 3.6 3.0* 2.7* 1.9*   CHLORIDE mmol/L 96* 96* 96* 86* 80* 72*   CO2 mmol/L 29.0 23.8 29.2* 32.5* 33.2* 33.7*   BUN mg/dL 3* 3* 3* 3* 4* 6   CREATININE mg/dL 0.46* 0.41* 0.40* 0.44* 0.38* 0.40*   GLUCOSE mg/dL 126* 78 79 116* 91 93   ALBUMIN g/dL 2.9*  --  2.9* 3.1*  --  3.0*   BILIRUBIN mg/dL 18.0*  --  17.9* 17.4*  --  15.8*   ALK PHOS U/L 286*  --  301* 324*  --  380*   AST (SGOT) U/L 122*  --  143* 159*  --  186*   ALT (SGPT) U/L 23  --  27 28  --  33   MAGNESIUM mg/dL  --   --  1.7 2.2  --  1.2*   PHOSPHORUS mg/dL 2.6 1.6*  --  0.3*  --   --    LIPASE U/L  --   --   --   --   --  100*   AMMONIA umol/L  --   --   --  90*  --   --      Cr Clearance Estimated Creatinine Clearance: 186.7 mL/min (A) (by C-G formula based on SCr of 0.46 mg/dL (L)).  Coag   Results from last 7 days   Lab Units 07/20/24  0509 07/19/24  0300 07/18/24  0923   INR  2.02* 1.79* 1.59*   APTT seconds  --   --  41.9*     HbA1C No results found for: \"HGBA1C\"  Blood Glucose No results found for: \"POCGLU\"  Infection   Results from last 7 days   Lab Units 07/18/24  0928 07/18/24  0923   BLOODCX  No growth at 2 days No growth at 2 days     UA    Results from last 7 days   Lab Units 07/18/24  1319   NITRITE UA  Positive*   WBC UA /HPF 0-2   BACTERIA UA /HPF None Seen   SQUAM EPITHEL UA /HPF 0-2     Radiology(recent) CT Head Without Contrast    Result Date: 7/19/2024  Impression: No acute intracranial process. Electronically Signed: Ghazala Ordonez MD  7/19/2024 6:10 AM EDT  Workstation ID: [id number]           Assessment & Plan   Alcohol hepatitis  Elevated LFTs related to above  Acute alcohol withdrawal  Nitrite positive UTI  Leukocytosis  Normocytic anemia  Thrombocytopenia consider related to alcohol abuse  Elevated INR due to alcohol abuse  Hyponatremia related to alcohol abuse  Dysphagia could be related to esophageal stricture versus reflux esophagitis versus dysmotility  GERD    Plan:  Continue CIWA protocol  Increase PPI to twice a " day.  Start Carafate.  Continue 2 g low-sodium diet with soft to chew meats.   Continue prednisolone, thiamine, zinc and folic acid.  Will consider EGD with possible dilation closer to discharge.  Platelets need to be at least 50.  Could consider barium swallow.           Kaci MARI Sanchez  07/20/24  14:33 EDT              Electronically signed by HAYDEE Sylvester MD at 07/20/24 1445       Leanna Rose APRN at 07/20/24 1230       Attestation signed by Ana Gupta MD at 07/22/24 1101    I have reviewed the mid-level documentation, and agree with the documentation, medical decision making and treatment plan as outlined by the mid-level provider.    This document has been electronically signed by Ana Gupta MD  July 22, 2024 11:01 EDT                                                                 Chief complaint suicidal ideation, alcohol withdrawal     Subjective .     History of present illness:  The patient is a 28 y.o. male who was admitted secondary to alcohol detox.     PMH: alcohol use disorder, GERD    Psych was consulted for depression, SI and alcohol withdrawal     The patient was seen this afternoon. He was accompanied by a sitter. The patient was lethargic, but did wake up to talk with me. He was alert and oriented. He reports that he has been drinking heavily for the past 10 years, up to 750ml of alcohol per day. He reports no periods of sobriety during that time. He recently moved here, 3 weeks ago from [place], to be closer to his family and have support. He is currently living with his parents.     The patient does want to stop drinking alcohol. I discussed an inpatient rehab treatment program with him as the best option for long term success at sobriety. He initially said no, and that he just wanted to go home with his parents. However, after discussing it with him more, he did agree to consider it and will let me know.     The patient does report a history of depression.  "He was supposed to start sertraline outpatient, but had not gotten to start it due to low sodium. His sodium is still low, but improving. Once medically appropriate, I do think starting an SSRI would benefit him. He does report suicidal ideation at time. He does state it is improving, and he last had thoughts about a week ago. At that time, he said he was having fleeting thoughts of killing himself, with fleeting plans. He mentioned \"it will just flash through my mind: guns, knives, needles.\" However, he states the thoughts quickly passed and he never came up with a definitive plan. He denies any serious SI for the last week. I did discuss with him discontinuing the sitter if he felt like he could tell nursing staff if the thoughts returned, and he was adamant that he would.       Review of Systems   All systems were reviewed and negative except for:  Neurological: positive for  lethargic  Behavioral/Psych: positive for  depression    The following portions of the patient's history were reviewed and updated as appropriate: allergies, current medications, past family history, past medical history, past social history, past surgical history and problem list.    History    Past psychiatric history : depression, alcohol use        No medications prior to admission.        Scheduled Meds:  albumin human, 25 g, Intravenous, Once  folic acid, 1 mg, Oral, Daily  LORazepam, 1 mg, Oral, Q12H   Followed by  [START ON 7/22/2024] LORazepam, 1 mg, Oral, Daily  multivitamin with minerals, 1 tablet, Oral, Daily  pantoprazole, 40 mg, Intravenous, Daily  prednisoLONE sodium phosphate, 40 mg, Oral, Daily  [Held by provider] sertraline, 50 mg, Oral, Daily  sodium chloride, 10 mL, Intravenous, Q12H  thiamine (B-1) IV, 200 mg, Intravenous, Q8H   Followed by  [START ON 7/24/2024] thiamine, 100 mg, Oral, Daily  zinc sulfate, 220 mg, Oral, Daily         Continuous Infusions:  dextrose 5 % and sodium chloride 0.9 %, 75 mL/hr, Last Rate: 75 " "mL/hr (07/20/24 0914)        PRN Meds:    acetaminophen **OR** acetaminophen **OR** acetaminophen    senna-docusate sodium **AND** polyethylene glycol **AND** bisacodyl **AND** bisacodyl    Calcium Replacement - Follow Nurse / BPA Driven Protocol    chlordiazePOXIDE    LORazepam    LORazepam **OR** LORazepam **OR** LORazepam **OR** LORazepam **OR** LORazepam **OR** LORazepam    Magnesium Standard Dose Replacement - Follow Nurse / BPA Driven Protocol    ondansetron ODT **OR** ondansetron    Phosphorus Replacement - Follow Nurse / BPA Driven Protocol    Potassium Replacement - Follow Nurse / BPA Driven Protocol    sodium chloride    sodium chloride    sodium chloride      Allergies:  Reglan [metoclopramide]      Objective     Vital Signs   /66 (BP Location: Left arm, Patient Position: Lying)   Pulse 90   Temp 98 °F (36.7 °C) (Oral)   Resp 19   Ht 165.1 cm (65\")   Wt 55.7 kg (122 lb 12.7 oz)   SpO2 93%   BMI 20.43 kg/m²     Physical Exam:    MENTAL STATUS EXAM   General Appearance:  Cleanly groomed and dressed  Eye Contact:  Fair  Attitude:  Cooperative  Muscle Strength:  Normal  Speech:  Other  Other Comment:  Slow to respond  Language:  Hesitant  Mood and affect:  Flat and depressed  Hopelessness:  Denies  Loneliness: Denies  Thought Process:  Goal-directed  Associations/ Thought Content:  No delusions  Hallucinations:  None  Suicidal Ideations:  Passive ideation  Homicidal Ideation:  Not present  Sensorium:  Alert  Orientation:  Person, place and time  Immediate Recall, Recent, and Remote Memory:  Intact  Attention Span/ Concentration:  Good  Fund of Knowledge:  Limited  Intellectual Functioning:  Average range  Insight:  Limited  Judgement:  Limited  Reliability:  Fair  Impulse Control:  Poor     Medications and allergies reviewed.    Result Review:  I have personally reviewed the results from the time of this admission to 7/20/2024 12:30 EDT and agree with these findings:  [x]  Laboratory  []  " Microbiology  []  Radiology  [x]  EKG/Telemetry   []  Cardiology/Vascular   []  Pathology  []  Old records  []  Other:  Most notable findings include: Sodium 133    Assessment & Plan       Alcohol withdrawal     Assessment: major depressive disorder, alcohol use disorder, alcohol withdrawal   Treatment Plan: Patient presented with alcohol withdrawal, depression, SI. He reports heavy drinking for the past 10 years, but does desire to stop drinking. He also reports depression with SI at times. States last thoughts of suicide were about a week ago.     Discussed inpatient chemical dependency treatment with the patient. I tried to impress upon him the seriousness of his alcohol problem and the importance of getting treatment for successful long term sobriety. He was reluctant, but stated he would think about it.     He does report depression and fleeting SI in the past, last a week ago. Patient would benefit from starting SSRI once sodium has normalized. Patient is agreeable. Ok to discontinue sitter and suicide precautions at this time as patient denies any current SI with any plan or intent. Patient advised to let nursing staff know if the thoughts return.     Will follow up with the patient regarding possible chemical dependency treatment when he is more medically stable, closer to discharge.     Continue supportive treatment.     Will continue to follow.   Treatment Plan discussed with: Patient    I discussed the patients findings and my recommendations with patient and nursing staff    I have reviewed and approved the behavioral health treatment plans and problem list. Yes     Referring MD has access to consult report and progress notes in EMR     MARI Loomis  07/20/24  12:30 EDT              Electronically signed by Ana Gupta MD at 07/22/24 1100       Allison Mcdonald MD at 07/20/24 0672          NEPHROLOGY PROGRESS NOTE------[facility]    [facility]  [phone]  Navya Mcdonald MD      Patient Care Team:  Provider, No Known as PCP - General      Provider:  Navya Mcdonald MD  Patient Name: Yonas Charles  :  1995    SUBJECTIVE:    F/U ELECTROLYTE ABNORMALITIES    With sitter. Generally malaised. No n/v/d. No SOB    Medication:  albumin human, 25 g, Intravenous, Once  calcium gluconate, 2,000 mg, Intravenous, Q8H  folic acid, 1 mg, Oral, Daily  LORazepam, 1 mg, Oral, Q6H   Followed by  LORazepam, 1 mg, Oral, Q12H   Followed by  [START ON 2024] LORazepam, 1 mg, Oral, Daily  multivitamin with minerals, 1 tablet, Oral, Daily  pantoprazole, 40 mg, Intravenous, Daily  potassium phosphate, 15 mmol, Intravenous, Q3H  prednisoLONE sodium phosphate, 40 mg, Oral, Daily  [Held by provider] sertraline, 50 mg, Oral, Daily  sodium chloride, 10 mL, Intravenous, Q12H  thiamine (B-1) IV, 200 mg, Intravenous, Q8H   Followed by  [START ON 2024] thiamine, 100 mg, Oral, Daily  zinc sulfate, 220 mg, Oral, Daily      dextrose 5 % and sodium chloride 0.9 %, 125 mL/hr, Last Rate: 125 mL/hr (24)        OBJECTIVE    Vital Sign Min/Max for last 24 hours  Temp  Min: 97.1 °F (36.2 °C)  Max: 98.6 °F (37 °C)   BP  Min: 100/55  Max: 143/92   Pulse  Min: 90  Max: 123   Resp  Min: 17  Max: 24   SpO2  Min: 93 %  Max: 98 %   No data recorded   No data recorded     Flowsheet Rows      Flowsheet Row First Filed Value   Admission Height 165.1 cm (65\") Documented at 2024 0855   Admission Weight 55.7 kg (122 lb 12.7 oz) Documented at 2024 0855            I/O this shift:  In: 120 [P.O.:120]  Out: -   I/O last 3 completed shifts:  In: 1050 [IV Piggyback:1050]  Out: 800 [Urine:800]    Physical Exam:  General Appearance: WEAK AND MALAISED  Head: normocephalic, without obvious abnormality and atraumatic    Eyes: conjunctivae and +ICTERIC SCLERAE  Neck: supple and no JVD  Lungs: +FEW SCATTERED RHONCHI  Heart: regular " "rhythm & normal rate and normal S1, S2   Chest Wall: no abnormalities observed  Abdomen: normal bowel sounds and soft   Extremities: moves extremities well, no edema, no cyanosis  Skin: +JAUNDICE  Neurologic: A AND O X 4 WITHOUT FOCAL DEFICIT    Labs:    WBC WBC   Date Value Ref Range Status   07/20/2024 11.18 (H) 3.40 - 10.80 10*3/mm3 Final   07/19/2024 8.78 3.40 - 10.80 10*3/mm3 Final   07/18/2024 9.86 3.40 - 10.80 10*3/mm3 Final      HGB Hemoglobin   Date Value Ref Range Status   07/20/2024 9.1 (L) 13.0 - 17.7 g/dL Final   07/19/2024 8.2 (L) 13.0 - 17.7 g/dL Final   07/18/2024 8.9 (L) 13.0 - 17.7 g/dL Final      HCT Hematocrit   Date Value Ref Range Status   07/20/2024 24.5 (L) 37.5 - 51.0 % Final   07/19/2024 24.5 (L) 37.5 - 51.0 % Final   07/18/2024 26.4 (L) 37.5 - 51.0 % Final      Platelets No results found for: \"LABPLAT\"   MCV MCV   Date Value Ref Range Status   07/20/2024 91.8 79.0 - 97.0 fL Final   07/19/2024 89.4 79.0 - 97.0 fL Final   07/18/2024 87.4 79.0 - 97.0 fL Final          Sodium Sodium   Date Value Ref Range Status   07/20/2024 133 (L) 136 - 145 mmol/L Final   07/19/2024 131 (L) 136 - 145 mmol/L Final   07/19/2024 134 (L) 136 - 145 mmol/L Final   07/19/2024 126 (L) 136 - 145 mmol/L Final   07/18/2024 122 (L) 136 - 145 mmol/L Final   07/18/2024 122 (L) 136 - 145 mmol/L Final      Potassium Potassium   Date Value Ref Range Status   07/20/2024 3.8 3.5 - 5.2 mmol/L Final   07/19/2024 3.8 3.5 - 5.2 mmol/L Final   07/19/2024 3.6 3.5 - 5.2 mmol/L Final   07/19/2024 3.6 3.5 - 5.2 mmol/L Final   07/19/2024 3.0 (L) 3.5 - 5.2 mmol/L Final   07/18/2024 2.7 (L) 3.5 - 5.2 mmol/L Final   07/18/2024 1.9 (C) 3.5 - 5.2 mmol/L Final      Chloride Chloride   Date Value Ref Range Status   07/20/2024 96 (L) 98 - 107 mmol/L Final   07/19/2024 96 (L) 98 - 107 mmol/L Final   07/19/2024 96 (L) 98 - 107 mmol/L Final   07/19/2024 86 (L) 98 - 107 mmol/L Final   07/18/2024 80 (L) 98 - 107 mmol/L Final   07/18/2024 72 (L) 98 - " "107 mmol/L Final      CO2 CO2   Date Value Ref Range Status   07/20/2024 29.0 22.0 - 29.0 mmol/L Final   07/19/2024 23.8 22.0 - 29.0 mmol/L Final   07/19/2024 29.2 (H) 22.0 - 29.0 mmol/L Final   07/19/2024 32.5 (H) 22.0 - 29.0 mmol/L Final   07/18/2024 33.2 (H) 22.0 - 29.0 mmol/L Final   07/18/2024 33.7 (H) 22.0 - 29.0 mmol/L Final      BUN BUN   Date Value Ref Range Status   07/20/2024 3 (L) 6 - 20 mg/dL Final   07/19/2024 3 (L) 6 - 20 mg/dL Final   07/19/2024 3 (L) 6 - 20 mg/dL Final   07/19/2024 3 (L) 6 - 20 mg/dL Final   07/18/2024 4 (L) 6 - 20 mg/dL Final   07/18/2024 6 6 - 20 mg/dL Final      Creatinine Creatinine   Date Value Ref Range Status   07/20/2024 0.46 (L) 0.76 - 1.27 mg/dL Final   07/19/2024 0.41 (L) 0.76 - 1.27 mg/dL Final   07/19/2024 0.40 (L) 0.76 - 1.27 mg/dL Final   07/19/2024 0.44 (L) 0.76 - 1.27 mg/dL Final   07/18/2024 0.38 (L) 0.76 - 1.27 mg/dL Final   07/18/2024 0.40 (L) 0.76 - 1.27 mg/dL Final      Calcium Calcium   Date Value Ref Range Status   07/20/2024 9.2 8.6 - 10.5 mg/dL Final   07/19/2024 7.9 (L) 8.6 - 10.5 mg/dL Final   07/19/2024 8.1 (L) 8.6 - 10.5 mg/dL Final   07/19/2024 7.9 (L) 8.6 - 10.5 mg/dL Final   07/18/2024 7.4 (L) 8.6 - 10.5 mg/dL Final   07/18/2024 7.9 (L) 8.6 - 10.5 mg/dL Final      PO4 No components found for: \"PO4\"   Albumin Albumin   Date Value Ref Range Status   07/20/2024 2.9 (L) 3.5 - 5.2 g/dL Final   07/19/2024 2.9 (L) 3.5 - 5.2 g/dL Final   07/19/2024 3.1 (L) 3.5 - 5.2 g/dL Final   07/18/2024 3.0 (L) 3.5 - 5.2 g/dL Final      Magnesium Magnesium   Date Value Ref Range Status   07/19/2024 1.7 1.6 - 2.6 mg/dL Final   07/19/2024 2.2 1.6 - 2.6 mg/dL Final   07/18/2024 1.2 (L) 1.6 - 2.6 mg/dL Final      Uric Acid No components found for: \"URIC ACID\"     Imaging Results (Last 72 Hours)       Procedure Component Value Units Date/Time    CT Head Without Contrast [[id number]] Collected: 07/19/24 0608     Updated: 07/19/24 0612    Narrative:      CT HEAD WO " CONTRAST    Date of Exam: 7/19/2024 1:55 AM EDT    Indication: AMS, weakness LUE.    Comparison: None available.    Technique: Volumetric axial CT images of the head were obtained without administration of intravenous contrast. Coronal and sagittal reconstructions were performed.Automated exposure control and iterative reconstruction methods were utilized for CT dose   reduction.    Findings:  There is no evidence of hemorrhage. There is no mass effect or midline shift.    There is no extracerebral collection.    Ventricles are normal in size and configuration for patient's stated age.      Posterior fossa is within normal limits.    Calvarium and skull base appear intact.   Visualized sinuses show no air fluid levels. Visualized orbits are unremarkable.      Impression:      Impression:  No acute intracranial process.        Electronically Signed: Ghazala Ordonez MD    7/19/2024 6:10 AM EDT    Workstation ID: [id number]    XR Chest PA & Lateral [[id number]] Collected: 07/18/24 1259     Updated: 07/18/24 1302    Narrative:      DATE OF EXAM:  7/18/2024 12:36 PM     PROCEDURE:  XR CHEST PA AND LATERAL-     INDICATIONS:  rule out aspiration pneumonia; E87.6-Hypokalemia; F10.939-Alcohol use,  unspecified with withdrawal, unspecified; R17-Unspecified jaundice     COMPARISON:  No Comparisons Available     TECHNIQUE:   Two radiologic views of the chest , PA and lateral were obtained.     FINDINGS:  Heart size normal. Negative for lobar consolidation, edema, effusion or  pneumothorax. Osseous structures unremarkable.       Impression:      No active pulmonary process.        Electronically Signed ByYuriy Small MD On:7/18/2024 1:00 PM       CT Abdomen Pelvis With Contrast [[id number]] Collected: 07/18/24 1036     Updated: 07/18/24 1048    Narrative:      EXAM: CT ABDOMEN PELVIS W CONTRAST-     DATE OF EXAM: 7/18/2024 10:24 AM     INDICATION: abd pain.     COMPARISON: None available.     TECHNIQUE: Contiguous axial CT images  were obtained from the lung bases  to the pubic symphysis obtained following the uneventful intravenous  administration of 100 mL of Isovue 370 contrast. Sagittal and coronal  reconstructions were performed.  Automated exposure control and  iterative reconstruction methods were used.     FINDINGS:  Heart size within normal limits. No pericardial effusion. Lower lungs  grossly clear.     Enlarged markedly heterogeneous and steatotic liver. Right hepatic lobe  measures nearly 22 cm in greatest CC dimension. Tiny low-density lesions  too small to accurately characterize favoring small cyst. 1.2 cm fluid  density lesion at hepatic dome again favoring cyst. Portal vasculature,  splenic vein and superior mesenteric vein patent. There are distal  paraesophageal, perigastric and perisplenic varices. Spleen within  normal limits in size. Trace intra-abdominal ascites.     Gallbladder wall edema without distention or visible stones most likely  related to adjacent hepatocellular dysfunction. No dilation of biliary  tree. No active pancreatitis or drainable collection. Adrenal glands  unremarkable. Normal size and contour of kidneys. No hydronephrosis or  solid mass. Urinary bladder and prostate unremarkable.     Probable small hiatal hernia. No evidence of bowel obstruction. Colonic  wall thickening and edema within ascending extending into hepatic  flexure, distribution favors sequelae of portal colopathy. In addition  there is mild thickening and edema as well as mucosal hyperemia in the  rectum and distal colon. No CT evidence of acute appendicitis.  Incidental areas of short segment jejunojejunal intussusception without  other complication by CT, most commonly transient and of no clinical  significance in adults.     Negative for abdominal aortic aneurysm. Minimal aortic atherosclerotic  disease. No suspicious adenopathy. No free air or organized collection.     Minimal reticular body wall edema. No acute displaced  fracture or  aggressive bone lesion.       Impression:      1. Enlarged heterogeneous and markedly steatotic liver, which could  reflect sequelae of alcoholic steatohepatitis.  2. Sequelae of portal hypertension noted including distal paraesophageal  and intra-abdominal varices as well as trace ascites. Normal splenic  size. Portal vasculature patent.  3. Gallbladder wall edema without other findings of acute cholecystitis  likely secondary related to underlying hepatocellular dysfunction.  4. Colonic and rectal inflammatory changes which could reflect sequelae  of portal colopathy. Differential would include a nonspecific  infectious/inflammatory proctocolitis. No bowel obstruction.                 Electronically Signed ByYuriy Small MD On:7/18/2024 10:46 AM               Results for orders placed during the hospital encounter of 07/18/24    XR Chest PA & Lateral    Narrative  DATE OF EXAM:  7/18/2024 12:36 PM    PROCEDURE:  XR CHEST PA AND LATERAL-    INDICATIONS:  rule out aspiration pneumonia; E87.6-Hypokalemia; F10.939-Alcohol use,  unspecified with withdrawal, unspecified; R17-Unspecified jaundice    COMPARISON:  No Comparisons Available    TECHNIQUE:  Two radiologic views of the chest , PA and lateral were obtained.    FINDINGS:  Heart size normal. Negative for lobar consolidation, edema, effusion or  pneumothorax. Osseous structures unremarkable.    Impression  No active pulmonary process.      Electronically Signed ByYuriy Small MD On:7/18/2024 1:00 PM            ASSESSMENT / PLAN      Alcohol withdrawal    HYPONATREMIA------Acute. Improving nicely. Hypotonic and clinically hypovolemic.  Secondary to poor solute intake and EtOH abuse (Beer Potomania). No pressing indication for use of hypertonic saline. Give IV NS and follow serial levels. Seizure/fall precautions. Neuro checks. On Telemetry.  No SSRI for now     2. HYPOKALEMIA-------Resolved. Acute and severe whole body total potassium  depletion secondary to n/v and poor intake and severe magnesium deficiency. Replace po and IV. Give IV MgSO4 and follow     3. HYPOMAGNESEMIA-------Replaced     4. HYPOCALCEMIA------Replaced     5. ALCOHOL ABUSE/CIRRHOSIS/JAUNDICE------Thiamine, Folate, IVFs, prn IV Ativan. Withdrawal prcautions     6. MIXED METABOLIC ALKALOSIS/METABOLIC ACIDOSIS-----  Elevated AGAP with elevated lactic and alcoholic ketoacidosis likely. Give Dextrose/IVFs. Avoid hypotension. Alkalosis secondary to effective intravascular volume depletion from n/v and poor oral intake     7. ANEMIA------Normocytic with normal RDW. Check Fe, SPEP, B12, Folate, and hemoccult. Follow H/H closely with hydration     8. DEPRESSION------Suicide precautions. Hold Zoloft given severe hyponatremia     9. HYPOALBUMINEMIA-----IV Albumin to temporize and po supplements     10. ELEVATED INR------Secondary to liver disease     11. THROMBOCYTOPENIA------No heparin. Secondary to liver disease     12. PUD PROPHYLAXIS-----IV PPI     13. DVT PROPHYLAXIS-----SCDs    14. HYPOPHOSPHATEMIA------Replaced        Navya Mcdonald MD  [facility]  [phone]  24  06:46 EDT      Electronically signed by Allison Mcdnoald MD at 24 1158       Allison Mcdonald MD at 24 0838          NEPHROLOGY PROGRESS NOTE------[facility]    [facility]  [phone]  Navya Mcdonald MD      Patient Care Team:  Provider, No Known as PCP - General      Provider:  Navya Mcdonald MD  Patient Name: Yonas Charles  :  1995    SUBJECTIVE:    F/U ELECTROLYTE ABNORMALITIES    Awake and alert this AM. No angina. No dysuria.     Medication:  folic acid, 1 mg, Oral, Daily  LORazepam, 2 mg, Oral, Q6H   Followed by  LORazepam, 1 mg, Oral, Q6H   Followed by  [START ON 2024] LORazepam, 1 mg, Oral, Q12H   Followed by  [START ON 2024] LORazepam, 1 mg,  "Oral, Daily  multivitamin with minerals, 1 tablet, Oral, Daily  pantoprazole, 40 mg, Intravenous, Daily  potassium chloride ER, 40 mEq, Oral, Q4H  potassium phosphate, 15 mmol, Intravenous, Q3H  [Held by provider] sertraline, 50 mg, Oral, Daily  sodium chloride, 10 mL, Intravenous, Q12H  thiamine (B-1) IV, 200 mg, Intravenous, Q8H   Followed by  [START ON 7/24/2024] thiamine, 100 mg, Oral, Daily      dextrose 5 % and sodium chloride 0.9 % 970 mL with multivitamin (adult) 10 mL, potassium chloride 40 mEq infusion, , Last Rate: 125 mL/hr at 07/19/24 0430        OBJECTIVE    Vital Sign Min/Max for last 24 hours  Temp  Min: 98 °F (36.7 °C)  Max: 98.6 °F (37 °C)   BP  Min: 110/58  Max: 158/87   Pulse  Min: 102  Max: 132   Resp  Min: 14  Max: 24   SpO2  Min: 90 %  Max: 97 %   No data recorded   Weight  Min: 55.7 kg (122 lb 12.7 oz)  Max: 55.7 kg (122 lb 12.7 oz)     Flowsheet Rows      Flowsheet Row First Filed Value   Admission Height 165.1 cm (65\") Documented at 07/18/2024 0855   Admission Weight 55.7 kg (122 lb 12.7 oz) Documented at 07/18/2024 0855            No intake/output data recorded.  I/O last 3 completed shifts:  In: 1050 [IV Piggyback:1050]  Out: 800 [Urine:800]    Physical Exam:  General Appearance: WEAK AND MALAISED  Head: normocephalic, without obvious abnormality and atraumatic +DRY OP  Eyes: conjunctivae and +ICTERIC SCLERAE  Neck: supple and no JVD  Lungs: +FEW SCATTERED RHONCHI  Heart: regular rhythm & normal rate and normal S1, S2   Chest Wall: no abnormalities observed  Abdomen: normal bowel sounds and soft   Extremities: moves extremities well, no edema, no cyanosis  Skin: +JAUNDICE  Neurologic: OBTUNDED POST ATIVAN GIVEN FOR AGITATION    Labs:    WBC WBC   Date Value Ref Range Status   07/19/2024 8.78 3.40 - 10.80 10*3/mm3 Final   07/18/2024 9.86 3.40 - 10.80 10*3/mm3 Final      HGB Hemoglobin   Date Value Ref Range Status   07/19/2024 8.2 (L) 13.0 - 17.7 g/dL Final   07/18/2024 8.9 (L) 13.0 - 17.7 " "g/dL Final      HCT Hematocrit   Date Value Ref Range Status   07/19/2024 24.5 (L) 37.5 - 51.0 % Final   07/18/2024 26.4 (L) 37.5 - 51.0 % Final      Platelets No results found for: \"LABPLAT\"   MCV MCV   Date Value Ref Range Status   07/19/2024 89.4 79.0 - 97.0 fL Final   07/18/2024 87.4 79.0 - 97.0 fL Final          Sodium Sodium   Date Value Ref Range Status   07/19/2024 126 (L) 136 - 145 mmol/L Final   07/18/2024 122 (L) 136 - 145 mmol/L Final   07/18/2024 122 (L) 136 - 145 mmol/L Final      Potassium Potassium   Date Value Ref Range Status   07/19/2024 3.0 (L) 3.5 - 5.2 mmol/L Final   07/18/2024 2.7 (L) 3.5 - 5.2 mmol/L Final   07/18/2024 1.9 (C) 3.5 - 5.2 mmol/L Final      Chloride Chloride   Date Value Ref Range Status   07/19/2024 86 (L) 98 - 107 mmol/L Final   07/18/2024 80 (L) 98 - 107 mmol/L Final   07/18/2024 72 (L) 98 - 107 mmol/L Final      CO2 CO2   Date Value Ref Range Status   07/19/2024 32.5 (H) 22.0 - 29.0 mmol/L Final   07/18/2024 33.2 (H) 22.0 - 29.0 mmol/L Final   07/18/2024 33.7 (H) 22.0 - 29.0 mmol/L Final      BUN BUN   Date Value Ref Range Status   07/19/2024 3 (L) 6 - 20 mg/dL Final   07/18/2024 4 (L) 6 - 20 mg/dL Final   07/18/2024 6 6 - 20 mg/dL Final      Creatinine Creatinine   Date Value Ref Range Status   07/19/2024 0.44 (L) 0.76 - 1.27 mg/dL Final   07/18/2024 0.38 (L) 0.76 - 1.27 mg/dL Final   07/18/2024 0.40 (L) 0.76 - 1.27 mg/dL Final      Calcium Calcium   Date Value Ref Range Status   07/19/2024 7.9 (L) 8.6 - 10.5 mg/dL Final   07/18/2024 7.4 (L) 8.6 - 10.5 mg/dL Final   07/18/2024 7.9 (L) 8.6 - 10.5 mg/dL Final      PO4 No components found for: \"PO4\"   Albumin Albumin   Date Value Ref Range Status   07/19/2024 3.1 (L) 3.5 - 5.2 g/dL Final   07/18/2024 3.0 (L) 3.5 - 5.2 g/dL Final      Magnesium Magnesium   Date Value Ref Range Status   07/19/2024 2.2 1.6 - 2.6 mg/dL Final   07/18/2024 1.2 (L) 1.6 - 2.6 mg/dL Final      Uric Acid No components found for: \"URIC ACID\"     Imaging " Results (Last 72 Hours)       Procedure Component Value Units Date/Time    CT Head Without Contrast [[id number]] Collected: 07/19/24 0608     Updated: 07/19/24 0612    Narrative:      CT HEAD WO CONTRAST    Date of Exam: 7/19/2024 1:55 AM EDT    Indication: AMS, weakness LUE.    Comparison: None available.    Technique: Volumetric axial CT images of the head were obtained without administration of intravenous contrast. Coronal and sagittal reconstructions were performed.Automated exposure control and iterative reconstruction methods were utilized for CT dose   reduction.    Findings:  There is no evidence of hemorrhage. There is no mass effect or midline shift.    There is no extracerebral collection.    Ventricles are normal in size and configuration for patient's stated age.      Posterior fossa is within normal limits.    Calvarium and skull base appear intact.   Visualized sinuses show no air fluid levels. Visualized orbits are unremarkable.      Impression:      Impression:  No acute intracranial process.        Electronically Signed: Ghazala Ordonez MD    7/19/2024 6:10 AM EDT    Workstation ID: [id number]    XR Chest PA & Lateral [[id number]] Collected: 07/18/24 1259     Updated: 07/18/24 1302    Narrative:      DATE OF EXAM:  7/18/2024 12:36 PM     PROCEDURE:  XR CHEST PA AND LATERAL-     INDICATIONS:  rule out aspiration pneumonia; E87.6-Hypokalemia; F10.939-Alcohol use,  unspecified with withdrawal, unspecified; R17-Unspecified jaundice     COMPARISON:  No Comparisons Available     TECHNIQUE:   Two radiologic views of the chest , PA and lateral were obtained.     FINDINGS:  Heart size normal. Negative for lobar consolidation, edema, effusion or  pneumothorax. Osseous structures unremarkable.       Impression:      No active pulmonary process.        Electronically Signed By-Armani Small MD On:7/18/2024 1:00 PM       CT Abdomen Pelvis With Contrast [[id number]] Collected: 07/18/24 1036     Updated: 07/18/24  1048    Narrative:      EXAM: CT ABDOMEN PELVIS W CONTRAST-     DATE OF EXAM: 7/18/2024 10:24 AM     INDICATION: abd pain.     COMPARISON: None available.     TECHNIQUE: Contiguous axial CT images were obtained from the lung bases  to the pubic symphysis obtained following the uneventful intravenous  administration of 100 mL of Isovue 370 contrast. Sagittal and coronal  reconstructions were performed.  Automated exposure control and  iterative reconstruction methods were used.     FINDINGS:  Heart size within normal limits. No pericardial effusion. Lower lungs  grossly clear.     Enlarged markedly heterogeneous and steatotic liver. Right hepatic lobe  measures nearly 22 cm in greatest CC dimension. Tiny low-density lesions  too small to accurately characterize favoring small cyst. 1.2 cm fluid  density lesion at hepatic dome again favoring cyst. Portal vasculature,  splenic vein and superior mesenteric vein patent. There are distal  paraesophageal, perigastric and perisplenic varices. Spleen within  normal limits in size. Trace intra-abdominal ascites.     Gallbladder wall edema without distention or visible stones most likely  related to adjacent hepatocellular dysfunction. No dilation of biliary  tree. No active pancreatitis or drainable collection. Adrenal glands  unremarkable. Normal size and contour of kidneys. No hydronephrosis or  solid mass. Urinary bladder and prostate unremarkable.     Probable small hiatal hernia. No evidence of bowel obstruction. Colonic  wall thickening and edema within ascending extending into hepatic  flexure, distribution favors sequelae of portal colopathy. In addition  there is mild thickening and edema as well as mucosal hyperemia in the  rectum and distal colon. No CT evidence of acute appendicitis.  Incidental areas of short segment jejunojejunal intussusception without  other complication by CT, most commonly transient and of no clinical  significance in adults.     Negative  for abdominal aortic aneurysm. Minimal aortic atherosclerotic  disease. No suspicious adenopathy. No free air or organized collection.     Minimal reticular body wall edema. No acute displaced fracture or  aggressive bone lesion.       Impression:      1. Enlarged heterogeneous and markedly steatotic liver, which could  reflect sequelae of alcoholic steatohepatitis.  2. Sequelae of portal hypertension noted including distal paraesophageal  and intra-abdominal varices as well as trace ascites. Normal splenic  size. Portal vasculature patent.  3. Gallbladder wall edema without other findings of acute cholecystitis  likely secondary related to underlying hepatocellular dysfunction.  4. Colonic and rectal inflammatory changes which could reflect sequelae  of portal colopathy. Differential would include a nonspecific  infectious/inflammatory proctocolitis. No bowel obstruction.                 Electronically Signed ByYuriy Small MD On:7/18/2024 10:46 AM               Results for orders placed during the hospital encounter of 07/18/24    XR Chest PA & Lateral    Narrative  DATE OF EXAM:  7/18/2024 12:36 PM    PROCEDURE:  XR CHEST PA AND LATERAL-    INDICATIONS:  rule out aspiration pneumonia; E87.6-Hypokalemia; F10.939-Alcohol use,  unspecified with withdrawal, unspecified; R17-Unspecified jaundice    COMPARISON:  No Comparisons Available    TECHNIQUE:  Two radiologic views of the chest , PA and lateral were obtained.    FINDINGS:  Heart size normal. Negative for lobar consolidation, edema, effusion or  pneumothorax. Osseous structures unremarkable.    Impression  No active pulmonary process.      Electronically Signed ByYuriy Small MD On:7/18/2024 1:00 PM            ASSESSMENT / PLAN      Alcohol withdrawal    HYPONATREMIA------Acute. Improving. Hypotonic and clinically hypovolemic.  Secondary to poor solute intake and EtOH abuse (Beer Potomania). No pressing indication for use of hypertonic saline. Give IV  NS and follow serial levels. Seizure/fall precautions. Neuro checks. On Telemetry.  No SSRI for now     2. HYPOKALEMIA-------Improving. Acute and severe whole body total potassium depletion secondary to n/v and poor intake and severe magnesium deficiency. Replace po and IV. Give IV MgSO4 and follow     3. HYPOMAGNESEMIA-------Replaced     4. HYPOCALCEMIA------Replace IV. Follow ionized levels. Give MgSO4     5. ALCOHOL ABUSE/CIRRHOSIS/JAUNDICE------Thiamine, Folate, IVFs, prn IV Ativan. Withdrawal prcautions     6. MIXED METABOLIC ALKALOSIS/METABOLIC ACIDOSIS-----  Elevated AGAP with elevated lactic and alcoholic ketoacidosis likely. Give Dextrose/IVFs. Avoid hypotension. Alkalosis secondary to effective intravascular volume depletion from n/v and poor oral intake     7. ANEMIA------Normocytic with normal RDW. Check Fe, SPEP, B12, Folate, and hemoccult. Follow H/H closely with hydration     8. DEPRESSION------Suicide precautions. Hold Zoloft given severe hyponatremia     9. HYPOALBUMINEMIA-----IV Albumin to temporize and po supplements     10. ELEVATED INR------Secondary to liver disease     11. THROMBOCYTOPENIA------No heparin. Secondary to liver disease     12. PUD PROPHYLAXIS-----IV PPI     13. DVT PROPHYLAXIS-----SCDs        Navya Mcdonald MD  [facility]  [phone]  07/19/24  08:38 EDT      Electronically signed by Allison Mcdonald MD at 07/19/24 1615          Consult Notes (all)        Allison Mcdonald MD at 07/18/24 1739        Consult Orders    1. Inpatient Nephrology Consult [[id number]] ordered by Mari Bustos MD at 07/18/24 1211                     NEPHROLOGY CONSULTATION-----[facility]    [facility]  [phone]  Navya Mcdonald MD    Patient Care Team:  Provider, No Known as PCP - General    CC/REASON FOR CONSULTATION: ELECTROLYTE ABNL    PHYSICIAN REQUESTING CONSULTATION:      History of Present Illness    HPI    Patient is a 28 y.o. WM whom I was asked to see in consultation for evaluation and management of renal failure/elevated serum creatinine. Patient was admitted after presenting to ER with c/o abdominal pain. Patient is an alcoholic with Cirrhosis.   No documented NSAIDs or recent IV dye exposure. No known h/o hepatitis, TB, rheumatic fever, jaundice, SLE, bleeding/bruising disorders.  No urinary sx. No edema or fluid retention.   Was not on diuretics prior to admission. Was not on ACE-I/ARB prior to admission. No herbal med use.     Review of Systems   Unable to perform ROS: Patient nonverbal          History reviewed. No pertinent past medical history.    History reviewed. No pertinent surgical history.    History reviewed. No pertinent family history.         Home Meds: (Not in a hospital admission)      Scheduled Meds:  [START ON 7/19/2024] folic acid, 1 mg, Oral, Daily  gabapentin, 100 mg, Oral, BID  LORazepam, 2 mg, Oral, Q6H   Followed by  [START ON 7/19/2024] LORazepam, 1 mg, Oral, Q6H   Followed by  [START ON 7/20/2024] LORazepam, 1 mg, Oral, Q12H   Followed by  [START ON 7/22/2024] LORazepam, 1 mg, Oral, Daily  magnesium sulfate, 2 g, Intravenous, Q2H  multivitamin with minerals, 1 tablet, Oral, Daily  potassium chloride, 40 mEq, Oral, Q4H  sertraline, 50 mg, Oral, Daily  sodium chloride, 10 mL, Intravenous, Q12H  thiamine (B-1) IV, 200 mg, Intravenous, Q8H   Followed by  [START ON 7/24/2024] thiamine, 100 mg, Oral, Daily        Continuous Infusions:       PRN Meds:    acetaminophen **OR** acetaminophen **OR** acetaminophen    senna-docusate sodium **AND** polyethylene glycol **AND** bisacodyl **AND** bisacodyl    Calcium Replacement - Follow Nurse / BPA Driven Protocol    chlordiazePOXIDE    LORazepam    LORazepam **OR** LORazepam **OR** LORazepam **OR** LORazepam **OR** LORazepam **OR** LORazepam    Magnesium Standard Dose Replacement - Follow Nurse / BPA  "Driven Protocol    ondansetron ODT **OR** ondansetron    Phosphorus Replacement - Follow Nurse / BPA Driven Protocol    Potassium Replacement - Follow Nurse / BPA Driven Protocol    sodium chloride    sodium chloride    sodium chloride    Allergies:  Reglan [metoclopramide]    OBJECTIVE    Vital Signs  Temp:  [98.4 °F (36.9 °C)-98.5 °F (36.9 °C)] 98.5 °F (36.9 °C)  Heart Rate:  [114-132] 124  Resp:  [21-24] 24  BP: (119-158)/(61-91) 122/69    I/O this shift:  In: 1050 [IV Piggyback:1050]  Out: -   No intake/output data recorded.    Physical Exam:  General Appearance: S/P ATIVAN AND WITHDRAWS TO PAIN ONLY  Head: normocephalic, without obvious abnormality and atraumatic +DRY OP  Eyes: conjunctivae and +ICTERIC SCLERAE  Neck: supple and no JVD  Lungs: +FEW SCATTERED RHONCHI  Heart: regular rhythm & normal rate and normal S1, S2   Chest Wall: no abnormalities observed  Abdomen: normal bowel sounds and soft   Extremities: moves extremities well, no edema, no cyanosis  Skin: +JAUNDICE  Neurologic: OBTUNDED POST ATIVAN GIVEN FOR AGITATION    Results Review:    I reviewed the patient's new clinical results.    WBC WBC   Date Value Ref Range Status   07/18/2024 9.86 3.40 - 10.80 10*3/mm3 Final      HGB Hemoglobin   Date Value Ref Range Status   07/18/2024 8.9 (L) 13.0 - 17.7 g/dL Final      HCT Hematocrit   Date Value Ref Range Status   07/18/2024 26.4 (L) 37.5 - 51.0 % Final      Platelets No results found for: \"LABPLAT\"   MCV MCV   Date Value Ref Range Status   07/18/2024 87.4 79.0 - 97.0 fL Final          Sodium Sodium   Date Value Ref Range Status   07/18/2024 122 (L) 136 - 145 mmol/L Final      Potassium Potassium   Date Value Ref Range Status   07/18/2024 1.9 (C) 3.5 - 5.2 mmol/L Final      Chloride Chloride   Date Value Ref Range Status   07/18/2024 72 (L) 98 - 107 mmol/L Final      CO2 CO2   Date Value Ref Range Status   07/18/2024 33.7 (H) 22.0 - 29.0 mmol/L Final      BUN BUN   Date Value Ref Range Status " "  07/18/2024 6 6 - 20 mg/dL Final      Creatinine Creatinine   Date Value Ref Range Status   07/18/2024 0.40 (L) 0.76 - 1.27 mg/dL Final      Calcium Calcium   Date Value Ref Range Status   07/18/2024 7.9 (L) 8.6 - 10.5 mg/dL Final      PO4 No results found for: \"CAPO4\"   Albumin Albumin   Date Value Ref Range Status   07/18/2024 3.0 (L) 3.5 - 5.2 g/dL Final      Magnesium Magnesium   Date Value Ref Range Status   07/18/2024 1.2 (L) 1.6 - 2.6 mg/dL Final      Uric Acid No results found for: \"URICACID\"       Imaging Results (Last 72 Hours)       Procedure Component Value Units Date/Time    XR Chest PA & Lateral [[id number]] Collected: 07/18/24 1259     Updated: 07/18/24 1302    Narrative:      DATE OF EXAM:  7/18/2024 12:36 PM     PROCEDURE:  XR CHEST PA AND LATERAL-     INDICATIONS:  rule out aspiration pneumonia; E87.6-Hypokalemia; F10.939-Alcohol use,  unspecified with withdrawal, unspecified; R17-Unspecified jaundice     COMPARISON:  No Comparisons Available     TECHNIQUE:   Two radiologic views of the chest , PA and lateral were obtained.     FINDINGS:  Heart size normal. Negative for lobar consolidation, edema, effusion or  pneumothorax. Osseous structures unremarkable.       Impression:      No active pulmonary process.        Electronically Signed By-Armani Small MD On:7/18/2024 1:00 PM       CT Abdomen Pelvis With Contrast [[id number]] Collected: 07/18/24 1036     Updated: 07/18/24 1048    Narrative:      EXAM: CT ABDOMEN PELVIS W CONTRAST-     DATE OF EXAM: 7/18/2024 10:24 AM     INDICATION: abd pain.     COMPARISON: None available.     TECHNIQUE: Contiguous axial CT images were obtained from the lung bases  to the pubic symphysis obtained following the uneventful intravenous  administration of 100 mL of Isovue 370 contrast. Sagittal and coronal  reconstructions were performed.  Automated exposure control and  iterative reconstruction methods were used.     FINDINGS:  Heart size within normal limits. No " pericardial effusion. Lower lungs  grossly clear.     Enlarged markedly heterogeneous and steatotic liver. Right hepatic lobe  measures nearly 22 cm in greatest CC dimension. Tiny low-density lesions  too small to accurately characterize favoring small cyst. 1.2 cm fluid  density lesion at hepatic dome again favoring cyst. Portal vasculature,  splenic vein and superior mesenteric vein patent. There are distal  paraesophageal, perigastric and perisplenic varices. Spleen within  normal limits in size. Trace intra-abdominal ascites.     Gallbladder wall edema without distention or visible stones most likely  related to adjacent hepatocellular dysfunction. No dilation of biliary  tree. No active pancreatitis or drainable collection. Adrenal glands  unremarkable. Normal size and contour of kidneys. No hydronephrosis or  solid mass. Urinary bladder and prostate unremarkable.     Probable small hiatal hernia. No evidence of bowel obstruction. Colonic  wall thickening and edema within ascending extending into hepatic  flexure, distribution favors sequelae of portal colopathy. In addition  there is mild thickening and edema as well as mucosal hyperemia in the  rectum and distal colon. No CT evidence of acute appendicitis.  Incidental areas of short segment jejunojejunal intussusception without  other complication by CT, most commonly transient and of no clinical  significance in adults.     Negative for abdominal aortic aneurysm. Minimal aortic atherosclerotic  disease. No suspicious adenopathy. No free air or organized collection.     Minimal reticular body wall edema. No acute displaced fracture or  aggressive bone lesion.       Impression:      1. Enlarged heterogeneous and markedly steatotic liver, which could  reflect sequelae of alcoholic steatohepatitis.  2. Sequelae of portal hypertension noted including distal paraesophageal  and intra-abdominal varices as well as trace ascites. Normal splenic  size. Portal  vasculature patent.  3. Gallbladder wall edema without other findings of acute cholecystitis  likely secondary related to underlying hepatocellular dysfunction.  4. Colonic and rectal inflammatory changes which could reflect sequelae  of portal colopathy. Differential would include a nonspecific  infectious/inflammatory proctocolitis. No bowel obstruction.                 Electronically Signed ByYuriy Small MD On:7/18/2024 10:46 AM                 Results for orders placed during the hospital encounter of 07/18/24    XR Chest PA & Lateral    Narrative  DATE OF EXAM:  7/18/2024 12:36 PM    PROCEDURE:  XR CHEST PA AND LATERAL-    INDICATIONS:  rule out aspiration pneumonia; E87.6-Hypokalemia; F10.939-Alcohol use,  unspecified with withdrawal, unspecified; R17-Unspecified jaundice    COMPARISON:  No Comparisons Available    TECHNIQUE:  Two radiologic views of the chest , PA and lateral were obtained.    FINDINGS:  Heart size normal. Negative for lobar consolidation, edema, effusion or  pneumothorax. Osseous structures unremarkable.    Impression  No active pulmonary process.      Electronically Signed By-Armani Small MD On:7/18/2024 1:00 PM            ASSESSMENT / PLAN      Alcohol withdrawal    HYPONATREMIA------Acute. Hypotonic and clinically hypovolemic.  Secondary to poor solute intake and EtOH abuse (Beer Potomania). No pressing indication for use of hypertonic saline. Give IV NS and follow serial levels. Seizure/fall precautions. Neuro checks. On Telemetry. Check further urine and serum studies to rule out other contributing factors. No SSRI for now    2. HYPOKALEMIA-------Acute and severe whole body total potassium depletion secondary to n/v and poor intake and severe magnesium deficiency. Replace po and IV. Give IV MgSO4 and follow    3. HYPOMAGNESEMIA-------Replace IV. Follow levels    4. HYPOCALCEMIA------Replace IV. Follow ionized levels. Give MgSO4    5. ALCOHOL  ABUSE/CIRRHOSIS/JAUNDICE------Thiamine, Folate, IVFs, prn IV Ativan. Withdrawal prcautions    6. MIXED METABOLIC ALKALOSIS/METABOLIC ACIDOSIS-----  Elevated AGAP with elevated lactic and alcoholic ketoacidosis likely. Give Dextrose/IVFs. Avoid hypotension. Alkalosis secondary to effective intravascular volume depletion from n/v and poor oral intake    7. ANEMIA------Normocytic with normal RDW. Check Fe, SPEP, B12, Folate, and hemoccult. Follow H/H closely with hydration    8. DEPRESSION------Suicide precautions. Hold Zoloft given severe hyponatremia    9. HYPOALBUMINEMIA-----IV Albumin to temporize and po supplements    10. ELEVATED INR------Secondary to liver disease    11. THROMBOCYTOPENIA------No heparin. Secondary to liver disease    12. PUD PROPHYLAXIS-----IV PPI    13. DVT PROPHYLAXIS-----SCDs      I discussed the patient's findings and my recommendations with nursing staff    Will follow along closely. Thank you for allowing us to see this patient in renal consultation.    [facility]  [phone]  MD Navya Gilmore MD  07/18/24  17:39 EDT              Electronically signed by Allison Mcdonald MD at 07/18/24 2065

## 2024-07-22 NOTE — PLAN OF CARE
Problem: Adult Inpatient Plan of Care  Goal: Plan of Care Review  Outcome: Ongoing, Progressing   Goal Outcome Evaluation:            Pt rested well overnight, no c/o pain. Pt educated on current plan of care, verbalized understanding

## 2024-07-22 NOTE — PROGRESS NOTES
"NEPHROLOGY PROGRESS NOTE------[facility]    [facility]  [phone]  Navya Mcdonald MD      Patient Care Team:  Provider, No Known as PCP - General      Provider:  Navya Mcdonald MD  Patient Name: Yonas Charles  :  1995    SUBJECTIVE:    F/U ELECTROLYTE ABNORMALITIES    Feeling fairly well. No angina. No dysuria. Appetite ok. Getting up and moving around    Medication:  folic acid, 1 mg, Oral, Daily  lactulose, 10 g, Oral, TID  LORazepam, 1 mg, Oral, Daily  multivitamin with minerals, 1 tablet, Oral, Daily  pantoprazole, 40 mg, Intravenous, BID AC  potassium chloride ER, 40 mEq, Oral, Q4H  prednisoLONE sodium phosphate, 40 mg, Oral, Daily  [Held by provider] sertraline, 50 mg, Oral, Daily  sodium chloride, 10 mL, Intravenous, Q12H  sucralfate, 1 g, Oral, 4x Daily AC & at Bedtime  thiamine (B-1) IV, 200 mg, Intravenous, Q8H   Followed by  [START ON 2024] thiamine, 100 mg, Oral, Daily  zinc sulfate, 220 mg, Oral, Daily             OBJECTIVE    Vital Sign Min/Max for last 24 hours  Temp  Min: 97.7 °F (36.5 °C)  Max: 98.1 °F (36.7 °C)   BP  Min: 104/64  Max: 118/77   Pulse  Min: 104  Max: 120   Resp  Min: 16  Max: 31   SpO2  Min: 90 %  Max: 93 %   No data recorded   No data recorded     Flowsheet Rows      Flowsheet Row First Filed Value   Admission Height 165.1 cm (65\") Documented at 2024 0855   Admission Weight 55.7 kg (122 lb 12.7 oz) Documented at 2024 0855            No intake/output data recorded.  I/O last 3 completed shifts:  In: 1946 [P.O.:560; I.V.:1386]  Out: -     Physical Exam:  General Appearance: WEAK AND MALAISED  Head: normocephalic, without obvious abnormality and atraumatic    Eyes: conjunctivae and +ICTERIC SCLERAE  Neck: supple and no JVD  Lungs: +FEW SCATTERED RHONCHI  Heart: regular rhythm & normal rate and normal S1, S2   Chest Wall: no abnormalities observed  Abdomen: normal bowel sounds and soft " "  Extremities: moves extremities well, no edema, no cyanosis  Skin: +JAUNDICE  Neurologic: A AND O X 4 WITHOUT FOCAL DEFICIT    Labs:    WBC WBC   Date Value Ref Range Status   07/22/2024 15.08 (H) 3.40 - 10.80 10*3/mm3 Final   07/21/2024 14.80 (H) 3.40 - 10.80 10*3/mm3 Final   07/20/2024 11.18 (H) 3.40 - 10.80 10*3/mm3 Final      HGB Hemoglobin   Date Value Ref Range Status   07/22/2024 8.4 (L) 13.0 - 17.7 g/dL Final   07/21/2024 8.5 (L) 13.0 - 17.7 g/dL Final   07/20/2024 9.1 (L) 13.0 - 17.7 g/dL Final      HCT Hematocrit   Date Value Ref Range Status   07/22/2024 23.2 (L) 37.5 - 51.0 % Final   07/21/2024 23.0 (L) 37.5 - 51.0 % Final   07/20/2024 24.5 (L) 37.5 - 51.0 % Final      Platelets No results found for: \"LABPLAT\"   MCV MCV   Date Value Ref Range Status   07/22/2024 93.5 79.0 - 97.0 fL Final   07/21/2024 92.4 79.0 - 97.0 fL Final   07/20/2024 91.8 79.0 - 97.0 fL Final          Sodium Sodium   Date Value Ref Range Status   07/22/2024 133 (L) 136 - 145 mmol/L Final   07/21/2024 135 (L) 136 - 145 mmol/L Final   07/20/2024 133 (L) 136 - 145 mmol/L Final   07/19/2024 131 (L) 136 - 145 mmol/L Final   07/19/2024 134 (L) 136 - 145 mmol/L Final      Potassium Potassium   Date Value Ref Range Status   07/22/2024 3.6 3.5 - 5.2 mmol/L Final   07/21/2024 3.7 3.5 - 5.2 mmol/L Final   07/20/2024 3.8 3.5 - 5.2 mmol/L Final   07/19/2024 3.8 3.5 - 5.2 mmol/L Final   07/19/2024 3.6 3.5 - 5.2 mmol/L Final   07/19/2024 3.6 3.5 - 5.2 mmol/L Final      Chloride Chloride   Date Value Ref Range Status   07/22/2024 98 98 - 107 mmol/L Final   07/21/2024 100 98 - 107 mmol/L Final   07/20/2024 96 (L) 98 - 107 mmol/L Final   07/19/2024 96 (L) 98 - 107 mmol/L Final   07/19/2024 96 (L) 98 - 107 mmol/L Final      CO2 CO2   Date Value Ref Range Status   07/22/2024 26.9 22.0 - 29.0 mmol/L Final   07/21/2024 24.9 22.0 - 29.0 mmol/L Final   07/20/2024 29.0 22.0 - 29.0 mmol/L Final   07/19/2024 23.8 22.0 - 29.0 mmol/L Final   07/19/2024 29.2 " "(H) 22.0 - 29.0 mmol/L Final      BUN BUN   Date Value Ref Range Status   07/22/2024 4 (L) 6 - 20 mg/dL Final   07/21/2024 3 (L) 6 - 20 mg/dL Final   07/20/2024 3 (L) 6 - 20 mg/dL Final   07/19/2024 3 (L) 6 - 20 mg/dL Final   07/19/2024 3 (L) 6 - 20 mg/dL Final      Creatinine Creatinine   Date Value Ref Range Status   07/22/2024 0.47 (L) 0.76 - 1.27 mg/dL Final   07/21/2024 0.46 (L) 0.76 - 1.27 mg/dL Final   07/20/2024 0.46 (L) 0.76 - 1.27 mg/dL Final   07/19/2024 0.41 (L) 0.76 - 1.27 mg/dL Final   07/19/2024 0.40 (L) 0.76 - 1.27 mg/dL Final      Calcium Calcium   Date Value Ref Range Status   07/22/2024 8.5 (L) 8.6 - 10.5 mg/dL Final   07/21/2024 8.6 8.6 - 10.5 mg/dL Final   07/20/2024 9.2 8.6 - 10.5 mg/dL Final   07/19/2024 7.9 (L) 8.6 - 10.5 mg/dL Final   07/19/2024 8.1 (L) 8.6 - 10.5 mg/dL Final      PO4 No components found for: \"PO4\"   Albumin Albumin   Date Value Ref Range Status   07/22/2024 3.1 (L) 3.5 - 5.2 g/dL Final   07/21/2024 2.8 (L) 3.5 - 5.2 g/dL Final   07/20/2024 2.9 (L) 3.5 - 5.2 g/dL Final   07/19/2024 2.9 (L) 3.5 - 5.2 g/dL Final      Magnesium Magnesium   Date Value Ref Range Status   07/22/2024 2.2 1.6 - 2.6 mg/dL Final   07/21/2024 1.3 (L) 1.6 - 2.6 mg/dL Final   07/19/2024 1.7 1.6 - 2.6 mg/dL Final      Uric Acid No components found for: \"URIC ACID\"     Imaging Results (Last 72 Hours)       ** No results found for the last 72 hours. **            Results for orders placed during the hospital encounter of 07/18/24    XR Chest PA & Lateral    Narrative  DATE OF EXAM:  7/18/2024 12:36 PM    PROCEDURE:  XR CHEST PA AND LATERAL-    INDICATIONS:  rule out aspiration pneumonia; E87.6-Hypokalemia; F10.939-Alcohol use,  unspecified with withdrawal, unspecified; R17-Unspecified jaundice    COMPARISON:  No Comparisons Available    TECHNIQUE:  Two radiologic views of the chest , PA and lateral were obtained.    FINDINGS:  Heart size normal. Negative for lobar consolidation, edema, effusion " or  pneumothorax. Osseous structures unremarkable.    Impression  No active pulmonary process.      Electronically Signed By-Armani Small MD On:7/18/2024 1:00 PM            ASSESSMENT / PLAN      Alcohol withdrawal    HYPONATREMIA------Acute.Restart little NS     2. HYPOKALEMIA-------Resolved.       3. HYPOMAGNESEMIA-------Replaced     4. HYPOCALCEMIA------Replace IV     5. ALCOHOL ABUSE/CIRRHOSIS/JAUNDICE------Thiamine, Folate, IVFs, prn IV Ativan. Withdrawal prcautions     6. MIXED METABOLIC ALKALOSIS/METABOLIC ACIDOSIS-----Better     7. ANEMIA------Normocytic with normal RDW. Follow for PRBC need     8. DEPRESSION------Suicide precautions. Hold Zoloft given severe hyponatremia     9. HYPOALBUMINEMIA-----IV Albumin to temporize and po supplements     10. ELEVATED INR------Secondary to liver disease     11. THROMBOCYTOPENIA------No heparin. Secondary to liver disease     12. PUD PROPHYLAXIS-----IV PPI     13. DVT PROPHYLAXIS-----SCDs    14. HYPOPHOSPHATEMIA------Replaced        Navya Mcdonald MD  [facility]  [phone]  07/22/24  07:31 EDT

## 2024-07-22 NOTE — PLAN OF CARE
Goal Outcome Evaluation:  Plan of Care Reviewed With: patient           Outcome Evaluation: 27 y/o male admitted to [facility] on 7/18/24 with alcohol withdrawal symptoms. Also with acute hypokalemia, jaundice, electrolyte abnormality, and severe alcohol hepatitis. Pt is usually independent at home with mobility and ADLs without AD. Pt reports family recently noticed impairments in his walking and balance. Pt presents with impaired sensation B feet, impaired standing balance, and impaired gait. Gait was initially assessed without AD due to pt's baseline; however he presents with increased fall risk. Quality significantly improved today with walker use. Pt is pleasant and cooperative. His goal is to get back to normal and to stop drinking. PT will follow for balance activities and additional gait training. Pt will benefit from RW use at OR. He will be safe to DC to inpatient alcohol rehab. If pt decides against inpatient rehab, he'll benefit from outpatient physical therapy.      Anticipated Discharge Disposition (PT):  (TBD - inpatient alcohol rehab vs. home with family and outpatient therapy.)

## 2024-07-22 NOTE — THERAPY TREATMENT NOTE
"Subjective: Pt agreeable to therapeutic plan of care. Pt pleasant and cooperative. Talkative, asking questions & smiling intermittently.   Cognition: oriented to Person, Place, Time, and Situation    Objective:       Therapeutic Exercise - 10 Reps B UE & LB AROM  with min resistance theraband (yellow) supported sitting / chair    Vitals: WNL    Pain: 0 V  Education: Provided education on the importance of mobility in the acute care setting and Verbal/Tactile Cues      Assessment: Yonas Charles presents with ADL impairments affecting function including endurance / activity tolerance and strength. Pt demo generalized weakness. Focused on UB & LB strengthening using resistive theraband today. Instructed pt on HEP that he can continue on his own in room & upon discharge. Pt alert, attentive & asking appropriate questions. He averaged between tolerating 5-10 reps & required freq rest breaks due to general fatigue, but tolerated exercises well. Demonstrated functioning below baseline abilities indicate the need for continued skilled intervention while inpatient. Tolerating session today without incident. Will continue to follow and progress as tolerated.     Plan/Recommendations:   Low Intensity Therapy recommended post-acute care - This is recommended as therapy feels this patient would require 2-3 visits per week. OP or HH would be the best option depending on patient's home bound status. Consider, if the patient has other  \"skilled\" needs such as wounds, IV antibiotics, etc. Combined with \"low intensity\" could also equate to a SNF. If patient is medically complex, consider LTAC.. Pt requires no DME at discharge.     Pt desires inpt substance abuse program at discharge. Pt cooperative; agreeable to therapeutic recommendations and plan of care.     Modified Cassy: N/A = No pre-op stroke/TIA    Post-Tx Position: Up in Chair, Alarms activated, and Call light and personal items within reach  PPE: gloves    "

## 2024-07-22 NOTE — PROGRESS NOTES
[facility] MEDICINE SERVICE  DAILY PROGRESS NOTE    NAME: Yonas Charles  : 1995  MRN: [id number]      LOS: 4 days     PROVIDER OF SERVICE: Frank Wild MD    Chief Complaint: Alcohol withdrawal    Subjective:     Interval History: Patient is that he is feeling overall better, with improved p.o. intake and no significant abdominal pain.  He states that he does have a cough when he wakes up but this is also improving and this has been present for quite some time.    Review of Systems: Negative except described above  Review of Systems    Objective:     Vital Signs  Temp:  [97.7 °F (36.5 °C)-98.1 °F (36.7 °C)] 98 °F (36.7 °C)  Heart Rate:  [104-120] 112  Resp:  [16-27] 19  BP: (104-123)/(63-78) 123/78  Flow (L/min):  [2] 2   Body mass index is 20.43 kg/m².      Physical Exam  General Appearance:  Alert, cooperative, no distress, appears stated age  Head:  Normocephalic, without obvious abnormality, atraumatic  Eyes:  PERRL, conjunctiva/corneas clear, EOM's intact, fundi benign, icteric sclera bilaterally  Ears:  Normal TM's and external ear canals, both ears  Nose: Nares normal, septum midline, mucosa normal, no drainage or sinus tenderness  Throat: Lips, mucosa, and tongue normal; teeth and gums normal  Neck: Supple, symmetrical, trachea midline, no adenopathy, thyroid: not enlarged, symmetric, no tenderness/mass/nodules, no carotid bruit or JVD  Lungs:   Clear to auscultation bilaterally, respirations unlabored  Heart:  Regular rate and rhythm, S1, S2 normal, no murmur, rub or gallop  Abdomen:  Soft, non-tender, bowel sounds active all four quadrants,  no masses, no organomegaly  Extremities: Extremities normal, atraumatic, no cyanosis or edema  Pulses: 2+ and symmetric  Skin: Jaundice  Neurologic: Normal      Scheduled Meds   calcium gluconate, 1,000 mg, Intravenous, Q12H  famotidine, 40 mg, Oral, Nightly  folic acid, 1 mg, Oral, Daily  lactulose, 10 g, Oral, TID  multivitamin with minerals, 1  tablet, Oral, Daily  pantoprazole, 40 mg, Intravenous, BID AC  prednisoLONE sodium phosphate, 40 mg, Oral, Daily  [Held by provider] sertraline, 50 mg, Oral, Daily  sodium chloride, 10 mL, Intravenous, Q12H  sucralfate, 1 g, Oral, 4x Daily AC & at Bedtime  thiamine (B-1) IV, 200 mg, Intravenous, Q8H   Followed by  [START ON 7/24/2024] thiamine, 100 mg, Oral, Daily  zinc sulfate, 220 mg, Oral, Daily       PRN Meds     acetaminophen **OR** acetaminophen **OR** acetaminophen    senna-docusate sodium **AND** polyethylene glycol **AND** bisacodyl **AND** bisacodyl    Calcium Replacement - Follow Nurse / BPA Driven Protocol    chlordiazePOXIDE    guaiFENesin-codeine    LORazepam    LORazepam **OR** LORazepam **OR** LORazepam **OR** LORazepam **OR** LORazepam **OR** LORazepam    Magnesium Standard Dose Replacement - Follow Nurse / BPA Driven Protocol    ondansetron ODT **OR** ondansetron    Phosphorus Replacement - Follow Nurse / BPA Driven Protocol    Potassium Replacement - Follow Nurse / BPA Driven Protocol    sodium chloride    sodium chloride    sodium chloride   Infusions  sodium chloride, 60 mL/hr, Last Rate: 60 mL/hr (07/22/24 0925)          Diagnostic Data    Results from last 7 days   Lab Units 07/22/24  0449   WBC 10*3/mm3 15.08*   HEMOGLOBIN g/dL 8.4*   HEMATOCRIT % 23.2*   PLATELETS 10*3/mm3 147   GLUCOSE mg/dL 89   CREATININE mg/dL 0.47*   BUN mg/dL 4*   SODIUM mmol/L 133*   POTASSIUM mmol/L 3.6   AST (SGOT) U/L 107*   ALT (SGPT) U/L 27   ALK PHOS U/L 258*   BILIRUBIN mg/dL 16.0*   ANION GAP mmol/L 8.1       No radiology results for the last day      I reviewed the patient's new clinical results.    Assessment/Plan:     Active and Resolved Problems  Active Hospital Problems    Diagnosis  POA    **Alcohol withdrawal [F10.939]  Yes      Resolved Hospital Problems   No resolved problems to display.       Acute alcoholic hepatitis with alcoholic cirrhosis  - methylprednisoloneSecondary to excessive alcohol  use  -Initiated on prednisolone given elevated discriminant function score on admission  -Check repeat Lille score on day 7  -Bilirubin and transaminases slowly improving with some improvement in pro time as well  -Continue CIWA for any alcohol withdrawal symptoms; CIWA score is 0 currently    Hyponatremia 2/2 beer potomania  -Improving sodium level with fluid restriction    Acute hypokalemia, hypomagnesemia, hypocalcemia  -Secondary to malnutrition in the setting of excessive alcohol use  -Repleted         VTE Prophylaxis:  Mechanical VTE prophylaxis orders are present.         Code status is   Code Status and Medical Interventions:   Ordered at: 07/18/24 1214     Code Status (Patient has no pulse and is not breathing):    CPR (Attempt to Resuscitate)     Medical Interventions (Patient has pulse or is breathing):    Full Support       Disposition: Possible DC on 7/25    Time: 30 minutes    Signature: Electronically signed by Frank Wild MD, 07/22/24, 14:33 EDT.  [facility] Hospitalist Team

## 2024-07-23 ENCOUNTER — APPOINTMENT (OUTPATIENT)
Dept: NEUROLOGY | Facility: HOSPITAL | Age: 29
End: 2024-07-23
Payer: MEDICAID

## 2024-07-23 ENCOUNTER — INPATIENT HOSPITAL (OUTPATIENT)
Dept: URBAN - METROPOLITAN AREA HOSPITAL 84 | Facility: HOSPITAL | Age: 29
End: 2024-07-23
Payer: MEDICAID

## 2024-07-23 DIAGNOSIS — K70.30 ALCOHOLIC CIRRHOSIS OF LIVER WITHOUT ASCITES: ICD-10-CM

## 2024-07-23 LAB
ALBUMIN SERPL-MCNC: 3.2 G/DL (ref 3.5–5.2)
ALBUMIN/GLOB SERPL: 1.4 G/DL
ALP SERPL-CCNC: 261 U/L (ref 39–117)
ALT SERPL W P-5'-P-CCNC: 29 U/L (ref 1–41)
ANION GAP SERPL CALCULATED.3IONS-SCNC: 6.1 MMOL/L (ref 5–15)
AST SERPL-CCNC: 116 U/L (ref 1–40)
BACTERIA SPEC AEROBE CULT: NORMAL
BACTERIA SPEC AEROBE CULT: NORMAL
BASOPHILS # BLD AUTO: 0.02 10*3/MM3 (ref 0–0.2)
BASOPHILS NFR BLD AUTO: 0.1 % (ref 0–1.5)
BILIRUB SERPL-MCNC: 16.4 MG/DL (ref 0–1.2)
BUN SERPL-MCNC: 5 MG/DL (ref 6–20)
BUN/CREAT SERPL: 9.1 (ref 7–25)
CALCIUM SPEC-SCNC: 8.6 MG/DL (ref 8.6–10.5)
CHLORIDE SERPL-SCNC: 102 MMOL/L (ref 98–107)
CO2 SERPL-SCNC: 25.9 MMOL/L (ref 22–29)
CREAT SERPL-MCNC: 0.55 MG/DL (ref 0.76–1.27)
DEPRECATED RDW RBC AUTO: 55 FL (ref 37–54)
EGFRCR SERPLBLD CKD-EPI 2021: 137.6 ML/MIN/1.73
EOSINOPHIL # BLD AUTO: 0.04 10*3/MM3 (ref 0–0.4)
EOSINOPHIL NFR BLD AUTO: 0.3 % (ref 0.3–6.2)
ERYTHROCYTE [DISTWIDTH] IN BLOOD BY AUTOMATED COUNT: 17.2 % (ref 12.3–15.4)
GLOBULIN UR ELPH-MCNC: 2.3 GM/DL
GLUCOSE SERPL-MCNC: 89 MG/DL (ref 65–99)
HCT VFR BLD AUTO: 24.2 % (ref 37.5–51)
HGB BLD-MCNC: 8.8 G/DL (ref 13–17.7)
IMM GRANULOCYTES # BLD AUTO: 0.29 10*3/MM3 (ref 0–0.05)
IMM GRANULOCYTES NFR BLD AUTO: 1.9 % (ref 0–0.5)
INR PPP: 1.8 (ref 0.93–1.1)
LYMPHOCYTES # BLD AUTO: 1.57 10*3/MM3 (ref 0.7–3.1)
LYMPHOCYTES NFR BLD AUTO: 10.5 % (ref 19.6–45.3)
MAGNESIUM SERPL-MCNC: 1.7 MG/DL (ref 1.6–2.6)
MCH RBC QN AUTO: 34.1 PG (ref 26.6–33)
MCHC RBC AUTO-ENTMCNC: 36.4 G/DL (ref 31.5–35.7)
MCV RBC AUTO: 93.8 FL (ref 79–97)
MONOCYTES # BLD AUTO: 1.72 10*3/MM3 (ref 0.1–0.9)
MONOCYTES NFR BLD AUTO: 11.5 % (ref 5–12)
NEUTROPHILS NFR BLD AUTO: 11.35 10*3/MM3 (ref 1.7–7)
NEUTROPHILS NFR BLD AUTO: 75.7 % (ref 42.7–76)
NRBC BLD AUTO-RTO: 0 /100 WBC (ref 0–0.2)
PHOSPHATE SERPL-MCNC: 2 MG/DL (ref 2.5–4.5)
PHOSPHATE SERPL-MCNC: 2.7 MG/DL (ref 2.5–4.5)
PLATELET # BLD AUTO: 148 10*3/MM3 (ref 140–450)
PMV BLD AUTO: 10.2 FL (ref 6–12)
POTASSIUM SERPL-SCNC: 4.5 MMOL/L (ref 3.5–5.2)
PROT SERPL-MCNC: 5.5 G/DL (ref 6–8.5)
PROTHROMBIN TIME: 18.8 SECONDS (ref 9.6–11.7)
RBC # BLD AUTO: 2.58 10*6/MM3 (ref 4.14–5.8)
SODIUM SERPL-SCNC: 134 MMOL/L (ref 136–145)
WBC NRBC COR # BLD AUTO: 14.99 10*3/MM3 (ref 3.4–10.8)

## 2024-07-23 PROCEDURE — 63710000001 PREDNISOLONE PER 5 MG: Performed by: NURSE PRACTITIONER

## 2024-07-23 PROCEDURE — 84100 ASSAY OF PHOSPHORUS: CPT | Performed by: INTERNAL MEDICINE

## 2024-07-23 PROCEDURE — 95819 EEG AWAKE AND ASLEEP: CPT | Performed by: PSYCHIATRY & NEUROLOGY

## 2024-07-23 PROCEDURE — 95819 EEG AWAKE AND ASLEEP: CPT

## 2024-07-23 PROCEDURE — 85025 COMPLETE CBC W/AUTO DIFF WBC: CPT | Performed by: NURSE PRACTITIONER

## 2024-07-23 PROCEDURE — 25010000002 THIAMINE PER 100 MG: Performed by: INTERNAL MEDICINE

## 2024-07-23 PROCEDURE — 99233 SBSQ HOSP IP/OBS HIGH 50: CPT | Performed by: NURSE PRACTITIONER

## 2024-07-23 PROCEDURE — 85610 PROTHROMBIN TIME: CPT | Performed by: NURSE PRACTITIONER

## 2024-07-23 PROCEDURE — 4A10X4Z MONITORING OF CENTRAL NERVOUS ELECTRICAL ACTIVITY, EXTERNAL APPROACH: ICD-10-PCS | Performed by: PSYCHIATRY & NEUROLOGY

## 2024-07-23 PROCEDURE — 25810000003 SODIUM CHLORIDE 0.9 % SOLUTION: Performed by: INTERNAL MEDICINE

## 2024-07-23 PROCEDURE — 83735 ASSAY OF MAGNESIUM: CPT | Performed by: INTERNAL MEDICINE

## 2024-07-23 PROCEDURE — 80053 COMPREHEN METABOLIC PANEL: CPT | Performed by: NURSE PRACTITIONER

## 2024-07-23 PROCEDURE — 99232 SBSQ HOSP IP/OBS MODERATE 35: CPT | Performed by: PSYCHIATRY & NEUROLOGY

## 2024-07-23 RX ORDER — PHYTONADIONE 2 MG/ML
5 INJECTION, EMULSION INTRAMUSCULAR; INTRAVENOUS; SUBCUTANEOUS ONCE
Status: DISCONTINUED | OUTPATIENT
Start: 2024-07-23 | End: 2024-07-23 | Stop reason: SDUPTHER

## 2024-07-23 RX ORDER — PANTOPRAZOLE SODIUM 40 MG/1
40 TABLET, DELAYED RELEASE ORAL
Status: DISCONTINUED | OUTPATIENT
Start: 2024-07-23 | End: 2024-08-02 | Stop reason: HOSPADM

## 2024-07-23 RX ADMIN — LACTULOSE 10 G: 20 SOLUTION ORAL at 20:30

## 2024-07-23 RX ADMIN — SODIUM CHLORIDE 60 ML/HR: 9 INJECTION, SOLUTION INTRAVENOUS at 06:42

## 2024-07-23 RX ADMIN — Medication 10 ML: at 08:14

## 2024-07-23 RX ADMIN — SUCRALFATE 1 G: 1 TABLET ORAL at 17:20

## 2024-07-23 RX ADMIN — FOLIC ACID 1 MG: 1 TABLET ORAL at 08:14

## 2024-07-23 RX ADMIN — Medication 10 ML: at 20:31

## 2024-07-23 RX ADMIN — SODIUM PHOSPHATE, MONOBASIC, MONOHYDRATE AND SODIUM PHOSPHATE, DIBASIC, ANHYDROUS 15 MMOL: 142; 276 INJECTION, SOLUTION INTRAVENOUS at 11:04

## 2024-07-23 RX ADMIN — LACTULOSE 10 G: 20 SOLUTION ORAL at 14:39

## 2024-07-23 RX ADMIN — SUCRALFATE 1 G: 1 TABLET ORAL at 20:30

## 2024-07-23 RX ADMIN — LACTULOSE 10 G: 20 SOLUTION ORAL at 08:14

## 2024-07-23 RX ADMIN — Medication 220 MG: at 08:14

## 2024-07-23 RX ADMIN — THIAMINE HYDROCHLORIDE 200 MG: 100 INJECTION, SOLUTION INTRAMUSCULAR; INTRAVENOUS at 14:37

## 2024-07-23 RX ADMIN — Medication 1 TABLET: at 08:14

## 2024-07-23 RX ADMIN — THIAMINE HYDROCHLORIDE 200 MG: 100 INJECTION, SOLUTION INTRAMUSCULAR; INTRAVENOUS at 05:11

## 2024-07-23 RX ADMIN — PANTOPRAZOLE SODIUM 40 MG: 40 INJECTION, POWDER, FOR SOLUTION INTRAVENOUS at 08:14

## 2024-07-23 RX ADMIN — PREDNISOLONE SODIUM PHOSPHATE 40 MG: 15 SOLUTION ORAL at 08:14

## 2024-07-23 RX ADMIN — SUCRALFATE 1 G: 1 TABLET ORAL at 11:04

## 2024-07-23 RX ADMIN — PANTOPRAZOLE SODIUM 40 MG: 40 TABLET, DELAYED RELEASE ORAL at 17:20

## 2024-07-23 RX ADMIN — SUCRALFATE 1 G: 1 TABLET ORAL at 08:14

## 2024-07-23 RX ADMIN — FAMOTIDINE 40 MG: 20 TABLET, FILM COATED ORAL at 20:30

## 2024-07-23 NOTE — PROCEDURES
This is an inpatient,   Digitally recorded multi-montage EEG with leads placed according to the international 10/20 system  Photic stimulation was not done  Hyperventilation was not done    This EEG demonstrates increase beta activity  No good sustained alpha rhythm  Then the patient became drowsy and fell asleep    Mostly asleep with spindles  No asymmetry    Nothing suggesting clear-cut epileptiform activity or asymmetry, but there was some phase reversing in left fronto-temporal region that did not line up, so not likely epilepectic focus  No clinical events      Impression:    This is a minimally abnormal adult, awake/drowsy/asleep EEG which showed no good alpha and increased beta that can be seen in delirium    Nonspecific  No seizures but EEG like this does not rule out epilepsy

## 2024-07-23 NOTE — PROGRESS NOTES
"  Chief complaint fatigue     Subjective .     History of present illness:  The patient is a 28 y.o. male who was admitted secondary to alcohol detox.   PMH: alcohol use disorder, GERD  Psych was consulted for depression, SI and alcohol withdrawal     During initial assessment he patient was lethargic, but did wake up to talk , He was alert and oriented. He  reported  heavy drinking  for the past 10 years, up to 750ml of alcohol per day. He reports no periods of sobriety during that time.    The patient does want to stop drinking alcohol.    The patient does report a history of depression. He was supposed to start sertraline outpatient, but had not gotten to start it due to low sodium. He reported   thoughts of killing himself 1 week ago, with fleeting plans. He mentioned \"it will just flash through my mind: guns, knives, needles.\" However, he states the thoughts quickly passed and he never came up with a definitive plan. He denies any serious SI for the last week.      Today the pt was alert and oriented to person/[place/time/ situation,   Withdrawal sxs resolving slowly, tremor improved   He feels more optimistic now after he decided to go to the rehab when medically stable. Denies any SI with any plan or intent.   Sleep - still fragmented       Review of Systems   All systems were reviewed and negative except for:  Constitution:  positive for fatigue  Behavioral/Psych: positive for  depression    The following portions of the patient's history were reviewed and updated as appropriate: allergies, current medications, past family history, past medical history, past social history, past surgical history and problem list.       No medications prior to admission.        Scheduled Meds:  famotidine, 40 mg, Oral, Nightly  folic acid, 1 mg, Oral, Daily  lactulose, 10 g, Oral, TID  multivitamin with minerals, 1 tablet, Oral, Daily  pantoprazole, 40 mg, Intravenous, BID AC  prednisoLONE sodium phosphate, 40 mg, Oral, " "Daily  [Held by provider] sertraline, 50 mg, Oral, Daily  sodium chloride, 10 mL, Intravenous, Q12H  sodium phosphate, 15 mmol, Intravenous, Once  sucralfate, 1 g, Oral, 4x Daily AC & at Bedtime  thiamine (B-1) IV, 200 mg, Intravenous, Q8H   Followed by  [START ON 7/24/2024] thiamine, 100 mg, Oral, Daily  zinc sulfate, 220 mg, Oral, Daily        Continuous Infusions:  sodium chloride, 60 mL/hr, Last Rate: 60 mL/hr (07/23/24 0642)      PRN Meds:    acetaminophen **OR** acetaminophen **OR** acetaminophen    senna-docusate sodium **AND** polyethylene glycol **AND** bisacodyl **AND** bisacodyl    Calcium Replacement - Follow Nurse / BPA Driven Protocol    chlordiazePOXIDE    guaiFENesin-codeine    LORazepam    LORazepam **OR** LORazepam **OR** LORazepam **OR** LORazepam **OR** LORazepam **OR** LORazepam    Magnesium Standard Dose Replacement - Follow Nurse / BPA Driven Protocol    ondansetron ODT **OR** ondansetron    Phosphorus Replacement - Follow Nurse / BPA Driven Protocol    Potassium Replacement - Follow Nurse / BPA Driven Protocol    sodium chloride    sodium chloride    sodium chloride      Allergies:  Reglan [metoclopramide]      Objective     Vital Signs   /75 (BP Location: Left arm, Patient Position: Sitting)   Pulse 110   Temp 97.7 °F (36.5 °C) (Oral)   Resp 16   Ht 165.1 cm (65\")   Wt 55.7 kg (122 lb 12.7 oz)   SpO2 90%   BMI 20.43 kg/m²     Physical Exam:    MENTAL STATUS EXAM   General Appearance:  Cleanly groomed and dressed  Eye Contact:  Good eye contact  Attitude:  Cooperative  Motor Activity:  Normal gait, posture  Speech:  Normal rate, tone, volume  Language:  Spontaneous  Mood and affect:  Depressed and appropriate  Hopelessness:  Denies  Loneliness: Denies  Thought Process:  Logical and goal-directed  Associations/ Thought Content:  No delusions  Hallucinations:  None  Suicidal Ideations:  Not present  Homicidal Ideation:  Not present  Sensorium:  Alert  Orientation:  Person, place, " time and situation  Immediate Recall, Recent, and Remote Memory:  Intact  Attention Span/ Concentration:  Good  Fund of Knowledge:  Appropriate for age and educational level  Intellectual Functioning:  Average range  Insight:  Fair  Judgement:  Fair  Reliability:  Fair  Impulse Control:  Fair     Medications and allergies reviewed.    Result Review:  I have personally reviewed the results from the time of this admission to 7/23/2024 08:21 EDT and agree with these findings:  [x]  Laboratory   Na 134   []  Microbiology  []  Radiology  [x]  EKG/Telemetry   []  Cardiology/Vascular   []  Pathology  []  Old records  []  Other:  Most notable findings include: Sodium 134 today     Assessment & Plan       Alcohol withdrawal     Assessment: major depressive disorder, alcohol use disorder, alcohol withdrawal   Treatment Plan: Patient presented with alcohol withdrawal, depression, SI. He reports heavy drinking for the past 10 years, but does desire to stop drinking. He also reports depression with SI at times. States last thoughts of suicide were about a week ago.     Patient is agreeable to inpatient rehab for chemical dependency when medically stable. Once he is closer to being medically ready for discharge, case management can explore either 30 day alcohol programs or possibly an intensive outpatient setting.     The pt was started on sertralie 50 mg po QAM, Na 134, cont on current dose    Continue supportive treatment.     Will continue to follow PRN       Treatment Plan discussed with: Patient    I discussed the patients findings and my recommendations with patient and nursing staff    I have reviewed and approved the behavioral health treatment plans and problem list. Yes     Referring MD has access to consult report and progress notes in EMR     Ana Gupta MD  07/23/24  08:21 EDT

## 2024-07-23 NOTE — PROGRESS NOTES
[facility] MEDICINE SERVICE  DAILY PROGRESS NOTE    NAME: Yonas Charles  : 1995  MRN: [id number]      LOS: 5 days     PROVIDER OF SERVICE: Frank Wild MD    Chief Complaint: Alcohol withdrawal    Subjective:     Interval History: Patient continues to feel overall better.  His p.o. intake is slowly improving and states his abdominal pain is not significant at all.  Denies any nausea or vomiting.    Review of Systems: Negative except described above  Review of Systems    Objective:     Vital Signs  Temp:  [97.3 °F (36.3 °C)-98.3 °F (36.8 °C)] 97.3 °F (36.3 °C)  Heart Rate:  [] 95  Resp:  [16-21] 21  BP: (105-128)/(63-88) 128/88   Body mass index is 20.43 kg/m².      Physical Exam  General Appearance:  Alert, cooperative, no distress, appears stated age  Head:  Normocephalic, without obvious abnormality, atraumatic  Eyes:  PERRL, conjunctiva/corneas clear, EOM's intact, fundi benign, icteric sclera bilaterally  Ears:  Normal TM's and external ear canals, both ears  Nose: Nares normal, septum midline, mucosa normal, no drainage or sinus tenderness  Throat: Lips, mucosa, and tongue normal; teeth and gums normal  Neck: Supple, symmetrical, trachea midline, no adenopathy, thyroid: not enlarged, symmetric, no tenderness/mass/nodules, no carotid bruit or JVD  Lungs:   Clear to auscultation bilaterally, respirations unlabored  Heart:  Regular rate and rhythm, S1, S2 normal, no murmur, rub or gallop  Abdomen:  Soft, non-tender, bowel sounds active all four quadrants,  no masses, no organomegaly  Extremities: Extremities normal, atraumatic, no cyanosis or edema  Pulses: 2+ and symmetric  Skin: Jaundice  Neurologic: Normal      Scheduled Meds   famotidine, 40 mg, Oral, Nightly  folic acid, 1 mg, Oral, Daily  lactulose, 10 g, Oral, TID  multivitamin with minerals, 1 tablet, Oral, Daily  pantoprazole, 40 mg, Intravenous, BID AC  prednisoLONE sodium phosphate, 40 mg, Oral, Daily  [Held by provider]  sertraline, 50 mg, Oral, Daily  sodium chloride, 10 mL, Intravenous, Q12H  sodium phosphate, 15 mmol, Intravenous, Once  sucralfate, 1 g, Oral, 4x Daily AC & at Bedtime  thiamine (B-1) IV, 200 mg, Intravenous, Q8H   Followed by  [START ON 7/24/2024] thiamine, 100 mg, Oral, Daily  zinc sulfate, 220 mg, Oral, Daily       PRN Meds     acetaminophen **OR** acetaminophen **OR** acetaminophen    senna-docusate sodium **AND** polyethylene glycol **AND** bisacodyl **AND** bisacodyl    Calcium Replacement - Follow Nurse / BPA Driven Protocol    guaiFENesin-codeine    LORazepam    LORazepam **OR** LORazepam **OR** LORazepam **OR** LORazepam **OR** LORazepam **OR** LORazepam    Magnesium Standard Dose Replacement - Follow Nurse / BPA Driven Protocol    ondansetron ODT **OR** ondansetron    Phosphorus Replacement - Follow Nurse / BPA Driven Protocol    Potassium Replacement - Follow Nurse / BPA Driven Protocol    sodium chloride    sodium chloride    sodium chloride   Infusions  sodium chloride, 60 mL/hr, Last Rate: 60 mL/hr (07/23/24 0642)          Diagnostic Data    Results from last 7 days   Lab Units 07/23/24  0435   WBC 10*3/mm3 14.99*   HEMOGLOBIN g/dL 8.8*   HEMATOCRIT % 24.2*   PLATELETS 10*3/mm3 148   GLUCOSE mg/dL 89   CREATININE mg/dL 0.55*   BUN mg/dL 5*   SODIUM mmol/L 134*   POTASSIUM mmol/L 4.5   AST (SGOT) U/L 116*   ALT (SGPT) U/L 29   ALK PHOS U/L 261*   BILIRUBIN mg/dL 16.4*   ANION GAP mmol/L 6.1       No radiology results for the last day      I reviewed the patient's new clinical results.    Assessment/Plan:     Active and Resolved Problems  Active Hospital Problems    Diagnosis  POA    **Alcohol withdrawal [F10.939]  Yes      Resolved Hospital Problems   No resolved problems to display.       Acute alcoholic hepatitis with alcoholic cirrhosis  -Secondary to excessive alcohol use  -Initiated on prednisolone given elevated discriminant function score on admission  -Check repeat Lille score on hospital day  7  -Bilirubin and transaminases slowly improving with some mild improvement in pro time as well  -Continue CIWA for any alcohol withdrawal symptoms  -Patient was given 1 dose of vitamin K on admission    Hyponatremia 2/2 beer potomania  -Improved sodium level with fluid restriction    Acute hypokalemia, hypomagnesemia, hypocalcemia  -Secondary to malnutrition in the setting of excessive alcohol use  -Repleted         VTE Prophylaxis:  Mechanical VTE prophylaxis orders are present.         Code status is   Code Status and Medical Interventions:   Ordered at: 07/18/24 1214     Code Status (Patient has no pulse and is not breathing):    CPR (Attempt to Resuscitate)     Medical Interventions (Patient has pulse or is breathing):    Full Support       Disposition: Possible DC on 7/25    Time: 30 minutes    Signature: Electronically signed by Frank Wild MD, 07/23/24, 13:59 EDT.  [facility] Hospitalist Team

## 2024-07-23 NOTE — PROGRESS NOTES
LOS: 5 days   Patient Care Team:  Provider, No Known as PCP - General      Subjective     Interval History:     Subjective: Patient reports that he feels okay today.  He has had multiple bowel movements.  Discussed with patient the possibility of needing NG tube for nutrition supplement, patient would prefer to try increasing nutrition on his own as he has had a previous poor experience.  Patient reports that he is feeling anxious today due to upcoming EEG.    ROS:   No chest pain, shortness of breath, or cough.        Medication Review:     Current Facility-Administered Medications:     acetaminophen (TYLENOL) tablet 650 mg, 650 mg, Oral, Q4H PRN **OR** acetaminophen (TYLENOL) 160 MG/5ML oral solution 650 mg, 650 mg, Oral, Q4H PRN **OR** acetaminophen (TYLENOL) suppository 650 mg, 650 mg, Rectal, Q4H PRN, Mari Bustos MD    sennosides-docusate (PERICOLACE) 8.6-50 MG per tablet 2 tablet, 2 tablet, Oral, BID PRN **AND** polyethylene glycol (MIRALAX) packet 17 g, 17 g, Oral, Daily PRN **AND** bisacodyl (DULCOLAX) EC tablet 5 mg, 5 mg, Oral, Daily PRN **AND** bisacodyl (DULCOLAX) suppository 10 mg, 10 mg, Rectal, Daily PRN, Mari Bustos MD    Calcium Replacement - Follow Nurse / BPA Driven Protocol, , Does not apply, PRN, Mari Bustos MD    famotidine (PEPCID) tablet 40 mg, 40 mg, Oral, Nightly, Waleska Chery, APRN, 40 mg at 07/22/24 2027    folic acid (FOLVITE) tablet 1 mg, 1 mg, Oral, Daily, Mari Bustos MD, 1 mg at 07/23/24 0814    guaiFENesin-codeine (GUAIFENESIN AC) 100-10 MG/5ML liquid 5 mL, 5 mL, Oral, Q4H PRN, Frank Wild MD, 5 mL at 07/22/24 1053    lactulose (CHRONULAC) 10 GM/15ML solution 10 g, 10 g, Oral, TID, Vicente Moscoso MD, 10 g at 07/23/24 0814    LORazepam (ATIVAN) injection 1 mg, 1 mg, Intravenous, Q2H PRN, Mari Bustos MD    LORazepam (ATIVAN) tablet 1 mg, 1 mg, Oral, Q1H PRN **OR** LORazepam (ATIVAN) injection 1 mg, 1 mg, Intravenous, Q1H PRN  **OR** LORazepam (ATIVAN) tablet 2 mg, 2 mg, Oral, Q1H PRN **OR** LORazepam (ATIVAN) injection 2 mg, 2 mg, Intravenous, Q1H PRN **OR** LORazepam (ATIVAN) injection 2 mg, 2 mg, Intravenous, Q15 Min PRN **OR** LORazepam (ATIVAN) injection 2 mg, 2 mg, Intramuscular, Q15 Min PRN, Mari Bustos MD    Magnesium Standard Dose Replacement - Follow Nurse / BPA Driven Protocol, , Does not apply, PRCOREY, Mari Bustos MD    multivitamin with minerals 1 tablet, 1 tablet, Oral, Daily, Mari Bustos MD, 1 tablet at 07/23/24 0814    ondansetron ODT (ZOFRAN-ODT) disintegrating tablet 4 mg, 4 mg, Oral, Q6H PRN **OR** ondansetron (ZOFRAN) injection 4 mg, 4 mg, Intravenous, Q6H PRN, Mari Bustos MD, 4 mg at 07/18/24 1330    pantoprazole (PROTONIX) injection 40 mg, 40 mg, Intravenous, BID AC, Kaci Polo APRN, 40 mg at 07/23/24 0814    Phosphorus Replacement - Follow Nurse / BPA Driven Protocol, , Does not apply, Juli HARTLEY Abbas Ali, MD    Potassium Replacement - Follow Nurse / BPA Driven Protocol, , Does not apply, NAEEM, Mari Bustos MD    prednisoLONE sodium phosphate (ORAPRED) 15 MG/5ML oral solution 40 mg, 40 mg, Oral, Daily, Waleska Chery APRN, 40 mg at 07/23/24 0814    [Held by provider] sertraline (ZOLOFT) tablet 50 mg, 50 mg, Oral, Daily, Mari Bustos MD, 50 mg at 07/18/24 1258    sodium chloride 0.9 % flush 10 mL, 10 mL, Intravenous, PRN, Mari Bustos MD    sodium chloride 0.9 % flush 10 mL, 10 mL, Intravenous, Q12H, Mari Bustos MD, 10 mL at 07/23/24 0814    sodium chloride 0.9 % flush 10 mL, 10 mL, Intravenous, PRN, Mari Bustos MD    sodium chloride 0.9 % infusion 40 mL, 40 mL, Intravenous, PRN, Mari Bustos MD    sodium chloride 0.9 % infusion, 60 mL/hr, Intravenous, Continuous, Allison Mcdonald MD, Last Rate: 60 mL/hr at 07/23/24 0642, 60 mL/hr at 07/23/24 0642    sodium phosphates 15 mmol in 250 mL 0.9% sodium chloride IVPB, 15 mmol,  Intravenous, Once, Frank Wild MD, 15 mmol at 07/23/24 1104    sucralfate (CARAFATE) tablet 1 g, 1 g, Oral, 4x Daily AC & at Bedtime, Kaci Polo APRN, 1 g at 07/23/24 1104    thiamine (B-1) injection 200 mg, 200 mg, Intravenous, Q8H, 200 mg at 07/23/24 0511 **FOLLOWED BY** [START ON 7/24/2024] thiamine (VITAMIN B-1) tablet 100 mg, 100 mg, Oral, Daily, Mari Bustos MD    zinc sulfate (ZINCATE) capsule 220 mg, 220 mg, Oral, Daily, Waleska Chery APRN, 220 mg at 07/23/24 0814      Objective     Vital Signs  Vitals:    07/22/24 2000 07/22/24 2346 07/23/24 0410 07/23/24 1105   BP:  105/68 116/75 128/88   BP Location:  Left arm Left arm Left arm   Patient Position:  Lying Sitting Sitting   Pulse: 102 100 110 95   Resp:  20 16 21   Temp:  98.3 °F (36.8 °C) 97.7 °F (36.5 °C) 97.3 °F (36.3 °C)   TempSrc:  Oral Oral Oral   SpO2: 92% 94% 90% 95%   Weight:       Height:           Physical Exam:     General Appearance:    Awake and alert, in no acute distress   Head:    Normocephalic, without obvious abnormality   Eyes:          Conjunctivae normal, icteric sclera   Throat:   No oral lesions, no thrush, oral mucosa moist   Neck:   No adenopathy, supple, no JVD   Lungs:     respirations regular, even and unlabored   Abdomen:     Soft, non-tender, no rebound or guarding, non-distended   Rectal:     Deferred   Extremities:   No edema, no cyanosis   Skin:   No bruising or rash, jaundice        Results Review:    CBC    Results from last 7 days   Lab Units 07/23/24  0435 07/22/24  0449 07/21/24  0514 07/20/24  0509 07/19/24  0300 07/18/24  1015   WBC 10*3/mm3 14.99* 15.08* 14.80* 11.18* 8.78 9.86   HEMOGLOBIN g/dL 8.8* 8.4* 8.5* 9.1* 8.2* 8.9*   PLATELETS 10*3/mm3 148 147 148 100* 85* 94*     CMP   Results from last 7 days   Lab Units 07/23/24  0435 07/22/24  1515 07/22/24  0449 07/21/24  0514 07/20/24  0509 07/19/24  1619 07/19/24  1458 07/19/24  0300 07/18/24  1829 07/18/24  1015   SODIUM mmol/L 134*  --  133*  "135* 133* 131* 134* 126* 122* 122*   POTASSIUM mmol/L 4.5 4.5 3.6 3.7 3.8 3.8  3.6 3.6 3.0* 2.7* 1.9*   CHLORIDE mmol/L 102  --  98 100 96* 96* 96* 86* 80* 72*   CO2 mmol/L 25.9  --  26.9 24.9 29.0 23.8 29.2* 32.5* 33.2* 33.7*   BUN mg/dL 5*  --  4* 3* 3* 3* 3* 3* 4* 6   CREATININE mg/dL 0.55*  --  0.47* 0.46* 0.46* 0.41* 0.40* 0.44* 0.38* 0.40*   GLUCOSE mg/dL 89  --  89 130* 126* 78 79 116* 91 93   ALBUMIN g/dL 3.2*  --  3.1* 2.8* 2.9*  --  2.9* 3.1* 2.4* 3.0*   BILIRUBIN mg/dL 16.4*  --  16.0* 16.6* 18.0*  --  17.9* 17.4*  --  15.8*   ALK PHOS U/L 261*  --  258* 272* 286*  --  301* 324*  --  380*   AST (SGOT) U/L 116*  --  107* 115* 122*  --  143* 159*  --  186*   ALT (SGPT) U/L 29  --  27 23 23  --  27 28  --  33   MAGNESIUM mg/dL 1.7  --  2.2 1.3*  --   --  1.7 2.2  --  1.2*   PHOSPHORUS mg/dL 2.0*  --  2.5 2.8 2.6 1.6*  --  0.3*  --   --    LIPASE U/L  --   --   --   --   --   --   --   --   --  100*   AMMONIA umol/L  --   --   --   --   --   --   --  90*  --   --      Cr Clearance Estimated Creatinine Clearance: 156.1 mL/min (A) (by C-G formula based on SCr of 0.55 mg/dL (L)).  Coag   Results from last 7 days   Lab Units 07/23/24  0435 07/22/24  0449 07/21/24  1506 07/20/24  0509 07/19/24  0300 07/18/24  0923   INR  1.80* 1.71* 1.71* 2.02* 1.79* 1.59*   APTT seconds  --   --   --   --   --  41.9*     HbA1C No results found for: \"HGBA1C\"  Results from last 7 days   Lab Units 07/18/24  1015   CK TOTAL U/L 45     Infection   Results from last 7 days   Lab Units 07/18/24  0928 07/18/24  0923   BLOODCX  No growth at 5 days No growth at 5 days     UA    Results from last 7 days   Lab Units 07/18/24  1319   NITRITE UA  Positive*   WBC UA /HPF 0-2   BACTERIA UA /HPF None Seen   SQUAM EPITHEL UA /HPF 0-2     Microbiology Results (last 10 days)       Procedure Component Value - Date/Time    Legionella Antigen, Urine - Urine, Urine, Clean Catch [[id number]]  (Normal) Collected: 07/18/24 1319    Lab Status: Final result " Specimen: Urine, Clean Catch Updated: 07/18/24 1852     LEGIONELLA ANTIGEN, URINE Negative    Blood Culture - Blood, Hand, Right [[id number]]  (Normal) Collected: 07/18/24 0928    Lab Status: Final result Specimen: Blood from Hand, Right Updated: 07/23/24 0945     Blood Culture No growth at 5 days    Blood Culture - Blood, Arm, Right [[id number]]  (Normal) Collected: 07/18/24 0923    Lab Status: Final result Specimen: Blood from Arm, Right Updated: 07/23/24 0945     Blood Culture No growth at 5 days          Imaging Results (Last 72 Hours)       ** No results found for the last 72 hours. **            Assessment & Plan     ASSESSMENT:  -Alcohol hepatitis/cirrhosis   -Elevated LFTs -related to above  -Acute alcohol withdrawal  -Nitrite positive UTI  -Leukocytosis  -Normocytic anemia  -Thrombocytopenia - due to cirrhosis  -Elevated INR - due to cirrhosis  -Hyponatremia related to alcohol abuse  -Dysphagia could be related to esophageal stricture versus reflux esophagitis versus dysmotility  -GERD     PLAN:  Patient is a 27 y/o male with history of ETOH abuse who presented on 7/18 with complaints of ETOH withdrawal. Found to have significantly elevated LFTs on admission with total bilirubin 17.4, alk phos 324, , and ALT 28. CT abd/pelvis W on admission showed fatty liver,  sequelae of portal hypertension noted including distal paraesophageal  and intra-abdominal varices as well as trace ascites , and patent portal vasculature. He was started on prednisolone on 7/19 due to Maddrey's DF of 33.     Patient reports that he is feeling some better. No nausea/vomiting, abdominal pain, or overt GI bleeding.   LFTs worsening with total bilirubin 16.4 from 16.0, alk phos 261 from 258,  from 107, and ALT 29 from 27.  Continue prednisolone. Will calculate Lille score on day 7.   Encourage nutrition.  Discussed possibility of placing NG tube, patient did not tolerate this well in the past and would like to try  increasing nutrition on his own.  Continue 2g low-sodium diet.   Continue thiamine, zinc, and folic acid.   Recommend complete ETOH cessation.   Liver serologies unremarkable. AFP normal.   Will need screening EGD at some point as inpatient vs outpatient.   Continue supportive care.     Electronically signed by MARI Mobley, 07/23/24, 12:43 PM EDT.

## 2024-07-23 NOTE — CASE MANAGEMENT/SOCIAL WORK
Social Work Assessment  [facility]     Patient Name: Yonas Charles  MRN: [id number]  Today's Date: 7/23/2024    Admit Date: 7/18/2024     Discharge Plan       Row Name 07/23/24 1747       Plan    Plan From home with parents.  Patient has expressed interest in Inpatient Substance Abuse Tx Program.    Plan Comments SW met with pt at bedside to discuss d/c planning needs.  Patient is alert/oriented x4.  He recently returned to [place] and is living with his parents who are concerned about him, his health and substance abuse.  Patient is able to drive and his parents are assisting him financially. Patient is agreeable to Inpatient SA Tx.  He was given a list of facilities/programs to review/research.  He would also like his parents input and they are coming to visit him this evening.  Patient reported he stopped smoking 5 weeks ago, consumes 750 mil of vodka daily, and no illegal substance use.  Patient has anxiety and depression.  His brother passed away June 23, 2023 of heart issues and this loss has been difficult for the entire family.  Patient has underwent difficult relationships and breakups with girlfriends.  Patient does not have a PCP but wants to sign up with a [facility] on his own.  He uses [facility] in [place], [place].  No DME used or needed at this time.  Support and active listening provided.  SW will assist with Inpatient Substance Abuse Program when pt is medically ready.                    Substance Abuse       Row Name 07/23/24 1733       Substance Use    Last Alcohol Use 07/05/24    Environment Typically Uses Alcohol private residence    Reported Characteristics of Alcohol Use continue despite physical/psychological problems    Readiness to Change Alcohol Use precontemplation    Longest Period of Alcohol Sobriety 1 month    Alcohol Withdrawal Pattern tremor(s);irritability    Previous Substance Use Treatment none    Substance Use Comment LIZABETH met with pt x2 today to discuss goals and d/c  planning needs. Patient is interested in IP Substance Abuse Tx.  He was given a list of facilities to choose from.  Encouraged pt to review/research online and discuss with his parents who will be coming to visit [facility].  Patient is not medically ready for d/c.  When pt is medically ready for d/c, SW will send appropriate referrals.       AUDIT-C (Alcohol Use Disorders ID Test)    Q1: How often do you have a drink containing alcohol? 4 or more ti    Q2: How many drinks containing alcohol do you have on a typical day when you are drinking? 10 or more    Q3: How often do you have six or more drinks on one occasion? Daily    Audit-C Score 12               MICHELE Jones MSW    Phone: [phone]  Cell: [phone]  Fax: [phone]  [email or web address]

## 2024-07-23 NOTE — PLAN OF CARE
Problem: Adult Inpatient Plan of Care  Goal: Absence of Hospital-Acquired Illness or Injury  Intervention: Identify and Manage Fall Risk  Recent Flowsheet Documentation  Taken 7/23/2024 1612 by Isabell Ibarra RN  Safety Promotion/Fall Prevention:   assistive device/personal items within reach   clutter free environment maintained   fall prevention program maintained   lighting adjusted   nonskid shoes/slippers when out of bed   room organization consistent   safety round/check completed  Taken 7/23/2024 1428 by Isabell Ibarra RN  Safety Promotion/Fall Prevention:   assistive device/personal items within reach   clutter free environment maintained   fall prevention program maintained   lighting adjusted   nonskid shoes/slippers when out of bed   room organization consistent   safety round/check completed  Taken 7/23/2024 1218 by Isabell Ibarra RN  Safety Promotion/Fall Prevention:   assistive device/personal items within reach   clutter free environment maintained   fall prevention program maintained   lighting adjusted   nonskid shoes/slippers when out of bed   room organization consistent   safety round/check completed  Taken 7/23/2024 1010 by Isabell Ibarra RN  Safety Promotion/Fall Prevention: patient off unit  Taken 7/23/2024 0820 by Isabell Ibarra RN  Safety Promotion/Fall Prevention:   clutter free environment maintained   assistive device/personal items within reach   fall prevention program maintained   lighting adjusted   nonskid shoes/slippers when out of bed   room organization consistent   safety round/check completed  Intervention: Prevent Skin Injury  Recent Flowsheet Documentation  Taken 7/23/2024 1612 by Isabell Ibarra RN  Body Position: position changed independently  Taken 7/23/2024 1428 by Isabell Ibarra RN  Body Position: position changed independently  Taken 7/23/2024 1218 by Isabell Ibarra RN  Body Position: position changed independently  Taken 7/23/2024 0820 by Isabell Ibarra RN  Body Position:  position changed independently  Intervention: Prevent and Manage VTE (Venous Thromboembolism) Risk  Recent Flowsheet Documentation  Taken 7/23/2024 1612 by Isabell Ibarra RN  Activity Management: activity encouraged  Taken 7/23/2024 1428 by Isabell Ibarra RN  Activity Management: activity encouraged  Taken 7/23/2024 1218 by Isabell Ibarra RN  Activity Management: activity encouraged  Taken 7/23/2024 0820 by Isabell Ibarra RN  Activity Management: activity encouraged  Intervention: Prevent Infection  Recent Flowsheet Documentation  Taken 7/23/2024 1612 by Isabell Ibarra RN  Infection Prevention:   hand hygiene promoted   personal protective equipment utilized  Taken 7/23/2024 1428 by Isabell Ibarra RN  Infection Prevention:   hand hygiene promoted   personal protective equipment utilized  Taken 7/23/2024 1218 by Isabell Ibarra RN  Infection Prevention:   hand hygiene promoted   personal protective equipment utilized  Taken 7/23/2024 0820 by Isabell Ibarra RN  Infection Prevention:   hand hygiene promoted   personal protective equipment utilized  Goal: Optimal Comfort and Wellbeing  Intervention: Provide Person-Centered Care  Recent Flowsheet Documentation  Taken 7/23/2024 0820 by Isabell Ibarra RN  Trust Relationship/Rapport: care explained   Goal Outcome Evaluation:

## 2024-07-23 NOTE — PROGRESS NOTES
"NEPHROLOGY PROGRESS NOTE------[facility]    [facility]  [phone]  Quang Butts MD      Patient Care Team:  Provider, No Known as PCP - General      Provider:  Quang Butts MD  Patient Name: Yonas Charles  :  1995    SUBJECTIVE:    F/U ELECTROLYTE ABNORMALITIES    No chest pain or SOA    Medication:  famotidine, 40 mg, Oral, Nightly  folic acid, 1 mg, Oral, Daily  lactulose, 10 g, Oral, TID  multivitamin with minerals, 1 tablet, Oral, Daily  pantoprazole, 40 mg, Oral, BID AC  prednisoLONE sodium phosphate, 40 mg, Oral, Daily  [Held by provider] sertraline, 50 mg, Oral, Daily  sodium chloride, 10 mL, Intravenous, Q12H  sucralfate, 1 g, Oral, 4x Daily AC & at Bedtime  [START ON 2024] thiamine, 100 mg, Oral, Daily  zinc sulfate, 220 mg, Oral, Daily      sodium chloride, 60 mL/hr, Last Rate: 60 mL/hr (24 0642)          OBJECTIVE    Vital Sign Min/Max for last 24 hours  Temp  Min: 97.3 °F (36.3 °C)  Max: 98.3 °F (36.8 °C)   BP  Min: 105/68  Max: 128/88   Pulse  Min: 95  Max: 115   Resp  Min: 16  Max: 26   SpO2  Min: 90 %  Max: 95 %   No data recorded   No data recorded     Flowsheet Rows      Flowsheet Row First Filed Value   Admission Height 165.1 cm (65\") Documented at 2024 0855   Admission Weight 55.7 kg (122 lb 12.7 oz) Documented at 2024 0855            I/O this shift:  In: 582 [P.O.:582]  Out: 0   I/O last 3 completed shifts:  In: 3060 [P.O.:1060; I.V.:2000]  Out: -     Physical Exam:  General Appearance: NAD.   Head: normocephalic, without obvious abnormality and atraumatic    Eyes: conjunctivae and +ICTERIC SCLERAE  Neck: supple and no JVD  Lungs: CTA bilaterally  Heart: regular rhythm & normal rate and normal S1, S2   Chest Wall: no abnormalities observed  Abdomen: normal bowel sounds and soft   Extremities: moves extremities well, no edema, no cyanosis  Skin: +JAUNDICE  Neurologic: A AND O X 4 WITHOUT FOCAL " "DEFICIT    Labs:    WBC WBC   Date Value Ref Range Status   07/23/2024 14.99 (H) 3.40 - 10.80 10*3/mm3 Final   07/22/2024 15.08 (H) 3.40 - 10.80 10*3/mm3 Final   07/21/2024 14.80 (H) 3.40 - 10.80 10*3/mm3 Final      HGB Hemoglobin   Date Value Ref Range Status   07/23/2024 8.8 (L) 13.0 - 17.7 g/dL Final   07/22/2024 8.4 (L) 13.0 - 17.7 g/dL Final   07/21/2024 8.5 (L) 13.0 - 17.7 g/dL Final      HCT Hematocrit   Date Value Ref Range Status   07/23/2024 24.2 (L) 37.5 - 51.0 % Final   07/22/2024 23.2 (L) 37.5 - 51.0 % Final   07/21/2024 23.0 (L) 37.5 - 51.0 % Final      Platelets No results found for: \"LABPLAT\"   MCV MCV   Date Value Ref Range Status   07/23/2024 93.8 79.0 - 97.0 fL Final   07/22/2024 93.5 79.0 - 97.0 fL Final   07/21/2024 92.4 79.0 - 97.0 fL Final          Sodium Sodium   Date Value Ref Range Status   07/23/2024 134 (L) 136 - 145 mmol/L Final   07/22/2024 133 (L) 136 - 145 mmol/L Final   07/21/2024 135 (L) 136 - 145 mmol/L Final      Potassium Potassium   Date Value Ref Range Status   07/23/2024 4.5 3.5 - 5.2 mmol/L Final   07/22/2024 4.5 3.5 - 5.2 mmol/L Final   07/22/2024 3.6 3.5 - 5.2 mmol/L Final   07/21/2024 3.7 3.5 - 5.2 mmol/L Final      Chloride Chloride   Date Value Ref Range Status   07/23/2024 102 98 - 107 mmol/L Final   07/22/2024 98 98 - 107 mmol/L Final   07/21/2024 100 98 - 107 mmol/L Final      CO2 CO2   Date Value Ref Range Status   07/23/2024 25.9 22.0 - 29.0 mmol/L Final   07/22/2024 26.9 22.0 - 29.0 mmol/L Final   07/21/2024 24.9 22.0 - 29.0 mmol/L Final      BUN BUN   Date Value Ref Range Status   07/23/2024 5 (L) 6 - 20 mg/dL Final   07/22/2024 4 (L) 6 - 20 mg/dL Final   07/21/2024 3 (L) 6 - 20 mg/dL Final      Creatinine Creatinine   Date Value Ref Range Status   07/23/2024 0.55 (L) 0.76 - 1.27 mg/dL Final   07/22/2024 0.47 (L) 0.76 - 1.27 mg/dL Final   07/21/2024 0.46 (L) 0.76 - 1.27 mg/dL Final      Calcium Calcium   Date Value Ref Range Status   07/23/2024 8.6 8.6 - 10.5 " "mg/dL Final   07/22/2024 8.5 (L) 8.6 - 10.5 mg/dL Final   07/21/2024 8.6 8.6 - 10.5 mg/dL Final      PO4 No components found for: \"PO4\"   Albumin Albumin   Date Value Ref Range Status   07/23/2024 3.2 (L) 3.5 - 5.2 g/dL Final   07/22/2024 3.1 (L) 3.5 - 5.2 g/dL Final   07/21/2024 2.8 (L) 3.5 - 5.2 g/dL Final      Magnesium Magnesium   Date Value Ref Range Status   07/23/2024 1.7 1.6 - 2.6 mg/dL Final   07/22/2024 2.2 1.6 - 2.6 mg/dL Final   07/21/2024 1.3 (L) 1.6 - 2.6 mg/dL Final      Uric Acid No components found for: \"URIC ACID\"     Imaging Results (Last 72 Hours)       ** No results found for the last 72 hours. **            Results for orders placed during the hospital encounter of 07/18/24    XR Chest PA & Lateral    Narrative  DATE OF EXAM:  7/18/2024 12:36 PM    PROCEDURE:  XR CHEST PA AND LATERAL-    INDICATIONS:  rule out aspiration pneumonia; E87.6-Hypokalemia; F10.939-Alcohol use,  unspecified with withdrawal, unspecified; R17-Unspecified jaundice    COMPARISON:  No Comparisons Available    TECHNIQUE:  Two radiologic views of the chest , PA and lateral were obtained.    FINDINGS:  Heart size normal. Negative for lobar consolidation, edema, effusion or  pneumothorax. Osseous structures unremarkable.    Impression  No active pulmonary process.      Electronically Signed By-Armani Small MD On:7/18/2024 1:00 PM            ASSESSMENT / PLAN      Alcohol withdrawal    HYPONATREMIA------stable. Likely related to underlying liver disease. Fluid restrict     2. HYPOKALEMIA-------Resolved.       3. HYPOMAGNESEMIA-------Replaced     4. HYPOCALCEMIA------Replace IV     5. ALCOHOL ABUSE/CIRRHOSIS/JAUNDICE------Thiamine, Folate, IVFs, prn IV Ativan. Withdrawal prcautions     6. MIXED METABOLIC ALKALOSIS/METABOLIC ACIDOSIS-----Better     7. ANEMIA------Normocytic with normal RDW. Follow for PRBC need     8. DEPRESSION------Suicide precautions. Hold Zoloft given severe hyponatremia     9. HYPOALBUMINEMIA-----IV " Albumin to temporize and po supplements     10. ELEVATED INR------Secondary to liver disease     11. THROMBOCYTOPENIA------No heparin. Secondary to liver disease     12. PUD PROPHYLAXIS-----IV PPI     13. DVT PROPHYLAXIS-----SCDs    14. HYPOPHOSPHATEMIA------Replaced    Sodium mostly stable  Stop IVF  Labs in am        Quang Butts MD  [facility]  [phone]  07/23/24  17:18 EDT

## 2024-07-23 NOTE — PLAN OF CARE
Goal Outcome Evaluation:  Plan of Care Reviewed With: patient        Problem: Adult Inpatient Plan of Care  Goal: Plan of Care Review  Outcome: Ongoing, Progressing  Flowsheets (Taken 7/23/2024 4344)  Progress: improving  Plan of Care Reviewed With: patient     Progress: improving

## 2024-07-23 NOTE — CASE MANAGEMENT/SOCIAL WORK
Social Work Assessment  [facility]     Patient Name: Yonas Charles  MRN: [id number]  Today's Date: 7/23/2024    Admit Date: 7/18/2024   Substance Abuse       Row Name 07/23/24 1733       Substance Use    Last Alcohol Use 07/05/24    Environment Typically Uses Alcohol private residence    Reported Characteristics of Alcohol Use continue despite physical/psychological problems    Readiness to Change Alcohol Use precontemplation    Longest Period of Alcohol Sobriety 1 month    Alcohol Withdrawal Pattern tremor(s);irritability    Previous Substance Use Treatment none    Substance Use Comment SW met with pt x2 today to discuss goals and d/c planning needs. Patient is interested in IP Substance Abuse Tx.  He was given a list of facilities to choose from.  Encouraged pt to review/research online and discuss with his parents who will be coming to visit [facility].  Patient is not medically ready for d/c.  When pt is medically ready for d/c,  will send appropriate referrals.       AUDIT-C (Alcohol Use Disorders ID Test)    Q1: How often do you have a drink containing alcohol? 4 or more ti    Q2: How many drinks containing alcohol do you have on a typical day when you are drinking? 10 or more    Q3: How often do you have six or more drinks on one occasion? Daily    Audit-C Score 12               MICHELE Jones MSW    Phone: [phone]  Cell: [phone]  Fax: [phone]  [email or web address]

## 2024-07-24 ENCOUNTER — APPOINTMENT (OUTPATIENT)
Dept: GENERAL RADIOLOGY | Facility: HOSPITAL | Age: 29
End: 2024-07-24
Payer: MEDICAID

## 2024-07-24 ENCOUNTER — INPATIENT HOSPITAL (OUTPATIENT)
Dept: URBAN - METROPOLITAN AREA HOSPITAL 84 | Facility: HOSPITAL | Age: 29
End: 2024-07-24
Payer: MEDICAID

## 2024-07-24 DIAGNOSIS — K21.9 GASTRO-ESOPHAGEAL REFLUX DISEASE WITHOUT ESOPHAGITIS: ICD-10-CM

## 2024-07-24 DIAGNOSIS — K70.10 ALCOHOLIC HEPATITIS WITHOUT ASCITES: ICD-10-CM

## 2024-07-24 DIAGNOSIS — R94.5 ABNORMAL RESULTS OF LIVER FUNCTION STUDIES: ICD-10-CM

## 2024-07-24 DIAGNOSIS — F10.939 ALCOHOL USE, UNSPECIFIED WITH WITHDRAWAL, UNSPECIFIED: ICD-10-CM

## 2024-07-24 DIAGNOSIS — K70.30 ALCOHOLIC CIRRHOSIS OF LIVER WITHOUT ASCITES: ICD-10-CM

## 2024-07-24 DIAGNOSIS — D72.829 ELEVATED WHITE BLOOD CELL COUNT, UNSPECIFIED: ICD-10-CM

## 2024-07-24 DIAGNOSIS — D64.9 ANEMIA, UNSPECIFIED: ICD-10-CM

## 2024-07-24 DIAGNOSIS — D69.6 THROMBOCYTOPENIA, UNSPECIFIED: ICD-10-CM

## 2024-07-24 LAB
ALBUMIN SERPL-MCNC: 3.2 G/DL (ref 3.5–5.2)
ALBUMIN/GLOB SERPL: 1.3 G/DL
ALP SERPL-CCNC: 275 U/L (ref 39–117)
ALT SERPL W P-5'-P-CCNC: 35 U/L (ref 1–41)
ANION GAP SERPL CALCULATED.3IONS-SCNC: 8 MMOL/L (ref 5–15)
AST SERPL-CCNC: 126 U/L (ref 1–40)
BASOPHILS # BLD AUTO: 0.02 10*3/MM3 (ref 0–0.2)
BASOPHILS # BLD AUTO: 0.02 10*3/MM3 (ref 0–0.2)
BASOPHILS NFR BLD AUTO: 0.1 % (ref 0–1.5)
BASOPHILS NFR BLD AUTO: 0.1 % (ref 0–1.5)
BILIRUB SERPL-MCNC: 16.9 MG/DL (ref 0–1.2)
BUN SERPL-MCNC: 5 MG/DL (ref 6–20)
BUN/CREAT SERPL: 10.2 (ref 7–25)
CALCIUM SPEC-SCNC: 8.4 MG/DL (ref 8.6–10.5)
CHLORIDE SERPL-SCNC: 101 MMOL/L (ref 98–107)
CO2 SERPL-SCNC: 25 MMOL/L (ref 22–29)
CREAT SERPL-MCNC: 0.49 MG/DL (ref 0.76–1.27)
DEPRECATED RDW RBC AUTO: 57.7 FL (ref 37–54)
DEPRECATED RDW RBC AUTO: 60.4 FL (ref 37–54)
EGFRCR SERPLBLD CKD-EPI 2021: 142.5 ML/MIN/1.73
EOSINOPHIL # BLD AUTO: 0.02 10*3/MM3 (ref 0–0.4)
EOSINOPHIL # BLD AUTO: 0.03 10*3/MM3 (ref 0–0.4)
EOSINOPHIL NFR BLD AUTO: 0.1 % (ref 0.3–6.2)
EOSINOPHIL NFR BLD AUTO: 0.2 % (ref 0.3–6.2)
ERYTHROCYTE [DISTWIDTH] IN BLOOD BY AUTOMATED COUNT: 17.6 % (ref 12.3–15.4)
ERYTHROCYTE [DISTWIDTH] IN BLOOD BY AUTOMATED COUNT: 18 % (ref 12.3–15.4)
GLOBULIN UR ELPH-MCNC: 2.4 GM/DL
GLUCOSE SERPL-MCNC: 93 MG/DL (ref 65–99)
HCT VFR BLD AUTO: 25.3 % (ref 37.5–51)
HCT VFR BLD AUTO: 25.7 % (ref 37.5–51)
HGB BLD-MCNC: 9.2 G/DL (ref 13–17.7)
HGB BLD-MCNC: 9.3 G/DL (ref 13–17.7)
IMM GRANULOCYTES # BLD AUTO: 0.42 10*3/MM3 (ref 0–0.05)
IMM GRANULOCYTES # BLD AUTO: 0.49 10*3/MM3 (ref 0–0.05)
IMM GRANULOCYTES NFR BLD AUTO: 2.6 % (ref 0–0.5)
IMM GRANULOCYTES NFR BLD AUTO: 2.9 % (ref 0–0.5)
INR PPP: 1.88 (ref 0.93–1.1)
LYMPHOCYTES # BLD AUTO: 1.27 10*3/MM3 (ref 0.7–3.1)
LYMPHOCYTES # BLD AUTO: 1.46 10*3/MM3 (ref 0.7–3.1)
LYMPHOCYTES NFR BLD AUTO: 7.6 % (ref 19.6–45.3)
LYMPHOCYTES NFR BLD AUTO: 9.1 % (ref 19.6–45.3)
MCH RBC QN AUTO: 34.2 PG (ref 26.6–33)
MCH RBC QN AUTO: 34.3 PG (ref 26.6–33)
MCHC RBC AUTO-ENTMCNC: 36.2 G/DL (ref 31.5–35.7)
MCHC RBC AUTO-ENTMCNC: 36.4 G/DL (ref 31.5–35.7)
MCV RBC AUTO: 94.4 FL (ref 79–97)
MCV RBC AUTO: 94.5 FL (ref 79–97)
MONOCYTES # BLD AUTO: 1.89 10*3/MM3 (ref 0.1–0.9)
MONOCYTES # BLD AUTO: 1.92 10*3/MM3 (ref 0.1–0.9)
MONOCYTES NFR BLD AUTO: 11.5 % (ref 5–12)
MONOCYTES NFR BLD AUTO: 11.8 % (ref 5–12)
NEUTROPHILS NFR BLD AUTO: 12.16 10*3/MM3 (ref 1.7–7)
NEUTROPHILS NFR BLD AUTO: 12.95 10*3/MM3 (ref 1.7–7)
NEUTROPHILS NFR BLD AUTO: 76.2 % (ref 42.7–76)
NEUTROPHILS NFR BLD AUTO: 77.8 % (ref 42.7–76)
NRBC BLD AUTO-RTO: 0 /100 WBC (ref 0–0.2)
NRBC BLD AUTO-RTO: 0.1 /100 WBC (ref 0–0.2)
PHOSPHATE SERPL-MCNC: 2.3 MG/DL (ref 2.5–4.5)
PLATELET # BLD AUTO: 162 10*3/MM3 (ref 140–450)
PLATELET # BLD AUTO: 170 10*3/MM3 (ref 140–450)
PMV BLD AUTO: 10.2 FL (ref 6–12)
PMV BLD AUTO: 9.8 FL (ref 6–12)
POTASSIUM SERPL-SCNC: 4 MMOL/L (ref 3.5–5.2)
PROT SERPL-MCNC: 5.6 G/DL (ref 6–8.5)
PROTHROMBIN TIME: 19.6 SECONDS (ref 9.6–11.7)
RBC # BLD AUTO: 2.68 10*6/MM3 (ref 4.14–5.8)
RBC # BLD AUTO: 2.72 10*6/MM3 (ref 4.14–5.8)
SODIUM SERPL-SCNC: 134 MMOL/L (ref 136–145)
URATE SERPL-MCNC: 1 MG/DL (ref 3.4–7)
WBC NRBC COR # BLD AUTO: 15.98 10*3/MM3 (ref 3.4–10.8)
WBC NRBC COR # BLD AUTO: 16.67 10*3/MM3 (ref 3.4–10.8)

## 2024-07-24 PROCEDURE — 25010000002 FUROSEMIDE PER 20 MG: Performed by: INTERNAL MEDICINE

## 2024-07-24 PROCEDURE — 80053 COMPREHEN METABOLIC PANEL: CPT | Performed by: NURSE PRACTITIONER

## 2024-07-24 PROCEDURE — 84100 ASSAY OF PHOSPHORUS: CPT | Performed by: INTERNAL MEDICINE

## 2024-07-24 PROCEDURE — 80053 COMPREHEN METABOLIC PANEL: CPT | Performed by: INTERNAL MEDICINE

## 2024-07-24 PROCEDURE — 25010000002 PHYTONADIONE 10 MG/ML SOLUTION: Performed by: NURSE PRACTITIONER

## 2024-07-24 PROCEDURE — 71045 X-RAY EXAM CHEST 1 VIEW: CPT

## 2024-07-24 PROCEDURE — 85025 COMPLETE CBC W/AUTO DIFF WBC: CPT | Performed by: NURSE PRACTITIONER

## 2024-07-24 PROCEDURE — 63710000001 PREDNISOLONE PER 5 MG: Performed by: NURSE PRACTITIONER

## 2024-07-24 PROCEDURE — 84550 ASSAY OF BLOOD/URIC ACID: CPT | Performed by: INTERNAL MEDICINE

## 2024-07-24 PROCEDURE — 85610 PROTHROMBIN TIME: CPT | Performed by: NURSE PRACTITIONER

## 2024-07-24 PROCEDURE — 99233 SBSQ HOSP IP/OBS HIGH 50: CPT | Performed by: NURSE PRACTITIONER

## 2024-07-24 RX ORDER — PHYTONADIONE 10 MG/ML
10 INJECTION, EMULSION INTRAMUSCULAR; INTRAVENOUS; SUBCUTANEOUS ONCE
Qty: 1 ML | Refills: 0 | Status: COMPLETED | OUTPATIENT
Start: 2024-07-24 | End: 2024-07-24

## 2024-07-24 RX ORDER — FUROSEMIDE 10 MG/ML
20 INJECTION INTRAMUSCULAR; INTRAVENOUS ONCE
Status: COMPLETED | OUTPATIENT
Start: 2024-07-24 | End: 2024-07-24

## 2024-07-24 RX ADMIN — SUCRALFATE 1 G: 1 TABLET ORAL at 08:18

## 2024-07-24 RX ADMIN — SUCRALFATE 1 G: 1 TABLET ORAL at 11:02

## 2024-07-24 RX ADMIN — LACTULOSE 10 G: 20 SOLUTION ORAL at 15:07

## 2024-07-24 RX ADMIN — FOLIC ACID 1 MG: 1 TABLET ORAL at 08:18

## 2024-07-24 RX ADMIN — LACTULOSE 10 G: 20 SOLUTION ORAL at 08:18

## 2024-07-24 RX ADMIN — PREDNISOLONE SODIUM PHOSPHATE 40 MG: 15 SOLUTION ORAL at 08:18

## 2024-07-24 RX ADMIN — Medication 10 ML: at 08:19

## 2024-07-24 RX ADMIN — GUAIFENESIN AND CODEINE PHOSPHATE 5 ML: 100; 10 SOLUTION ORAL at 03:18

## 2024-07-24 RX ADMIN — PHYTONADIONE 10 MG: 10 INJECTION, EMULSION INTRAMUSCULAR; INTRAVENOUS; SUBCUTANEOUS at 15:07

## 2024-07-24 RX ADMIN — LACTULOSE 10 G: 20 SOLUTION ORAL at 21:07

## 2024-07-24 RX ADMIN — Medication 1 TABLET: at 08:18

## 2024-07-24 RX ADMIN — SUCRALFATE 1 G: 1 TABLET ORAL at 16:44

## 2024-07-24 RX ADMIN — PANTOPRAZOLE SODIUM 40 MG: 40 TABLET, DELAYED RELEASE ORAL at 16:44

## 2024-07-24 RX ADMIN — FAMOTIDINE 40 MG: 20 TABLET, FILM COATED ORAL at 21:07

## 2024-07-24 RX ADMIN — Medication 220 MG: at 08:18

## 2024-07-24 RX ADMIN — FUROSEMIDE 20 MG: 10 INJECTION, SOLUTION INTRAMUSCULAR; INTRAVENOUS at 15:07

## 2024-07-24 RX ADMIN — Medication 10 ML: at 21:07

## 2024-07-24 RX ADMIN — SUCRALFATE 1 G: 1 TABLET ORAL at 21:07

## 2024-07-24 RX ADMIN — Medication 100 MG: at 08:18

## 2024-07-24 RX ADMIN — PANTOPRAZOLE SODIUM 40 MG: 40 TABLET, DELAYED RELEASE ORAL at 08:18

## 2024-07-24 NOTE — PROGRESS NOTES
LOS: 6 days   Patient Care Team:  Provider, No Known as PCP - General      Subjective     Interval History:     Subjective: Patient with no new complaints.  States that he continues to eat 100% of his trays.  No abdominal pain.  Denies nausea/vomiting.      ROS:   No chest pain, shortness of breath, or cough.        Medication Review:     Current Facility-Administered Medications:     acetaminophen (TYLENOL) tablet 650 mg, 650 mg, Oral, Q4H PRN **OR** acetaminophen (TYLENOL) 160 MG/5ML oral solution 650 mg, 650 mg, Oral, Q4H PRN **OR** acetaminophen (TYLENOL) suppository 650 mg, 650 mg, Rectal, Q4H PRN, Mari Bustos MD    sennosides-docusate (PERICOLACE) 8.6-50 MG per tablet 2 tablet, 2 tablet, Oral, BID PRN **AND** polyethylene glycol (MIRALAX) packet 17 g, 17 g, Oral, Daily PRN **AND** bisacodyl (DULCOLAX) EC tablet 5 mg, 5 mg, Oral, Daily PRN **AND** bisacodyl (DULCOLAX) suppository 10 mg, 10 mg, Rectal, Daily PRN, Mari Bustos MD    Calcium Replacement - Follow Nurse / BPA Driven Protocol, , Does not apply, PRN, Mari Bustos MD    famotidine (PEPCID) tablet 40 mg, 40 mg, Oral, Nightly, Waleska Chery, APRN, 40 mg at 07/23/24 2030    folic acid (FOLVITE) tablet 1 mg, 1 mg, Oral, Daily, Mari Bustos MD, 1 mg at 07/24/24 0818    guaiFENesin-codeine (GUAIFENESIN AC) 100-10 MG/5ML liquid 5 mL, 5 mL, Oral, Q4H PRN, Frank Wild MD, 5 mL at 07/24/24 0318    lactulose (CHRONULAC) 10 GM/15ML solution 10 g, 10 g, Oral, TID, Vicente Moscoso MD, 10 g at 07/24/24 0818    Magnesium Standard Dose Replacement - Follow Nurse / BPA Driven Protocol, , Does not apply, PRN, Mari Bustos MD    multivitamin with minerals 1 tablet, 1 tablet, Oral, Daily, Mari Bustos MD, 1 tablet at 07/24/24 0818    ondansetron ODT (ZOFRAN-ODT) disintegrating tablet 4 mg, 4 mg, Oral, Q6H PRN **OR** ondansetron (ZOFRAN) injection 4 mg, 4 mg, Intravenous, Q6H PRN, Mari Bustos MD, 4 mg at  07/18/24 1330    pantoprazole (PROTONIX) EC tablet 40 mg, 40 mg, Oral, BID AC, Kaci Polo APRN, 40 mg at 07/24/24 0818    Phosphorus Replacement - Follow Nurse / BPA Driven Protocol, , Does not apply, Juli HARTLEY Abbas Ali, MD    Potassium Replacement - Follow Nurse / BPA Driven Protocol, , Does not apply, Juli HARTLEY Abbas Ali, MD    prednisoLONE sodium phosphate (ORAPRED) 15 MG/5ML oral solution 40 mg, 40 mg, Oral, Daily, Waleska Chery APRN, 40 mg at 07/24/24 0818    [Held by provider] sertraline (ZOLOFT) tablet 50 mg, 50 mg, Oral, Daily, Mari Bustos MD, 50 mg at 07/18/24 1258    sodium chloride 0.9 % flush 10 mL, 10 mL, Intravenous, PRN, Mari Bustos MD    sodium chloride 0.9 % flush 10 mL, 10 mL, Intravenous, Q12H, Mari Bustos MD, 10 mL at 07/24/24 0819    sodium chloride 0.9 % flush 10 mL, 10 mL, Intravenous, PRN, Mari Bustos MD    sodium chloride 0.9 % infusion 40 mL, 40 mL, Intravenous, PRN, Mari Bustos MD    sucralfate (CARAFATE) tablet 1 g, 1 g, Oral, 4x Daily AC & at Bedtime, Kaci Polo APRN, 1 g at 07/24/24 1102    [COMPLETED] thiamine (B-1) injection 200 mg, 200 mg, Intravenous, Q8H, 200 mg at 07/23/24 1437 **FOLLOWED BY** thiamine (VITAMIN B-1) tablet 100 mg, 100 mg, Oral, Daily, Mari Bustos MD, 100 mg at 07/24/24 0818    zinc sulfate (ZINCATE) capsule 220 mg, 220 mg, Oral, Daily, Waleska Chery APRN, 220 mg at 07/24/24 0818      Objective     Vital Signs  Vitals:    07/24/24 0810 07/24/24 0815 07/24/24 0820 07/24/24 1133   BP:  120/71  120/70   BP Location:    Left arm   Patient Position:    Sitting   Pulse: 112 112 115 111   Resp:    22   Temp:    98 °F (36.7 °C)   TempSrc:    Oral   SpO2: 91% 90% 90% 95%   Weight:       Height:           Physical Exam:     General Appearance:    Awake and alert, in no acute distress   Head:    Normocephalic, without obvious abnormality   Eyes:          Conjunctivae normal, icteric sclera   Throat:    No oral lesions, no thrush, oral mucosa moist   Neck:   No adenopathy, supple, no JVD   Lungs:     respirations regular, even and unlabored   Abdomen:     Soft, non-tender, no rebound or guarding, non-distended   Rectal:     Deferred   Extremities:   No edema, no cyanosis   Skin:   No bruising or rash, jaundice        Results Review:    CBC    Results from last 7 days   Lab Units 07/24/24  0324 07/23/24  0435 07/22/24  0449 07/21/24  0514 07/20/24  0509 07/19/24  0300 07/18/24  1015   WBC 10*3/mm3 15.98* 14.99* 15.08* 14.80* 11.18* 8.78 9.86   HEMOGLOBIN g/dL 9.2* 8.8* 8.4* 8.5* 9.1* 8.2* 8.9*   PLATELETS 10*3/mm3 162 148 147 148 100* 85* 94*     CMP   Results from last 7 days   Lab Units 07/24/24  0324 07/23/24  1638 07/23/24  0435 07/22/24  1515 07/22/24  0449 07/21/24  0514 07/20/24  0509 07/19/24  1619 07/19/24  1458 07/19/24  0300 07/18/24  1829 07/18/24  1015   SODIUM mmol/L 134*  --  134*  --  133* 135* 133* 131* 134* 126*   < > 122*   POTASSIUM mmol/L 4.0  --  4.5 4.5 3.6 3.7 3.8 3.8  3.6 3.6 3.0*   < > 1.9*   CHLORIDE mmol/L 101  --  102  --  98 100 96* 96* 96* 86*   < > 72*   CO2 mmol/L 25.0  --  25.9  --  26.9 24.9 29.0 23.8 29.2* 32.5*   < > 33.7*   BUN mg/dL 5*  --  5*  --  4* 3* 3* 3* 3* 3*   < > 6   CREATININE mg/dL 0.49*  --  0.55*  --  0.47* 0.46* 0.46* 0.41* 0.40* 0.44*   < > 0.40*   GLUCOSE mg/dL 93  --  89  --  89 130* 126* 78 79 116*   < > 93   ALBUMIN g/dL 3.2*  --  3.2*  --  3.1* 2.8* 2.9*  --  2.9* 3.1*   < > 3.0*   BILIRUBIN mg/dL 16.9*  --  16.4*  --  16.0* 16.6* 18.0*  --  17.9* 17.4*  --  15.8*   ALK PHOS U/L 275*  --  261*  --  258* 272* 286*  --  301* 324*  --  380*   AST (SGOT) U/L 126*  --  116*  --  107* 115* 122*  --  143* 159*  --  186*   ALT (SGPT) U/L 35  --  29  --  27 23 23  --  27 28  --  33   MAGNESIUM mg/dL  --   --  1.7  --  2.2 1.3*  --   --  1.7 2.2  --  1.2*   PHOSPHORUS mg/dL 2.3* 2.7 2.0*  --  2.5 2.8 2.6 1.6*  --  0.3*   < >  --    LIPASE U/L  --   --   --   --   --   " --   --   --   --   --   --  100*   AMMONIA umol/L  --   --   --   --   --   --   --   --   --  90*  --   --     < > = values in this interval not displayed.     Cr Clearance Estimated Creatinine Clearance: 175.2 mL/min (A) (by C-G formula based on SCr of 0.49 mg/dL (L)).  Coag   Results from last 7 days   Lab Units 07/24/24  0324 07/23/24  0435 07/22/24  0449 07/21/24  1506 07/20/24  0509 07/19/24  0300 07/18/24  0923   INR  1.88* 1.80* 1.71* 1.71* 2.02* 1.79* 1.59*   APTT seconds  --   --   --   --   --   --  41.9*     HbA1C No results found for: \"HGBA1C\"  Results from last 7 days   Lab Units 07/18/24  1015   CK TOTAL U/L 45     Infection   Results from last 7 days   Lab Units 07/18/24  0928 07/18/24 0923   BLOODCX  No growth at 5 days No growth at 5 days     UA    Results from last 7 days   Lab Units 07/18/24  1319   NITRITE UA  Positive*   WBC UA /HPF 0-2   BACTERIA UA /HPF None Seen   SQUAM EPITHEL UA /HPF 0-2     Microbiology Results (last 10 days)       Procedure Component Value - Date/Time    Legionella Antigen, Urine - Urine, Urine, Clean Catch [[id number]]  (Normal) Collected: 07/18/24 1319    Lab Status: Final result Specimen: Urine, Clean Catch Updated: 07/18/24 1852     LEGIONELLA ANTIGEN, URINE Negative    Blood Culture - Blood, Hand, Right [[id number]]  (Normal) Collected: 07/18/24 0928    Lab Status: Final result Specimen: Blood from Hand, Right Updated: 07/23/24 0945     Blood Culture No growth at 5 days    Blood Culture - Blood, Arm, Right [[id number]]  (Normal) Collected: 07/18/24 0923    Lab Status: Final result Specimen: Blood from Arm, Right Updated: 07/23/24 0945     Blood Culture No growth at 5 days          Imaging Results (Last 72 Hours)       ** No results found for the last 72 hours. **            Assessment & Plan     ASSESSMENT:  -Alcohol hepatitis/cirrhosis   -Elevated LFTs -related to above  -Acute alcohol withdrawal  -Nitrite positive UTI  -Leukocytosis  -Normocytic " anemia  -Thrombocytopenia - due to cirrhosis  -Elevated INR - due to cirrhosis  -Hyponatremia related to alcohol abuse  -Dysphagia could be related to esophageal stricture versus reflux esophagitis versus dysmotility  -GERD     PLAN:  Patient is a 29 y/o male with history of ETOH abuse who presented on 7/18 with complaints of ETOH withdrawal. Found to have significantly elevated LFTs on admission with total bilirubin 17.4, alk phos 324, , and ALT 28. CT abd/pelvis W on admission showed fatty liver,  sequelae of portal hypertension noted including distal paraesophageal  and intra-abdominal varices as well as trace ascites , and patent portal vasculature. He was started on prednisolone on 7/19 due to Maddrey's DF of 33.    Patient with no new complaints.  No nausea/vomiting, abdominal pain, or overt GI bleeding.  He has been eating 100% of trays.  LFTs continue to worsen with total bilirubin 16.9 from 16.4, alk phos 275 from 261,  from 116, and ALT 35 from 29.  INR 1.88 from 1.80.  Plan vitamin K.  Continue to encourage nutrition.  Continue thiamine, zinc, and folic acid.  Recommend complete ETOH cessation.   Liver serologies unremarkable. AFP normal.   Will need screening EGD at some point as inpatient vs outpatient.   Continue supportive care.     Electronically signed by MARI Mobley, 07/24/24, 12:57 PM EDT.

## 2024-07-24 NOTE — PROGRESS NOTES
"NEPHROLOGY PROGRESS NOTE------[facility]    [facility]  [phone]  Quang Butts MD      Patient Care Team:  Provider, No Known as PCP - General      Provider:  Quang Butts MD  Patient Name: Yonas Charles  :  1995    SUBJECTIVE:    F/U ELECTROLYTE ABNORMALITIES    No chest pain or SOA    Medication:  famotidine, 40 mg, Oral, Nightly  folic acid, 1 mg, Oral, Daily  lactulose, 10 g, Oral, TID  multivitamin with minerals, 1 tablet, Oral, Daily  pantoprazole, 40 mg, Oral, BID AC  prednisoLONE sodium phosphate, 40 mg, Oral, Daily  [Held by provider] sertraline, 50 mg, Oral, Daily  sodium chloride, 10 mL, Intravenous, Q12H  sucralfate, 1 g, Oral, 4x Daily AC & at Bedtime  thiamine, 100 mg, Oral, Daily  zinc sulfate, 220 mg, Oral, Daily               OBJECTIVE    Vital Sign Min/Max for last 24 hours  Temp  Min: 97.4 °F (36.3 °C)  Max: 98.1 °F (36.7 °C)   BP  Min: 106/65  Max: 120/70   Pulse  Min: 107  Max: 115   Resp  Min: 22  Max: 30   SpO2  Min: 90 %  Max: 95 %   No data recorded   No data recorded     Flowsheet Rows      Flowsheet Row First Filed Value   Admission Height 165.1 cm (65\") Documented at 2024 0855   Admission Weight 55.7 kg (122 lb 12.7 oz) Documented at 2024 0855            I/O this shift:  In: 240 [P.O.:240]  Out: -   I/O last 3 completed shifts:  In: 2162 [P.O.:1162; I.V.:1000]  Out: 0     Physical Exam:  General Appearance: NAD.   Head: normocephalic, without obvious abnormality and atraumatic    Eyes: conjunctivae and +ICTERIC SCLERAE  Neck: supple and no JVD  Lungs: CTA bilaterally  Heart: regular rhythm & normal rate and normal S1, S2   Chest Wall: no abnormalities observed  Abdomen: normal bowel sounds and soft   Extremities: moves extremities well, no edema, no cyanosis  Skin: +JAUNDICE  Neurologic: A AND O X 4 WITHOUT FOCAL DEFICIT    Labs:    WBC WBC   Date Value Ref Range Status   2024 15.98 (H) 3.40 - " "10.80 10*3/mm3 Final   07/23/2024 14.99 (H) 3.40 - 10.80 10*3/mm3 Final   07/22/2024 15.08 (H) 3.40 - 10.80 10*3/mm3 Final      HGB Hemoglobin   Date Value Ref Range Status   07/24/2024 9.2 (L) 13.0 - 17.7 g/dL Final   07/23/2024 8.8 (L) 13.0 - 17.7 g/dL Final   07/22/2024 8.4 (L) 13.0 - 17.7 g/dL Final      HCT Hematocrit   Date Value Ref Range Status   07/24/2024 25.3 (L) 37.5 - 51.0 % Final   07/23/2024 24.2 (L) 37.5 - 51.0 % Final   07/22/2024 23.2 (L) 37.5 - 51.0 % Final      Platelets No results found for: \"LABPLAT\"   MCV MCV   Date Value Ref Range Status   07/24/2024 94.4 79.0 - 97.0 fL Final   07/23/2024 93.8 79.0 - 97.0 fL Final   07/22/2024 93.5 79.0 - 97.0 fL Final          Sodium Sodium   Date Value Ref Range Status   07/24/2024 134 (L) 136 - 145 mmol/L Final   07/23/2024 134 (L) 136 - 145 mmol/L Final   07/22/2024 133 (L) 136 - 145 mmol/L Final      Potassium Potassium   Date Value Ref Range Status   07/24/2024 4.0 3.5 - 5.2 mmol/L Final   07/23/2024 4.5 3.5 - 5.2 mmol/L Final   07/22/2024 4.5 3.5 - 5.2 mmol/L Final   07/22/2024 3.6 3.5 - 5.2 mmol/L Final      Chloride Chloride   Date Value Ref Range Status   07/24/2024 101 98 - 107 mmol/L Final   07/23/2024 102 98 - 107 mmol/L Final   07/22/2024 98 98 - 107 mmol/L Final      CO2 CO2   Date Value Ref Range Status   07/24/2024 25.0 22.0 - 29.0 mmol/L Final   07/23/2024 25.9 22.0 - 29.0 mmol/L Final   07/22/2024 26.9 22.0 - 29.0 mmol/L Final      BUN BUN   Date Value Ref Range Status   07/24/2024 5 (L) 6 - 20 mg/dL Final   07/23/2024 5 (L) 6 - 20 mg/dL Final   07/22/2024 4 (L) 6 - 20 mg/dL Final      Creatinine Creatinine   Date Value Ref Range Status   07/24/2024 0.49 (L) 0.76 - 1.27 mg/dL Final   07/23/2024 0.55 (L) 0.76 - 1.27 mg/dL Final   07/22/2024 0.47 (L) 0.76 - 1.27 mg/dL Final      Calcium Calcium   Date Value Ref Range Status   07/24/2024 8.4 (L) 8.6 - 10.5 mg/dL Final   07/23/2024 8.6 8.6 - 10.5 mg/dL Final   07/22/2024 8.5 (L) 8.6 - 10.5 " "mg/dL Final      PO4 No components found for: \"PO4\"   Albumin Albumin   Date Value Ref Range Status   07/24/2024 3.2 (L) 3.5 - 5.2 g/dL Final   07/23/2024 3.2 (L) 3.5 - 5.2 g/dL Final   07/22/2024 3.1 (L) 3.5 - 5.2 g/dL Final      Magnesium Magnesium   Date Value Ref Range Status   07/23/2024 1.7 1.6 - 2.6 mg/dL Final   07/22/2024 2.2 1.6 - 2.6 mg/dL Final      Uric Acid No components found for: \"URIC ACID\"     Imaging Results (Last 72 Hours)       ** No results found for the last 72 hours. **            Results for orders placed during the hospital encounter of 07/18/24    XR Chest PA & Lateral    Narrative  DATE OF EXAM:  7/18/2024 12:36 PM    PROCEDURE:  XR CHEST PA AND LATERAL-    INDICATIONS:  rule out aspiration pneumonia; E87.6-Hypokalemia; F10.939-Alcohol use,  unspecified with withdrawal, unspecified; R17-Unspecified jaundice    COMPARISON:  No Comparisons Available    TECHNIQUE:  Two radiologic views of the chest , PA and lateral were obtained.    FINDINGS:  Heart size normal. Negative for lobar consolidation, edema, effusion or  pneumothorax. Osseous structures unremarkable.    Impression  No active pulmonary process.      Electronically Signed By-Armani Small MD On:7/18/2024 1:00 PM            ASSESSMENT / PLAN      Alcohol withdrawal    HYPONATREMIA------stable. Likely related to underlying liver disease. Fluid restrict     2. HYPOKALEMIA-------Resolved.       3. HYPOMAGNESEMIA-------Replaced     4. HYPOCALCEMIA------Replace IV     5. ALCOHOL ABUSE/CIRRHOSIS/JAUNDICE------Thiamine, Folate, IVFs, prn IV Ativan. Withdrawal prcautions     6. MIXED METABOLIC ALKALOSIS/METABOLIC ACIDOSIS-----Better     7. ANEMIA------Normocytic with normal RDW. Follow for PRBC need     8. DEPRESSION------Suicide precautions. Hold Zoloft given severe hyponatremia     9. HYPOALBUMINEMIA-----IV Albumin to temporize and po supplements     10. ELEVATED INR------Secondary to liver disease     11. THROMBOCYTOPENIA------No " heparin. Secondary to liver disease     12. PUD PROPHYLAXIS-----IV PPI     13. DVT PROPHYLAXIS-----SCDs    14. HYPOPHOSPHATEMIA------Replaced    Sodium remains stable  Off IV fluids  Labs in am        Quang Butts MD  [facility]  [phone]  07/24/24  11:38 EDT

## 2024-07-24 NOTE — CONSULTS
"Nutrition Services    Patient Name:  Yonas Charles  YOB: 1995  MRN: [id number]  Admit Date:  7/18/2024    RD received consult that Dr. Miller want patient to get \"extra nutrition\".  Per clarification with the patients RN, MD would like patient to have food intake every 1-2 hours.  RD to communicate with the kitchen that this patient is allowed to order more than 3 meals per day.      This RD previously provided low Na+ diet education to patient 7/22 and had lengthy discussed about his eating habits and changes necessary to limit Na+.  Noted patient lives at home and eats what his mother cooks and states his mother is on board with helping him make these changes.      [employer] already ordered TID for patient.  Also noted per kitchen that a request has been made for this patient to get yogurt between meals for snacks.  The kitchen will send up 3 yogurts per day and keep in the supplement fridge on the unit.      Per RD review of patient recent meals, patient ordering full meals with adequate protein sources such as eggs and fish.  Noted from previous discussion with patient that he doesn't eat a lot of meat.      Noted per GI note 7/23, possible need for NJ tube placement if patient unable to eat well.  Patient does not want this and will continue to try and eat on his own.  Recent intakes documented at %.  RD to continue to monitor as needed.      Electronically signed by:  Joyce Gardiner RD  07/24/24 10:34 EDT   "

## 2024-07-24 NOTE — PROGRESS NOTES
[facility] MEDICINE SERVICE  DAILY PROGRESS NOTE    NAME: Yonas Charles  : 1995  MRN: [id number]      LOS: 6 days     PROVIDER OF SERVICE: Hayley Rodriguez DO    Chief Complaint: Alcohol withdrawal    Subjective:     Interval History: Patient seen and examined this morning.  Taking over care today.  Appears jaundiced, T. bili trending up per GI.  Patient says he is eating all 3 meals as full, started on protein shakes per GI.  May need NJ tube per GI if still does not have adequate nutrition status.  Denies any other associated fever, chills, chest pain, shortness of breath, abdominal pain, nausea, vomiting, diarrhea.      Review of Systems:   Pertinent positives as noted in HPI/subjective.  All other systems were reviewed and are negative.      Vital Signs  Temp:  [97.4 °F (36.3 °C)-98.1 °F (36.7 °C)] 97.4 °F (36.3 °C)  Heart Rate:  [107-115] 115  Resp:  [24-30] 26  BP: (106-120)/(65-73) 120/71   Body mass index is 20.43 kg/m².    Physical exam:     General: Awake, alert, NAD  Eyes: PERRL, EOMI, sclera jaundiced  Cardiovascular: Regular rate and rhythm, no murmurs  Respiratory: Clear to auscultation bilaterally, no wheezing or rales, unlabored breathing  Abdomen: Soft, nontender, positive bowel sounds, no guarding  Neurologic: A&O, CN grossly intact, moves all extremities spontaneously  Musculoskeletal: Normal range of motion, no other gross deformities  Skin: Warm, jaundiced        Scheduled Meds   famotidine, 40 mg, Oral, Nightly  folic acid, 1 mg, Oral, Daily  lactulose, 10 g, Oral, TID  multivitamin with minerals, 1 tablet, Oral, Daily  pantoprazole, 40 mg, Oral, BID AC  prednisoLONE sodium phosphate, 40 mg, Oral, Daily  [Held by provider] sertraline, 50 mg, Oral, Daily  sodium chloride, 10 mL, Intravenous, Q12H  sucralfate, 1 g, Oral, 4x Daily AC & at Bedtime  thiamine, 100 mg, Oral, Daily  zinc sulfate, 220 mg, Oral, Daily       PRN Meds     acetaminophen **OR** acetaminophen **OR**  acetaminophen    senna-docusate sodium **AND** polyethylene glycol **AND** bisacodyl **AND** bisacodyl    Calcium Replacement - Follow Nurse / BPA Driven Protocol    guaiFENesin-codeine    Magnesium Standard Dose Replacement - Follow Nurse / BPA Driven Protocol    ondansetron ODT **OR** ondansetron    Phosphorus Replacement - Follow Nurse / BPA Driven Protocol    Potassium Replacement - Follow Nurse / BPA Driven Protocol    sodium chloride    sodium chloride    sodium chloride   Infusions           Diagnostic Data    Results from last 7 days   Lab Units 07/24/24  0324   WBC 10*3/mm3 15.98*   HEMOGLOBIN g/dL 9.2*   HEMATOCRIT % 25.3*   PLATELETS 10*3/mm3 162   GLUCOSE mg/dL 93   CREATININE mg/dL 0.49*   BUN mg/dL 5*   SODIUM mmol/L 134*   POTASSIUM mmol/L 4.0   AST (SGOT) U/L 126*   ALT (SGPT) U/L 35   ALK PHOS U/L 275*   BILIRUBIN mg/dL 16.9*   ANION GAP mmol/L 8.0       No radiology results for the last day      I reviewed the patient's new clinical results.    Assessment/Plan:     Active and Resolved Problems  Active Hospital Problems    Diagnosis  POA    **Alcohol withdrawal [F10.939]  Yes      Resolved Hospital Problems   No resolved problems to display.       Acute alcoholic hepatitis with alcoholic cirrhosis  -Secondary to excessive alcohol use  -Initiated on prednisolone given elevated discriminant function score on admission  -Check repeat Lille score on day 7 of prednisolone which is 7/25  -Bilirubin remains same around 16, AST also about the same in 100s to 120s  -Continue CIWA for any alcohol withdrawal symptoms, psych following  -Patient was given 1 dose of vitamin K on admission  -GI following, continue to optimize nutritional status, may need NJ tube with tube feedings per GI    Hyponatremia 2/2 beer potomania  -Improved sodium level with fluid restriction  -Off IV fluids  -Nephrology following    Acute hypokalemia, hypomagnesemia, hypocalcemia  -Secondary to malnutrition in the setting of excessive  alcohol use  -Repleted     VTE Prophylaxis:  Mechanical VTE prophylaxis orders are present.         Code status is   Code Status and Medical Interventions:   Ordered at: 07/18/24 1214     Code Status (Patient has no pulse and is not breathing):    CPR (Attempt to Resuscitate)     Medical Interventions (Patient has pulse or is breathing):    Full Support       Disposition: Possible DC on 7/27    Signature: Electronically signed by Hayley Rodriguez DO, 07/24/24, 11:07 EDT.  [facility] Hospitalist Team    Part of this note may be an electronic transcription/translation of spoken language to printed text using the Dragon Dictation System.

## 2024-07-24 NOTE — PLAN OF CARE
Bed mobility - Independent  Transfers - Supervision sit to/from stand from hospital bed.   Ambulation - 150 feet x2 bouts  by seated rest break and vitals assessment.   CGA, no AD. Pt with improved balance, stride length, rakel and safety today. Slight lateral instability occurs x2, but pt is able to self correct .    Therapeutic Exercise - 15 Reps B LE AROM standing without support for dynamic standing balance challenge. Slight instability during heel/toe raises.       Anticipated Discharge Disposition (PT):  (TBD - inpatient alcohol rehab vs. home with family and outpatient therapy.)

## 2024-07-24 NOTE — PLAN OF CARE
Goal Outcome Evaluation:  Plan of Care Reviewed With: patient        Progress: improving  Outcome Evaluation: Pt remains jaundiced, denies pain, cough when laying down remains, cough medication given. call light within reach and care continues.      Problem: Adult Inpatient Plan of Care  Goal: Plan of Care Review  Outcome: Ongoing, Progressing  Flowsheets (Taken 7/24/2024 0343)  Progress: improving  Plan of Care Reviewed With: patient  Outcome Evaluation: Pt remains jaundiced, denies pain, cough when laying down remains, cough medication given. call light within reach and care continues.  Goal: Patient-Specific Goal (Individualized)  Outcome: Ongoing, Progressing  Goal: Absence of Hospital-Acquired Illness or Injury  Outcome: Ongoing, Progressing  Intervention: Identify and Manage Fall Risk  Recent Flowsheet Documentation  Taken 7/24/2024 0200 by Mariam Cardona, RN  Safety Promotion/Fall Prevention:   activity supervised   assistive device/personal items within reach   clutter free environment maintained   fall prevention program maintained   nonskid shoes/slippers when out of bed   room organization consistent   safety round/check completed  Taken 7/24/2024 0000 by Mariam Cardona, RN  Safety Promotion/Fall Prevention:   activity supervised   assistive device/personal items within reach   clutter free environment maintained   fall prevention program maintained   nonskid shoes/slippers when out of bed   room organization consistent   safety round/check completed  Taken 7/23/2024 2200 by Mariam Cardona, RN  Safety Promotion/Fall Prevention:   activity supervised   assistive device/personal items within reach   clutter free environment maintained   fall prevention program maintained   nonskid shoes/slippers when out of bed   room organization consistent   safety round/check completed  Taken 7/23/2024 2000 by Mariam Cardona, RN  Safety Promotion/Fall Prevention:   activity supervised   assistive device/personal items within reach    clutter free environment maintained   fall prevention program maintained   nonskid shoes/slippers when out of bed   safety round/check completed   room organization consistent  Intervention: Prevent Skin Injury  Recent Flowsheet Documentation  Taken 7/23/2024 2000 by Mariam Cardona RN  Body Position: position changed independently  Intervention: Prevent and Manage VTE (Venous Thromboembolism) Risk  Recent Flowsheet Documentation  Taken 7/24/2024 0000 by Mariam Cardona RN  VTE Prevention/Management:   bilateral   sequential compression devices off   patient refused intervention  Range of Motion: active ROM (range of motion) encouraged  Taken 7/23/2024 2000 by Mariam Cardona RN  Activity Management: sitting, edge of bed  VTE Prevention/Management:   bilateral   sequential compression devices off   patient refused intervention  Range of Motion: active ROM (range of motion) encouraged  Intervention: Prevent Infection  Recent Flowsheet Documentation  Taken 7/24/2024 0200 by Mariam Cardona RN  Infection Prevention:   hand hygiene promoted   personal protective equipment utilized   rest/sleep promoted   single patient room provided  Taken 7/24/2024 0000 by Mariam Cardona RN  Infection Prevention:   hand hygiene promoted   personal protective equipment utilized   rest/sleep promoted   single patient room provided  Taken 7/23/2024 2200 by Mariam Cardona RN  Infection Prevention:   hand hygiene promoted   personal protective equipment utilized   rest/sleep promoted   single patient room provided  Taken 7/23/2024 2000 by Mariam Cardona RN  Infection Prevention:   hand hygiene promoted   personal protective equipment utilized   rest/sleep promoted   single patient room provided  Goal: Optimal Comfort and Wellbeing  Outcome: Ongoing, Progressing  Intervention: Provide Person-Centered Care  Recent Flowsheet Documentation  Taken 7/24/2024 0000 by Mariam Cardona RN  Trust Relationship/Rapport:   care explained   thoughts/feelings acknowledged  Taken  7/23/2024 2000 by Mariam Cardona RN  Trust Relationship/Rapport:   care explained   thoughts/feelings acknowledged  Goal: Readiness for Transition of Care  Outcome: Ongoing, Progressing     Problem: Pain Acute  Goal: Acceptable Pain Control and Functional Ability  Outcome: Ongoing, Progressing  Intervention: Prevent or Manage Pain  Recent Flowsheet Documentation  Taken 7/24/2024 0200 by Mariam Cardona RN  Medication Review/Management: medications reviewed  Taken 7/24/2024 0000 by Mariam Cardona RN  Medication Review/Management: medications reviewed  Taken 7/23/2024 2200 by Mariam Cardona RN  Medication Review/Management: medications reviewed  Taken 7/23/2024 2000 by Mariam Cardona RN  Medication Review/Management: medications reviewed  Intervention: Optimize Psychosocial Wellbeing  Recent Flowsheet Documentation  Taken 7/24/2024 0000 by Mariam Cardona RN  Diversional Activities: smartphone  Taken 7/23/2024 2000 by Mariam Cardona RN  Diversional Activities: smartphone     Problem: Skin Injury Risk Increased  Goal: Skin Health and Integrity  Outcome: Ongoing, Progressing     Problem: Fall Injury Risk  Goal: Absence of Fall and Fall-Related Injury  Outcome: Ongoing, Progressing  Intervention: Identify and Manage Contributors  Recent Flowsheet Documentation  Taken 7/24/2024 0200 by Mariam Cardona RN  Medication Review/Management: medications reviewed  Taken 7/24/2024 0000 by Mariam Cardona RN  Medication Review/Management: medications reviewed  Taken 7/23/2024 2200 by Mariam Cardona RN  Medication Review/Management: medications reviewed  Taken 7/23/2024 2000 by Mariam Cardona RN  Medication Review/Management: medications reviewed  Intervention: Promote Injury-Free Environment  Recent Flowsheet Documentation  Taken 7/24/2024 0200 by Mariam Cardona RN  Safety Promotion/Fall Prevention:   activity supervised   assistive device/personal items within reach   clutter free environment maintained   fall prevention program maintained   nonskid shoes/slippers when  out of bed   room organization consistent   safety round/check completed  Taken 7/24/2024 0000 by Mariam Cardona RN  Safety Promotion/Fall Prevention:   activity supervised   assistive device/personal items within reach   clutter free environment maintained   fall prevention program maintained   nonskid shoes/slippers when out of bed   room organization consistent   safety round/check completed  Taken 7/23/2024 2200 by Mariam Cardona RN  Safety Promotion/Fall Prevention:   activity supervised   assistive device/personal items within reach   clutter free environment maintained   fall prevention program maintained   nonskid shoes/slippers when out of bed   room organization consistent   safety round/check completed  Taken 7/23/2024 2000 by Mariam Cardona RN  Safety Promotion/Fall Prevention:   activity supervised   assistive device/personal items within reach   clutter free environment maintained   fall prevention program maintained   nonskid shoes/slippers when out of bed   safety round/check completed   room organization consistent     Problem: Alcohol Withdrawal  Goal: Alcohol Withdrawal Symptom Control  Outcome: Ongoing, Progressing     Problem: Acute Neurologic Deterioration (Alcohol Withdrawal)  Goal: Optimal Neurologic Function  Outcome: Ongoing, Progressing     Problem: Substance Misuse (Alcohol Withdrawal)  Goal: Readiness for Change Identified  Outcome: Ongoing, Progressing

## 2024-07-24 NOTE — PLAN OF CARE
Problem: Adult Inpatient Plan of Care  Goal: Absence of Hospital-Acquired Illness or Injury  Intervention: Identify and Manage Fall Risk  Recent Flowsheet Documentation  Taken 7/24/2024 1605 by Isabell Ibarra RN  Safety Promotion/Fall Prevention:   assistive device/personal items within reach   clutter free environment maintained   fall prevention program maintained   lighting adjusted   nonskid shoes/slippers when out of bed   room organization consistent   safety round/check completed  Taken 7/24/2024 1415 by Isabell Ibarra RN  Safety Promotion/Fall Prevention:   assistive device/personal items within reach   clutter free environment maintained   fall prevention program maintained   lighting adjusted   nonskid shoes/slippers when out of bed   room organization consistent   safety round/check completed  Taken 7/24/2024 1215 by Isabell Ibarra RN  Safety Promotion/Fall Prevention:   assistive device/personal items within reach   clutter free environment maintained   fall prevention program maintained   lighting adjusted   nonskid shoes/slippers when out of bed   room organization consistent   safety round/check completed  Taken 7/24/2024 1005 by Isabell Ibarra RN  Safety Promotion/Fall Prevention:   assistive device/personal items within reach   clutter free environment maintained   fall prevention program maintained   lighting adjusted   nonskid shoes/slippers when out of bed   room organization consistent   safety round/check completed  Taken 7/24/2024 0825 by Isabell Ibarra RN  Safety Promotion/Fall Prevention:   assistive device/personal items within reach   clutter free environment maintained   fall prevention program maintained   lighting adjusted   nonskid shoes/slippers when out of bed   room organization consistent   safety round/check completed  Intervention: Prevent Skin Injury  Recent Flowsheet Documentation  Taken 7/24/2024 1605 by Isabell Ibarra, RN  Body Position: position changed independently  Taken  7/24/2024 1415 by Isabell Ibarra RN  Body Position: position changed independently  Taken 7/24/2024 1215 by Isabell Ibarra RN  Body Position: position changed independently  Taken 7/24/2024 1005 by Isabell Ibarra RN  Body Position: position changed independently  Taken 7/24/2024 0825 by Isabell Ibarra RN  Body Position: position changed independently  Skin Protection: adhesive use limited  Intervention: Prevent and Manage VTE (Venous Thromboembolism) Risk  Recent Flowsheet Documentation  Taken 7/24/2024 1605 by Isabell Ibarra RN  Activity Management: activity encouraged  Taken 7/24/2024 1415 by Isabell Ibarra RN  Activity Management: activity encouraged  Taken 7/24/2024 1215 by Isabell Ibarra RN  Activity Management: activity encouraged  Taken 7/24/2024 1005 by Isabell Ibarra RN  Activity Management: activity encouraged  Taken 7/24/2024 0825 by Isabell Ibarra RN  Activity Management: activity encouraged  Intervention: Prevent Infection  Recent Flowsheet Documentation  Taken 7/24/2024 1605 by Isabell Ibarra RN  Infection Prevention:   personal protective equipment utilized   hand hygiene promoted  Taken 7/24/2024 1415 by Isabell Ibarra RN  Infection Prevention:   hand hygiene promoted   personal protective equipment utilized  Taken 7/24/2024 1215 by Isabell Ibarra RN  Infection Prevention:   hand hygiene promoted   personal protective equipment utilized  Taken 7/24/2024 1005 by Isabell Ibarra RN  Infection Prevention:   hand hygiene promoted   personal protective equipment utilized  Taken 7/24/2024 0825 by Isabell Ibarra RN  Infection Prevention:   hand hygiene promoted   personal protective equipment utilized     Problem: Adult Inpatient Plan of Care  Goal: Absence of Hospital-Acquired Illness or Injury  Intervention: Identify and Manage Fall Risk  Recent Flowsheet Documentation  Taken 7/24/2024 1605 by Isabell Ibarra RN  Safety Promotion/Fall Prevention:   assistive device/personal items within reach   clutter free environment  maintained   fall prevention program maintained   lighting adjusted   nonskid shoes/slippers when out of bed   room organization consistent   safety round/check completed  Taken 7/24/2024 1415 by Isabell Ibarra RN  Safety Promotion/Fall Prevention:   assistive device/personal items within reach   clutter free environment maintained   fall prevention program maintained   lighting adjusted   nonskid shoes/slippers when out of bed   room organization consistent   safety round/check completed  Taken 7/24/2024 1215 by Isabell Ibarra RN  Safety Promotion/Fall Prevention:   assistive device/personal items within reach   clutter free environment maintained   fall prevention program maintained   lighting adjusted   nonskid shoes/slippers when out of bed   room organization consistent   safety round/check completed  Taken 7/24/2024 1005 by Isabell Ibarra RN  Safety Promotion/Fall Prevention:   assistive device/personal items within reach   clutter free environment maintained   fall prevention program maintained   lighting adjusted   nonskid shoes/slippers when out of bed   room organization consistent   safety round/check completed  Taken 7/24/2024 0825 by Isabell Ibarra RN  Safety Promotion/Fall Prevention:   assistive device/personal items within reach   clutter free environment maintained   fall prevention program maintained   lighting adjusted   nonskid shoes/slippers when out of bed   room organization consistent   safety round/check completed  Intervention: Prevent Skin Injury  Recent Flowsheet Documentation  Taken 7/24/2024 1605 by Isabell Ibarra RN  Body Position: position changed independently  Taken 7/24/2024 1415 by Isabell Ibarra RN  Body Position: position changed independently  Taken 7/24/2024 1215 by Isabell Ibarra RN  Body Position: position changed independently  Taken 7/24/2024 1005 by Isabell Ibarra RN  Body Position: position changed independently  Taken 7/24/2024 0825 by Isabell Ibarra RN  Body Position: position  changed independently  Skin Protection: adhesive use limited  Intervention: Prevent and Manage VTE (Venous Thromboembolism) Risk  Recent Flowsheet Documentation  Taken 7/24/2024 1605 by Isabell Ibarra RN  Activity Management: activity encouraged  Taken 7/24/2024 1415 by Isabell Ibarra RN  Activity Management: activity encouraged  Taken 7/24/2024 1215 by Isabell Ibarra RN  Activity Management: activity encouraged  Taken 7/24/2024 1005 by Isabell Ibarra RN  Activity Management: activity encouraged  Taken 7/24/2024 0825 by Isabell Ibarra RN  Activity Management: activity encouraged  Intervention: Prevent Infection  Recent Flowsheet Documentation  Taken 7/24/2024 1605 by Isabell Ibarra RN  Infection Prevention:   personal protective equipment utilized   hand hygiene promoted  Taken 7/24/2024 1415 by Isabell Ibarra RN  Infection Prevention:   hand hygiene promoted   personal protective equipment utilized  Taken 7/24/2024 1215 by Isabell Ibarra RN  Infection Prevention:   hand hygiene promoted   personal protective equipment utilized  Taken 7/24/2024 1005 by Isabell Ibarra RN  Infection Prevention:   hand hygiene promoted   personal protective equipment utilized  Taken 7/24/2024 0825 by Isabell Ibarra RN  Infection Prevention:   hand hygiene promoted   personal protective equipment utilized     Problem: Adult Inpatient Plan of Care  Goal: Absence of Hospital-Acquired Illness or Injury  Intervention: Identify and Manage Fall Risk  Recent Flowsheet Documentation  Taken 7/24/2024 1605 by Isabell Ibarra RN  Safety Promotion/Fall Prevention:   assistive device/personal items within reach   clutter free environment maintained   fall prevention program maintained   lighting adjusted   nonskid shoes/slippers when out of bed   room organization consistent   safety round/check completed  Taken 7/24/2024 1415 by Isabell Ibarra RN  Safety Promotion/Fall Prevention:   assistive device/personal items within reach   clutter free environment  maintained   fall prevention program maintained   lighting adjusted   nonskid shoes/slippers when out of bed   room organization consistent   safety round/check completed  Taken 7/24/2024 1215 by Isabell Ibarra RN  Safety Promotion/Fall Prevention:   assistive device/personal items within reach   clutter free environment maintained   fall prevention program maintained   lighting adjusted   nonskid shoes/slippers when out of bed   room organization consistent   safety round/check completed  Taken 7/24/2024 1005 by Isabell Ibarra RN  Safety Promotion/Fall Prevention:   assistive device/personal items within reach   clutter free environment maintained   fall prevention program maintained   lighting adjusted   nonskid shoes/slippers when out of bed   room organization consistent   safety round/check completed  Taken 7/24/2024 0825 by Isabell Ibarra RN  Safety Promotion/Fall Prevention:   assistive device/personal items within reach   clutter free environment maintained   fall prevention program maintained   lighting adjusted   nonskid shoes/slippers when out of bed   room organization consistent   safety round/check completed  Intervention: Prevent Skin Injury  Recent Flowsheet Documentation  Taken 7/24/2024 1605 by Isabell Ibarra RN  Body Position: position changed independently  Taken 7/24/2024 1415 by Isabell Ibarra RN  Body Position: position changed independently  Taken 7/24/2024 1215 by Isabell Ibarra RN  Body Position: position changed independently  Taken 7/24/2024 1005 by Isabell Ibarra RN  Body Position: position changed independently  Taken 7/24/2024 0825 by Isabell Ibarra RN  Body Position: position changed independently  Skin Protection: adhesive use limited  Intervention: Prevent and Manage VTE (Venous Thromboembolism) Risk  Recent Flowsheet Documentation  Taken 7/24/2024 1605 by Isabell Ibarra RN  Activity Management: activity encouraged  Taken 7/24/2024 1415 by Isabell Ibarra RN  Activity Management: activity  encouraged  Taken 7/24/2024 1215 by Isabell Ibarra RN  Activity Management: activity encouraged  Taken 7/24/2024 1005 by Isabell Ibarra RN  Activity Management: activity encouraged  Taken 7/24/2024 0825 by Isabell Ibarra RN  Activity Management: activity encouraged  Intervention: Prevent Infection  Recent Flowsheet Documentation  Taken 7/24/2024 1605 by Isabell Ibarra RN  Infection Prevention:   personal protective equipment utilized   hand hygiene promoted  Taken 7/24/2024 1415 by Isabell Ibarra RN  Infection Prevention:   hand hygiene promoted   personal protective equipment utilized  Taken 7/24/2024 1215 by Isabell Ibarra RN  Infection Prevention:   hand hygiene promoted   personal protective equipment utilized  Taken 7/24/2024 1005 by Isabell Ibarra RN  Infection Prevention:   hand hygiene promoted   personal protective equipment utilized  Taken 7/24/2024 0825 by Isabell Ibarra RN  Infection Prevention:   hand hygiene promoted   personal protective equipment utilized   Goal Outcome Evaluation:      Pt with no complaints this shift. Up with PT. Pt compliant with diet modifications from GI. Intake hourly throughout the shift. Pt resting at this time.Call light in reach. Plan of care to continue.

## 2024-07-24 NOTE — THERAPY TREATMENT NOTE
"Subjective: Pt agreeable to therapeutic plan of care. Pt is motivated to get up and move.    Objective:     Bed mobility - Independent  Transfers - Supervision sit to/from stand from hospital bed.   Ambulation - 150 feet x2 bouts  by seated rest break and vitals assessment.   CGA, no AD. Pt with improved balance, stride length, rakel and safety today. Slight lateral instability occurs x2, but pt is able to self correct .    Therapeutic Exercise - 15 Reps B LE AROM standing without support for dynamic standing balance challenge. Slight instability during heel/toe raises.     Vitals: Tachycardic up to 120 bpm with activity.     Pain: 0 VAS       Education: Provided education on the importance of mobility in the acute care setting, Verbal/Tactile Cues, Gait Training, and Energy conservation strategies    Assessment: Yonas Charles presents with gait and dynamic balance impairments which indicate the need for skilled intervention. He is progressing well but will benefit from  additional gait training. Will continue to follow and progress as tolerated. PT recommends pt DC to inpatient substance abuse rehab.     Plan/Recommendations:   If medically appropriate, Low Intensity Therapy recommended post-acute care - This is recommended as therapy feels this patient would require 2-3 visits per week. OP or HH would be the best option depending on patient's home bound status. Consider, if the patient has other  \"skilled\" needs such as wounds, IV antibiotics, etc. Combined with \"low intensity\" could also equate to a SNF. If patient is medically complex, consider LTAC. Pt requires no DME at discharge.     Pt desires Home vs. EtOH rehab at discharge. Pt cooperative; agreeable to therapeutic recommendations and plan of care.         Basic Mobility 6-click:  Rollin = Total, A lot = 2, A little = 3; 4 = None  Supine>Sit:   1 = Total, A lot = 2, A little = 3; 4 = None   Sit>Stand with arms:  1 = Total, A lot = 2, " A little = 3; 4 = None  Bed>Chair:   1 = Total, A lot = 2, A little = 3; 4 = None  Ambulate in room:  1 = Total, A lot = 2, A little = 3; 4 = None  3-5 Steps with railin = Total, A lot = 2, A little = 3; 4 = None  Score: 22    Modified [place]: N/A = No pre-op stroke/TIA    Post-Tx Position: Call light and personal items within reach, seated EOB  PPE: gloves

## 2024-07-25 ENCOUNTER — INPATIENT HOSPITAL (OUTPATIENT)
Dept: URBAN - METROPOLITAN AREA HOSPITAL 84 | Facility: HOSPITAL | Age: 29
End: 2024-07-25
Payer: MEDICAID

## 2024-07-25 DIAGNOSIS — K70.10 ALCOHOLIC HEPATITIS WITHOUT ASCITES: ICD-10-CM

## 2024-07-25 LAB
ALBUMIN SERPL-MCNC: 3.3 G/DL (ref 3.5–5.2)
ALBUMIN/GLOB SERPL: 1.4 G/DL
ALP SERPL-CCNC: 283 U/L (ref 39–117)
ALT SERPL W P-5'-P-CCNC: 47 U/L (ref 1–41)
ANION GAP SERPL CALCULATED.3IONS-SCNC: 10.6 MMOL/L (ref 5–15)
AST SERPL-CCNC: 141 U/L (ref 1–40)
BILIRUB SERPL-MCNC: 18.2 MG/DL (ref 0–1.2)
BUN SERPL-MCNC: 5 MG/DL (ref 6–20)
BUN/CREAT SERPL: 9.8 (ref 7–25)
CALCIUM SPEC-SCNC: 8.5 MG/DL (ref 8.6–10.5)
CHLORIDE SERPL-SCNC: 99 MMOL/L (ref 98–107)
CO2 SERPL-SCNC: 25.4 MMOL/L (ref 22–29)
CREAT SERPL-MCNC: 0.51 MG/DL (ref 0.76–1.27)
EGFRCR SERPLBLD CKD-EPI 2021: 140.8 ML/MIN/1.73
GLOBULIN UR ELPH-MCNC: 2.4 GM/DL
GLUCOSE SERPL-MCNC: 115 MG/DL (ref 65–99)
INR PPP: 1.98 (ref 0.93–1.1)
PHOSPHATE SERPL-MCNC: 2.1 MG/DL (ref 2.5–4.5)
PHOSPHATE SERPL-MCNC: 2.5 MG/DL (ref 2.5–4.5)
POTASSIUM SERPL-SCNC: 3.4 MMOL/L (ref 3.5–5.2)
POTASSIUM SERPL-SCNC: 4 MMOL/L (ref 3.5–5.2)
PROT SERPL-MCNC: 5.7 G/DL (ref 6–8.5)
PROTHROMBIN TIME: 20.5 SECONDS (ref 9.6–11.7)
SODIUM SERPL-SCNC: 135 MMOL/L (ref 136–145)

## 2024-07-25 PROCEDURE — 63710000001 PREDNISOLONE PER 5 MG: Performed by: NURSE PRACTITIONER

## 2024-07-25 PROCEDURE — 97116 GAIT TRAINING THERAPY: CPT

## 2024-07-25 PROCEDURE — 84132 ASSAY OF SERUM POTASSIUM: CPT | Performed by: INTERNAL MEDICINE

## 2024-07-25 PROCEDURE — 99233 SBSQ HOSP IP/OBS HIGH 50: CPT | Performed by: NURSE PRACTITIONER

## 2024-07-25 PROCEDURE — 84100 ASSAY OF PHOSPHORUS: CPT | Performed by: INTERNAL MEDICINE

## 2024-07-25 PROCEDURE — 97112 NEUROMUSCULAR REEDUCATION: CPT

## 2024-07-25 PROCEDURE — 25010000002 FILGRASTIM 300 MCG/0.5ML SOLUTION PREFILLED SYRINGE: Performed by: NURSE PRACTITIONER

## 2024-07-25 PROCEDURE — 97530 THERAPEUTIC ACTIVITIES: CPT

## 2024-07-25 PROCEDURE — 97110 THERAPEUTIC EXERCISES: CPT

## 2024-07-25 RX ORDER — POTASSIUM CHLORIDE 20 MEQ/1
40 TABLET, EXTENDED RELEASE ORAL EVERY 4 HOURS
Status: COMPLETED | OUTPATIENT
Start: 2024-07-25 | End: 2024-07-25

## 2024-07-25 RX ADMIN — Medication 220 MG: at 08:18

## 2024-07-25 RX ADMIN — FILGRASTIM 300 MCG: 300 INJECTION, SOLUTION INTRAVENOUS; SUBCUTANEOUS at 17:14

## 2024-07-25 RX ADMIN — PANTOPRAZOLE SODIUM 40 MG: 40 TABLET, DELAYED RELEASE ORAL at 17:14

## 2024-07-25 RX ADMIN — LACTULOSE 10 G: 20 SOLUTION ORAL at 17:14

## 2024-07-25 RX ADMIN — SUCRALFATE 1 G: 1 TABLET ORAL at 13:18

## 2024-07-25 RX ADMIN — SUCRALFATE 1 G: 1 TABLET ORAL at 08:18

## 2024-07-25 RX ADMIN — POTASSIUM CHLORIDE 40 MEQ: 1500 TABLET, EXTENDED RELEASE ORAL at 05:41

## 2024-07-25 RX ADMIN — PANTOPRAZOLE SODIUM 40 MG: 40 TABLET, DELAYED RELEASE ORAL at 08:18

## 2024-07-25 RX ADMIN — POTASSIUM CHLORIDE 40 MEQ: 1500 TABLET, EXTENDED RELEASE ORAL at 01:28

## 2024-07-25 RX ADMIN — LACTULOSE 10 G: 20 SOLUTION ORAL at 20:21

## 2024-07-25 RX ADMIN — LACTULOSE 10 G: 20 SOLUTION ORAL at 08:18

## 2024-07-25 RX ADMIN — Medication 100 MG: at 08:18

## 2024-07-25 RX ADMIN — Medication 2 PACKET: at 01:23

## 2024-07-25 RX ADMIN — Medication 10 ML: at 08:18

## 2024-07-25 RX ADMIN — Medication 10 ML: at 20:22

## 2024-07-25 RX ADMIN — FAMOTIDINE 40 MG: 20 TABLET, FILM COATED ORAL at 20:21

## 2024-07-25 RX ADMIN — SUCRALFATE 1 G: 1 TABLET ORAL at 17:14

## 2024-07-25 RX ADMIN — SUCRALFATE 1 G: 1 TABLET ORAL at 20:21

## 2024-07-25 RX ADMIN — PREDNISOLONE SODIUM PHOSPHATE 40 MG: 15 SOLUTION ORAL at 08:18

## 2024-07-25 RX ADMIN — Medication 1 TABLET: at 08:18

## 2024-07-25 RX ADMIN — FOLIC ACID 1 MG: 1 TABLET ORAL at 08:18

## 2024-07-25 NOTE — CONSULTS
"Nutrition Services    Patient Name: Yonas Charels  YOB: 1995  MRN: [id number]  Admission date: 7/18/2024    Comment:  -- Continue current diet and encourage good PO intakes    -- Continue Boost Plus TID (provides 1080 kcals, 42 g protein if consumed)     -- Continue Yogurt TID      CLINICAL NUTRITION ASSESSMENT      Reason for Assessment 7/25: LOS x 7 days  7/24: MD consult for \"extra nutrition\"  7/22: 2 gram Na+ diet education      H&P      History reviewed. No pertinent past medical history.    History reviewed. No pertinent surgical history.     Current Problems   Acute alcoholic hepatitis with alcoholic cirrhosis   Alcohol withdrawal with delirium tremens  Jaundice with elevated bilirubin level    Normocytic anemia    Nausea/vomiting  - GI following    Severe hypokalemia  Hyponatremia (potomania)  - Nephrology following    Metabolic acidosis    Esophagitis    Hepatomegaly with portal hypertension    Esophageal varices    Suicide ideation  Anxiety depression  Social issues  - psych following       Encounter Information        Trending Narrative     7/25: Admitted for alcohol withdrawal and detox.  Psych following for SI/depression.  RD previously visited patient at bedside 7/22 for low Na+ diet education.  See narrative below.  RD also previously consulted for \"extra nutrition\" per GI, see 7/24 documentation below.  GI considering NG tube for feedings if needed but patient agreed to try and eat more and has been successful.  MD notes mention possible transfer to transplant center.      7/24: RD received consult that Dr. Miller want patient to get \"extra nutrition\".  Per clarification with the patients RN, MD would like patient to have food intake every 1-2 hours.  RD to communicate with the kitchen that this patient is allowed to order more than 3 meals per day.       This RD previously provided low Na+ diet education to patient 7/22 and had lengthy discussed about his eating habits and changes " "necessary to limit Na+.  Noted patient lives at home and eats what his mother cooks and states his mother is on board with helping him make these changes.       Boost Plus already ordered TID for patient.  Also noted per kitchen that a request has been made for this patient to get yogurt between meals for snacks.  The kitchen will send up 3 yogurts per day and keep in the supplement fridge on the unit.       Per RD review of patient recent meals, patient ordering full meals with adequate protein sources such as eggs and fish.  Noted from previous discussion with patient that he doesn't eat a lot of meat.       Noted per GI note 7/23, possible need for NJ tube placement if patient unable to eat well.  Patient does not want this and will continue to try and eat on his own.  Recent intakes documented at %.  RD to continue to monitor as needed.      7/22: Patient accepting of diet education at time of RD visit. Patient reports eating most meals at home that his mother cooks. Discussed changes such as discontinue using the salt shaker at the table, buying no salt added canned vegetables, limited processed meats. Patient appreciative of information and reports his mother is on board to help him with these changes. RD provided low Na+ handout and RD contact information.        Anthropometrics        Current Height, Weight Height: 165.1 cm (65\")  Weight: 55.7 kg (122 lb 12.7 oz) (07/25/24 0305)       Usual Body Weight (UBW) Unable to obtain from patient        Trending Weight Hx     This admission: 7/25: 122#             PTA: No history to note     Wt Readings from Last 30 Encounters:   07/25/24 0305 55.7 kg (122 lb 12.7 oz)   07/18/24 0855 55.7 kg (122 lb 12.7 oz)      BMI kg/m2 Body mass index is 20.43 kg/m².       Labs        Pertinent Labs    Results from last 7 days   Lab Units 07/25/24  0953 07/24/24  2322 07/24/24  0324 07/23/24  0435   SODIUM mmol/L  --  135* 134* 134*   POTASSIUM mmol/L 4.0 3.4* 4.0 4.5 " "  CHLORIDE mmol/L  --  99 101 102   CO2 mmol/L  --  25.4 25.0 25.9   BUN mg/dL  --  5* 5* 5*   CREATININE mg/dL  --  0.51* 0.49* 0.55*   CALCIUM mg/dL  --  8.5* 8.4* 8.6   BILIRUBIN mg/dL  --  18.2* 16.9* 16.4*   ALK PHOS U/L  --  283* 275* 261*   ALT (SGPT) U/L  --  47* 35 29   AST (SGOT) U/L  --  141* 126* 116*   GLUCOSE mg/dL  --  115* 93 89     Results from last 7 days   Lab Units 07/25/24  0953 07/24/24  2322 07/23/24  1638 07/23/24  0435 07/22/24  0449 07/21/24  0514   MAGNESIUM mg/dL  --   --   --  1.7 2.2 1.3*   PHOSPHORUS mg/dL 2.5 2.1*   < > 2.0* 2.5 2.8   HEMOGLOBIN g/dL  --  9.3*   < > 8.8* 8.4* 8.5*   HEMATOCRIT %  --  25.7*   < > 24.2* 23.2* 23.0*    < > = values in this interval not displayed.     No results found for: \"HGBA1C\"     Medications    Scheduled Medications famotidine, 40 mg, Oral, Nightly  filgrastim (NEUPOGEN) injection, 300 mcg, Subcutaneous, Daily With Lunch  folic acid, 1 mg, Oral, Daily  lactulose, 10 g, Oral, TID  multivitamin with minerals, 1 tablet, Oral, Daily  pantoprazole, 40 mg, Oral, BID AC  [Held by provider] sertraline, 50 mg, Oral, Daily  sodium chloride, 10 mL, Intravenous, Q12H  sucralfate, 1 g, Oral, 4x Daily AC & at Bedtime  thiamine, 100 mg, Oral, Daily  zinc sulfate, 220 mg, Oral, Daily        Infusions      PRN Medications   acetaminophen **OR** acetaminophen **OR** acetaminophen    senna-docusate sodium **AND** polyethylene glycol **AND** bisacodyl **AND** bisacodyl    Calcium Replacement - Follow Nurse / BPA Driven Protocol    guaiFENesin-codeine    Magnesium Standard Dose Replacement - Follow Nurse / BPA Driven Protocol    ondansetron ODT **OR** ondansetron    Phosphorus Replacement - Follow Nurse / BPA Driven Protocol    Potassium Replacement - Follow Nurse / BPA Driven Protocol    sodium chloride    sodium chloride    sodium chloride     Physical Findings        Trending Physical   Appearance, NFPE 7/25: NFPE completed and not consistent with nutrition diagnosis " of malnutrition at this time using AND/ASPEN criteria      --  Edema  1+ feet      Bowel Function Last documented BM 7/25 (today)     Tubes No feeding tube      Chewing/Swallowing No feeding tube      Skin Intact      --  Current Nutrition Orders & Evaluation of Intake       Oral Nutrition     Food Allergies NKFA   Current PO Diet Diet: Cardiac; Low Sodium (2g); Texture: Soft to Chew (NDD 3); Soft to Chew: Chopped Meat; Fluid Consistency: Thin (IDDSI 0)   Supplement None ordered    PO Evaluation     Trending % PO Intake 7/25: 70% average PO intakes x 11 documented meals since admission    --  Nutritional Risk Screening        NRS-2002 Score          Nutrition Diagnosis         Nutrition Dx Problem 1 Excessive alcohol intake related to intake greater than recommendations as evidenced by greater than 2 drinks per day.        Nutrition Dx Problem 2        Intervention Goal         Intervention Goal(s) Accept ONS  No weight loss  PO intakes at least 75%     Nutrition Intervention        RD Action Diet education given 7/22  Continue ONS     Nutrition Prescription          Diet Prescription Low Na+, Soft to Chew    Supplement Prescription Boost Plus TID  Yogurt TID   --  Monitor/Evaluation        Monitor Per protocol, I&O, PO intake, Supplement intake, Pertinent labs, Weight, Skin status, GI status, Symptoms, POC/GOC       Electronically signed by:  Joyce Gardiner RD  07/25/24 13:18 EDT

## 2024-07-25 NOTE — PROGRESS NOTES
"NEPHROLOGY PROGRESS NOTE------[facility]    [facility]  [phone]  Quang Butts MD      Patient Care Team:  Provider, No Known as PCP - General      Provider:  Quang Butts MD  Patient Name: Yonas Charles  :  1995    SUBJECTIVE:    F/U ELECTROLYTE ABNORMALITIES    No chest pain or SOA    Medication:  famotidine, 40 mg, Oral, Nightly  filgrastim (NEUPOGEN) injection, 300 mcg, Subcutaneous, Daily With Lunch  folic acid, 1 mg, Oral, Daily  lactulose, 10 g, Oral, TID  multivitamin with minerals, 1 tablet, Oral, Daily  pantoprazole, 40 mg, Oral, BID AC  [Held by provider] sertraline, 50 mg, Oral, Daily  sodium chloride, 10 mL, Intravenous, Q12H  sucralfate, 1 g, Oral, 4x Daily AC & at Bedtime  thiamine, 100 mg, Oral, Daily  zinc sulfate, 220 mg, Oral, Daily               OBJECTIVE    Vital Sign Min/Max for last 24 hours  Temp  Min: 97.5 °F (36.4 °C)  Max: 98.4 °F (36.9 °C)   BP  Min: 109/67  Max: 125/84   Pulse  Min: 100  Max: 125   Resp  Min: 18  Max: 26   SpO2  Min: 92 %  Max: 98 %   No data recorded   Weight  Min: 55.7 kg (122 lb 12.7 oz)  Max: 55.7 kg (122 lb 12.7 oz)     Flowsheet Rows      Flowsheet Row First Filed Value   Admission Height 165.1 cm (65\") Documented at 2024 0855   Admission Weight 55.7 kg (122 lb 12.7 oz) Documented at 2024 0855            No intake/output data recorded.  I/O last 3 completed shifts:  In: 1020 [P.O.:1020]  Out: -     Physical Exam:  General Appearance: NAD.   Head: normocephalic, without obvious abnormality and atraumatic    Eyes: conjunctivae and +ICTERIC SCLERAE  Neck: supple and no JVD  Lungs: CTA bilaterally  Heart: regular rhythm & normal rate and normal S1, S2   Chest Wall: no abnormalities observed  Abdomen: normal bowel sounds and soft   Extremities: moves extremities well, no edema, no cyanosis  Skin: +JAUNDICE  Neurologic: A AND O X 4 WITHOUT FOCAL DEFICIT    Labs:    WBC WBC   Date " "Value Ref Range Status   07/24/2024 16.67 (H) 3.40 - 10.80 10*3/mm3 Final   07/24/2024 15.98 (H) 3.40 - 10.80 10*3/mm3 Final   07/23/2024 14.99 (H) 3.40 - 10.80 10*3/mm3 Final      HGB Hemoglobin   Date Value Ref Range Status   07/24/2024 9.3 (L) 13.0 - 17.7 g/dL Final   07/24/2024 9.2 (L) 13.0 - 17.7 g/dL Final   07/23/2024 8.8 (L) 13.0 - 17.7 g/dL Final      HCT Hematocrit   Date Value Ref Range Status   07/24/2024 25.7 (L) 37.5 - 51.0 % Final   07/24/2024 25.3 (L) 37.5 - 51.0 % Final   07/23/2024 24.2 (L) 37.5 - 51.0 % Final      Platelets No results found for: \"LABPLAT\"   MCV MCV   Date Value Ref Range Status   07/24/2024 94.5 79.0 - 97.0 fL Final   07/24/2024 94.4 79.0 - 97.0 fL Final   07/23/2024 93.8 79.0 - 97.0 fL Final          Sodium Sodium   Date Value Ref Range Status   07/24/2024 135 (L) 136 - 145 mmol/L Final   07/24/2024 134 (L) 136 - 145 mmol/L Final   07/23/2024 134 (L) 136 - 145 mmol/L Final      Potassium Potassium   Date Value Ref Range Status   07/25/2024 4.0 3.5 - 5.2 mmol/L Final     Comment:     Specimen hemolyzed.  Result may be falsely elevated.   07/24/2024 3.4 (L) 3.5 - 5.2 mmol/L Final   07/24/2024 4.0 3.5 - 5.2 mmol/L Final   07/23/2024 4.5 3.5 - 5.2 mmol/L Final   07/22/2024 4.5 3.5 - 5.2 mmol/L Final      Chloride Chloride   Date Value Ref Range Status   07/24/2024 99 98 - 107 mmol/L Final   07/24/2024 101 98 - 107 mmol/L Final   07/23/2024 102 98 - 107 mmol/L Final      CO2 CO2   Date Value Ref Range Status   07/24/2024 25.4 22.0 - 29.0 mmol/L Final   07/24/2024 25.0 22.0 - 29.0 mmol/L Final   07/23/2024 25.9 22.0 - 29.0 mmol/L Final      BUN BUN   Date Value Ref Range Status   07/24/2024 5 (L) 6 - 20 mg/dL Final   07/24/2024 5 (L) 6 - 20 mg/dL Final   07/23/2024 5 (L) 6 - 20 mg/dL Final      Creatinine Creatinine   Date Value Ref Range Status   07/24/2024 0.51 (L) 0.76 - 1.27 mg/dL Final   07/24/2024 0.49 (L) 0.76 - 1.27 mg/dL Final   07/23/2024 0.55 (L) 0.76 - 1.27 mg/dL Final    " "  Calcium Calcium   Date Value Ref Range Status   07/24/2024 8.5 (L) 8.6 - 10.5 mg/dL Final   07/24/2024 8.4 (L) 8.6 - 10.5 mg/dL Final   07/23/2024 8.6 8.6 - 10.5 mg/dL Final      PO4 No components found for: \"PO4\"   Albumin Albumin   Date Value Ref Range Status   07/24/2024 3.3 (L) 3.5 - 5.2 g/dL Final   07/24/2024 3.2 (L) 3.5 - 5.2 g/dL Final   07/23/2024 3.2 (L) 3.5 - 5.2 g/dL Final      Magnesium Magnesium   Date Value Ref Range Status   07/23/2024 1.7 1.6 - 2.6 mg/dL Final      Uric Acid No components found for: \"URIC ACID\"     Imaging Results (Last 72 Hours)       Procedure Component Value Units Date/Time    XR Chest 1 View [[id number]] Collected: 07/24/24 1330     Updated: 07/24/24 1333    Narrative:      XR CHEST 1 VW    Date of Exam: 7/24/2024 1:25 PM EDT    Indication: coughing, SOB    Comparison: 7/18/2024    Findings:  There are lower lung volumes. Cardiac size appears increased. There is airspace disease in both lung bases with small bilateral pleural effusions. Pulmonary vascular markings have increased compared with the last study.      Impression:      Impression:    1. Increased cardiac size with evidence of mild passive congestion.  2. Bibasilar airspace disease with bilateral pleural effusions.      Electronically Signed: Vitor Chin MD    7/24/2024 1:31 PM EDT    Workstation ID: [id number]            Results for orders placed during the hospital encounter of 07/18/24    XR Chest 1 View    Narrative  XR CHEST 1 VW    Date of Exam: 7/24/2024 1:25 PM EDT    Indication: coughing, SOB    Comparison: 7/18/2024    Findings:  There are lower lung volumes. Cardiac size appears increased. There is airspace disease in both lung bases with small bilateral pleural effusions. Pulmonary vascular markings have increased compared with the last study.    Impression  Impression:    1. Increased cardiac size with evidence of mild passive congestion.  2. Bibasilar airspace disease with bilateral pleural " effusions.      Electronically Signed: Vitor Chin MD  7/24/2024 1:31 PM EDT  Workstation ID: [id number]      XR Chest PA & Lateral    Narrative  DATE OF EXAM:  7/18/2024 12:36 PM    PROCEDURE:  XR CHEST PA AND LATERAL-    INDICATIONS:  rule out aspiration pneumonia; E87.6-Hypokalemia; F10.939-Alcohol use,  unspecified with withdrawal, unspecified; R17-Unspecified jaundice    COMPARISON:  No Comparisons Available    TECHNIQUE:  Two radiologic views of the chest , PA and lateral were obtained.    FINDINGS:  Heart size normal. Negative for lobar consolidation, edema, effusion or  pneumothorax. Osseous structures unremarkable.    Impression  No active pulmonary process.      Electronically Signed By-Armani Small MD On:7/18/2024 1:00 PM            ASSESSMENT / PLAN      Alcohol withdrawal    HYPONATREMIA------stable. Likely related to underlying liver disease. Fluid restrict     2. HYPOKALEMIA-------Resolved.       3. HYPOMAGNESEMIA-------Replaced     4. HYPOCALCEMIA------Replace IV     5. ALCOHOL ABUSE/CIRRHOSIS/JAUNDICE------Thiamine, Folate, IVFs, prn IV Ativan. Withdrawal prcautions     6. MIXED METABOLIC ALKALOSIS/METABOLIC ACIDOSIS-----Better     7. ANEMIA------Normocytic with normal RDW. Follow for PRBC need     8. DEPRESSION------Suicide precautions. Hold Zoloft given severe hyponatremia     9. HYPOALBUMINEMIA-----IV Albumin to temporize and po supplements     10. ELEVATED INR------Secondary to liver disease     11. THROMBOCYTOPENIA------No heparin. Secondary to liver disease     12. PUD PROPHYLAXIS-----IV PPI     13. DVT PROPHYLAXIS-----SCDs    14. HYPOPHOSPHATEMIA------Replaced    Sodium stable, slightly improved  Replace potassium/phos  Keep on fluid restriction  Labs in am        Quang Btuts MD  [facility]  [phone]  07/25/24  11:32 EDT

## 2024-07-25 NOTE — PLAN OF CARE
Goal Outcome Evaluation:  Plan of Care Reviewed With: patient        Progress: no change  Outcome Evaluation: Pt eating frequent meals and acknowledges the importance of 3000 calorie/day intake. Denies pain, electrolytes replaced. Care continues.         Problem: Adult Inpatient Plan of Care  Goal: Plan of Care Review  Outcome: Ongoing, Progressing  Flowsheets (Taken 7/25/2024 0313)  Progress: no change  Outcome Evaluation: Pt eating frequent meals and acknowledges the importance of 3000 calorie/day intake. Denies pain, electrolytes replaced. Care continues.  Goal: Patient-Specific Goal (Individualized)  Outcome: Ongoing, Progressing  Goal: Absence of Hospital-Acquired Illness or Injury  Outcome: Ongoing, Progressing  Intervention: Identify and Manage Fall Risk  Recent Flowsheet Documentation  Taken 7/25/2024 0000 by Mariam Cardona, RN  Safety Promotion/Fall Prevention:   activity supervised   assistive device/personal items within reach   clutter free environment maintained   fall prevention program maintained   nonskid shoes/slippers when out of bed   room organization consistent   safety round/check completed  Taken 7/24/2024 2200 by Mariam Cardona, URSULA  Safety Promotion/Fall Prevention:   activity supervised   assistive device/personal items within reach   clutter free environment maintained   fall prevention program maintained   nonskid shoes/slippers when out of bed   safety round/check completed   room organization consistent  Taken 7/24/2024 1956 by Mariam Cardona, RN  Safety Promotion/Fall Prevention:   activity supervised   assistive device/personal items within reach   clutter free environment maintained   fall prevention program maintained   nonskid shoes/slippers when out of bed   room organization consistent   safety round/check completed  Intervention: Prevent Skin Injury  Recent Flowsheet Documentation  Taken 7/25/2024 0000 by Mariam Cardona, RN  Body Position: position changed independently  Taken 7/24/2024 2200 by  Mariam Cardona RN  Body Position: position changed independently  Taken 7/24/2024 1956 by Mariam Cardona RN  Body Position: position changed independently  Intervention: Prevent and Manage VTE (Venous Thromboembolism) Risk  Recent Flowsheet Documentation  Taken 7/25/2024 0000 by Mariam Cardona RN  Range of Motion: active ROM (range of motion) encouraged  Taken 7/24/2024 2200 by Mariam Cardona RN  Activity Management: sitting, edge of bed  Taken 7/24/2024 1956 by Mariam Cardona RN  VTE Prevention/Management:   bilateral   sequential compression devices off   patient refused intervention  Range of Motion: active ROM (range of motion) encouraged  Intervention: Prevent Infection  Recent Flowsheet Documentation  Taken 7/25/2024 0000 by Mariam Cardona RN  Infection Prevention:   hand hygiene promoted   personal protective equipment utilized   rest/sleep promoted   single patient room provided  Taken 7/24/2024 2200 by Mariam Cardona RN  Infection Prevention:   hand hygiene promoted   personal protective equipment utilized   rest/sleep promoted   single patient room provided  Taken 7/24/2024 1956 by Mariam Cardona RN  Infection Prevention:   hand hygiene promoted   personal protective equipment utilized   rest/sleep promoted   single patient room provided  Goal: Optimal Comfort and Wellbeing  Outcome: Ongoing, Progressing  Intervention: Provide Person-Centered Care  Recent Flowsheet Documentation  Taken 7/25/2024 0000 by Mariam Cardona RN  Trust Relationship/Rapport:   care explained   thoughts/feelings acknowledged  Taken 7/24/2024 1956 by Mariam Cardona RN  Trust Relationship/Rapport:   care explained   thoughts/feelings acknowledged  Goal: Readiness for Transition of Care  Outcome: Ongoing, Progressing     Problem: Pain Acute  Goal: Acceptable Pain Control and Functional Ability  Outcome: Ongoing, Progressing  Intervention: Prevent or Manage Pain  Recent Flowsheet Documentation  Taken 7/25/2024 0000 by Mariam Cardona RN  Medication Review/Management:  medications reviewed  Taken 7/24/2024 2200 by Mariam Cardona RN  Medication Review/Management: medications reviewed  Taken 7/24/2024 1956 by Mariam Cardona RN  Medication Review/Management: medications reviewed  Intervention: Optimize Psychosocial Wellbeing  Recent Flowsheet Documentation  Taken 7/25/2024 0000 by Mariam Cardona RN  Diversional Activities: television  Taken 7/24/2024 1956 by Mariam Cardona RN  Diversional Activities: television     Problem: Skin Injury Risk Increased  Goal: Skin Health and Integrity  Outcome: Ongoing, Progressing     Problem: Fall Injury Risk  Goal: Absence of Fall and Fall-Related Injury  Outcome: Ongoing, Progressing  Intervention: Identify and Manage Contributors  Recent Flowsheet Documentation  Taken 7/25/2024 0000 by Mariam Cardona RN  Medication Review/Management: medications reviewed  Taken 7/24/2024 2200 by Mariam Cardona RN  Medication Review/Management: medications reviewed  Taken 7/24/2024 1956 by Mariam Cardona RN  Medication Review/Management: medications reviewed  Intervention: Promote Injury-Free Environment  Recent Flowsheet Documentation  Taken 7/25/2024 0000 by Mariam Cardona RN  Safety Promotion/Fall Prevention:   activity supervised   assistive device/personal items within reach   clutter free environment maintained   fall prevention program maintained   nonskid shoes/slippers when out of bed   room organization consistent   safety round/check completed  Taken 7/24/2024 2200 by Mariam Cardona RN  Safety Promotion/Fall Prevention:   activity supervised   assistive device/personal items within reach   clutter free environment maintained   fall prevention program maintained   nonskid shoes/slippers when out of bed   safety round/check completed   room organization consistent  Taken 7/24/2024 1956 by Mariam Cardona RN  Safety Promotion/Fall Prevention:   activity supervised   assistive device/personal items within reach   clutter free environment maintained   fall prevention program maintained    nonskid shoes/slippers when out of bed   room organization consistent   safety round/check completed     Problem: Alcohol Withdrawal  Goal: Alcohol Withdrawal Symptom Control  Outcome: Ongoing, Progressing     Problem: Acute Neurologic Deterioration (Alcohol Withdrawal)  Goal: Optimal Neurologic Function  Outcome: Ongoing, Progressing     Problem: Substance Misuse (Alcohol Withdrawal)  Goal: Readiness for Change Identified  Outcome: Ongoing, Progressing

## 2024-07-25 NOTE — PLAN OF CARE
Goal Outcome Evaluation:         Pt ciwa has been 2 for today. Pt has not complained of soa/chest. Pt as eated 100% of his meals as well as had 3 addition boost shakes.

## 2024-07-25 NOTE — THERAPY DISCHARGE NOTE
Subjective: Pt agreeable to therapeutic plan of care.    Objective:     Bed mobility - Independent  Transfers - Independent  Ambulation - 400 feet Independent without AD  Balance training - tinetti gait and balance assessment scored 28/28 indicating low fall risk. Also completed modified romberg and romberg - eyes open and closed for improved ankle strategies and decreased fall risk.     Vitals: remains Tachycardic    Pain: 0 VAS    Education: Provided education on the importance of mobility in the acute care setting, Verbal/Tactile Cues, and Gait Training    Assessment: Yonas Charles presents with great progress on balance and gait No additional functional mobility impairments present which indicate the need for skilled intervention. Tolerating session today without incident. PT will s/o and complete orders.      Plan/Recommendations:   If medically appropriate, No ongoing therapy recommended post-acute care. No therapy needs. Pt requires no DME at discharge.     Pt desires Home vs. EtOH rehab at discharge. Pt cooperative; agreeable to therapeutic recommendations and plan of care.         Basic Mobility 6-click:  Rollin = Total, A lot = 2, A little = 3; 4 = None  Supine>Sit:   1 = Total, A lot = 2, A little = 3; 4 = None   Sit>Stand with arms:  1 = Total, A lot = 2, A little = 3; 4 = None  Bed>Chair:   1 = Total, A lot = 2, A little = 3; 4 = None  Ambulate in room:  1 = Total, A lot = 2, A little = 3; 4 = None  3-5 Steps with railin = Total, A lot = 2, A little = 3; 4 = None  Score: 24    Modified [place]: N/A = No pre-op stroke/TIA    Post-Tx Position: Up in Chair and Call light and personal items within reach; RN notified of pt's progress and PT recommendation that he can walk around the unit unassisted.   PPE: gloves

## 2024-07-25 NOTE — PLAN OF CARE
Goal Outcome Evaluation:  Yonas Charles presents with ADL impairments affecting function including endurance / activity tolerance and strength. Demonstrated functioning below baseline abilities indicate the need for continued skilled intervention while inpatient. Pt completed toileting with MOD I.  Pt completed donning tennis shoes with MOD I.  Pt completed functional mobility in hallway with Supervision.  Pt completed chair push ups 1 x 10.  Pt completed BUE AROM 2 x 10 focusing on improving strength and endurance.  Pt reports fatigue after exercises.   Tolerating session today without incident. Will continue to follow and progress as tolerated.

## 2024-07-25 NOTE — THERAPY TREATMENT NOTE
Subjective: Pt agreeable to therapeutic plan of care.  Cognition: oriented to Person, Place, Time, and Situation    Objective:     Functional Transfers: Supervision     Balance: unsupported and standing Supervision  Functional Ambulation: Supervision    Lower Body Dressing: Modified-Independent  ADL Position: supported sitting  ADL Comments: donning tennis shoes    Toileting: Modified-Independent  ADL Position: supported sitting/standing      Therapeutic Exercise - 20 Reps B UE AROM supported sitting / chair    Chair push ups 1 x 10    Vitals: Tachycardic -130's throughout    Pain: 0 VAS    Education: Provided education on the importance of mobility in the acute care setting, ADL training, and Transfer Training      Assessment: Yonas Charles presents with ADL impairments affecting function including endurance / activity tolerance and strength. Demonstrated functioning below baseline abilities indicate the need for continued skilled intervention while inpatient. Pt completed toileting with MOD I.  Pt completed donning tennis shoes with MOD I.  Pt completed functional mobility in hallway with Supervision.  Pt completed chair push ups 1 x 10.  Pt completed BUE AROM 2 x 10 focusing on improving strength and endurance.  Pt reports fatigue after exercises.   Tolerating session today without incident. Will continue to follow and progress as tolerated.     Plan/Recommendations:   No ongoing therapy recommended post-acute care. No therapy needs.. Pt requires no DME at discharge.     Pt desires Home vs ETOH rehab at discharge. Pt cooperative; agreeable to therapeutic recommendations and plan of care.     Modified [place]: N/A = No pre-op stroke/TIA    Post-Tx Position: Up in Chair, Alarms activated, and Call light and personal items within reach  PPE: gloves

## 2024-07-25 NOTE — PROGRESS NOTES
LOS: 7 days   Patient Care Team:  Provider, No Known as PCP - General      Subjective     Interval History:     Subjective: Patient with no new complaints.  Continues to eat 100% of trays.  Reports abdominal bloating, but denies abdominal pain.  No nausea/vomiting.      ROS:   No chest pain, shortness of breath, or cough.        Medication Review:     Current Facility-Administered Medications:     acetaminophen (TYLENOL) tablet 650 mg, 650 mg, Oral, Q4H PRN **OR** acetaminophen (TYLENOL) 160 MG/5ML oral solution 650 mg, 650 mg, Oral, Q4H PRN **OR** acetaminophen (TYLENOL) suppository 650 mg, 650 mg, Rectal, Q4H PRN, Mari Bustos MD    sennosides-docusate (PERICOLACE) 8.6-50 MG per tablet 2 tablet, 2 tablet, Oral, BID PRN **AND** polyethylene glycol (MIRALAX) packet 17 g, 17 g, Oral, Daily PRN **AND** bisacodyl (DULCOLAX) EC tablet 5 mg, 5 mg, Oral, Daily PRN **AND** bisacodyl (DULCOLAX) suppository 10 mg, 10 mg, Rectal, Daily PRN, Mari Bustos MD    Calcium Replacement - Follow Nurse / BPA Driven Protocol, , Does not apply, PRN, Mari Bustos MD    famotidine (PEPCID) tablet 40 mg, 40 mg, Oral, Nightly, Waleska Chery, APRN, 40 mg at 07/24/24 2107    folic acid (FOLVITE) tablet 1 mg, 1 mg, Oral, Daily, Mari Bustos MD, 1 mg at 07/25/24 0818    guaiFENesin-codeine (GUAIFENESIN AC) 100-10 MG/5ML liquid 5 mL, 5 mL, Oral, Q4H PRN, Frank Wild MD, 5 mL at 07/24/24 0318    lactulose (CHRONULAC) 10 GM/15ML solution 10 g, 10 g, Oral, TID, Vicente Moscoso MD, 10 g at 07/25/24 0818    Magnesium Standard Dose Replacement - Follow Nurse / BPA Driven Protocol, , Does not apply, PRN, Mari Bustos MD    multivitamin with minerals 1 tablet, 1 tablet, Oral, Daily, Mari Bustos MD, 1 tablet at 07/25/24 0818    ondansetron ODT (ZOFRAN-ODT) disintegrating tablet 4 mg, 4 mg, Oral, Q6H PRN **OR** ondansetron (ZOFRAN) injection 4 mg, 4 mg, Intravenous, Q6H PRN, Mari Bustos MD,  4 mg at 07/18/24 1330    pantoprazole (PROTONIX) EC tablet 40 mg, 40 mg, Oral, BID AC, Kaci Polo APRN, 40 mg at 07/25/24 0818    Phosphorus Replacement - Follow Nurse / BPA Driven Protocol, , Does not apply, Juli HARTLEY Abbas Ali, MD    Potassium Replacement - Follow Nurse / BPA Driven Protocol, , Does not apply, Juli HARTLEY Abbas Ali, MD    prednisoLONE sodium phosphate (ORAPRED) 15 MG/5ML oral solution 40 mg, 40 mg, Oral, Daily, Waleska Chery APRN, 40 mg at 07/25/24 0818    [Held by provider] sertraline (ZOLOFT) tablet 50 mg, 50 mg, Oral, Daily, Mari Bustos MD, 50 mg at 07/18/24 1258    sodium chloride 0.9 % flush 10 mL, 10 mL, Intravenous, PRN, Mari Bustos MD    sodium chloride 0.9 % flush 10 mL, 10 mL, Intravenous, Q12H, Mari Bustos MD, 10 mL at 07/25/24 0818    sodium chloride 0.9 % flush 10 mL, 10 mL, Intravenous, PRN, Mari Bustos MD    sodium chloride 0.9 % infusion 40 mL, 40 mL, Intravenous, PRN, Mari Bustos MD    sucralfate (CARAFATE) tablet 1 g, 1 g, Oral, 4x Daily AC & at Bedtime, Kaci Polo APRN, 1 g at 07/25/24 0818    [COMPLETED] thiamine (B-1) injection 200 mg, 200 mg, Intravenous, Q8H, 200 mg at 07/23/24 1437 **FOLLOWED BY** thiamine (VITAMIN B-1) tablet 100 mg, 100 mg, Oral, Daily, Mari Bustos MD, 100 mg at 07/25/24 0818    zinc sulfate (ZINCATE) capsule 220 mg, 220 mg, Oral, Daily, Waleska Chery APRN, 220 mg at 07/25/24 0818      Objective     Vital Signs  Vitals:    07/24/24 2317 07/25/24 0305 07/25/24 0723 07/25/24 0810   BP: 125/84 109/67  122/70   BP Location: Left arm Left arm  Right arm   Patient Position: Lying Lying  Lying   Pulse: 114 108 (!) 123 (!) 125   Resp: 18 20  20   Temp: 98.4 °F (36.9 °C) 97.5 °F (36.4 °C)  98.4 °F (36.9 °C)   TempSrc: Oral Oral  Oral   SpO2: 97% 92% 96% 96%   Weight:  55.7 kg (122 lb 12.7 oz)     Height:           Physical Exam:     General Appearance:    Awake and alert, in no acute distress    Head:    Normocephalic, without obvious abnormality   Eyes:          Conjunctivae normal, anicteric sclera   Throat:   No oral lesions, no thrush, oral mucosa moist   Neck:   No adenopathy, supple, no JVD   Lungs:     respirations regular, even and unlabored   Abdomen:     Soft, non-tender, no rebound or guarding, mild distention   Rectal:     Deferred   Extremities:   No edema, no cyanosis   Skin:   No bruising or rash, no jaundice        Results Review:    CBC    Results from last 7 days   Lab Units 07/24/24  2322 07/24/24  0324 07/23/24  0435 07/22/24  0449 07/21/24  0514 07/20/24  0509 07/19/24  0300   WBC 10*3/mm3 16.67* 15.98* 14.99* 15.08* 14.80* 11.18* 8.78   HEMOGLOBIN g/dL 9.3* 9.2* 8.8* 8.4* 8.5* 9.1* 8.2*   PLATELETS 10*3/mm3 170 162 148 147 148 100* 85*     CMP   Results from last 7 days   Lab Units 07/24/24  2322 07/24/24  0324 07/23/24  1638 07/23/24  0435 07/22/24  1515 07/22/24  0449 07/21/24  0514 07/20/24  0509 07/19/24  1619 07/19/24  1458 07/19/24  0300 07/18/24  1829 07/18/24  1015   SODIUM mmol/L 135* 134*  --  134*  --  133* 135* 133* 131* 134* 126*   < > 122*   POTASSIUM mmol/L 3.4* 4.0  --  4.5 4.5 3.6 3.7 3.8 3.8  3.6 3.6 3.0*   < > 1.9*   CHLORIDE mmol/L 99 101  --  102  --  98 100 96* 96* 96* 86*   < > 72*   CO2 mmol/L 25.4 25.0  --  25.9  --  26.9 24.9 29.0 23.8 29.2* 32.5*   < > 33.7*   BUN mg/dL 5* 5*  --  5*  --  4* 3* 3* 3* 3* 3*   < > 6   CREATININE mg/dL 0.51* 0.49*  --  0.55*  --  0.47* 0.46* 0.46* 0.41* 0.40* 0.44*   < > 0.40*   GLUCOSE mg/dL 115* 93  --  89  --  89 130* 126* 78 79 116*   < > 93   ALBUMIN g/dL 3.3* 3.2*  --  3.2*  --  3.1* 2.8* 2.9*  --  2.9* 3.1*   < > 3.0*   BILIRUBIN mg/dL 18.2* 16.9*  --  16.4*  --  16.0* 16.6* 18.0*  --  17.9* 17.4*  --  15.8*   ALK PHOS U/L 283* 275*  --  261*  --  258* 272* 286*  --  301* 324*  --  380*   AST (SGOT) U/L 141* 126*  --  116*  --  107* 115* 122*  --  143* 159*  --  186*   ALT (SGPT) U/L 47* 35  --  29  --  27 23 23  --   "27 28  --  33   MAGNESIUM mg/dL  --   --   --  1.7  --  2.2 1.3*  --   --  1.7 2.2  --  1.2*   PHOSPHORUS mg/dL 2.1* 2.3* 2.7 2.0*  --  2.5 2.8 2.6 1.6*  --  0.3*   < >  --    LIPASE U/L  --   --   --   --   --   --   --   --   --   --   --   --  100*   AMMONIA umol/L  --   --   --   --   --   --   --   --   --   --  90*  --   --     < > = values in this interval not displayed.     Cr Clearance Estimated Creatinine Clearance: 168.4 mL/min (A) (by C-G formula based on SCr of 0.51 mg/dL (L)).  Coag   Results from last 7 days   Lab Units 07/24/24  2322 07/24/24  0324 07/23/24  0435 07/22/24  0449 07/21/24  1506 07/20/24  0509 07/19/24  0300   INR  1.98* 1.88* 1.80* 1.71* 1.71* 2.02* 1.79*     HbA1C No results found for: \"HGBA1C\"  Results from last 7 days   Lab Units 07/18/24  1015   CK TOTAL U/L 45     Infection     UA    Results from last 7 days   Lab Units 07/18/24  1319   NITRITE UA  Positive*   WBC UA /HPF 0-2   BACTERIA UA /HPF None Seen   SQUAM EPITHEL UA /HPF 0-2     Microbiology Results (last 10 days)       Procedure Component Value - Date/Time    Legionella Antigen, Urine - Urine, Urine, Clean Catch [[id number]]  (Normal) Collected: 07/18/24 1319    Lab Status: Final result Specimen: Urine, Clean Catch Updated: 07/18/24 1852     LEGIONELLA ANTIGEN, URINE Negative    Blood Culture - Blood, Hand, Right [[id number]]  (Normal) Collected: 07/18/24 0928    Lab Status: Final result Specimen: Blood from Hand, Right Updated: 07/23/24 0945     Blood Culture No growth at 5 days    Blood Culture - Blood, Arm, Right [[id number]]  (Normal) Collected: 07/18/24 0923    Lab Status: Final result Specimen: Blood from Arm, Right Updated: 07/23/24 0945     Blood Culture No growth at 5 days          Imaging Results (Last 72 Hours)       Procedure Component Value Units Date/Time    XR Chest 1 View [[id number]] Collected: 07/24/24 1330     Updated: 07/24/24 1333    Narrative:      XR CHEST 1 VW    Date of Exam: 7/24/2024 1:25 PM " EDT    Indication: coughing, SOB    Comparison: 7/18/2024    Findings:  There are lower lung volumes. Cardiac size appears increased. There is airspace disease in both lung bases with small bilateral pleural effusions. Pulmonary vascular markings have increased compared with the last study.      Impression:      Impression:    1. Increased cardiac size with evidence of mild passive congestion.  2. Bibasilar airspace disease with bilateral pleural effusions.      Electronically Signed: Vitor Chin MD    7/24/2024 1:31 PM EDT    Workstation ID: [id number]            Assessment & Plan     ASSESSMENT:  -Alcohol hepatitis/cirrhosis   -Elevated LFTs -related to above  -Acute alcohol withdrawal  -Nitrite positive UTI  -Leukocytosis  -Normocytic anemia  -Thrombocytopenia - due to cirrhosis  -Elevated INR - due to cirrhosis  -Hyponatremia related to alcohol abuse  -Dysphagia could be related to esophageal stricture versus reflux esophagitis versus dysmotility  -GERD     PLAN:  Patient is a 29 y/o male with history of ETOH abuse who presented on 7/18 with complaints of ETOH withdrawal. Found to have significantly elevated LFTs on admission with total bilirubin 17.4, alk phos 324, , and ALT 28. CT abd/pelvis W on admission showed fatty liver,  sequelae of portal hypertension noted including distal paraesophageal  and intra-abdominal varices as well as trace ascites , and patent portal vasculature. He was started on prednisolone on 7/19 due to Maddrey's DF of 33.    Patient with no new complaints.  Continues to eat 100% of meal trays.  No nausea/vomiting or abdominal pain.  LFTs continue to worsen.  Total bilirubin 18.2 from 16.9, alk phos 283 from 275,  from 126, and ALT 47 from 35.  INR 1.98 from 1.88 despite vitamin K administration yesterday.  Due to worsening LFTs and prolonging INR despite vitamin K, we will start Neupogen daily.  Discontinue prednisolone as LFTs continue to worsen despite  treatment.  Continue to encourage nutrition.  Continue thiamine, zinc, and folic acid.  Recommend complete ETOH cessation.   Liver serologies unremarkable. AFP normal.   Will need screening EGD at some point as inpatient vs outpatient.   If liver function continues to worsen despite Neupogen, may need to consider contacting transplant team.  Continue supportive care.    Electronically signed by MARI Mobley, 07/25/24, 9:42 AM EDT.

## 2024-07-25 NOTE — PROGRESS NOTES
[facility] MEDICINE SERVICE  DAILY PROGRESS NOTE    NAME: Yonas Charles  : 1995  MRN: [id number]      LOS: 7 days     PROVIDER OF SERVICE: Hayley Rodriguez DO    Chief Complaint: Alcohol withdrawal    Subjective:     Interval History: Patient seen and examined this morning.  Doing about the same, eating 100% of all his meals and protein supplements.  T. bili continues to rise as well as LFTs.  Patient states GI considering possible transfer to [facility] for transplant evaluation but further final decision pending.    Review of Systems:   Pertinent positives as noted in HPI/subjective.  All other systems were reviewed and are negative.      Vital Signs  Temp:  [97.5 °F (36.4 °C)-98.4 °F (36.9 °C)] 98.4 °F (36.9 °C)  Heart Rate:  [100-125] 125  Resp:  [18-26] 20  BP: (109-125)/(67-84) 122/70   Body mass index is 20.43 kg/m².    Physical exam:     General: Awake, alert, young male, NAD  Eyes: PERRL, EOMI, sclera jaundiced  Cardiovascular: Regular rate and rhythm, no murmurs  Respiratory: Clear to auscultation bilaterally, no wheezing or rales, unlabored breathing  Abdomen: Soft, nontender, positive bowel sounds, no guarding  Neurologic: A&O, CN grossly intact, moves all extremities spontaneously  Musculoskeletal: Normal range of motion, no other gross deformities  Skin: Warm, jaundiced        Scheduled Meds   famotidine, 40 mg, Oral, Nightly  filgrastim (NEUPOGEN) injection, 300 mcg, Subcutaneous, Daily With Lunch  folic acid, 1 mg, Oral, Daily  lactulose, 10 g, Oral, TID  multivitamin with minerals, 1 tablet, Oral, Daily  pantoprazole, 40 mg, Oral, BID AC  [Held by provider] sertraline, 50 mg, Oral, Daily  sodium chloride, 10 mL, Intravenous, Q12H  sucralfate, 1 g, Oral, 4x Daily AC & at Bedtime  thiamine, 100 mg, Oral, Daily  zinc sulfate, 220 mg, Oral, Daily       PRN Meds     acetaminophen **OR** acetaminophen **OR** acetaminophen    senna-docusate sodium **AND** polyethylene glycol **AND**  bisacodyl **AND** bisacodyl    Calcium Replacement - Follow Nurse / BPA Driven Protocol    guaiFENesin-codeine    Magnesium Standard Dose Replacement - Follow Nurse / BPA Driven Protocol    ondansetron ODT **OR** ondansetron    Phosphorus Replacement - Follow Nurse / BPA Driven Protocol    Potassium Replacement - Follow Nurse / BPA Driven Protocol    sodium chloride    sodium chloride    sodium chloride   Infusions           Diagnostic Data    Results from last 7 days   Lab Units 07/25/24  0953 07/24/24  2322   WBC 10*3/mm3  --  16.67*   HEMOGLOBIN g/dL  --  9.3*   HEMATOCRIT %  --  25.7*   PLATELETS 10*3/mm3  --  170   GLUCOSE mg/dL  --  115*   CREATININE mg/dL  --  0.51*   BUN mg/dL  --  5*   SODIUM mmol/L  --  135*   POTASSIUM mmol/L 4.0 3.4*   AST (SGOT) U/L  --  141*   ALT (SGPT) U/L  --  47*   ALK PHOS U/L  --  283*   BILIRUBIN mg/dL  --  18.2*   ANION GAP mmol/L  --  10.6       XR Chest 1 View    Result Date: 7/24/2024  Impression: 1. Increased cardiac size with evidence of mild passive congestion. 2. Bibasilar airspace disease with bilateral pleural effusions. Electronically Signed: Vitor Chin MD  7/24/2024 1:31 PM EDT  Workstation ID: [id number]       I reviewed the patient's new clinical results.    Assessment/Plan:     Active and Resolved Problems  Active Hospital Problems    Diagnosis  POA    **Alcohol withdrawal [F10.939]  Yes      Resolved Hospital Problems   No resolved problems to display.       Acute alcoholic hepatitis with alcoholic cirrhosis  -Secondary to excessive alcohol use  -Initiated on prednisolone given elevated discriminant function score on admission  -T. bili and LFTs continues to rise, prednisolone discontinued per GI and Neupogen started on 7/25  -Bilirubin remains same around 16, AST also about the same in 100s to 120s  -Withdrawal symptoms improved and stable now  -Guarded prognosis overall, GI following and may need transfer to tertiary care center for transplant evaluation if  does not improve on Neupogen    Hyponatremia 2/2 beer potomania  -Improved sodium level with fluid restriction  -Off IV fluids  -Nephrology following    Acute hypokalemia, hypomagnesemia, hypocalcemia  -Secondary to malnutrition in the setting of excessive alcohol use  -Replete as needed and monitor     VTE Prophylaxis:  Mechanical VTE prophylaxis orders are present.       Code status is   Code Status and Medical Interventions:   Ordered at: 07/18/24 1214     Code Status (Patient has no pulse and is not breathing):    CPR (Attempt to Resuscitate)     Medical Interventions (Patient has pulse or is breathing):    Full Support       Disposition: Possible DC on 7/30    Signature: Electronically signed by Hayley Rodriguez DO, 07/25/24, 10:43 EDT.  [facility] Hospitalist Team    Part of this note may be an electronic transcription/translation of spoken language to printed text using the Dragon Dictation System.

## 2024-07-26 ENCOUNTER — INPATIENT HOSPITAL (OUTPATIENT)
Dept: URBAN - METROPOLITAN AREA HOSPITAL 84 | Facility: HOSPITAL | Age: 29
End: 2024-07-26
Payer: MEDICAID

## 2024-07-26 DIAGNOSIS — F10.20 ALCOHOL DEPENDENCE, UNCOMPLICATED: ICD-10-CM

## 2024-07-26 DIAGNOSIS — R13.10 DYSPHAGIA, UNSPECIFIED: ICD-10-CM

## 2024-07-26 DIAGNOSIS — D64.9 ANEMIA, UNSPECIFIED: ICD-10-CM

## 2024-07-26 DIAGNOSIS — K21.9 GASTRO-ESOPHAGEAL REFLUX DISEASE WITHOUT ESOPHAGITIS: ICD-10-CM

## 2024-07-26 DIAGNOSIS — E87.1 HYPO-OSMOLALITY AND HYPONATREMIA: ICD-10-CM

## 2024-07-26 DIAGNOSIS — D69.59 OTHER SECONDARY THROMBOCYTOPENIA: ICD-10-CM

## 2024-07-26 DIAGNOSIS — K70.10 ALCOHOLIC HEPATITIS WITHOUT ASCITES: ICD-10-CM

## 2024-07-26 DIAGNOSIS — K70.30 ALCOHOLIC CIRRHOSIS OF LIVER WITHOUT ASCITES: ICD-10-CM

## 2024-07-26 DIAGNOSIS — R79.1 ABNORMAL COAGULATION PROFILE: ICD-10-CM

## 2024-07-26 DIAGNOSIS — D72.829 ELEVATED WHITE BLOOD CELL COUNT, UNSPECIFIED: ICD-10-CM

## 2024-07-26 LAB
ALBUMIN SERPL-MCNC: 2.9 G/DL (ref 3.5–5.2)
ALBUMIN/GLOB SERPL: 1.2 G/DL
ALP SERPL-CCNC: 255 U/L (ref 39–117)
ALT SERPL W P-5'-P-CCNC: 49 U/L (ref 1–41)
ANION GAP SERPL CALCULATED.3IONS-SCNC: 11 MMOL/L (ref 5–15)
AST SERPL-CCNC: 143 U/L (ref 1–40)
BILIRUB SERPL-MCNC: 17.8 MG/DL (ref 0–1.2)
BUN SERPL-MCNC: 6 MG/DL (ref 6–20)
BUN/CREAT SERPL: 12 (ref 7–25)
CALCIUM SPEC-SCNC: 8.4 MG/DL (ref 8.6–10.5)
CHLORIDE SERPL-SCNC: 98 MMOL/L (ref 98–107)
CO2 SERPL-SCNC: 25 MMOL/L (ref 22–29)
CREAT SERPL-MCNC: 0.5 MG/DL (ref 0.76–1.27)
DEPRECATED RDW RBC AUTO: 60.7 FL (ref 37–54)
EGFRCR SERPLBLD CKD-EPI 2021: 141.6 ML/MIN/1.73
ERYTHROCYTE [DISTWIDTH] IN BLOOD BY AUTOMATED COUNT: 18.6 % (ref 12.3–15.4)
GLOBULIN UR ELPH-MCNC: 2.4 GM/DL
GLUCOSE SERPL-MCNC: 87 MG/DL (ref 65–99)
HCT VFR BLD AUTO: 24.1 % (ref 37.5–51)
HGB BLD-MCNC: 8.5 G/DL (ref 13–17.7)
INR PPP: 1.97 (ref 0.93–1.1)
INR PPP: 2 (ref 0.93–1.1)
LYMPHOCYTES # BLD MANUAL: 3.87 10*3/MM3 (ref 0.7–3.1)
LYMPHOCYTES NFR BLD MANUAL: 7 % (ref 5–12)
MCH RBC QN AUTO: 33.5 PG (ref 26.6–33)
MCHC RBC AUTO-ENTMCNC: 35.3 G/DL (ref 31.5–35.7)
MCV RBC AUTO: 94.9 FL (ref 79–97)
METAMYELOCYTES NFR BLD MANUAL: 2 % (ref 0–0)
MONOCYTES # BLD: 1.81 10*3/MM3 (ref 0.1–0.9)
NEUTROPHILS # BLD AUTO: 19.6 10*3/MM3 (ref 1.7–7)
NEUTROPHILS NFR BLD MANUAL: 65 % (ref 42.7–76)
NEUTS BAND NFR BLD MANUAL: 11 % (ref 0–5)
PHOSPHATE SERPL-MCNC: 3 MG/DL (ref 2.5–4.5)
PLATELET # BLD AUTO: 161 10*3/MM3 (ref 140–450)
PMV BLD AUTO: 10.1 FL (ref 6–12)
POTASSIUM SERPL-SCNC: 3.4 MMOL/L (ref 3.5–5.2)
POTASSIUM SERPL-SCNC: 4.3 MMOL/L (ref 3.5–5.2)
PROT SERPL-MCNC: 5.3 G/DL (ref 6–8.5)
PROTHROMBIN TIME: 20.4 SECONDS (ref 9.6–11.7)
PROTHROMBIN TIME: 20.7 SECONDS (ref 9.6–11.7)
RBC # BLD AUTO: 2.54 10*6/MM3 (ref 4.14–5.8)
SCAN SLIDE: NORMAL
SMALL PLATELETS BLD QL SMEAR: ADEQUATE
SODIUM SERPL-SCNC: 134 MMOL/L (ref 136–145)
TARGETS BLD QL SMEAR: ABNORMAL
VARIANT LYMPHS NFR BLD MANUAL: 15 % (ref 19.6–45.3)
WBC MORPH BLD: NORMAL
WBC NRBC COR # BLD AUTO: 25.79 10*3/MM3 (ref 3.4–10.8)

## 2024-07-26 PROCEDURE — 85610 PROTHROMBIN TIME: CPT | Performed by: NURSE PRACTITIONER

## 2024-07-26 PROCEDURE — 84132 ASSAY OF SERUM POTASSIUM: CPT | Performed by: INTERNAL MEDICINE

## 2024-07-26 PROCEDURE — 85007 BL SMEAR W/DIFF WBC COUNT: CPT | Performed by: NURSE PRACTITIONER

## 2024-07-26 PROCEDURE — 85025 COMPLETE CBC W/AUTO DIFF WBC: CPT | Performed by: NURSE PRACTITIONER

## 2024-07-26 PROCEDURE — 25010000002 FILGRASTIM 300 MCG/0.5ML SOLUTION PREFILLED SYRINGE: Performed by: NURSE PRACTITIONER

## 2024-07-26 PROCEDURE — 80053 COMPREHEN METABOLIC PANEL: CPT | Performed by: NURSE PRACTITIONER

## 2024-07-26 PROCEDURE — 99233 SBSQ HOSP IP/OBS HIGH 50: CPT | Performed by: NURSE PRACTITIONER

## 2024-07-26 PROCEDURE — 84100 ASSAY OF PHOSPHORUS: CPT | Performed by: INTERNAL MEDICINE

## 2024-07-26 RX ORDER — GUAIFENESIN 600 MG/1
600 TABLET, EXTENDED RELEASE ORAL EVERY 12 HOURS SCHEDULED
Status: DISCONTINUED | OUTPATIENT
Start: 2024-07-26 | End: 2024-08-02 | Stop reason: HOSPADM

## 2024-07-26 RX ORDER — POTASSIUM CHLORIDE 20 MEQ/1
40 TABLET, EXTENDED RELEASE ORAL EVERY 4 HOURS
Status: COMPLETED | OUTPATIENT
Start: 2024-07-26 | End: 2024-07-26

## 2024-07-26 RX ADMIN — SUCRALFATE 1 G: 1 TABLET ORAL at 20:38

## 2024-07-26 RX ADMIN — LACTULOSE 10 G: 20 SOLUTION ORAL at 20:37

## 2024-07-26 RX ADMIN — LACTULOSE 10 G: 20 SOLUTION ORAL at 09:29

## 2024-07-26 RX ADMIN — GUAIFENESIN 600 MG: 600 TABLET, EXTENDED RELEASE ORAL at 20:38

## 2024-07-26 RX ADMIN — SUCRALFATE 1 G: 1 TABLET ORAL at 17:47

## 2024-07-26 RX ADMIN — SUCRALFATE 1 G: 1 TABLET ORAL at 09:29

## 2024-07-26 RX ADMIN — Medication 1 TABLET: at 09:29

## 2024-07-26 RX ADMIN — POTASSIUM CHLORIDE 40 MEQ: 1500 TABLET, EXTENDED RELEASE ORAL at 09:29

## 2024-07-26 RX ADMIN — Medication 220 MG: at 09:29

## 2024-07-26 RX ADMIN — FAMOTIDINE 40 MG: 20 TABLET, FILM COATED ORAL at 20:38

## 2024-07-26 RX ADMIN — Medication 10 ML: at 09:30

## 2024-07-26 RX ADMIN — PANTOPRAZOLE SODIUM 40 MG: 40 TABLET, DELAYED RELEASE ORAL at 09:29

## 2024-07-26 RX ADMIN — FILGRASTIM 300 MCG: 300 INJECTION, SOLUTION INTRAVENOUS; SUBCUTANEOUS at 13:15

## 2024-07-26 RX ADMIN — PANTOPRAZOLE SODIUM 40 MG: 40 TABLET, DELAYED RELEASE ORAL at 17:47

## 2024-07-26 RX ADMIN — SUCRALFATE 1 G: 1 TABLET ORAL at 13:15

## 2024-07-26 RX ADMIN — Medication 100 MG: at 09:29

## 2024-07-26 RX ADMIN — POTASSIUM CHLORIDE 40 MEQ: 1500 TABLET, EXTENDED RELEASE ORAL at 05:15

## 2024-07-26 RX ADMIN — GUAIFENESIN 600 MG: 600 TABLET, EXTENDED RELEASE ORAL at 13:15

## 2024-07-26 RX ADMIN — Medication 10 ML: at 20:38

## 2024-07-26 RX ADMIN — LACTULOSE 10 G: 20 SOLUTION ORAL at 17:49

## 2024-07-26 RX ADMIN — FOLIC ACID 1 MG: 1 TABLET ORAL at 09:29

## 2024-07-26 NOTE — CASE MANAGEMENT/SOCIAL WORK
Continued Stay Note  EVI Kinsey     Patient Name: Yonas Charles  MRN: [id number]  Today's Date: 7/26/2024    Admit Date: 7/18/2024    Plan: From home with parents. Patient has expressed interest in Inpatient Substance Abuse Tx Program. Watch for potential need to transfer to another acute care hospital   Discharge Plan       Row Name 07/26/24 1713       Plan    Plan From home with parents. Patient has expressed interest in Inpatient Substance Abuse Tx Program. Watch for potential need to transfer to another acute care hospital    Patient/Family in Agreement with Plan yes    Plan Comments Barriers to discharge: Prednisone stopped yesterday.  Neupogen started (plan 5 day course) GI following closely             Expected Discharge Date and Time       Expected Discharge Date Expected Discharge Time    Jul 30, 2024           Danay Al RN     Office Phone [phone]  Office Cell [phone]

## 2024-07-26 NOTE — PLAN OF CARE
Goal Outcome Evaluation:  Plan of Care Reviewed With: patient        Progress: no change  Outcome Evaluation: Rested well during the shift. No c/o discomfort. PO intake good, drinking supplements. Will continue to monitor.

## 2024-07-26 NOTE — PROGRESS NOTES
"NEPHROLOGY PROGRESS NOTE  Patient Care Team:  Provider, No Known as PCP - General      Provider:  Nael Perez MD  Patient Name: Yonas Charles  :  1995    SUBJECTIVE:    F/U ELECTROLYTE ABNORMALITIES    No chest pain or SOA    Medication:  famotidine, 40 mg, Oral, Nightly  filgrastim (NEUPOGEN) injection, 300 mcg, Subcutaneous, Daily With Lunch  folic acid, 1 mg, Oral, Daily  guaiFENesin, 600 mg, Oral, Q12H  lactulose, 10 g, Oral, TID  multivitamin with minerals, 1 tablet, Oral, Daily  pantoprazole, 40 mg, Oral, BID AC  [Held by provider] sertraline, 50 mg, Oral, Daily  sodium chloride, 10 mL, Intravenous, Q12H  sucralfate, 1 g, Oral, 4x Daily AC & at Bedtime  thiamine, 100 mg, Oral, Daily  zinc sulfate, 220 mg, Oral, Daily               OBJECTIVE    Vital Sign Min/Max for last 24 hours  Temp  Min: 97.7 °F (36.5 °C)  Max: 98.3 °F (36.8 °C)   BP  Min: 107/67  Max: 128/81   Pulse  Min: 118  Max: 130   Resp  Min: 16  Max: 24   SpO2  Min: 94 %  Max: 97 %   No data recorded   No data recorded     Flowsheet Rows      Flowsheet Row First Filed Value   Admission Height 165.1 cm (65\") Documented at 2024 0855   Admission Weight 55.7 kg (122 lb 12.7 oz) Documented at 2024 0855            No intake/output data recorded.  I/O last 3 completed shifts:  In: 1740 [P.O.:1740]  Out: -     Physical Exam:  General Appearance: NAD.   Head: normocephalic, without obvious abnormality and atraumatic    Eyes: conjunctivae and +ICTERIC SCLERAE  Neck: supple and no JVD  Lungs: CTA bilaterally  Heart: regular rhythm & normal rate and normal S1, S2   Chest Wall: no abnormalities observed  Abdomen: normal bowel sounds and soft   Extremities: moves extremities well, no edema, no cyanosis  Skin: +JAUNDICE  Neurologic: A AND O X 4 WITHOUT FOCAL DEFICIT    Labs:    WBC WBC   Date Value Ref Range Status   2024 25.79 (H) 3.40 - 10.80 10*3/mm3 Final   2024 16.67 (H) 3.40 - 10.80 10*3/mm3 Final   2024 15.98 (H) 3.40 " "- 10.80 10*3/mm3 Final      HGB Hemoglobin   Date Value Ref Range Status   07/26/2024 8.5 (L) 13.0 - 17.7 g/dL Final   07/24/2024 9.3 (L) 13.0 - 17.7 g/dL Final   07/24/2024 9.2 (L) 13.0 - 17.7 g/dL Final      HCT Hematocrit   Date Value Ref Range Status   07/26/2024 24.1 (L) 37.5 - 51.0 % Final   07/24/2024 25.7 (L) 37.5 - 51.0 % Final   07/24/2024 25.3 (L) 37.5 - 51.0 % Final      Platelets No results found for: \"LABPLAT\"   MCV MCV   Date Value Ref Range Status   07/26/2024 94.9 79.0 - 97.0 fL Final   07/24/2024 94.5 79.0 - 97.0 fL Final   07/24/2024 94.4 79.0 - 97.0 fL Final          Sodium Sodium   Date Value Ref Range Status   07/26/2024 134 (L) 136 - 145 mmol/L Final   07/24/2024 135 (L) 136 - 145 mmol/L Final   07/24/2024 134 (L) 136 - 145 mmol/L Final      Potassium Potassium   Date Value Ref Range Status   07/26/2024 3.4 (L) 3.5 - 5.2 mmol/L Final   07/25/2024 4.0 3.5 - 5.2 mmol/L Final     Comment:     Specimen hemolyzed.  Result may be falsely elevated.   07/24/2024 3.4 (L) 3.5 - 5.2 mmol/L Final   07/24/2024 4.0 3.5 - 5.2 mmol/L Final      Chloride Chloride   Date Value Ref Range Status   07/26/2024 98 98 - 107 mmol/L Final   07/24/2024 99 98 - 107 mmol/L Final   07/24/2024 101 98 - 107 mmol/L Final      CO2 CO2   Date Value Ref Range Status   07/26/2024 25.0 22.0 - 29.0 mmol/L Final   07/24/2024 25.4 22.0 - 29.0 mmol/L Final   07/24/2024 25.0 22.0 - 29.0 mmol/L Final      BUN BUN   Date Value Ref Range Status   07/26/2024 6 6 - 20 mg/dL Final   07/24/2024 5 (L) 6 - 20 mg/dL Final   07/24/2024 5 (L) 6 - 20 mg/dL Final      Creatinine Creatinine   Date Value Ref Range Status   07/26/2024 0.50 (L) 0.76 - 1.27 mg/dL Final   07/24/2024 0.51 (L) 0.76 - 1.27 mg/dL Final   07/24/2024 0.49 (L) 0.76 - 1.27 mg/dL Final      Calcium Calcium   Date Value Ref Range Status   07/26/2024 8.4 (L) 8.6 - 10.5 mg/dL Final   07/24/2024 8.5 (L) 8.6 - 10.5 mg/dL Final   07/24/2024 8.4 (L) 8.6 - 10.5 mg/dL Final      PO4 No " "components found for: \"PO4\"   Albumin Albumin   Date Value Ref Range Status   07/26/2024 2.9 (L) 3.5 - 5.2 g/dL Final   07/24/2024 3.3 (L) 3.5 - 5.2 g/dL Final   07/24/2024 3.2 (L) 3.5 - 5.2 g/dL Final      Magnesium No results found for: \"MG\"     Uric Acid No components found for: \"URIC ACID\"     Imaging Results (Last 72 Hours)       Procedure Component Value Units Date/Time    XR Chest 1 View [[id number]] Collected: 07/24/24 1330     Updated: 07/24/24 1333    Narrative:      XR CHEST 1 VW    Date of Exam: 7/24/2024 1:25 PM EDT    Indication: coughing, SOB    Comparison: 7/18/2024    Findings:  There are lower lung volumes. Cardiac size appears increased. There is airspace disease in both lung bases with small bilateral pleural effusions. Pulmonary vascular markings have increased compared with the last study.      Impression:      Impression:    1. Increased cardiac size with evidence of mild passive congestion.  2. Bibasilar airspace disease with bilateral pleural effusions.      Electronically Signed: Vitor Chin MD    7/24/2024 1:31 PM EDT    Workstation ID: [id number]            Results for orders placed during the hospital encounter of 07/18/24    XR Chest 1 View    Narrative  XR CHEST 1 VW    Date of Exam: 7/24/2024 1:25 PM EDT    Indication: coughing, SOB    Comparison: 7/18/2024    Findings:  There are lower lung volumes. Cardiac size appears increased. There is airspace disease in both lung bases with small bilateral pleural effusions. Pulmonary vascular markings have increased compared with the last study.    Impression  Impression:    1. Increased cardiac size with evidence of mild passive congestion.  2. Bibasilar airspace disease with bilateral pleural effusions.      Electronically Signed: Vitor Chin MD  7/24/2024 1:31 PM EDT  Workstation ID: [id number]      XR Chest PA & Lateral    Narrative  DATE OF EXAM:  7/18/2024 12:36 PM    PROCEDURE:  XR CHEST PA AND LATERAL-    INDICATIONS:  rule out " aspiration pneumonia; E87.6-Hypokalemia; F10.939-Alcohol use,  unspecified with withdrawal, unspecified; R17-Unspecified jaundice    COMPARISON:  No Comparisons Available    TECHNIQUE:  Two radiologic views of the chest , PA and lateral were obtained.    FINDINGS:  Heart size normal. Negative for lobar consolidation, edema, effusion or  pneumothorax. Osseous structures unremarkable.    Impression  No active pulmonary process.      Electronically Signed By-Armani Small MD On:7/18/2024 1:00 PM            ASSESSMENT / PLAN      Alcohol withdrawal    HYPONATREMIA------stable. Likely related to underlying liver disease. Fluid restrict     2. HYPOKALEMIA-------Resolved.       3. HYPOMAGNESEMIA-------Replaced     4. HYPOCALCEMIA------Replace IV     5. ALCOHOL ABUSE/CIRRHOSIS/JAUNDICE------Thiamine, Folate, IVFs, prn IV Ativan. Withdrawal prcautions     6. MIXED METABOLIC ALKALOSIS/METABOLIC ACIDOSIS-----Better     7. ANEMIA------Normocytic with normal RDW. Follow for PRBC need     8. DEPRESSION------Suicide precautions. Hold Zoloft given severe hyponatremia     9. HYPOALBUMINEMIA-----IV Albumin to temporize and po supplements     10. ELEVATED INR------Secondary to liver disease     11. THROMBOCYTOPENIA------No heparin. Secondary to liver disease     12. PUD PROPHYLAXIS-----IV PPI     13. DVT PROPHYLAXIS-----SCDs    14. HYPOPHOSPHATEMIA------Replaced    Sodium stable, slightly improved  Replace potassium/phos  Keep on fluid restriction  Labs in am        Nael Perez MD  [facility]  [phone]  07/26/24  10:59 EDT

## 2024-07-26 NOTE — PROGRESS NOTES
LOS: 8 days   Patient Care Team:  Provider, No Known as PCP - General      Subjective     Interval History:     Subjective: Patient with no new complaints.  No abdominal pain or nausea/vomiting.      ROS:   No chest pain, shortness of breath, or cough.        Medication Review:     Current Facility-Administered Medications:     acetaminophen (TYLENOL) tablet 650 mg, 650 mg, Oral, Q4H PRN **OR** acetaminophen (TYLENOL) 160 MG/5ML oral solution 650 mg, 650 mg, Oral, Q4H PRN **OR** acetaminophen (TYLENOL) suppository 650 mg, 650 mg, Rectal, Q4H PRN, Mari Bustos MD    sennosides-docusate (PERICOLACE) 8.6-50 MG per tablet 2 tablet, 2 tablet, Oral, BID PRN **AND** polyethylene glycol (MIRALAX) packet 17 g, 17 g, Oral, Daily PRN **AND** bisacodyl (DULCOLAX) EC tablet 5 mg, 5 mg, Oral, Daily PRN **AND** bisacodyl (DULCOLAX) suppository 10 mg, 10 mg, Rectal, Daily PRN, Mari Bustos MD    Calcium Replacement - Follow Nurse / BPA Driven Protocol, , Does not apply, PRN, Mari Bustos MD    famotidine (PEPCID) tablet 40 mg, 40 mg, Oral, Nightly, Waleska Chery, APRN, 40 mg at 07/25/24 2021    Filgrastim (NEUPOGEN) injection 300 mcg, 300 mcg, Subcutaneous, Daily With Lunch, Waleska Chery, APRN, 300 mcg at 07/25/24 1714    folic acid (FOLVITE) tablet 1 mg, 1 mg, Oral, Daily, Mari Bustos MD, 1 mg at 07/26/24 0929    guaiFENesin (MUCINEX) 12 hr tablet 600 mg, 600 mg, Oral, Q12H, Hayley Rodriguez DO    guaiFENesin-codeine (GUAIFENESIN AC) 100-10 MG/5ML liquid 5 mL, 5 mL, Oral, Q4H PRN, Frank Wild MD, 5 mL at 07/24/24 0318    lactulose (CHRONULAC) 10 GM/15ML solution 10 g, 10 g, Oral, TID, Vicente Moscoso MD, 10 g at 07/26/24 0929    Magnesium Standard Dose Replacement - Follow Nurse / BPA Driven Protocol, , Does not apply, PRN, Mari Bustos MD    multivitamin with minerals 1 tablet, 1 tablet, Oral, Daily, Mari Bustos MD, 1 tablet at 07/26/24 0929    ondansetron ODT  (ZOFRAN-ODT) disintegrating tablet 4 mg, 4 mg, Oral, Q6H PRN **OR** ondansetron (ZOFRAN) injection 4 mg, 4 mg, Intravenous, Q6H PRN, Mari Bustos MD, 4 mg at 07/18/24 1330    pantoprazole (PROTONIX) EC tablet 40 mg, 40 mg, Oral, BID AC, Kaci Polo APRN, 40 mg at 07/26/24 0929    Phosphorus Replacement - Follow Nurse / BPA Driven Protocol, , Does not apply, Juli HARTLEY Abbas Ali, MD    Potassium Replacement - Follow Nurse / BPA Driven Protocol, , Does not apply, Juli HARTLEY Abbas Ali, MD    [Held by provider] sertraline (ZOLOFT) tablet 50 mg, 50 mg, Oral, Daily, Mari Bustos MD, 50 mg at 07/18/24 1258    sodium chloride 0.9 % flush 10 mL, 10 mL, Intravenous, PRN, Mari Bustos MD    sodium chloride 0.9 % flush 10 mL, 10 mL, Intravenous, Q12H, Mari Bustos MD, 10 mL at 07/26/24 0930    sodium chloride 0.9 % flush 10 mL, 10 mL, Intravenous, PRN, Mari Bustos MD    sodium chloride 0.9 % infusion 40 mL, 40 mL, Intravenous, PRN, Mari Bustos MD    sucralfate (CARAFATE) tablet 1 g, 1 g, Oral, 4x Daily AC & at Bedtime, Kaci Polo APRN, 1 g at 07/26/24 0929    [COMPLETED] thiamine (B-1) injection 200 mg, 200 mg, Intravenous, Q8H, 200 mg at 07/23/24 1437 **FOLLOWED BY** thiamine (VITAMIN B-1) tablet 100 mg, 100 mg, Oral, Daily, Mari Bustos MD, 100 mg at 07/26/24 0929    zinc sulfate (ZINCATE) capsule 220 mg, 220 mg, Oral, Daily, Waleska Chery APRN, 220 mg at 07/26/24 0929      Objective     Vital Signs  Vitals:    07/25/24 1500 07/25/24 1949 07/25/24 2334 07/26/24 0406   BP: 117/64 128/81 112/67 107/67   BP Location: Right arm Right arm Right arm Right arm   Patient Position: Sitting Lying Lying Lying   Pulse:  120 (!) 124 (!) 130   Resp: 24 22 20 16   Temp: 98.3 °F (36.8 °C) 97.7 °F (36.5 °C) 98 °F (36.7 °C) 97.7 °F (36.5 °C)   TempSrc: Oral Oral Oral Oral   SpO2:  95% 94% 97%   Weight:       Height:           Physical Exam:     General Appearance:    Awake  and alert, in no acute distress   Head:    Normocephalic, without obvious abnormality   Eyes:          Conjunctivae normal, anicteric sclera   Throat:   No oral lesions, no thrush, oral mucosa moist   Neck:   No adenopathy, supple, no JVD   Lungs:     respirations regular, even and unlabored   Abdomen:     Soft, non-tender, no rebound or guarding, non-distended   Rectal:     Deferred   Extremities:   No edema, no cyanosis   Skin:   No bruising or rash, no jaundice        Results Review:    CBC    Results from last 7 days   Lab Units 07/26/24  0108 07/24/24  2322 07/24/24  0324 07/23/24  0435 07/22/24  0449 07/21/24  0514 07/20/24  0509   WBC 10*3/mm3 25.79* 16.67* 15.98* 14.99* 15.08* 14.80* 11.18*   HEMOGLOBIN g/dL 8.5* 9.3* 9.2* 8.8* 8.4* 8.5* 9.1*   PLATELETS 10*3/mm3 161 170 162 148 147 148 100*     CMP   Results from last 7 days   Lab Units 07/26/24  0108 07/25/24  0953 07/24/24  2322 07/24/24  0324 07/23/24  1638 07/23/24  0435 07/22/24  1515 07/22/24  0449 07/21/24  0514 07/20/24  0509 07/19/24  1619 07/19/24  1458   SODIUM mmol/L 134*  --  135* 134*  --  134*  --  133* 135* 133*   < > 134*   POTASSIUM mmol/L 3.4* 4.0 3.4* 4.0  --  4.5 4.5 3.6 3.7 3.8   < > 3.6   CHLORIDE mmol/L 98  --  99 101  --  102  --  98 100 96*   < > 96*   CO2 mmol/L 25.0  --  25.4 25.0  --  25.9  --  26.9 24.9 29.0   < > 29.2*   BUN mg/dL 6  --  5* 5*  --  5*  --  4* 3* 3*   < > 3*   CREATININE mg/dL 0.50*  --  0.51* 0.49*  --  0.55*  --  0.47* 0.46* 0.46*   < > 0.40*   GLUCOSE mg/dL 87  --  115* 93  --  89  --  89 130* 126*   < > 79   ALBUMIN g/dL 2.9*  --  3.3* 3.2*  --  3.2*  --  3.1* 2.8* 2.9*  --  2.9*   BILIRUBIN mg/dL 17.8*  --  18.2* 16.9*  --  16.4*  --  16.0* 16.6* 18.0*  --  17.9*   ALK PHOS U/L 255*  --  283* 275*  --  261*  --  258* 272* 286*  --  301*   AST (SGOT) U/L 143*  --  141* 126*  --  116*  --  107* 115* 122*  --  143*   ALT (SGPT) U/L 49*  --  47* 35  --  29  --  27 23 23  --  27   MAGNESIUM mg/dL  --   --   --   " --   --  1.7  --  2.2 1.3*  --   --  1.7   PHOSPHORUS mg/dL 3.0 2.5 2.1* 2.3* 2.7 2.0*  --  2.5 2.8 2.6   < >  --     < > = values in this interval not displayed.     Cr Clearance Estimated Creatinine Clearance: 171.7 mL/min (A) (by C-G formula based on SCr of 0.5 mg/dL (L)).  Coag   Results from last 7 days   Lab Units 07/24/24  2322 07/24/24  0324 07/23/24  0435 07/22/24  0449 07/21/24  1506 07/20/24  0509   INR  1.98* 1.88* 1.80* 1.71* 1.71* 2.02*     HbA1C No results found for: \"HGBA1C\"      Infection     UA      Microbiology Results (last 10 days)       Procedure Component Value - Date/Time    Legionella Antigen, Urine - Urine, Urine, Clean Catch [[id number]]  (Normal) Collected: 07/18/24 1319    Lab Status: Final result Specimen: Urine, Clean Catch Updated: 07/18/24 1852     LEGIONELLA ANTIGEN, URINE Negative    Blood Culture - Blood, Hand, Right [[id number]]  (Normal) Collected: 07/18/24 0928    Lab Status: Final result Specimen: Blood from Hand, Right Updated: 07/23/24 0945     Blood Culture No growth at 5 days    Blood Culture - Blood, Arm, Right [[id number]]  (Normal) Collected: 07/18/24 0923    Lab Status: Final result Specimen: Blood from Arm, Right Updated: 07/23/24 0945     Blood Culture No growth at 5 days          Imaging Results (Last 72 Hours)       Procedure Component Value Units Date/Time    XR Chest 1 View [[id number]] Collected: 07/24/24 1330     Updated: 07/24/24 1333    Narrative:      XR CHEST 1 VW    Date of Exam: 7/24/2024 1:25 PM EDT    Indication: coughing, SOB    Comparison: 7/18/2024    Findings:  There are lower lung volumes. Cardiac size appears increased. There is airspace disease in both lung bases with small bilateral pleural effusions. Pulmonary vascular markings have increased compared with the last study.      Impression:      Impression:    1. Increased cardiac size with evidence of mild passive congestion.  2. Bibasilar airspace disease with bilateral pleural " effusions.      Electronically Signed: Vitor Chin MD    7/24/2024 1:31 PM EDT    Workstation ID: [id number]            Assessment & Plan     ASSESSMENT:  -Alcohol hepatitis/cirrhosis   -Elevated LFTs -related to above  -Acute alcohol withdrawal  -Nitrite positive UTI  -Leukocytosis  -Normocytic anemia  -Thrombocytopenia - due to cirrhosis  -Elevated INR - due to cirrhosis  -Hyponatremia related to alcohol abuse  -Dysphagia could be related to esophageal stricture versus reflux esophagitis versus dysmotility  -GERD     PLAN:  Patient is a 27 y/o male with history of ETOH abuse who presented on 7/18 with complaints of ETOH withdrawal. Found to have significantly elevated LFTs on admission with total bilirubin 17.4, alk phos 324, , and ALT 28. CT abd/pelvis W on admission showed fatty liver,  sequelae of portal hypertension noted including distal paraesophageal  and intra-abdominal varices as well as trace ascites , and patent portal vasculature. He was started on prednisolone on 7/19 due to Maddrey's DF of 33.    Patient with no new complaints.  Tolerating diet without nausea/vomiting.  No abdominal pain.  Slight improvement in LFTs today with total bilirubin 17.8 from 18.2, alk phos 255 from 283,  from 141, and ALT 49 from 47.  INR pending for today.  Continue Neupogen for total of 5 days.  Prednisolone discontinued yesterday as LFTs continue to worsen despite this medication.  Continue to encourage nutrition.  Continue thiamine, zinc, and folic acid.  Recommend complete ETOH cessation.   Liver serologies unremarkable. AFP normal.   Will need screening EGD at some point as inpatient vs outpatient.   If liver function continues to worsen despite Neupogen, may need to consider contacting transplant team.   Continue supportive care.      Electronically signed by MARI Mobley, 07/26/24, 10:40 AM EDT.

## 2024-07-26 NOTE — PLAN OF CARE
Goal Outcome Evaluation:                     Problem: Adult Inpatient Plan of Care  Goal: Plan of Care Review  Outcome: Ongoing, Progressing  Goal: Patient-Specific Goal (Individualized)  Outcome: Ongoing, Progressing  Goal: Absence of Hospital-Acquired Illness or Injury  Outcome: Ongoing, Progressing  Intervention: Identify and Manage Fall Risk  Intervention: Prevent and Manage VTE (Venous Thromboembolism) Risk  Goal: Optimal Comfort and Wellbeing  Outcome: Ongoing, Progressing  Goal: Readiness for Transition of Care  Outcome: Ongoing, Progressing     Problem: Pain Acute  Goal: Acceptable Pain Control and Functional Ability  Outcome: Ongoing, Progressing     Problem: Skin Injury Risk Increased  Goal: Skin Health and Integrity  Outcome: Ongoing, Progressing     Problem: Fall Injury Risk  Goal: Absence of Fall and Fall-Related Injury  Outcome: Ongoing, Progressing  Intervention: Promote Injury-Free Environment     Problem: Alcohol Withdrawal  Goal: Alcohol Withdrawal Symptom Control  Outcome: Ongoing, Progressing     Problem: Acute Neurologic Deterioration (Alcohol Withdrawal)  Goal: Optimal Neurologic Function  Outcome: Ongoing, Progressing     Problem: Substance Misuse (Alcohol Withdrawal)  Goal: Readiness for Change Identified  Outcome: Ongoing, Progressing           Pt resting in bed now, has been walking around the halls. Pt is eating well and receiving boost shakes. Pt has not complained of pain on shift or soa

## 2024-07-26 NOTE — PROGRESS NOTES
[facility] MEDICINE SERVICE  DAILY PROGRESS NOTE    NAME: Yonas Charles  : 1995  MRN: [id number]      LOS: 8 days     PROVIDER OF SERVICE: Hayley Rodriguez DO    Chief Complaint: Alcohol withdrawal    Subjective:     Interval History: Patient seen and examined this morning.  Doing okay, eating breakfast.  Having episodes of coughing at times, Mucinex added.  Started on Neupogen as of yesterday per GI.  Prednisone discontinued.    Review of Systems:   Pertinent positives as noted in HPI/subjective.  All other systems were reviewed and are negative.      Vital Signs  Temp:  [97.7 °F (36.5 °C)-98.3 °F (36.8 °C)] 97.7 °F (36.5 °C)  Heart Rate:  [118-130] 130  Resp:  [16-24] 16  BP: (107-128)/(64-81) 107/67   Body mass index is 20.43 kg/m².    Physical exam:     General: Awake, alert, young male, NAD  Eyes: PERRL, EOMI, sclera jaundiced  Cardiovascular: Regular rate and rhythm, no murmurs  Respiratory: Clear to auscultation bilaterally, no wheezing or rales, unlabored breathing  Abdomen: Soft, nontender, positive bowel sounds, no guarding  Neurologic: A&O, CN grossly intact, moves all extremities spontaneously  Musculoskeletal: Normal range of motion, no other gross deformities  Skin: Warm, jaundiced        Scheduled Meds   famotidine, 40 mg, Oral, Nightly  filgrastim (NEUPOGEN) injection, 300 mcg, Subcutaneous, Daily With Lunch  folic acid, 1 mg, Oral, Daily  guaiFENesin, 600 mg, Oral, Q12H  lactulose, 10 g, Oral, TID  multivitamin with minerals, 1 tablet, Oral, Daily  pantoprazole, 40 mg, Oral, BID AC  [Held by provider] sertraline, 50 mg, Oral, Daily  sodium chloride, 10 mL, Intravenous, Q12H  sucralfate, 1 g, Oral, 4x Daily AC & at Bedtime  thiamine, 100 mg, Oral, Daily  zinc sulfate, 220 mg, Oral, Daily       PRN Meds     acetaminophen **OR** acetaminophen **OR** acetaminophen    senna-docusate sodium **AND** polyethylene glycol **AND** bisacodyl **AND** bisacodyl    Calcium Replacement - Follow  Nurse / BPA Driven Protocol    guaiFENesin-codeine    Magnesium Standard Dose Replacement - Follow Nurse / BPA Driven Protocol    ondansetron ODT **OR** ondansetron    Phosphorus Replacement - Follow Nurse / BPA Driven Protocol    Potassium Replacement - Follow Nurse / BPA Driven Protocol    sodium chloride    sodium chloride    sodium chloride   Infusions           Diagnostic Data    Results from last 7 days   Lab Units 07/26/24  0108   WBC 10*3/mm3 25.79*   HEMOGLOBIN g/dL 8.5*   HEMATOCRIT % 24.1*   PLATELETS 10*3/mm3 161   GLUCOSE mg/dL 87   CREATININE mg/dL 0.50*   BUN mg/dL 6   SODIUM mmol/L 134*   POTASSIUM mmol/L 3.4*   AST (SGOT) U/L 143*   ALT (SGPT) U/L 49*   ALK PHOS U/L 255*   BILIRUBIN mg/dL 17.8*   ANION GAP mmol/L 11.0       XR Chest 1 View    Result Date: 7/24/2024  Impression: 1. Increased cardiac size with evidence of mild passive congestion. 2. Bibasilar airspace disease with bilateral pleural effusions. Electronically Signed: Vitor Chin MD  7/24/2024 1:31 PM EDT  Workstation ID: [id number]       I reviewed the patient's new clinical results.    Assessment/Plan:     Active and Resolved Problems  Active Hospital Problems    Diagnosis  POA    **Alcohol withdrawal [F10.939]  Yes      Resolved Hospital Problems   No resolved problems to display.       Acute alcoholic hepatitis with alcoholic cirrhosis  -Secondary to excessive alcohol use  -Initiated on prednisolone given elevated discriminant function score on admission  -T. bili and LFTs continues to rise, prednisolone discontinued per GI and Neupogen started on 7/25  -Monitor LFTs  -Withdrawal symptoms improved and stable now  -Guarded prognosis overall, GI following and may need transfer to tertiary care center for transplant evaluation if does not improve on Neupogen    Leukocytosis  -Reactive from Neupogen  -Remains afebrile, no signs of any acute infection at this time  -Continue to monitor    Hyponatremia 2/2 beer potomania  -Improved sodium  level with fluid restriction  -Off IV fluids  -Nephrology following    Acute hypokalemia, hypomagnesemia, hypocalcemia  -Secondary to malnutrition in the setting of excessive alcohol use  -Replete as needed and monitor     VTE Prophylaxis:  Mechanical VTE prophylaxis orders are present.       Code status is   Code Status and Medical Interventions:   Ordered at: 07/18/24 1214     Code Status (Patient has no pulse and is not breathing):    CPR (Attempt to Resuscitate)     Medical Interventions (Patient has pulse or is breathing):    Full Support       Disposition: Possible DC on 7/30    Signature: Electronically signed by Hayley Rodriguez DO, 07/26/24, 10:09 EDT.  [facility] Hospitalist Team    Part of this note may be an electronic transcription/translation of spoken language to printed text using the Dragon Dictation System.

## 2024-07-27 ENCOUNTER — APPOINTMENT (OUTPATIENT)
Dept: GENERAL RADIOLOGY | Facility: HOSPITAL | Age: 29
End: 2024-07-27
Payer: MEDICAID

## 2024-07-27 ENCOUNTER — INPATIENT HOSPITAL (OUTPATIENT)
Dept: URBAN - METROPOLITAN AREA HOSPITAL 84 | Facility: HOSPITAL | Age: 29
End: 2024-07-27
Payer: MEDICAID

## 2024-07-27 ENCOUNTER — APPOINTMENT (OUTPATIENT)
Dept: CT IMAGING | Facility: HOSPITAL | Age: 29
End: 2024-07-27
Payer: MEDICAID

## 2024-07-27 DIAGNOSIS — E87.1 HYPO-OSMOLALITY AND HYPONATREMIA: ICD-10-CM

## 2024-07-27 DIAGNOSIS — D64.9 ANEMIA, UNSPECIFIED: ICD-10-CM

## 2024-07-27 DIAGNOSIS — F10.139 ALCOHOL ABUSE WITH WITHDRAWAL, UNSPECIFIED: ICD-10-CM

## 2024-07-27 DIAGNOSIS — D72.829 ELEVATED WHITE BLOOD CELL COUNT, UNSPECIFIED: ICD-10-CM

## 2024-07-27 DIAGNOSIS — K70.30 ALCOHOLIC CIRRHOSIS OF LIVER WITHOUT ASCITES: ICD-10-CM

## 2024-07-27 DIAGNOSIS — D69.6 THROMBOCYTOPENIA, UNSPECIFIED: ICD-10-CM

## 2024-07-27 DIAGNOSIS — R94.5 ABNORMAL RESULTS OF LIVER FUNCTION STUDIES: ICD-10-CM

## 2024-07-27 DIAGNOSIS — K70.10 ALCOHOLIC HEPATITIS WITHOUT ASCITES: ICD-10-CM

## 2024-07-27 LAB
ALBUMIN SERPL-MCNC: 3 G/DL (ref 3.5–5.2)
ALBUMIN/GLOB SERPL: 1.1 G/DL
ALP SERPL-CCNC: 307 U/L (ref 39–117)
ALT SERPL W P-5'-P-CCNC: 62 U/L (ref 1–41)
ANION GAP SERPL CALCULATED.3IONS-SCNC: 15.1 MMOL/L (ref 5–15)
ANISOCYTOSIS BLD QL: ABNORMAL
AST SERPL-CCNC: 176 U/L (ref 1–40)
BASOPHILS # BLD MANUAL: 0.34 10*3/MM3 (ref 0–0.2)
BASOPHILS NFR BLD MANUAL: 1 % (ref 0–1.5)
BILIRUB SERPL-MCNC: 20.4 MG/DL (ref 0–1.2)
BUN SERPL-MCNC: 5 MG/DL (ref 6–20)
BUN/CREAT SERPL: 12.8 (ref 7–25)
CALCIUM SPEC-SCNC: 8.5 MG/DL (ref 8.6–10.5)
CHLORIDE SERPL-SCNC: 98 MMOL/L (ref 98–107)
CO2 SERPL-SCNC: 21.9 MMOL/L (ref 22–29)
CREAT SERPL-MCNC: 0.39 MG/DL (ref 0.76–1.27)
DEPRECATED RDW RBC AUTO: 69.5 FL (ref 37–54)
EGFRCR SERPLBLD CKD-EPI 2021: 152.6 ML/MIN/1.73
ERYTHROCYTE [DISTWIDTH] IN BLOOD BY AUTOMATED COUNT: 20.3 % (ref 12.3–15.4)
GLOBULIN UR ELPH-MCNC: 2.7 GM/DL
GLUCOSE SERPL-MCNC: 111 MG/DL (ref 65–99)
HCT VFR BLD AUTO: 24.9 % (ref 37.5–51)
HGB BLD-MCNC: 8.9 G/DL (ref 13–17.7)
INR PPP: 1.85 (ref 0.93–1.1)
LYMPHOCYTES # BLD MANUAL: 3.44 10*3/MM3 (ref 0.7–3.1)
LYMPHOCYTES NFR BLD MANUAL: 8 % (ref 5–12)
MCH RBC QN AUTO: 34.8 PG (ref 26.6–33)
MCHC RBC AUTO-ENTMCNC: 35.7 G/DL (ref 31.5–35.7)
MCV RBC AUTO: 97.3 FL (ref 79–97)
MONOCYTES # BLD: 2.75 10*3/MM3 (ref 0.1–0.9)
NEUTROPHILS # BLD AUTO: 27.87 10*3/MM3 (ref 1.7–7)
NEUTROPHILS NFR BLD MANUAL: 71 % (ref 42.7–76)
NEUTS BAND NFR BLD MANUAL: 10 % (ref 0–5)
NEUTS VAC BLD QL SMEAR: ABNORMAL
PHOSPHATE SERPL-MCNC: 2.5 MG/DL (ref 2.5–4.5)
PLAT MORPH BLD: NORMAL
PLATELET # BLD AUTO: 172 10*3/MM3 (ref 140–450)
PMV BLD AUTO: 10.4 FL (ref 6–12)
POTASSIUM SERPL-SCNC: 3.1 MMOL/L (ref 3.5–5.2)
POTASSIUM SERPL-SCNC: 3.4 MMOL/L (ref 3.5–5.2)
PROT SERPL-MCNC: 5.7 G/DL (ref 6–8.5)
PROTHROMBIN TIME: 19.3 SECONDS (ref 9.6–11.7)
RBC # BLD AUTO: 2.56 10*6/MM3 (ref 4.14–5.8)
SCAN SLIDE: NORMAL
SODIUM SERPL-SCNC: 135 MMOL/L (ref 136–145)
TARGETS BLD QL SMEAR: ABNORMAL
VARIANT LYMPHS NFR BLD MANUAL: 10 % (ref 19.6–45.3)
WBC NRBC COR # BLD AUTO: 34.41 10*3/MM3 (ref 3.4–10.8)

## 2024-07-27 PROCEDURE — 0 DEXTROSE 5 % SOLUTION 500 ML FLEX CONT: Performed by: NURSE PRACTITIONER

## 2024-07-27 PROCEDURE — 71045 X-RAY EXAM CHEST 1 VIEW: CPT

## 2024-07-27 PROCEDURE — 85007 BL SMEAR W/DIFF WBC COUNT: CPT | Performed by: NURSE PRACTITIONER

## 2024-07-27 PROCEDURE — 0 DEXTROSE 5 % SOLUTION 1,000 ML FLEX CONT: Performed by: NURSE PRACTITIONER

## 2024-07-27 PROCEDURE — 84132 ASSAY OF SERUM POTASSIUM: CPT | Performed by: INTERNAL MEDICINE

## 2024-07-27 PROCEDURE — 99232 SBSQ HOSP IP/OBS MODERATE 35: CPT | Performed by: NURSE PRACTITIONER

## 2024-07-27 PROCEDURE — 72125 CT NECK SPINE W/O DYE: CPT

## 2024-07-27 PROCEDURE — 84100 ASSAY OF PHOSPHORUS: CPT | Performed by: INTERNAL MEDICINE

## 2024-07-27 PROCEDURE — 80053 COMPREHEN METABOLIC PANEL: CPT | Performed by: NURSE PRACTITIONER

## 2024-07-27 PROCEDURE — 85025 COMPLETE CBC W/AUTO DIFF WBC: CPT | Performed by: NURSE PRACTITIONER

## 2024-07-27 PROCEDURE — 25010000002 FILGRASTIM 300 MCG/0.5ML SOLUTION PREFILLED SYRINGE: Performed by: NURSE PRACTITIONER

## 2024-07-27 PROCEDURE — 85610 PROTHROMBIN TIME: CPT | Performed by: NURSE PRACTITIONER

## 2024-07-27 PROCEDURE — 70450 CT HEAD/BRAIN W/O DYE: CPT

## 2024-07-27 PROCEDURE — 25010000002 ACETYLCYSTEINE PER 100 MG: Performed by: NURSE PRACTITIONER

## 2024-07-27 RX ORDER — POTASSIUM CHLORIDE 20 MEQ/1
40 TABLET, EXTENDED RELEASE ORAL EVERY 4 HOURS
Status: COMPLETED | OUTPATIENT
Start: 2024-07-27 | End: 2024-07-27

## 2024-07-27 RX ORDER — BENZOCAINE/MENTHOL 6 MG-10 MG
1 LOZENGE MUCOUS MEMBRANE EVERY 8 HOURS SCHEDULED
Status: DISCONTINUED | OUTPATIENT
Start: 2024-07-27 | End: 2024-08-02 | Stop reason: HOSPADM

## 2024-07-27 RX ORDER — LACTULOSE 10 G/15ML
10 SOLUTION ORAL DAILY
Status: DISCONTINUED | OUTPATIENT
Start: 2024-07-28 | End: 2024-08-02 | Stop reason: HOSPADM

## 2024-07-27 RX ORDER — POTASSIUM CHLORIDE 20 MEQ/1
40 TABLET, EXTENDED RELEASE ORAL ONCE
Status: DISCONTINUED | OUTPATIENT
Start: 2024-07-27 | End: 2024-07-27 | Stop reason: SDUPTHER

## 2024-07-27 RX ORDER — POTASSIUM CHLORIDE 20 MEQ/1
40 TABLET, EXTENDED RELEASE ORAL EVERY 4 HOURS
Status: COMPLETED | OUTPATIENT
Start: 2024-07-27 | End: 2024-07-28

## 2024-07-27 RX ADMIN — GUAIFENESIN 600 MG: 600 TABLET, EXTENDED RELEASE ORAL at 07:56

## 2024-07-27 RX ADMIN — Medication 10 ML: at 21:01

## 2024-07-27 RX ADMIN — POTASSIUM CHLORIDE 40 MEQ: 1500 TABLET, EXTENDED RELEASE ORAL at 06:01

## 2024-07-27 RX ADMIN — SUCRALFATE 1 G: 1 TABLET ORAL at 18:36

## 2024-07-27 RX ADMIN — ACETYLCYSTEINE 6.25 MG/KG/HR: 200 INJECTION, SOLUTION INTRAVENOUS at 21:09

## 2024-07-27 RX ADMIN — POTASSIUM CHLORIDE 40 MEQ: 1500 TABLET, EXTENDED RELEASE ORAL at 23:50

## 2024-07-27 RX ADMIN — PANTOPRAZOLE SODIUM 40 MG: 40 TABLET, DELAYED RELEASE ORAL at 18:36

## 2024-07-27 RX ADMIN — FILGRASTIM 300 MCG: 300 INJECTION, SOLUTION INTRAVENOUS; SUBCUTANEOUS at 13:27

## 2024-07-27 RX ADMIN — SUCRALFATE 1 G: 1 TABLET ORAL at 21:01

## 2024-07-27 RX ADMIN — SUCRALFATE 1 G: 1 TABLET ORAL at 13:21

## 2024-07-27 RX ADMIN — Medication 1 TABLET: at 07:56

## 2024-07-27 RX ADMIN — ACETYLCYSTEINE 2780 MG: 200 INJECTION, SOLUTION INTRAVENOUS at 16:30

## 2024-07-27 RX ADMIN — LACTULOSE 10 G: 20 SOLUTION ORAL at 07:55

## 2024-07-27 RX ADMIN — FOLIC ACID 1 MG: 1 TABLET ORAL at 07:56

## 2024-07-27 RX ADMIN — GUAIFENESIN 600 MG: 600 TABLET, EXTENDED RELEASE ORAL at 21:01

## 2024-07-27 RX ADMIN — HYDROCORTISONE 1 APPLICATION: 1 CREAM TOPICAL at 16:30

## 2024-07-27 RX ADMIN — Medication 100 MG: at 07:56

## 2024-07-27 RX ADMIN — POTASSIUM CHLORIDE 40 MEQ: 1500 TABLET, EXTENDED RELEASE ORAL at 13:21

## 2024-07-27 RX ADMIN — PANTOPRAZOLE SODIUM 40 MG: 40 TABLET, DELAYED RELEASE ORAL at 07:56

## 2024-07-27 RX ADMIN — Medication 10 ML: at 07:59

## 2024-07-27 RX ADMIN — ACETYLCYSTEINE 8360 MG: 200 INJECTION, SOLUTION INTRAVENOUS at 14:41

## 2024-07-27 RX ADMIN — FAMOTIDINE 40 MG: 20 TABLET, FILM COATED ORAL at 21:01

## 2024-07-27 RX ADMIN — Medication 220 MG: at 07:56

## 2024-07-27 RX ADMIN — SUCRALFATE 1 G: 1 TABLET ORAL at 07:56

## 2024-07-27 NOTE — PROGRESS NOTES
[facility] MEDICINE SERVICE  DAILY PROGRESS NOTE    NAME: Yonas Charles  : 1995  MRN: [id number]      LOS: 9 days     PROVIDER OF SERVICE: Hayley Rodriguez DO    Chief Complaint: Alcohol withdrawal    Subjective:     Interval History: Patient seen and examined this morning.  Doing about the same, T. bili trending up.  Awaiting GI evaluation today.  No other complaints.    Review of Systems:   Pertinent positives as noted in HPI/subjective.  All other systems were reviewed and are negative.      Vital Signs  Temp:  [97.9 °F (36.6 °C)-98.3 °F (36.8 °C)] 98 °F (36.7 °C)  Heart Rate:  [113-118] 118  Resp:  [16-23] 17  BP: (107-128)/(58-76) 128/76  Flow (L/min):  [2] 2   Body mass index is 20.43 kg/m².    Physical exam:     General: Awake, alert, young male, NAD  Eyes: PERRL, EOMI, sclera jaundiced  Cardiovascular: Regular rate and rhythm, no murmurs  Respiratory: Clear to auscultation bilaterally, no wheezing or rales, unlabored breathing  Abdomen: Soft, nontender, positive bowel sounds, no guarding  Neurologic: A&O, CN grossly intact, moves all extremities spontaneously  Musculoskeletal: Normal range of motion, no other gross deformities  Skin: Warm, jaundiced        Scheduled Meds   famotidine, 40 mg, Oral, Nightly  filgrastim (NEUPOGEN) injection, 300 mcg, Subcutaneous, Daily With Lunch  folic acid, 1 mg, Oral, Daily  guaiFENesin, 600 mg, Oral, Q12H  lactulose, 10 g, Oral, TID  multivitamin with minerals, 1 tablet, Oral, Daily  pantoprazole, 40 mg, Oral, BID AC  potassium chloride ER, 40 mEq, Oral, Q4H  [Held by provider] sertraline, 50 mg, Oral, Daily  sodium chloride, 10 mL, Intravenous, Q12H  sucralfate, 1 g, Oral, 4x Daily AC & at Bedtime  thiamine, 100 mg, Oral, Daily  zinc sulfate, 220 mg, Oral, Daily       PRN Meds     acetaminophen **OR** acetaminophen **OR** acetaminophen    senna-docusate sodium **AND** polyethylene glycol **AND** bisacodyl **AND** bisacodyl    Calcium Replacement - Follow  Nurse / BPA Driven Protocol    guaiFENesin-codeine    Magnesium Standard Dose Replacement - Follow Nurse / BPA Driven Protocol    ondansetron ODT **OR** ondansetron    Phosphorus Replacement - Follow Nurse / BPA Driven Protocol    Potassium Replacement - Follow Nurse / BPA Driven Protocol    sodium chloride    sodium chloride    sodium chloride   Infusions           Diagnostic Data    Results from last 7 days   Lab Units 07/27/24  0414   WBC 10*3/mm3 34.41*   HEMOGLOBIN g/dL 8.9*   HEMATOCRIT % 24.9*   PLATELETS 10*3/mm3 172   GLUCOSE mg/dL 111*   CREATININE mg/dL 0.39*   BUN mg/dL 5*   SODIUM mmol/L 135*   POTASSIUM mmol/L 3.4*   AST (SGOT) U/L 176*   ALT (SGPT) U/L 62*   ALK PHOS U/L 307*   BILIRUBIN mg/dL 20.4*   ANION GAP mmol/L 15.1*       No radiology results for the last day      I reviewed the patient's new clinical results.    Assessment/Plan:     Active and Resolved Problems  Active Hospital Problems    Diagnosis  POA    **Alcohol withdrawal [F10.939]  Yes      Resolved Hospital Problems   No resolved problems to display.       Acute alcoholic hepatitis with alcoholic cirrhosis  -Secondary to excessive alcohol use  -Initiated on prednisolone given elevated discriminant function score on admission  -T. bili and LFTs continues to rise, prednisolone discontinued per GI and Neupogen started on 7/25  -Monitor LFTs  -Withdrawal symptoms improved and stable now  -Guarded prognosis overall, GI following and may need transfer to tertiary care center for transplant evaluation if does not improve on Neupogen    Leukocytosis  -Reactive from Neupogen  -Remains afebrile, no signs of any acute infection at this time  -Continue to monitor    Hyponatremia 2/2 beer potomania  -Improved sodium level with fluid restriction  -Off IV fluids  -Nephrology following    Acute hypokalemia, hypomagnesemia, hypocalcemia  -Secondary to malnutrition in the setting of excessive alcohol use  -Replete as needed and monitor     VTE  Prophylaxis:  Mechanical VTE prophylaxis orders are present.       Code status is   Code Status and Medical Interventions:   Ordered at: 07/18/24 1214     Code Status (Patient has no pulse and is not breathing):    CPR (Attempt to Resuscitate)     Medical Interventions (Patient has pulse or is breathing):    Full Support       Disposition: May need to transfer to tertiary care center for further management.    Signature: Electronically signed by Hayley Rodriguez DO, 07/27/24, 10:23 EDT.  [facility] Hospitalist Team    Part of this note may be an electronic transcription/translation of spoken language to printed text using the Dragon Dictation System.

## 2024-07-27 NOTE — PROGRESS NOTES
" LOS: 9 days   Patient Care Team:  Provider, No Known as PCP - General      Subjective   \"I usually only eat once a day, so eating three times a day is hard for me.\"    Interval History:   LABS: Sodium 135, potassium 3.4, creatinine 0.39.  Total bilirubin up to 20.4 from 17.8 yesterday, alk phos 307 (255),  (143), ALT 62 (49).  INR 1.85 (1.97).  WBCs 34.41 (25.79), hemoglobin stable at 8.9, platelets 172.  10 bands.  Ate about 25% of his breakfast.   Is having BM every 30 minutes. Is bloated.     ROS:   No chest pain, shortness of breath, or cough.        Medication Review:     Current Facility-Administered Medications:     acetaminophen (TYLENOL) tablet 650 mg, 650 mg, Oral, Q4H PRN **OR** acetaminophen (TYLENOL) 160 MG/5ML oral solution 650 mg, 650 mg, Oral, Q4H PRN **OR** acetaminophen (TYLENOL) suppository 650 mg, 650 mg, Rectal, Q4H PRN, Mari Bustos MD    sennosides-docusate (PERICOLACE) 8.6-50 MG per tablet 2 tablet, 2 tablet, Oral, BID PRN **AND** polyethylene glycol (MIRALAX) packet 17 g, 17 g, Oral, Daily PRN **AND** bisacodyl (DULCOLAX) EC tablet 5 mg, 5 mg, Oral, Daily PRN **AND** bisacodyl (DULCOLAX) suppository 10 mg, 10 mg, Rectal, Daily PRN, Mari Bustos MD    Calcium Replacement - Follow Nurse / BPA Driven Protocol, , Does not apply, PRN, Mari Bustos MD    famotidine (PEPCID) tablet 40 mg, 40 mg, Oral, Nightly, Waleska Chery, APRN, 40 mg at 07/26/24 2038    Filgrastim (NEUPOGEN) injection 300 mcg, 300 mcg, Subcutaneous, Daily With Lunch, Waleska Chery D, APRN, 300 mcg at 07/26/24 1315    folic acid (FOLVITE) tablet 1 mg, 1 mg, Oral, Daily, Mari Bustos MD, 1 mg at 07/27/24 0756    guaiFENesin (MUCINEX) 12 hr tablet 600 mg, 600 mg, Oral, Q12H, Hayley Rodriguez DO, 600 mg at 07/27/24 0756    guaiFENesin-codeine (GUAIFENESIN AC) 100-10 MG/5ML liquid 5 mL, 5 mL, Oral, Q4H PRN, Frank Wild MD, 5 mL at 07/24/24 0318    lactulose (CHRONULAC) 10 GM/15ML solution 10 g, " 10 g, Oral, TID, Vicente Moscoso MD, 10 g at 07/27/24 0755    Magnesium Standard Dose Replacement - Follow Nurse / BPA Driven Protocol, , Does not apply, Juli HARTLEY Abbas Ali, MD    multivitamin with minerals 1 tablet, 1 tablet, Oral, Daily, Mari Bustos MD, 1 tablet at 07/27/24 0756    ondansetron ODT (ZOFRAN-ODT) disintegrating tablet 4 mg, 4 mg, Oral, Q6H PRN **OR** ondansetron (ZOFRAN) injection 4 mg, 4 mg, Intravenous, Q6H PRN, Mari Bustos MD, 4 mg at 07/18/24 1330    pantoprazole (PROTONIX) EC tablet 40 mg, 40 mg, Oral, BID AC, Kaci Polo APRN, 40 mg at 07/27/24 0756    Phosphorus Replacement - Follow Nurse / BPA Driven Protocol, , Does not apply, PRCOREY, Mari Bustos MD    potassium chloride (KLOR-CON M20) CR tablet 40 mEq, 40 mEq, Oral, Q4H, Hayley Rodriguez, , 40 mEq at 07/27/24 0601    Potassium Replacement - Follow Nurse / BPA Driven Protocol, , Does not apply, Juli HARTLEY Abbas Ali, MD    [Held by provider] sertraline (ZOLOFT) tablet 50 mg, 50 mg, Oral, Daily, Mari Bustos MD, 50 mg at 07/18/24 1258    sodium chloride 0.9 % flush 10 mL, 10 mL, Intravenous, PRN, Mari Bustos MD    sodium chloride 0.9 % flush 10 mL, 10 mL, Intravenous, Q12H, Mari Bustos MD, 10 mL at 07/27/24 0759    sodium chloride 0.9 % flush 10 mL, 10 mL, Intravenous, PRN, Mari Bustos MD    sodium chloride 0.9 % infusion 40 mL, 40 mL, Intravenous, PRN, Mari Bustos MD    sucralfate (CARAFATE) tablet 1 g, 1 g, Oral, 4x Daily AC & at Bedtime, Kaci Polo APRN, 1 g at 07/27/24 0756    [COMPLETED] thiamine (B-1) injection 200 mg, 200 mg, Intravenous, Q8H, 200 mg at 07/23/24 1437 **FOLLOWED BY** thiamine (VITAMIN B-1) tablet 100 mg, 100 mg, Oral, Daily, Mari Bustos MD, 100 mg at 07/27/24 0756    zinc sulfate (ZINCATE) capsule 220 mg, 220 mg, Oral, Daily, Waleska Chery APRN, 220 mg at 07/27/24 0756      Objective  Asleep in bed. No family present. Rm  264    Vital Signs  Vitals:    07/26/24 1500 07/26/24 1940 07/26/24 2329 07/27/24 0334   BP: 119/73 126/75 107/58 126/73   BP Location: Right arm Right arm Right arm Right arm   Patient Position: Sitting Sitting Sitting Sitting   Pulse:  113 114 118   Resp: 16 18 20 18   Temp: 98.3 °F (36.8 °C) 98 °F (36.7 °C) 97.9 °F (36.6 °C) 98 °F (36.7 °C)   TempSrc: Oral Oral Oral Oral   SpO2:  97% 90% 95%   Weight:       Height:           Physical Exam:    General Appearance:    Awake and alert, in no acute distress   Head:    Normocephalic, without obvious abnormality   Eyes:          Conjunctivae normal, icteric sclera   Throat:   No oral lesions, no thrush, oral mucosa moist   Neck:   No adenopathy, supple, no JVD   Lungs:     respirations regular, even and unlabored   Abdomen:     Soft, non-tender, no rebound or guarding, non-distended   Rectal:     Deferred   Extremities:   No edema, no cyanosis   Skin:   No bruising or rash,+ jaundice        Results Review:    CBC    Results from last 7 days   Lab Units 07/27/24  0414 07/26/24  0108 07/24/24  2322 07/24/24  0324 07/23/24  0435 07/22/24  0449 07/21/24  0514   WBC 10*3/mm3 34.41* 25.79* 16.67* 15.98* 14.99* 15.08* 14.80*   HEMOGLOBIN g/dL 8.9* 8.5* 9.3* 9.2* 8.8* 8.4* 8.5*   PLATELETS 10*3/mm3 172 161 170 162 148 147 148     CMP   Results from last 7 days   Lab Units 07/27/24  0414 07/26/24  1325 07/26/24  0108 07/25/24  0953 07/24/24  2322 07/24/24  0324 07/23/24  1638 07/23/24  0435 07/22/24  1515 07/22/24  0449 07/21/24  0514   SODIUM mmol/L 135*  --  134*  --  135* 134*  --  134*  --  133* 135*   POTASSIUM mmol/L 3.4* 4.3 3.4* 4.0 3.4* 4.0  --  4.5   < > 3.6 3.7   CHLORIDE mmol/L 98  --  98  --  99 101  --  102  --  98 100   CO2 mmol/L 21.9*  --  25.0  --  25.4 25.0  --  25.9  --  26.9 24.9   BUN mg/dL 5*  --  6  --  5* 5*  --  5*  --  4* 3*   CREATININE mg/dL 0.39*  --  0.50*  --  0.51* 0.49*  --  0.55*  --  0.47* 0.46*   GLUCOSE mg/dL 111*  --  87  --  115* 93  --   "89  --  89 130*   ALBUMIN g/dL 3.0*  --  2.9*  --  3.3* 3.2*  --  3.2*  --  3.1* 2.8*   BILIRUBIN mg/dL 20.4*  --  17.8*  --  18.2* 16.9*  --  16.4*  --  16.0* 16.6*   ALK PHOS U/L 307*  --  255*  --  283* 275*  --  261*  --  258* 272*   AST (SGOT) U/L 176*  --  143*  --  141* 126*  --  116*  --  107* 115*   ALT (SGPT) U/L 62*  --  49*  --  47* 35  --  29  --  27 23   MAGNESIUM mg/dL  --   --   --   --   --   --   --  1.7  --  2.2 1.3*   PHOSPHORUS mg/dL 2.5  --  3.0 2.5 2.1* 2.3* 2.7 2.0*  --  2.5 2.8    < > = values in this interval not displayed.     Cr Clearance Estimated Creatinine Clearance: 220.2 mL/min (A) (by C-G formula based on SCr of 0.39 mg/dL (L)).  Coag   Results from last 7 days   Lab Units 07/27/24  0414 07/26/24  1325 07/26/24  0938 07/24/24  2322 07/24/24  0324 07/23/24  0435 07/22/24  0449   INR  1.85* 1.97* 2.00* 1.98* 1.88* 1.80* 1.71*     HbA1C No results found for: \"HGBA1C\"      Infection     UA      Microbiology Results (last 10 days)       Procedure Component Value - Date/Time    Legionella Antigen, Urine - Urine, Urine, Clean Catch [[id number]]  (Normal) Collected: 07/18/24 1319    Lab Status: Final result Specimen: Urine, Clean Catch Updated: 07/18/24 1852     LEGIONELLA ANTIGEN, URINE Negative    Blood Culture - Blood, Hand, Right [[id number]]  (Normal) Collected: 07/18/24 0928    Lab Status: Final result Specimen: Blood from Hand, Right Updated: 07/23/24 0945     Blood Culture No growth at 5 days    Blood Culture - Blood, Arm, Right [[id number]]  (Normal) Collected: 07/18/24 0923    Lab Status: Final result Specimen: Blood from Arm, Right Updated: 07/23/24 0945     Blood Culture No growth at 5 days          Imaging Results (Last 72 Hours)       Procedure Component Value Units Date/Time    XR Chest 1 View [[id number]] Collected: 07/24/24 1330     Updated: 07/24/24 1333    Narrative:      XR CHEST 1 VW    Date of Exam: 7/24/2024 1:25 PM EDT    Indication: coughing, SOB    Comparison: " 7/18/2024    Findings:  There are lower lung volumes. Cardiac size appears increased. There is airspace disease in both lung bases with small bilateral pleural effusions. Pulmonary vascular markings have increased compared with the last study.      Impression:      Impression:    1. Increased cardiac size with evidence of mild passive congestion.  2. Bibasilar airspace disease with bilateral pleural effusions.      Electronically Signed: Vitor Chin MD    7/24/2024 1:31 PM EDT    Workstation ID: [id number]            Assessment & Plan     ASSESSMENT:  -Alcohol hepatitis/cirrhosis   -Elevated LFTs -related to above  -Acute alcohol withdrawal  -Nitrite positive UTI  -Leukocytosis  -Normocytic anemia  -Thrombocytopenia - due to cirrhosis  -Elevated INR - due to cirrhosis  -Hyponatremia related to alcohol abuse  -Dysphagia could be related to esophageal stricture versus reflux esophagitis versus dysmotility  -GERD     PLAN:  Patient is a 29 y/o male with history of ETOH abuse who presented on 7/18 with complaints of ETOH withdrawal. Found to have significantly elevated LFTs on admission with total bilirubin 17.4, alk phos 324, , and ALT 28. CT abd/pelvis W on admission showed fatty liver,  sequelae of portal hypertension noted including distal paraesophageal  and intra-abdominal varices as well as trace ascites , and patent portal vasculature. He was started on prednisolone on 7/19 due to Maddrey's DF of 33.    LFTs continue to worsen.  Will start acetylcysteine protocol.   INR 1.85 and improved from yesterday.  White blood cell count is worse at 34 from 25 yesterday.  Continue Neupogen for total of 5 days.  Prednisolone discontinued 7/25/24 as LFTs continue to worsen despite this medication.  Nonresponder.  Continue to encourage nutrition.  Continue thiamine, zinc, and folic acid.  Decrease lactulose to once a day as he is having bowel movements every 30 minutes.  Recommend complete ETOH cessation.   Liver  serologies unremarkable. AFP normal.   Will need screening EGD at some point as inpatient vs outpatient.   If liver function continues to worsen despite Neupogen, continue to consider tertiary transfer for transplant evaluation.  Going to hold due to improving INR & kidney function intact.   Continue supportive care.      Electronically signed by MARI Horn, 07/27/24, 10:07 AM EDT.

## 2024-07-27 NOTE — PLAN OF CARE
Problem: Adult Inpatient Plan of Care  Goal: Absence of Hospital-Acquired Illness or Injury  Intervention: Identify and Manage Fall Risk  Description: Perform standard risk assessment on admission using a validated tool or comprehensive approach appropriate to the patient; reassess fall risk frequently, with change in status or transfer to another level of care.  Communicate fall injury risk to interprofessional healthcare team.  Determine need for increased observation, equipment and environmental modification, such as low bed, signage and supportive, nonskid footwear.  Adjust safety measures to individual developmental age, stage and identified risk factors.  Reinforce the importance of safety and physical activity with patient and family.  Perform regular intentional rounding to assess need for position change, pain assessment and personal needs, including assistance with toileting.  Recent Flowsheet Documentation  Taken 7/27/2024 0200 by Carlos Garcia LPN  Safety Promotion/Fall Prevention:   room organization consistent   patient off unit   nonskid shoes/slippers when out of bed   activity supervised   assistive device/personal items within reach   clutter free environment maintained  Taken 7/27/2024 0030 by Carlos Garcia LPN  Safety Promotion/Fall Prevention:   safety round/check completed   room organization consistent   nonskid shoes/slippers when out of bed   muscle strengthening facilitated   mobility aid in reach   lighting adjusted   fall prevention program maintained   assistive device/personal items within reach   clutter free environment maintained   activity supervised  Intervention: Prevent Skin Injury  Description: Perform a screening for skin injury risk, such as pressure or moisture associated skin damage on admission and at regular intervals throughout hospital stay.  Keep all areas of skin (especially folds) clean and dry.  Maintain adequate skin hydration.  Relieve and  redistribute pressure and protect bony prominences; implement measures based on patient-specific risk factors.  Match turning and repositioning schedule to clinical condition.  Encourage weight shift frequently; assist with reposition if unable to complete independently.  Float heels off bed; avoid pressure on the Achilles tendon.  Keep skin free from extended contact with medical devices.  Encourage functional activity and mobility, as early as tolerated.  Use aids (e.g., slide boards, mechanical lift) during transfer.  Recent Flowsheet Documentation  Taken 7/27/2024 0200 by Carlos Garcia LPN  Body Position: position changed independently  Taken 7/27/2024 0030 by Carlos Garcia LPN  Body Position: position changed independently  Skin Protection: adhesive use limited  Intervention: Prevent and Manage VTE (Venous Thromboembolism) Risk  Description: Assess for VTE (venous thromboembolism) risk.  Encourage and assist with early ambulation.  Initiate and maintain compression or other therapy, as indicated, based on identified risk in accordance with organizational protocol and provider order.  Encourage both active and passive leg exercises while in bed, if unable to ambulate.  Recent Flowsheet Documentation  Taken 7/27/2024 0200 by Carlos Garcia LPN  Activity Management:   up ad jan   ambulated to bathroom  Taken 7/27/2024 0030 by Carlos Garcia LPN  Activity Management: up ad jan  VTE Prevention/Management:   sequential compression devices off   patient refused intervention  Intervention: Prevent Infection  Description: Maintain skin and mucous membrane integrity; promote hand, oral and pulmonary hygiene.  Optimize fluid balance, nutrition, sleep and glycemic control to maximize infection resistance.  Identify potential sources of infection early to prevent or mitigate progression of infection (e.g., wound, lines, devices).  Evaluate ongoing need for invasive devices; remove promptly when no  longer indicated.  Recent Flowsheet Documentation  Taken 7/27/2024 0200 by Carlos Garcia LPN  Infection Prevention:   visitors restricted/screened   single patient room provided   rest/sleep promoted   personal protective equipment utilized   hand hygiene promoted  Taken 7/27/2024 0030 by Carlos Garcia LPN  Infection Prevention:   visitors restricted/screened   single patient room provided   personal protective equipment utilized   rest/sleep promoted   hand hygiene promoted     Problem: Pain Acute  Goal: Acceptable Pain Control and Functional Ability  Intervention: Prevent or Manage Pain  Description: Evaluate pain level, effect of treatment and patient response at regular intervals.  Minimize painful stimuli; coordinate care and adjust environment (e.g., light, noise, unnecessary movement); promote sleep/rest.  Match pharmacologic analgesia to severity and type of pain mechanism (e.g., neuropathic, muscle, inflammatory); consider multimodal approach (e.g., nonopioid, opioid, adjuvant).  Provide medication at regular intervals; titrate to patient response; premedicate for painful procedures.  Manage breakthrough pain with additional doses; consider rotation or switching medication.  Monitor for signs of substance tolerance (increased dose to reach desired effect, decreased effect with same dose).  Manage medication-induced effects, such as constipation, nausea, pruritus, urinary retention, somnolence and dizziness.  Provide multimodal interventions, such as as physical activity, therapeutic exercise, yoga, TENS (transcutaneous electrical nerve stimulation) and manual therapy.  Train in functional activity modifications, such as body mechanics, posture, ergonomics, energy conservation and activity pacing.  Consider addition of complementary or alternative therapy, such as acupuncture, hypnosis or therapeutic touch.  Recent Flowsheet Documentation  Taken 7/27/2024 0200 by Carlos Garcia  LPN  Medication Review/Management: medications reviewed  Taken 7/27/2024 0030 by Carlos Garcia LPN  Medication Review/Management: medications reviewed     Problem: Skin Injury Risk Increased  Goal: Skin Health and Integrity  Intervention: Optimize Skin Protection  Description: Perform a full pressure injury risk assessment, as indicated by screening, upon admission to care unit.  Reassess skin (injury risk, full inspection) frequently (e.g., scheduled interval, with change in condition) to provide optimal early detection and prevention.  Maintain adequate tissue perfusion (e.g., encourage fluid balance; avoid crossing legs, constrictive clothing or devices) to promote tissue oxygenation.  Maintain head of bed at lowest degree of elevation tolerated, considering medical condition and other restrictions.  Avoid positioning onto an area that remains reddened.  Minimize incontinence and moisture (e.g., toileting schedule; moisture-wicking pad, diaper or incontinence collection device; skin moisture barrier).  Cleanse skin promptly and gently when soiled utilizing a pH-balanced cleanser.  Relieve and redistribute pressure (e.g., scheduled position changes, weight shifts, use of support surface, medical device repositioning, protective dressing application, use of positioning device, microclimate control, use of pressure-injury-monitor  Encourage increased activity, such as sitting in a chair at the bedside or early mobilization, when able to tolerate.  Recent Flowsheet Documentation  Taken 7/27/2024 0030 by Carlos Garcia LPN  Pressure Reduction Techniques: frequent weight shift encouraged  Pressure Reduction Devices: pressure-redistributing mattress utilized  Skin Protection: adhesive use limited     Problem: Fall Injury Risk  Goal: Absence of Fall and Fall-Related Injury  Intervention: Identify and Manage Contributors  Description: Develop a fall prevention plan with the patient and  caregiver/family.  Provide reorientation, appropriate sensory stimulation and routines with changes in mental status to decrease risk of fall.  Promote use of personal vision and auditory aids.  Assess assistance level required for safe and effective self-care; provide support as needed, such as toileting, mobilization. For age 65 and older, implement timed toileting with assistance.  Encourage physical activity, such as performance of mobility and self-care at highest level of patient ability, multicomponent exercise program and provision of appropriate assistive devices.  If fall occurs, assess the severity of injury; implement fall injury protocol. Determine the cause and revise fall injury prevention plan.  Regularly review medication contribution to fall risk; adjust medication administration times to minimize risk of falling.  Consider risk related to polypharmacy and age.  Balance adequate pain management with potential for oversedation.  Recent Flowsheet Documentation  Taken 7/27/2024 0200 by Carlos Garcia LPN  Medication Review/Management: medications reviewed  Taken 7/27/2024 0030 by Carlos Garcia LPN  Medication Review/Management: medications reviewed  Intervention: Promote Injury-Free Environment  Description: Provide a safe, barrier-free environment that encourages independent activity.  Keep care area uncluttered and well-lighted.  Determine need for increased observation or monitoring.  Avoid use of devices that minimize mobility, such as restraints or indwelling urinary catheter.  Recent Flowsheet Documentation  Taken 7/27/2024 0200 by Carlos Garcia LPN  Safety Promotion/Fall Prevention:   room organization consistent   patient off unit   nonskid shoes/slippers when out of bed   activity supervised   assistive device/personal items within reach   clutter free environment maintained  Taken 7/27/2024 0030 by Carlos Garcia LPN  Safety Promotion/Fall Prevention:   safety  round/check completed   room organization consistent   nonskid shoes/slippers when out of bed   muscle strengthening facilitated   mobility aid in reach   lighting adjusted   fall prevention program maintained   assistive device/personal items within reach   clutter free environment maintained   activity supervised   Goal Outcome Evaluation: Plan of care reviewed, inpt rehab plan at discharge, barrier is medication thearpy that require 5days course, noted in notes transplant eval in medication dose if no improvement with SQ medication, inprovement has been noted , pt is progress toward goal

## 2024-07-27 NOTE — PROGRESS NOTES
"NEPHROLOGY PROGRESS NOTE------[facility]    [facility]  [phone]  Quang Butts MD      Patient Care Team:  Provider, No Known as PCP - General      Provider:  Quang Butts MD  Patient Name: Yonas Charles  :  1995    SUBJECTIVE:    F/U ELECTROLYTE ABNORMALITIES    No chest pain or SOA    Medication:  famotidine, 40 mg, Oral, Nightly  filgrastim (NEUPOGEN) injection, 300 mcg, Subcutaneous, Daily With Lunch  folic acid, 1 mg, Oral, Daily  guaiFENesin, 600 mg, Oral, Q12H  lactulose, 10 g, Oral, TID  multivitamin with minerals, 1 tablet, Oral, Daily  pantoprazole, 40 mg, Oral, BID AC  potassium chloride ER, 40 mEq, Oral, Q4H  [Held by provider] sertraline, 50 mg, Oral, Daily  sodium chloride, 10 mL, Intravenous, Q12H  sucralfate, 1 g, Oral, 4x Daily AC & at Bedtime  thiamine, 100 mg, Oral, Daily  zinc sulfate, 220 mg, Oral, Daily               OBJECTIVE    Vital Sign Min/Max for last 24 hours  Temp  Min: 97.9 °F (36.6 °C)  Max: 98.3 °F (36.8 °C)   BP  Min: 107/58  Max: 128/76   Pulse  Min: 113  Max: 118   Resp  Min: 16  Max: 23   SpO2  Min: 90 %  Max: 97 %   No data recorded   No data recorded     Flowsheet Rows      Flowsheet Row First Filed Value   Admission Height 165.1 cm (65\") Documented at 2024 0855   Admission Weight 55.7 kg (122 lb 12.7 oz) Documented at 2024 0855            No intake/output data recorded.  I/O last 3 completed shifts:  In: 1320 [P.O.:1320]  Out: -     Physical Exam:  General Appearance: NAD.   Head: normocephalic, without obvious abnormality and atraumatic    Eyes: conjunctivae and +ICTERIC SCLERAE  Neck: supple and no JVD  Lungs: CTA bilaterally  Heart: regular rhythm & normal rate and normal S1, S2   Chest Wall: no abnormalities observed  Abdomen: normal bowel sounds and soft   Extremities: moves extremities well, no edema, no cyanosis  Skin: +JAUNDICE  Neurologic: A AND O X 4 WITHOUT FOCAL " "DEFICIT    Labs:    WBC WBC   Date Value Ref Range Status   07/27/2024 34.41 (C) 3.40 - 10.80 10*3/mm3 Final   07/26/2024 25.79 (H) 3.40 - 10.80 10*3/mm3 Final   07/24/2024 16.67 (H) 3.40 - 10.80 10*3/mm3 Final      HGB Hemoglobin   Date Value Ref Range Status   07/27/2024 8.9 (L) 13.0 - 17.7 g/dL Final   07/26/2024 8.5 (L) 13.0 - 17.7 g/dL Final   07/24/2024 9.3 (L) 13.0 - 17.7 g/dL Final      HCT Hematocrit   Date Value Ref Range Status   07/27/2024 24.9 (L) 37.5 - 51.0 % Final   07/26/2024 24.1 (L) 37.5 - 51.0 % Final   07/24/2024 25.7 (L) 37.5 - 51.0 % Final      Platelets No results found for: \"LABPLAT\"   MCV MCV   Date Value Ref Range Status   07/27/2024 97.3 (H) 79.0 - 97.0 fL Final   07/26/2024 94.9 79.0 - 97.0 fL Final   07/24/2024 94.5 79.0 - 97.0 fL Final          Sodium Sodium   Date Value Ref Range Status   07/27/2024 135 (L) 136 - 145 mmol/L Final   07/26/2024 134 (L) 136 - 145 mmol/L Final   07/24/2024 135 (L) 136 - 145 mmol/L Final      Potassium Potassium   Date Value Ref Range Status   07/27/2024 3.4 (L) 3.5 - 5.2 mmol/L Final     Comment:     Slight hemolysis detected by analyzer. Result may be falsely elevated.   07/26/2024 4.3 3.5 - 5.2 mmol/L Final   07/26/2024 3.4 (L) 3.5 - 5.2 mmol/L Final   07/25/2024 4.0 3.5 - 5.2 mmol/L Final     Comment:     Specimen hemolyzed.  Result may be falsely elevated.   07/24/2024 3.4 (L) 3.5 - 5.2 mmol/L Final      Chloride Chloride   Date Value Ref Range Status   07/27/2024 98 98 - 107 mmol/L Final   07/26/2024 98 98 - 107 mmol/L Final   07/24/2024 99 98 - 107 mmol/L Final      CO2 CO2   Date Value Ref Range Status   07/27/2024 21.9 (L) 22.0 - 29.0 mmol/L Final   07/26/2024 25.0 22.0 - 29.0 mmol/L Final   07/24/2024 25.4 22.0 - 29.0 mmol/L Final      BUN BUN   Date Value Ref Range Status   07/27/2024 5 (L) 6 - 20 mg/dL Final   07/26/2024 6 6 - 20 mg/dL Final   07/24/2024 5 (L) 6 - 20 mg/dL Final      Creatinine Creatinine   Date Value Ref Range Status " "  07/27/2024 0.39 (L) 0.76 - 1.27 mg/dL Final   07/26/2024 0.50 (L) 0.76 - 1.27 mg/dL Final   07/24/2024 0.51 (L) 0.76 - 1.27 mg/dL Final      Calcium Calcium   Date Value Ref Range Status   07/27/2024 8.5 (L) 8.6 - 10.5 mg/dL Final   07/26/2024 8.4 (L) 8.6 - 10.5 mg/dL Final   07/24/2024 8.5 (L) 8.6 - 10.5 mg/dL Final      PO4 No components found for: \"PO4\"   Albumin Albumin   Date Value Ref Range Status   07/27/2024 3.0 (L) 3.5 - 5.2 g/dL Final   07/26/2024 2.9 (L) 3.5 - 5.2 g/dL Final   07/24/2024 3.3 (L) 3.5 - 5.2 g/dL Final      Magnesium No results found for: \"MG\"     Uric Acid No components found for: \"URIC ACID\"     Imaging Results (Last 72 Hours)       Procedure Component Value Units Date/Time    XR Chest 1 View [[id number]] Collected: 07/24/24 1330     Updated: 07/24/24 1333    Narrative:      XR CHEST 1 VW    Date of Exam: 7/24/2024 1:25 PM EDT    Indication: coughing, SOB    Comparison: 7/18/2024    Findings:  There are lower lung volumes. Cardiac size appears increased. There is airspace disease in both lung bases with small bilateral pleural effusions. Pulmonary vascular markings have increased compared with the last study.      Impression:      Impression:    1. Increased cardiac size with evidence of mild passive congestion.  2. Bibasilar airspace disease with bilateral pleural effusions.      Electronically Signed: Vitor Chin MD    7/24/2024 1:31 PM EDT    Workstation ID: [id number]            Results for orders placed during the hospital encounter of 07/18/24    XR Chest 1 View    Narrative  XR CHEST 1 VW    Date of Exam: 7/24/2024 1:25 PM EDT    Indication: coughing, SOB    Comparison: 7/18/2024    Findings:  There are lower lung volumes. Cardiac size appears increased. There is airspace disease in both lung bases with small bilateral pleural effusions. Pulmonary vascular markings have increased compared with the last study.    Impression  Impression:    1. Increased cardiac size with evidence of " mild passive congestion.  2. Bibasilar airspace disease with bilateral pleural effusions.      Electronically Signed: Vitor Chin MD  7/24/2024 1:31 PM EDT  Workstation ID: [id number]      XR Chest PA & Lateral    Narrative  DATE OF EXAM:  7/18/2024 12:36 PM    PROCEDURE:  XR CHEST PA AND LATERAL-    INDICATIONS:  rule out aspiration pneumonia; E87.6-Hypokalemia; F10.939-Alcohol use,  unspecified with withdrawal, unspecified; R17-Unspecified jaundice    COMPARISON:  No Comparisons Available    TECHNIQUE:  Two radiologic views of the chest , PA and lateral were obtained.    FINDINGS:  Heart size normal. Negative for lobar consolidation, edema, effusion or  pneumothorax. Osseous structures unremarkable.    Impression  No active pulmonary process.      Electronically Signed By-Armani Small MD On:7/18/2024 1:00 PM            ASSESSMENT / PLAN      Alcohol withdrawal    HYPONATREMIA------stable. Likely related to underlying liver disease. Fluid restrict     2. HYPOKALEMIA-------Resolved.       3. HYPOMAGNESEMIA-------Replaced     4. HYPOCALCEMIA------Replace IV     5. ALCOHOL ABUSE/CIRRHOSIS/JAUNDICE------Thiamine, Folate, IVFs, prn IV Ativan. Withdrawal prcautions     6. MIXED METABOLIC ALKALOSIS/METABOLIC ACIDOSIS-----Better     7. ANEMIA------Normocytic with normal RDW. Follow for PRBC need     8. DEPRESSION------Suicide precautions. Hold Zoloft given severe hyponatremia     9. HYPOALBUMINEMIA-----IV Albumin to temporize and po supplements     10. ELEVATED INR------Secondary to liver disease     11. THROMBOCYTOPENIA------No heparin. Secondary to liver disease     12. PUD PROPHYLAXIS-----IV PPI     13. DVT PROPHYLAXIS-----SCDs    14. HYPOPHOSPHATEMIA------Replaced    Sodium stable,  Replace potassium  Keep on fluid restriction  Labs in am        Quang Butts MD  [facility]  [phone]  07/27/24  10:52 EDT

## 2024-07-28 ENCOUNTER — APPOINTMENT (OUTPATIENT)
Dept: ULTRASOUND IMAGING | Facility: HOSPITAL | Age: 29
End: 2024-07-28
Payer: MEDICAID

## 2024-07-28 LAB
ALBUMIN SERPL-MCNC: 3 G/DL (ref 3.5–5.2)
ALBUMIN/GLOB SERPL: 1.2 G/DL
ALP SERPL-CCNC: 294 U/L (ref 39–117)
ALT SERPL W P-5'-P-CCNC: 63 U/L (ref 1–41)
ANION GAP SERPL CALCULATED.3IONS-SCNC: 11.8 MMOL/L (ref 5–15)
AST SERPL-CCNC: 163 U/L (ref 1–40)
BILIRUB SERPL-MCNC: 20.8 MG/DL (ref 0–1.2)
BUN SERPL-MCNC: 4 MG/DL (ref 6–20)
BUN/CREAT SERPL: 7.7 (ref 7–25)
CALCIUM SPEC-SCNC: 8.3 MG/DL (ref 8.6–10.5)
CHLORIDE SERPL-SCNC: 97 MMOL/L (ref 98–107)
CO2 SERPL-SCNC: 22.2 MMOL/L (ref 22–29)
CREAT SERPL-MCNC: 0.52 MG/DL (ref 0.76–1.27)
DEPRECATED RDW RBC AUTO: 64.3 FL (ref 37–54)
EGFRCR SERPLBLD CKD-EPI 2021: 139.9 ML/MIN/1.73
ERYTHROCYTE [DISTWIDTH] IN BLOOD BY AUTOMATED COUNT: 19.8 % (ref 12.3–15.4)
GLOBULIN UR ELPH-MCNC: 2.5 GM/DL
GLUCOSE SERPL-MCNC: 83 MG/DL (ref 65–99)
HCT VFR BLD AUTO: 24.6 % (ref 37.5–51)
HGB BLD-MCNC: 8.9 G/DL (ref 13–17.7)
INR PPP: 2.04 (ref 0.93–1.1)
MCH RBC QN AUTO: 34.4 PG (ref 26.6–33)
MCHC RBC AUTO-ENTMCNC: 36.2 G/DL (ref 31.5–35.7)
MCV RBC AUTO: 95 FL (ref 79–97)
PHOSPHATE SERPL-MCNC: 2.3 MG/DL (ref 2.5–4.5)
PLATELET # BLD AUTO: 161 10*3/MM3 (ref 140–450)
PMV BLD AUTO: 10.2 FL (ref 6–12)
POTASSIUM SERPL-SCNC: 3 MMOL/L (ref 3.5–5.2)
POTASSIUM SERPL-SCNC: 3.3 MMOL/L (ref 3.5–5.2)
PROT SERPL-MCNC: 5.5 G/DL (ref 6–8.5)
PROTHROMBIN TIME: 21.1 SECONDS (ref 9.6–11.7)
RBC # BLD AUTO: 2.59 10*6/MM3 (ref 4.14–5.8)
SODIUM SERPL-SCNC: 131 MMOL/L (ref 136–145)
WBC NRBC COR # BLD AUTO: 40.75 10*3/MM3 (ref 3.4–10.8)

## 2024-07-28 PROCEDURE — 0 DEXTROSE 5 % SOLUTION 1,000 ML FLEX CONT: Performed by: NURSE PRACTITIONER

## 2024-07-28 PROCEDURE — 85027 COMPLETE CBC AUTOMATED: CPT | Performed by: NURSE PRACTITIONER

## 2024-07-28 PROCEDURE — 99232 SBSQ HOSP IP/OBS MODERATE 35: CPT | Performed by: NURSE PRACTITIONER

## 2024-07-28 PROCEDURE — 25010000002 FILGRASTIM 300 MCG/0.5ML SOLUTION PREFILLED SYRINGE: Performed by: NURSE PRACTITIONER

## 2024-07-28 PROCEDURE — 25010000002 ACETYLCYSTEINE PER 100 MG: Performed by: NURSE PRACTITIONER

## 2024-07-28 PROCEDURE — 80053 COMPREHEN METABOLIC PANEL: CPT | Performed by: NURSE PRACTITIONER

## 2024-07-28 PROCEDURE — 84100 ASSAY OF PHOSPHORUS: CPT | Performed by: INTERNAL MEDICINE

## 2024-07-28 PROCEDURE — 76705 ECHO EXAM OF ABDOMEN: CPT

## 2024-07-28 PROCEDURE — 25010000002 PHYTONADIONE 10 MG/ML SOLUTION: Performed by: NURSE PRACTITIONER

## 2024-07-28 PROCEDURE — 84132 ASSAY OF SERUM POTASSIUM: CPT | Performed by: INTERNAL MEDICINE

## 2024-07-28 PROCEDURE — 85610 PROTHROMBIN TIME: CPT | Performed by: NURSE PRACTITIONER

## 2024-07-28 RX ORDER — FUROSEMIDE 40 MG/1
40 TABLET ORAL DAILY
Status: DISCONTINUED | OUTPATIENT
Start: 2024-07-28 | End: 2024-08-02 | Stop reason: HOSPADM

## 2024-07-28 RX ORDER — POTASSIUM CHLORIDE 20 MEQ/1
40 TABLET, EXTENDED RELEASE ORAL EVERY 4 HOURS
Status: DISPENSED | OUTPATIENT
Start: 2024-07-28 | End: 2024-07-28

## 2024-07-28 RX ORDER — POTASSIUM CHLORIDE 20 MEQ/1
40 TABLET, EXTENDED RELEASE ORAL EVERY 4 HOURS
Status: COMPLETED | OUTPATIENT
Start: 2024-07-28 | End: 2024-07-29

## 2024-07-28 RX ORDER — PHYTONADIONE 2 MG/ML
5 INJECTION, EMULSION INTRAMUSCULAR; INTRAVENOUS; SUBCUTANEOUS ONCE
Status: COMPLETED | OUTPATIENT
Start: 2024-07-28 | End: 2024-07-28

## 2024-07-28 RX ADMIN — GUAIFENESIN 600 MG: 600 TABLET, EXTENDED RELEASE ORAL at 20:50

## 2024-07-28 RX ADMIN — Medication 100 MG: at 08:33

## 2024-07-28 RX ADMIN — FILGRASTIM 300 MCG: 300 INJECTION, SOLUTION INTRAVENOUS; SUBCUTANEOUS at 14:20

## 2024-07-28 RX ADMIN — POTASSIUM CHLORIDE 40 MEQ: 1500 TABLET, EXTENDED RELEASE ORAL at 06:24

## 2024-07-28 RX ADMIN — Medication 220 MG: at 08:33

## 2024-07-28 RX ADMIN — ACETYLCYSTEINE 6.25 MG/KG/HR: 200 INJECTION, SOLUTION INTRAVENOUS at 15:56

## 2024-07-28 RX ADMIN — SUCRALFATE 1 G: 1 TABLET ORAL at 19:04

## 2024-07-28 RX ADMIN — HYDROCORTISONE 1 APPLICATION: 1 CREAM TOPICAL at 22:00

## 2024-07-28 RX ADMIN — FAMOTIDINE 40 MG: 20 TABLET, FILM COATED ORAL at 20:50

## 2024-07-28 RX ADMIN — SUCRALFATE 1 G: 1 TABLET ORAL at 14:19

## 2024-07-28 RX ADMIN — HYDROCORTISONE 1 APPLICATION: 1 CREAM TOPICAL at 06:24

## 2024-07-28 RX ADMIN — Medication 10 ML: at 20:50

## 2024-07-28 RX ADMIN — GUAIFENESIN 600 MG: 600 TABLET, EXTENDED RELEASE ORAL at 08:34

## 2024-07-28 RX ADMIN — SUCRALFATE 1 G: 1 TABLET ORAL at 20:50

## 2024-07-28 RX ADMIN — PHYTONADIONE 5 MG: 10 INJECTION, EMULSION INTRAMUSCULAR; INTRAVENOUS; SUBCUTANEOUS at 15:56

## 2024-07-28 RX ADMIN — SUCRALFATE 1 G: 1 TABLET ORAL at 08:34

## 2024-07-28 RX ADMIN — FOLIC ACID 1 MG: 1 TABLET ORAL at 08:34

## 2024-07-28 RX ADMIN — PANTOPRAZOLE SODIUM 40 MG: 40 TABLET, DELAYED RELEASE ORAL at 08:33

## 2024-07-28 RX ADMIN — Medication 10 ML: at 08:35

## 2024-07-28 RX ADMIN — LACTULOSE 10 G: 10 SOLUTION ORAL at 08:34

## 2024-07-28 RX ADMIN — POTASSIUM CHLORIDE 40 MEQ: 1500 TABLET, EXTENDED RELEASE ORAL at 22:01

## 2024-07-28 RX ADMIN — FUROSEMIDE 40 MG: 40 TABLET ORAL at 14:20

## 2024-07-28 RX ADMIN — POTASSIUM CHLORIDE 40 MEQ: 1500 TABLET, EXTENDED RELEASE ORAL at 02:26

## 2024-07-28 RX ADMIN — Medication 1 TABLET: at 08:34

## 2024-07-28 RX ADMIN — PANTOPRAZOLE SODIUM 40 MG: 40 TABLET, DELAYED RELEASE ORAL at 14:21

## 2024-07-28 RX ADMIN — POTASSIUM CHLORIDE 40 MEQ: 1500 TABLET, EXTENDED RELEASE ORAL at 14:19

## 2024-07-28 NOTE — PLAN OF CARE
Goal Outcome Evaluation:      Pt had fall without injury when he tripped putting on underwear.  Falls precautions now in place.  He continues on acetylcestine drip.  Vital signs stable.

## 2024-07-28 NOTE — PROGRESS NOTES
" LOS: 10 days   Patient Care Team:  Provider, No Known as PCP - General      Subjective    \"Feeling bloated & distended today\"    Interval History:   LABS: Sodium 131, potassium 3.3, creatinine 0.52.  Total bilirubin 20.8 (20.4), alk phos 294 (307),  (176), ALT 63 (62).  INR 2.04 (1.85), WBCs 40.75 (34.4, hemoglobin stable at 8.9, platelets 161.  Acetadote protocol for alcohol hepatitis started yesterday.  Pending RUQ US     ROS:   No chest pain, shortness of breath, or cough.        Medication Review:     Current Facility-Administered Medications:     acetaminophen (TYLENOL) tablet 650 mg, 650 mg, Oral, Q4H PRN **OR** acetaminophen (TYLENOL) 160 MG/5ML oral solution 650 mg, 650 mg, Oral, Q4H PRN **OR** acetaminophen (TYLENOL) suppository 650 mg, 650 mg, Rectal, Q4H PRN, Mari Bustos MD    [COMPLETED] acetylcysteine (ACETADOTE) 8,360 mg in dextrose (D5W) 5 % 200 mL infusion, 150 mg/kg, Intravenous, Once, Last Rate: 241.8 mL/hr at 07/27/24 1441, 8,360 mg at 07/27/24 1441 **FOLLOWED BY** [COMPLETED] acetylcysteine (ACETADOTE) 2,780 mg in dextrose (D5W) 5 % 500 mL infusion, 50 mg/kg, Intravenous, Once, Last Rate: 125 mL/hr at 07/27/24 1630, 2,780 mg at 07/27/24 1630 **FOLLOWED BY** acetylcysteine (ACETADOTE) 6,000 mg in dextrose (D5W) 5 % 1,000 mL infusion, 6.25 mg/kg/hr, Intravenous, Continuous, Kaci Polo APRN, Last Rate: 58 mL/hr at 07/27/24 2109, 6.25 mg/kg/hr at 07/27/24 2109    sennosides-docusate (PERICOLACE) 8.6-50 MG per tablet 2 tablet, 2 tablet, Oral, BID PRN **AND** polyethylene glycol (MIRALAX) packet 17 g, 17 g, Oral, Daily PRN **AND** bisacodyl (DULCOLAX) EC tablet 5 mg, 5 mg, Oral, Daily PRN **AND** bisacodyl (DULCOLAX) suppository 10 mg, 10 mg, Rectal, Daily PRN, Mari Bustos MD    Calcium Replacement - Follow Nurse / BPA Driven Protocol, , Does not apply, PRN, Mari Bustos MD    famotidine (PEPCID) tablet 40 mg, 40 mg, Oral, Nightly, Waleska Chery, APRN, 40 mg at " 07/27/24 2101    Filgrastim (NEUPOGEN) injection 300 mcg, 300 mcg, Subcutaneous, Daily With Lunch, Waleska Chery APRN, 300 mcg at 07/27/24 1327    folic acid (FOLVITE) tablet 1 mg, 1 mg, Oral, Daily, Mari Bustos MD, 1 mg at 07/28/24 0834    guaiFENesin (MUCINEX) 12 hr tablet 600 mg, 600 mg, Oral, Q12H, Hayley Rodriguez DO, 600 mg at 07/28/24 0834    guaiFENesin-codeine (GUAIFENESIN AC) 100-10 MG/5ML liquid 5 mL, 5 mL, Oral, Q4H PRN, Frank Wild MD, 5 mL at 07/24/24 0318    hydrocortisone 1 % cream 1 Application, 1 Application, Topical, Q8H, Hayley Rodriguez DO, 1 Application at 07/28/24 0624    lactulose (CHRONULAC) 10 GM/15ML solution 10 g, 10 g, Oral, Daily, Kaci Polo APRN, 10 g at 07/28/24 0834    Magnesium Standard Dose Replacement - Follow Nurse / BPA Driven Protocol, , Does not apply, NAEEM, Mari Bustos MD    multivitamin with minerals 1 tablet, 1 tablet, Oral, Daily, Mari Bustos MD, 1 tablet at 07/28/24 0834    ondansetron ODT (ZOFRAN-ODT) disintegrating tablet 4 mg, 4 mg, Oral, Q6H PRN **OR** ondansetron (ZOFRAN) injection 4 mg, 4 mg, Intravenous, Q6H PRN, Mari Bustos MD, 4 mg at 07/18/24 1330    pantoprazole (PROTONIX) EC tablet 40 mg, 40 mg, Oral, BID AC, Kaci Polo APRN, 40 mg at 07/28/24 0833    Phosphorus Replacement - Follow Nurse / BPA Driven Protocol, , Does not apply, Juli HARTLEY Abbas Ali, MD    Potassium Replacement - Follow Nurse / BPA Driven Protocol, , Does not apply, Juli HARTLEY Abbas Ali, MD    [Held by provider] sertraline (ZOLOFT) tablet 50 mg, 50 mg, Oral, Daily, Mari Bustos MD, 50 mg at 07/18/24 1258    sodium chloride 0.9 % flush 10 mL, 10 mL, Intravenous, PRN, Mari Bustos MD    sodium chloride 0.9 % flush 10 mL, 10 mL, Intravenous, Q12H, Mari Bustos MD, 10 mL at 07/28/24 0835    sodium chloride 0.9 % flush 10 mL, 10 mL, Intravenous, PRN, Mari Bustos MD    sodium chloride 0.9 % infusion 40 mL, 40 mL,  Intravenous, PRN, Mari Bustos MD    sucralfate (CARAFATE) tablet 1 g, 1 g, Oral, 4x Daily AC & at Bedtime, Kaci Polo APRN, 1 g at 07/28/24 0834    [COMPLETED] thiamine (B-1) injection 200 mg, 200 mg, Intravenous, Q8H, 200 mg at 07/23/24 1437 **FOLLOWED BY** thiamine (VITAMIN B-1) tablet 100 mg, 100 mg, Oral, Daily, Mari Bustos MD, 100 mg at 07/28/24 0833    zinc sulfate (ZINCATE) capsule 220 mg, 220 mg, Oral, Daily, Waleska Chery APRN, 220 mg at 07/28/24 0833      Objective    Rm 264. Resting in bed. NAD.     Vital Signs  Vitals:    07/27/24 2015 07/27/24 2224 07/28/24 0250 07/28/24 0825   BP: 124/73 124/72 125/76 117/76   BP Location: Right arm Right arm Right arm Left arm   Patient Position: Lying Lying Lying Sitting   Pulse: 119 114 120 112   Resp: 16 27 14 18   Temp: 98.1 °F (36.7 °C) 98.1 °F (36.7 °C) 98.1 °F (36.7 °C) 98.8 °F (37.1 °C)   TempSrc: Oral Oral Oral Oral   SpO2: 97% 97% 96% 97%   Weight:       Height:           Physical Exam:    General Appearance:    Awake and alert, in no acute distress   Head:    Normocephalic, without obvious abnormality   Eyes:          Conjunctivae normal, icteric sclera   Throat:   No oral lesions, no thrush, oral mucosa moist   Neck:   No adenopathy, supple, no JVD   Lungs:     respirations regular, even and unlabored   Abdomen:     Soft, non-tender, no rebound or guarding, moderate distended   Rectal:     Deferred   Extremities:   No edema, no cyanosis   Skin:   No bruising or rash,+ jaundice        Results Review:    CBC    Results from last 7 days   Lab Units 07/28/24  0234 07/27/24  0414 07/26/24  0108 07/24/24  2322 07/24/24  0324 07/23/24  0435 07/22/24  0449   WBC 10*3/mm3 40.75* 34.41* 25.79* 16.67* 15.98* 14.99* 15.08*   HEMOGLOBIN g/dL 8.9* 8.9* 8.5* 9.3* 9.2* 8.8* 8.4*   PLATELETS 10*3/mm3 161 172 161 170 162 148 147     CMP   Results from last 7 days   Lab Units 07/28/24  0234 07/27/24  2119 07/27/24  0414 07/26/24  1325 07/26/24  0108  "07/25/24  0953 07/24/24  2322 07/24/24  0324 07/23/24  1638 07/23/24  0435 07/22/24  1515 07/22/24  9789   SODIUM mmol/L 131*  --  135*  --  134*  --  135* 134*  --  134*  --  133*   POTASSIUM mmol/L 3.3* 3.1* 3.4* 4.3 3.4* 4.0 3.4* 4.0  --  4.5   < > 3.6   CHLORIDE mmol/L 97*  --  98  --  98  --  99 101  --  102  --  98   CO2 mmol/L 22.2  --  21.9*  --  25.0  --  25.4 25.0  --  25.9  --  26.9   BUN mg/dL 4*  --  5*  --  6  --  5* 5*  --  5*  --  4*   CREATININE mg/dL 0.52*  --  0.39*  --  0.50*  --  0.51* 0.49*  --  0.55*  --  0.47*   GLUCOSE mg/dL 83  --  111*  --  87  --  115* 93  --  89  --  89   ALBUMIN g/dL 3.0*  --  3.0*  --  2.9*  --  3.3* 3.2*  --  3.2*  --  3.1*   BILIRUBIN mg/dL 20.8*  --  20.4*  --  17.8*  --  18.2* 16.9*  --  16.4*  --  16.0*   ALK PHOS U/L 294*  --  307*  --  255*  --  283* 275*  --  261*  --  258*   AST (SGOT) U/L 163*  --  176*  --  143*  --  141* 126*  --  116*  --  107*   ALT (SGPT) U/L 63*  --  62*  --  49*  --  47* 35  --  29  --  27   MAGNESIUM mg/dL  --   --   --   --   --   --   --   --   --  1.7  --  2.2   PHOSPHORUS mg/dL 2.3*  --  2.5  --  3.0 2.5 2.1* 2.3* 2.7 2.0*  --  2.5    < > = values in this interval not displayed.     Cr Clearance Estimated Creatinine Clearance: 165.1 mL/min (A) (by C-G formula based on SCr of 0.52 mg/dL (L)).  Coag   Results from last 7 days   Lab Units 07/28/24  0234 07/27/24  0414 07/26/24  1325 07/26/24  0938 07/24/24  2322 07/24/24  0324 07/23/24  0435   INR  2.04* 1.85* 1.97* 2.00* 1.98* 1.88* 1.80*     HbA1C No results found for: \"HGBA1C\"      Infection     UA      Microbiology Results (last 10 days)       Procedure Component Value - Date/Time    Legionella Antigen, Urine - Urine, Urine, Clean Catch [[id number]]  (Normal) Collected: 07/18/24 1319    Lab Status: Final result Specimen: Urine, Clean Catch Updated: 07/18/24 1852     LEGIONELLA ANTIGEN, URINE Negative          Imaging Results (Last 72 Hours)       Procedure Component Value Units " Date/Time    CT Cervical Spine Without Contrast [[id number]] Collected: 07/27/24 2331     Updated: 07/27/24 2334    Narrative:        CT CERVICAL SPINE WO CONTRAST    Date of Exam: 7/27/2024 10:57 PM EDT    Indication: S/P fall.    Comparison: None available.    Technique: Axial CT images were obtained of the cervical spine without contrast administration.  Sagittal and coronal reconstructions were performed.  Automated exposure control and iterative reconstruction methods were used.      Findings:  Seven cervical vertebrae are identified. Alignment is anatomic. Disc spaces maintain normal height. Vertebral bodies maintain normal height. There is no evidence of an acute fracture or subluxation. There are no destructive osseous lesions. Paraspinal   musculature is grossly preserved. There is no evidence of cervical lymphadenopathy or a neck mass. The craniocervical junction appears within normal limits. Limited view of the posterior fossa contents is unremarkable. Limited view of the lung apices is   unremarkable.    The uncovertebral and facet joints appear normal. There is no significant neuroforaminal or spinal canal stenosis. No large focal disc protrusion or extrusion.      Impression:      Impression:  No acute osseous abnormality.        Electronically Signed: Juno Ernst MD    7/27/2024 11:32 PM EDT    Workstation ID: [id number]    CT Head Without Contrast [[id number]] Collected: 07/27/24 2330     Updated: 07/27/24 2333    Narrative:      CT HEAD WO CONTRAST    Date of Exam: 7/27/2024 10:57 PM EDT    Indication: S/P fall.    Comparison: 7/19/2024.    Technique: Axial CT images were obtained of the head without contrast administration.  Coronal reconstructions were performed.  Automated exposure control and iterative reconstruction methods were used.      Findings:  Superficial soft tissues appear within normal limits. The calvarium is intact.  Paranasal sinuses and mastoid air cells appear well aerated.   Orbits are unremarkable.  There is no acute intracranial hemorrhage.  No mass effect or midline shift.  No   abnormal extra-axial collections.  Gray-white differentiation is within normal limits. Stable mild patchy white matter hypoattenuation. Ventricular size and configuration is normal for age.      Impression:      Impression:  1.No acute intracranial abnormality.  2.Mild chronic small vessel ischemic change.        Electronically Signed: Juno Ernst MD    7/27/2024 11:31 PM EDT    Workstation ID: [id number]    XR Chest 1 View [[id number]] Collected: 07/27/24 2311     Updated: 07/27/24 2314    Narrative:      XR CHEST 1 VW    Date of Exam: 7/27/2024 10:42 PM EDT    Indication: S/P fall, chest pain    Comparison: 7/24/2024.    Findings:  There is persistent mixed interstitial and airspace disease in the mid and lower lungs. No pneumothorax or pleural effusion. Heart size is unchanged. Pulmonary vasculature is indistinct. No acute osseous abnormality.      Impression:      Impression:  Persistent mixed interstitial and airspace disease in the mid and lower lungs.        Electronically Signed: Juno Ernst MD    7/27/2024 11:12 PM EDT    Workstation ID: [id number]            Assessment & Plan     ASSESSMENT:  -Alcohol hepatitis/cirrhosis   -Elevated LFTs -related to above  -Acute alcohol withdrawal  -Nitrite positive UTI  -Leukocytosis  -Normocytic anemia  -Thrombocytopenia - due to cirrhosis  -Elevated INR - due to cirrhosis  -Hyponatremia related to alcohol abuse  -Dysphagia could be related to esophageal stricture versus reflux esophagitis versus dysmotility  -GERD     PLAN:  Patient is a 29 y/o male with history of ETOH abuse who presented on 7/18 with complaints of ETOH withdrawal. Found to have significantly elevated LFTs on admission with total bilirubin 17.4, alk phos 324, , and ALT 28. CT abd/pelvis W on admission showed fatty liver,  sequelae of portal hypertension noted including distal  paraesophageal  and intra-abdominal varices as well as trace ascites , and patent portal vasculature. He was started on prednisolone on 7/19 due to Maddrey's DF of 33.    Maddrey discriminant function up to 64.  Feeling abdominal distention.  Right upper quadrant ultrasound has been ordered to check for ascites fluid.  May need paracentesis.  Will try diuretics first.  Bilirubin remains elevated at 20.8, alk phos and AST went slightly down.  ALT is about the same.  White blood cell count is up to 40.75 but he is getting Neupogen.    Acetylcysteine protocol for alcohol hepatitis started yesterday.    INR is up to 2.  Consider vitamin K  Prednisolone discontinued 7/25/24 as LFTs continue to worsen despite this medication.  Nonresponder.  Continue to encourage nutrition.  Continue thiamine, zinc, and folic acid.   Lactulose decreased to once a day on 7/27/2024 as he was having bowel movements every 30 minutes.  Will continue to monitor and adjust as needed. He has already had 4 BM this morning. Discussed titration.   Recommend complete ETOH cessation.  Patient is agreeable to inpatient or outpatient treatment.  Full liver serologies unremarkable. AFP normal.   Will need screening EGD at some point as inpatient vs outpatient.   Continue to consider tertiary transfer for transplant evaluation.  Will discuss further with Dr. Boyce.  Patient is aware.  Continue supportive care.      Electronically signed by MARI Horn, 07/28/24, 10:01 AM EDT.

## 2024-07-28 NOTE — PROGRESS NOTES
[facility] MEDICINE SERVICE  DAILY PROGRESS NOTE    NAME: Yonas Charles  : 1995  MRN: [id number]      LOS: 10 days     PROVIDER OF SERVICE: Hayley Rodriguez DO    Chief Complaint: Alcohol withdrawal    Subjective:     Interval History: Patient seen and examined this morning.  States his abdomen is becoming more distended now, will check ultrasound to rule out ascites.  Also having bleeding with hemorrhoids, Anusol suppository added.    Review of Systems:   Pertinent positives as noted in HPI/subjective.  All other systems were reviewed and are negative.      Vital Signs  Temp:  [98 °F (36.7 °C)-98.8 °F (37.1 °C)] 98.8 °F (37.1 °C)  Heart Rate:  [112-120] 112  Resp:  [14-27] 18  BP: (111-129)/(69-76) 117/76   Body mass index is 20.43 kg/m².    Physical exam:     General: Awake, alert, young male, NAD  Eyes: PERRL, EOMI, sclera jaundiced  Cardiovascular: Regular rate and rhythm, no murmurs  Respiratory: Clear to auscultation bilaterally, no wheezing or rales, unlabored breathing  Abdomen: Soft, distended, nontender, positive bowel sounds, no guarding  Neurologic: A&O, CN grossly intact, moves all extremities spontaneously  Musculoskeletal: Normal range of motion, no other gross deformities  Skin: Warm, jaundiced        Scheduled Meds   famotidine, 40 mg, Oral, Nightly  filgrastim (NEUPOGEN) injection, 300 mcg, Subcutaneous, Daily With Lunch  folic acid, 1 mg, Oral, Daily  guaiFENesin, 600 mg, Oral, Q12H  hydrocortisone, 1 Application, Topical, Q8H  lactulose, 10 g, Oral, Daily  multivitamin with minerals, 1 tablet, Oral, Daily  pantoprazole, 40 mg, Oral, BID AC  [Held by provider] sertraline, 50 mg, Oral, Daily  sodium chloride, 10 mL, Intravenous, Q12H  sucralfate, 1 g, Oral, 4x Daily AC & at Bedtime  thiamine, 100 mg, Oral, Daily  zinc sulfate, 220 mg, Oral, Daily       PRN Meds     acetaminophen **OR** acetaminophen **OR** acetaminophen    senna-docusate sodium **AND** polyethylene glycol **AND**  bisacodyl **AND** bisacodyl    Calcium Replacement - Follow Nurse / BPA Driven Protocol    guaiFENesin-codeine    Magnesium Standard Dose Replacement - Follow Nurse / BPA Driven Protocol    ondansetron ODT **OR** ondansetron    Phosphorus Replacement - Follow Nurse / BPA Driven Protocol    Potassium Replacement - Follow Nurse / BPA Driven Protocol    sodium chloride    sodium chloride    sodium chloride   Infusions  acetylcysteine (ACETADOTE) 6000 mg in D5W infusion (NON-APAP LIVER FAILURE) Third Dose, 6.25 mg/kg/hr, Last Rate: 6.25 mg/kg/hr (07/27/24 2109)            Diagnostic Data    Results from last 7 days   Lab Units 07/28/24  0234   WBC 10*3/mm3 40.75*   HEMOGLOBIN g/dL 8.9*   HEMATOCRIT % 24.6*   PLATELETS 10*3/mm3 161   GLUCOSE mg/dL 83   CREATININE mg/dL 0.52*   BUN mg/dL 4*   SODIUM mmol/L 131*   POTASSIUM mmol/L 3.3*   AST (SGOT) U/L 163*   ALT (SGPT) U/L 63*   ALK PHOS U/L 294*   BILIRUBIN mg/dL 20.8*   ANION GAP mmol/L 11.8       CT Cervical Spine Without Contrast    Result Date: 7/27/2024  Impression: No acute osseous abnormality. Electronically Signed: Juno Ernst MD  7/27/2024 11:32 PM EDT  Workstation ID: [id number]    CT Head Without Contrast    Result Date: 7/27/2024  Impression: 1.No acute intracranial abnormality. 2.Mild chronic small vessel ischemic change. Electronically Signed: Juno Ernst MD  7/27/2024 11:31 PM EDT  Workstation ID: [id number]    XR Chest 1 View    Result Date: 7/27/2024  Impression: Persistent mixed interstitial and airspace disease in the mid and lower lungs. Electronically Signed: Juno Ernst MD  7/27/2024 11:12 PM EDT  Workstation ID: [id number]       I reviewed the patient's new clinical results.    Assessment/Plan:     Active and Resolved Problems  Active Hospital Problems    Diagnosis  POA    **Alcohol withdrawal [F10.939]  Yes      Resolved Hospital Problems   No resolved problems to display.       Acute alcoholic hepatitis with alcoholic cirrhosis  -Secondary to  excessive alcohol use  -Initiated on prednisolone given elevated discriminant function score on admission  -T. bili and LFTs continues to rise, prednisolone discontinued per GI and Neupogen started on 7/25  -Monitor LFTs  -Withdrawal symptoms improved and stable now  -Guarded prognosis overall, GI following and may need transfer to tertiary care center for transplant evaluation if does not improve on Neupogen  -Abdomen becoming more distended, order abdominal ultrasound for ascites, consider paracentesis tomorrow if significant fluid noted    Leukocytosis  -Reactive from Neupogen  -Remains afebrile, no signs of any acute infection at this time  -Continue to monitor    Coagulopathy  -Related to liver disease as above  -Monitor INR  -vitamin K if needed    Hyponatremia 2/2 beer potomania  -Improved sodium level with fluid restriction  -Off IV fluids  -Nephrology following    Acute hypokalemia, hypomagnesemia, hypocalcemia  -Secondary to malnutrition in the setting of excessive alcohol use  -Replete as needed and monitor     VTE Prophylaxis:  Mechanical VTE prophylaxis orders are present.       Code status is   Code Status and Medical Interventions:   Ordered at: 07/18/24 1214     Code Status (Patient has no pulse and is not breathing):    CPR (Attempt to Resuscitate)     Medical Interventions (Patient has pulse or is breathing):    Full Support       Disposition: May need to transfer to tertiary care center for further management.    Signature: Electronically signed by Hayley Rodriguez DO, 07/28/24, 09:50 EDT.  [facility] Hospitalist Team    Part of this note may be an electronic transcription/translation of spoken language to printed text using the Dragon Dictation System.

## 2024-07-28 NOTE — PROGRESS NOTES
"NEPHROLOGY PROGRESS NOTE------[facility]    [facility]  [phone]  Quang Butts MD      Patient Care Team:  Provider, No Known as PCP - General      Provider:  Quang Butts MD  Patient Name: Yonas Charles  :  1995    SUBJECTIVE:    F/U ELECTROLYTE ABNORMALITIES    No chest pain or SOA  Feels abdomen more distended    Medication:  famotidine, 40 mg, Oral, Nightly  filgrastim (NEUPOGEN) injection, 300 mcg, Subcutaneous, Daily With Lunch  folic acid, 1 mg, Oral, Daily  guaiFENesin, 600 mg, Oral, Q12H  hydrocortisone, 1 Application, Topical, Q8H  lactulose, 10 g, Oral, Daily  multivitamin with minerals, 1 tablet, Oral, Daily  pantoprazole, 40 mg, Oral, BID AC  [Held by provider] sertraline, 50 mg, Oral, Daily  sodium chloride, 10 mL, Intravenous, Q12H  sucralfate, 1 g, Oral, 4x Daily AC & at Bedtime  thiamine, 100 mg, Oral, Daily  zinc sulfate, 220 mg, Oral, Daily      acetylcysteine (ACETADOTE) 6000 mg in D5W infusion (NON-APAP LIVER FAILURE) Third Dose, 6.25 mg/kg/hr, Last Rate: 6.25 mg/kg/hr (24)            OBJECTIVE    Vital Sign Min/Max for last 24 hours  Temp  Min: 98 °F (36.7 °C)  Max: 98.8 °F (37.1 °C)   BP  Min: 111/69  Max: 129/75   Pulse  Min: 108  Max: 120   Resp  Min: 14  Max: 27   SpO2  Min: 93 %  Max: 97 %   No data recorded   No data recorded     Flowsheet Rows      Flowsheet Row First Filed Value   Admission Height 165.1 cm (65\") Documented at 2024 0855   Admission Weight 55.7 kg (122 lb 12.7 oz) Documented at 2024 0855            No intake/output data recorded.  I/O last 3 completed shifts:  In: 960 [P.O.:960]  Out: -     Physical Exam:  General Appearance: NAD.   Head: normocephalic, without obvious abnormality and atraumatic    Eyes: conjunctivae and +ICTERIC SCLERAE  Neck: supple and no JVD  Lungs: CTA bilaterally  Heart: regular rhythm & normal rate and normal S1, S2   Chest Wall: no abnormalities " "observed  Abdomen: normal bowel sounds.  Distended  Extremities: moves extremities well, trace edema, no cyanosis  Skin: +JAUNDICE  Neurologic: A AND O X 4 WITHOUT FOCAL DEFICIT    Labs:    WBC WBC   Date Value Ref Range Status   07/28/2024 40.75 (C) 3.40 - 10.80 10*3/mm3 Final   07/27/2024 34.41 (C) 3.40 - 10.80 10*3/mm3 Final   07/26/2024 25.79 (H) 3.40 - 10.80 10*3/mm3 Final      HGB Hemoglobin   Date Value Ref Range Status   07/28/2024 8.9 (L) 13.0 - 17.7 g/dL Final   07/27/2024 8.9 (L) 13.0 - 17.7 g/dL Final   07/26/2024 8.5 (L) 13.0 - 17.7 g/dL Final      HCT Hematocrit   Date Value Ref Range Status   07/28/2024 24.6 (L) 37.5 - 51.0 % Final   07/27/2024 24.9 (L) 37.5 - 51.0 % Final   07/26/2024 24.1 (L) 37.5 - 51.0 % Final      Platelets No results found for: \"LABPLAT\"   MCV MCV   Date Value Ref Range Status   07/28/2024 95.0 79.0 - 97.0 fL Final   07/27/2024 97.3 (H) 79.0 - 97.0 fL Final   07/26/2024 94.9 79.0 - 97.0 fL Final          Sodium Sodium   Date Value Ref Range Status   07/28/2024 131 (L) 136 - 145 mmol/L Final   07/27/2024 135 (L) 136 - 145 mmol/L Final   07/26/2024 134 (L) 136 - 145 mmol/L Final      Potassium Potassium   Date Value Ref Range Status   07/28/2024 3.3 (L) 3.5 - 5.2 mmol/L Final   07/27/2024 3.1 (L) 3.5 - 5.2 mmol/L Final   07/27/2024 3.4 (L) 3.5 - 5.2 mmol/L Final     Comment:     Slight hemolysis detected by analyzer. Result may be falsely elevated.   07/26/2024 4.3 3.5 - 5.2 mmol/L Final   07/26/2024 3.4 (L) 3.5 - 5.2 mmol/L Final      Chloride Chloride   Date Value Ref Range Status   07/28/2024 97 (L) 98 - 107 mmol/L Final   07/27/2024 98 98 - 107 mmol/L Final   07/26/2024 98 98 - 107 mmol/L Final      CO2 CO2   Date Value Ref Range Status   07/28/2024 22.2 22.0 - 29.0 mmol/L Final   07/27/2024 21.9 (L) 22.0 - 29.0 mmol/L Final   07/26/2024 25.0 22.0 - 29.0 mmol/L Final      BUN BUN   Date Value Ref Range Status   07/28/2024 4 (L) 6 - 20 mg/dL Final   07/27/2024 5 (L) 6 - 20 " "mg/dL Final   07/26/2024 6 6 - 20 mg/dL Final      Creatinine Creatinine   Date Value Ref Range Status   07/28/2024 0.52 (L) 0.76 - 1.27 mg/dL Final   07/27/2024 0.39 (L) 0.76 - 1.27 mg/dL Final   07/26/2024 0.50 (L) 0.76 - 1.27 mg/dL Final      Calcium Calcium   Date Value Ref Range Status   07/28/2024 8.3 (L) 8.6 - 10.5 mg/dL Final   07/27/2024 8.5 (L) 8.6 - 10.5 mg/dL Final   07/26/2024 8.4 (L) 8.6 - 10.5 mg/dL Final      PO4 No components found for: \"PO4\"   Albumin Albumin   Date Value Ref Range Status   07/28/2024 3.0 (L) 3.5 - 5.2 g/dL Final   07/27/2024 3.0 (L) 3.5 - 5.2 g/dL Final   07/26/2024 2.9 (L) 3.5 - 5.2 g/dL Final      Magnesium No results found for: \"MG\"     Uric Acid No components found for: \"URIC ACID\"     Imaging Results (Last 72 Hours)       Procedure Component Value Units Date/Time    US Abdomen Limited [[id number]] Resulted: 07/28/24 1050     Updated: 07/28/24 1050    CT Cervical Spine Without Contrast [[id number]] Collected: 07/27/24 2331     Updated: 07/27/24 2334    Narrative:        CT CERVICAL SPINE WO CONTRAST    Date of Exam: 7/27/2024 10:57 PM EDT    Indication: S/P fall.    Comparison: None available.    Technique: Axial CT images were obtained of the cervical spine without contrast administration.  Sagittal and coronal reconstructions were performed.  Automated exposure control and iterative reconstruction methods were used.      Findings:  Seven cervical vertebrae are identified. Alignment is anatomic. Disc spaces maintain normal height. Vertebral bodies maintain normal height. There is no evidence of an acute fracture or subluxation. There are no destructive osseous lesions. Paraspinal   musculature is grossly preserved. There is no evidence of cervical lymphadenopathy or a neck mass. The craniocervical junction appears within normal limits. Limited view of the posterior fossa contents is unremarkable. Limited view of the lung apices is   unremarkable.    The uncovertebral and facet " joints appear normal. There is no significant neuroforaminal or spinal canal stenosis. No large focal disc protrusion or extrusion.      Impression:      Impression:  No acute osseous abnormality.        Electronically Signed: Juno Ernst MD    7/27/2024 11:32 PM EDT    Workstation ID: [id number]    CT Head Without Contrast [[id number]] Collected: 07/27/24 2330     Updated: 07/27/24 2333    Narrative:      CT HEAD WO CONTRAST    Date of Exam: 7/27/2024 10:57 PM EDT    Indication: S/P fall.    Comparison: 7/19/2024.    Technique: Axial CT images were obtained of the head without contrast administration.  Coronal reconstructions were performed.  Automated exposure control and iterative reconstruction methods were used.      Findings:  Superficial soft tissues appear within normal limits. The calvarium is intact.  Paranasal sinuses and mastoid air cells appear well aerated.  Orbits are unremarkable.  There is no acute intracranial hemorrhage.  No mass effect or midline shift.  No   abnormal extra-axial collections.  Gray-white differentiation is within normal limits. Stable mild patchy white matter hypoattenuation. Ventricular size and configuration is normal for age.      Impression:      Impression:  1.No acute intracranial abnormality.  2.Mild chronic small vessel ischemic change.        Electronically Signed: Juno Ernst MD    7/27/2024 11:31 PM EDT    Workstation ID: [id number]    XR Chest 1 View [[id number]] Collected: 07/27/24 2311     Updated: 07/27/24 2314    Narrative:      XR CHEST 1 VW    Date of Exam: 7/27/2024 10:42 PM EDT    Indication: S/P fall, chest pain    Comparison: 7/24/2024.    Findings:  There is persistent mixed interstitial and airspace disease in the mid and lower lungs. No pneumothorax or pleural effusion. Heart size is unchanged. Pulmonary vasculature is indistinct. No acute osseous abnormality.      Impression:      Impression:  Persistent mixed interstitial and airspace disease in the mid  and lower lungs.        Electronically Signed: Juno Ernst MD    7/27/2024 11:12 PM EDT    Workstation ID: [id number]            Results for orders placed during the hospital encounter of 07/18/24    XR Chest 1 View    Narrative  XR CHEST 1 VW    Date of Exam: 7/27/2024 10:42 PM EDT    Indication: S/P fall, chest pain    Comparison: 7/24/2024.    Findings:  There is persistent mixed interstitial and airspace disease in the mid and lower lungs. No pneumothorax or pleural effusion. Heart size is unchanged. Pulmonary vasculature is indistinct. No acute osseous abnormality.    Impression  Impression:  Persistent mixed interstitial and airspace disease in the mid and lower lungs.        Electronically Signed: Juno Ernst MD  7/27/2024 11:12 PM EDT  Workstation ID: [id number]      XR Chest 1 View    Narrative  XR CHEST 1 VW    Date of Exam: 7/24/2024 1:25 PM EDT    Indication: coughing, SOB    Comparison: 7/18/2024    Findings:  There are lower lung volumes. Cardiac size appears increased. There is airspace disease in both lung bases with small bilateral pleural effusions. Pulmonary vascular markings have increased compared with the last study.    Impression  Impression:    1. Increased cardiac size with evidence of mild passive congestion.  2. Bibasilar airspace disease with bilateral pleural effusions.      Electronically Signed: Vitor Chin MD  7/24/2024 1:31 PM EDT  Workstation ID: [id number]      XR Chest PA & Lateral    Narrative  DATE OF EXAM:  7/18/2024 12:36 PM    PROCEDURE:  XR CHEST PA AND LATERAL-    INDICATIONS:  rule out aspiration pneumonia; E87.6-Hypokalemia; F10.939-Alcohol use,  unspecified with withdrawal, unspecified; R17-Unspecified jaundice    COMPARISON:  No Comparisons Available    TECHNIQUE:  Two radiologic views of the chest , PA and lateral were obtained.    FINDINGS:  Heart size normal. Negative for lobar consolidation, edema, effusion or  pneumothorax. Osseous structures  unremarkable.    Impression  No active pulmonary process.      Electronically Signed By-Armani Small MD On:7/18/2024 1:00 PM            ASSESSMENT / PLAN      Alcohol withdrawal    HYPONATREMIA------stable. Likely related to underlying liver disease. Fluid restrict     2. HYPOKALEMIA-------Resolved.       3. HYPOMAGNESEMIA-------Replaced     4. HYPOCALCEMIA------Replace IV     5. ALCOHOL ABUSE/CIRRHOSIS/JAUNDICE------Thiamine, Folate, IVFs, prn IV Ativan. Withdrawal prcautions     6. MIXED METABOLIC ALKALOSIS/METABOLIC ACIDOSIS-----Better     7. ANEMIA------Normocytic with normal RDW. Follow for PRBC need     8. DEPRESSION------Suicide precautions. Hold Zoloft given severe hyponatremia     9. HYPOALBUMINEMIA-----IV Albumin to temporize and po supplements     10. ELEVATED INR------Secondary to liver disease     11. THROMBOCYTOPENIA------No heparin. Secondary to liver disease     12. PUD PROPHYLAXIS-----IV PPI     13. DVT PROPHYLAXIS-----SCDs    14. HYPOPHOSPHATEMIA------Replaced    Sodium back down  Has some excess volume  Add Lasix 40 mg p.o. daily  Replace potassium  Keep on fluid restriction  Labs in am        Quang Butts MD  [facility]  [phone]  07/28/24  10:53 EDT

## 2024-07-29 ENCOUNTER — INPATIENT HOSPITAL (OUTPATIENT)
Dept: URBAN - METROPOLITAN AREA HOSPITAL 84 | Facility: HOSPITAL | Age: 29
End: 2024-07-29
Payer: MEDICAID

## 2024-07-29 DIAGNOSIS — D72.829 ELEVATED WHITE BLOOD CELL COUNT, UNSPECIFIED: ICD-10-CM

## 2024-07-29 DIAGNOSIS — R79.1 ABNORMAL COAGULATION PROFILE: ICD-10-CM

## 2024-07-29 DIAGNOSIS — K70.10 ALCOHOLIC HEPATITIS WITHOUT ASCITES: ICD-10-CM

## 2024-07-29 DIAGNOSIS — K70.30 ALCOHOLIC CIRRHOSIS OF LIVER WITHOUT ASCITES: ICD-10-CM

## 2024-07-29 LAB
ALBUMIN SERPL-MCNC: 2.9 G/DL (ref 3.5–5.2)
ALBUMIN SERPL-MCNC: 3 G/DL (ref 3.5–5.2)
ALP SERPL-CCNC: 346 U/L (ref 39–117)
ALT SERPL W P-5'-P-CCNC: 66 U/L (ref 1–41)
ANION GAP SERPL CALCULATED.3IONS-SCNC: 12.8 MMOL/L (ref 5–15)
ANISOCYTOSIS BLD QL: ABNORMAL
AST SERPL-CCNC: 159 U/L (ref 1–40)
BILIRUB CONJ SERPL-MCNC: 15.6 MG/DL (ref 0–0.3)
BILIRUB INDIRECT SERPL-MCNC: 5.8 MG/DL
BILIRUB SERPL-MCNC: 21.4 MG/DL (ref 0–1.2)
BUN SERPL-MCNC: 3 MG/DL (ref 6–20)
BUN/CREAT SERPL: 6.4 (ref 7–25)
CALCIUM SPEC-SCNC: 8 MG/DL (ref 8.6–10.5)
CHLORIDE SERPL-SCNC: 96 MMOL/L (ref 98–107)
CO2 SERPL-SCNC: 22.2 MMOL/L (ref 22–29)
CREAT SERPL-MCNC: 0.47 MG/DL (ref 0.76–1.27)
DEPRECATED RDW RBC AUTO: 65.5 FL (ref 37–54)
EGFRCR SERPLBLD CKD-EPI 2021: 144.3 ML/MIN/1.73
ERYTHROCYTE [DISTWIDTH] IN BLOOD BY AUTOMATED COUNT: 19.9 % (ref 12.3–15.4)
GLUCOSE SERPL-MCNC: 98 MG/DL (ref 65–99)
HCT VFR BLD AUTO: 25.2 % (ref 37.5–51)
HGB BLD-MCNC: 9.2 G/DL (ref 13–17.7)
INR PPP: 2.25 (ref 0.93–1.1)
LYMPHOCYTES # BLD MANUAL: 3.94 10*3/MM3 (ref 0.7–3.1)
LYMPHOCYTES NFR BLD MANUAL: 7 % (ref 5–12)
MAGNESIUM SERPL-MCNC: 1.2 MG/DL (ref 1.6–2.6)
MCH RBC QN AUTO: 34.6 PG (ref 26.6–33)
MCHC RBC AUTO-ENTMCNC: 36.5 G/DL (ref 31.5–35.7)
MCV RBC AUTO: 94.7 FL (ref 79–97)
METAMYELOCYTES NFR BLD MANUAL: 2 % (ref 0–0)
MONOCYTES # BLD: 2.75 10*3/MM3 (ref 0.1–0.9)
MYELOCYTES NFR BLD MANUAL: 2 % (ref 0–0)
NEUTROPHILS # BLD AUTO: 31.09 10*3/MM3 (ref 1.7–7)
NEUTROPHILS NFR BLD MANUAL: 73 % (ref 42.7–76)
NEUTS BAND NFR BLD MANUAL: 6 % (ref 0–5)
NRBC SPEC MANUAL: 1 /100 WBC (ref 0–0.2)
PHOSPHATE SERPL-MCNC: 2.3 MG/DL (ref 2.5–4.5)
PLATELET # BLD AUTO: 159 10*3/MM3 (ref 140–450)
PMV BLD AUTO: 10.6 FL (ref 6–12)
POTASSIUM SERPL-SCNC: 3 MMOL/L (ref 3.5–5.2)
POTASSIUM SERPL-SCNC: 3 MMOL/L (ref 3.5–5.2)
PROT SERPL-MCNC: 5.6 G/DL (ref 6–8.5)
PROTHROMBIN TIME: 23.1 SECONDS (ref 9.6–11.7)
RBC # BLD AUTO: 2.66 10*6/MM3 (ref 4.14–5.8)
SCAN SLIDE: NORMAL
SMALL PLATELETS BLD QL SMEAR: ADEQUATE
SODIUM SERPL-SCNC: 131 MMOL/L (ref 136–145)
TARGETS BLD QL SMEAR: ABNORMAL
VARIANT LYMPHS NFR BLD MANUAL: 10 % (ref 19.6–45.3)
WBC MORPH BLD: NORMAL
WBC NRBC COR # BLD AUTO: 39.35 10*3/MM3 (ref 3.4–10.8)

## 2024-07-29 PROCEDURE — 25010000002 PHYTONADIONE 10 MG/ML SOLUTION 1 ML AMPULE: Performed by: INTERNAL MEDICINE

## 2024-07-29 PROCEDURE — 84100 ASSAY OF PHOSPHORUS: CPT | Performed by: INTERNAL MEDICINE

## 2024-07-29 PROCEDURE — 83735 ASSAY OF MAGNESIUM: CPT | Performed by: INTERNAL MEDICINE

## 2024-07-29 PROCEDURE — 25010000002 FILGRASTIM 300 MCG/0.5ML SOLUTION PREFILLED SYRINGE: Performed by: NURSE PRACTITIONER

## 2024-07-29 PROCEDURE — 85007 BL SMEAR W/DIFF WBC COUNT: CPT | Performed by: INTERNAL MEDICINE

## 2024-07-29 PROCEDURE — 80053 COMPREHEN METABOLIC PANEL: CPT | Performed by: INTERNAL MEDICINE

## 2024-07-29 PROCEDURE — 25010000002 MAGNESIUM SULFATE 2 GM/50ML SOLUTION: Performed by: INTERNAL MEDICINE

## 2024-07-29 PROCEDURE — 99233 SBSQ HOSP IP/OBS HIGH 50: CPT | Performed by: NURSE PRACTITIONER

## 2024-07-29 PROCEDURE — 84132 ASSAY OF SERUM POTASSIUM: CPT | Performed by: INTERNAL MEDICINE

## 2024-07-29 PROCEDURE — 85025 COMPLETE CBC W/AUTO DIFF WBC: CPT | Performed by: INTERNAL MEDICINE

## 2024-07-29 PROCEDURE — 0 DEXTROSE 5 % SOLUTION 1,000 ML FLEX CONT: Performed by: NURSE PRACTITIONER

## 2024-07-29 PROCEDURE — 25010000002 ACETYLCYSTEINE PER 100 MG: Performed by: NURSE PRACTITIONER

## 2024-07-29 PROCEDURE — 82248 BILIRUBIN DIRECT: CPT | Performed by: INTERNAL MEDICINE

## 2024-07-29 PROCEDURE — 85610 PROTHROMBIN TIME: CPT | Performed by: INTERNAL MEDICINE

## 2024-07-29 RX ORDER — POTASSIUM CHLORIDE 20 MEQ/1
40 TABLET, EXTENDED RELEASE ORAL EVERY 4 HOURS
Status: COMPLETED | OUTPATIENT
Start: 2024-07-29 | End: 2024-07-30

## 2024-07-29 RX ORDER — MAGNESIUM SULFATE HEPTAHYDRATE 40 MG/ML
2 INJECTION, SOLUTION INTRAVENOUS
Status: COMPLETED | OUTPATIENT
Start: 2024-07-29 | End: 2024-07-29

## 2024-07-29 RX ORDER — POTASSIUM CHLORIDE 20 MEQ/1
40 TABLET, EXTENDED RELEASE ORAL EVERY 4 HOURS
Status: COMPLETED | OUTPATIENT
Start: 2024-07-29 | End: 2024-07-29

## 2024-07-29 RX ADMIN — FUROSEMIDE 40 MG: 40 TABLET ORAL at 08:27

## 2024-07-29 RX ADMIN — Medication 10 ML: at 21:22

## 2024-07-29 RX ADMIN — MAGNESIUM SULFATE HEPTAHYDRATE 2 G: 40 INJECTION, SOLUTION INTRAVENOUS at 10:47

## 2024-07-29 RX ADMIN — SUCRALFATE 1 G: 1 TABLET ORAL at 13:02

## 2024-07-29 RX ADMIN — POTASSIUM CHLORIDE 40 MEQ: 1500 TABLET, EXTENDED RELEASE ORAL at 05:52

## 2024-07-29 RX ADMIN — SUCRALFATE 1 G: 1 TABLET ORAL at 08:27

## 2024-07-29 RX ADMIN — Medication 1 TABLET: at 08:27

## 2024-07-29 RX ADMIN — Medication 10 ML: at 08:32

## 2024-07-29 RX ADMIN — FILGRASTIM 300 MCG: 300 INJECTION, SOLUTION INTRAVENOUS; SUBCUTANEOUS at 13:02

## 2024-07-29 RX ADMIN — MAGNESIUM SULFATE HEPTAHYDRATE 2 G: 40 INJECTION, SOLUTION INTRAVENOUS at 13:02

## 2024-07-29 RX ADMIN — Medication 220 MG: at 08:27

## 2024-07-29 RX ADMIN — MAGNESIUM SULFATE HEPTAHYDRATE 2 G: 40 INJECTION, SOLUTION INTRAVENOUS at 16:06

## 2024-07-29 RX ADMIN — FAMOTIDINE 40 MG: 20 TABLET, FILM COATED ORAL at 21:22

## 2024-07-29 RX ADMIN — HYDROCORTISONE 1 APPLICATION: 1 CREAM TOPICAL at 06:04

## 2024-07-29 RX ADMIN — POTASSIUM CHLORIDE 40 MEQ: 1500 TABLET, EXTENDED RELEASE ORAL at 21:22

## 2024-07-29 RX ADMIN — FOLIC ACID 1 MG: 1 TABLET ORAL at 08:27

## 2024-07-29 RX ADMIN — PANTOPRAZOLE SODIUM 40 MG: 40 TABLET, DELAYED RELEASE ORAL at 08:27

## 2024-07-29 RX ADMIN — SUCRALFATE 1 G: 1 TABLET ORAL at 21:22

## 2024-07-29 RX ADMIN — GUAIFENESIN 600 MG: 600 TABLET, EXTENDED RELEASE ORAL at 21:22

## 2024-07-29 RX ADMIN — ACETYLCYSTEINE 6.25 MG/KG/HR: 200 INJECTION, SOLUTION INTRAVENOUS at 10:47

## 2024-07-29 RX ADMIN — SUCRALFATE 1 G: 1 TABLET ORAL at 16:07

## 2024-07-29 RX ADMIN — POTASSIUM CHLORIDE 40 MEQ: 1500 TABLET, EXTENDED RELEASE ORAL at 16:06

## 2024-07-29 RX ADMIN — Medication 100 MG: at 08:27

## 2024-07-29 RX ADMIN — PANTOPRAZOLE SODIUM 40 MG: 40 TABLET, DELAYED RELEASE ORAL at 16:06

## 2024-07-29 RX ADMIN — POTASSIUM CHLORIDE 40 MEQ: 1500 TABLET, EXTENDED RELEASE ORAL at 08:27

## 2024-07-29 RX ADMIN — POTASSIUM CHLORIDE 40 MEQ: 1500 TABLET, EXTENDED RELEASE ORAL at 02:29

## 2024-07-29 RX ADMIN — POTASSIUM CHLORIDE 40 MEQ: 1500 TABLET, EXTENDED RELEASE ORAL at 13:02

## 2024-07-29 RX ADMIN — PHYTONADIONE 2.5 MG: 10 INJECTION, EMULSION INTRAMUSCULAR; INTRAVENOUS; SUBCUTANEOUS at 23:56

## 2024-07-29 RX ADMIN — GUAIFENESIN 600 MG: 600 TABLET, EXTENDED RELEASE ORAL at 08:26

## 2024-07-29 NOTE — PLAN OF CARE
Problem: Adult Inpatient Plan of Care  Goal: Plan of Care Review  Outcome: Ongoing, Progressing  Goal: Patient-Specific Goal (Individualized)  Outcome: Ongoing, Progressing  Goal: Absence of Hospital-Acquired Illness or Injury  Outcome: Ongoing, Progressing  Intervention: Identify and Manage Fall Risk  Description: Perform standard risk assessment on admission using a validated tool or comprehensive approach appropriate to the patient; reassess fall risk frequently, with change in status or transfer to another level of care.  Communicate fall injury risk to interprofessional healthcare team.  Determine need for increased observation, equipment and environmental modification, such as low bed, signage and supportive, nonskid footwear.  Adjust safety measures to individual developmental age, stage and identified risk factors.  Reinforce the importance of safety and physical activity with patient and family.  Perform regular intentional rounding to assess need for position change, pain assessment and personal needs, including assistance with toileting.  Recent Flowsheet Documentation  Taken 7/29/2024 0400 by Carlos Garcia LPN  Safety Promotion/Fall Prevention:   safety round/check completed   room organization consistent   muscle strengthening facilitated   nonskid shoes/slippers when out of bed   lighting adjusted   mobility aid in reach   fall prevention program maintained   clutter free environment maintained   assistive device/personal items within reach   activity supervised  Taken 7/29/2024 0200 by Carlos Garcia LPN  Safety Promotion/Fall Prevention:   safety round/check completed   room organization consistent   nonskid shoes/slippers when out of bed   muscle strengthening facilitated   mobility aid in reach   lighting adjusted   fall prevention program maintained   clutter free environment maintained   assistive device/personal items within reach   activity supervised  Taken 7/29/2024 0000 by  Carlos Garcia LPN  Safety Promotion/Fall Prevention:   safety round/check completed   room organization consistent   patient off unit   nonskid shoes/slippers when out of bed   muscle strengthening facilitated   mobility aid in reach   lighting adjusted   fall prevention program maintained   assistive device/personal items within reach   clutter free environment maintained   activity supervised  Taken 7/28/2024 2200 by Carlos Garcia LPN  Safety Promotion/Fall Prevention:   safety round/check completed   room organization consistent   nonskid shoes/slippers when out of bed   muscle strengthening facilitated   mobility aid in reach   lighting adjusted   fall prevention program maintained   elopement precautions   clutter free environment maintained   assistive device/personal items within reach   activity supervised  Taken 7/28/2024 2007 by Carlos Garcia LPN  Safety Promotion/Fall Prevention:   safety round/check completed   room organization consistent   nonskid shoes/slippers when out of bed   muscle strengthening facilitated   mobility aid in reach   fall prevention program maintained   clutter free environment maintained   assistive device/personal items within reach   activity supervised  Intervention: Prevent Skin Injury  Description: Perform a screening for skin injury risk, such as pressure or moisture associated skin damage on admission and at regular intervals throughout hospital stay.  Keep all areas of skin (especially folds) clean and dry.  Maintain adequate skin hydration.  Relieve and redistribute pressure and protect bony prominences; implement measures based on patient-specific risk factors.  Match turning and repositioning schedule to clinical condition.  Encourage weight shift frequently; assist with reposition if unable to complete independently.  Float heels off bed; avoid pressure on the Achilles tendon.  Keep skin free from extended contact with medical devices.  Encourage  functional activity and mobility, as early as tolerated.  Use aids (e.g., slide boards, mechanical lift) during transfer.  Recent Flowsheet Documentation  Taken 7/29/2024 0400 by Carlos Garcia LPN  Body Position: position changed independently  Taken 7/29/2024 0200 by Carlos Garcia LPN  Body Position: position changed independently  Taken 7/29/2024 0000 by Carlos Garcia LPN  Body Position: position changed independently  Taken 7/28/2024 2200 by Carlos Garcia LPN  Body Position: position changed independently  Taken 7/28/2024 2007 by Carlos Garcia LPN  Body Position: position changed independently  Skin Protection: adhesive use limited  Intervention: Prevent and Manage VTE (Venous Thromboembolism) Risk  Description: Assess for VTE (venous thromboembolism) risk.  Encourage and assist with early ambulation.  Initiate and maintain compression or other therapy, as indicated, based on identified risk in accordance with organizational protocol and provider order.  Encourage both active and passive leg exercises while in bed, if unable to ambulate.  Recent Flowsheet Documentation  Taken 7/29/2024 0400 by Carlos Garcia LPN  Activity Management: activity encouraged  Taken 7/29/2024 0200 by Carlos Garcia LPN  Activity Management:   activity encouraged   ambulated to bathroom  Taken 7/29/2024 0000 by Carlos Garcia LPN  Activity Management:   activity encouraged   ambulated to bathroom  Taken 7/28/2024 2200 by Carlos Garcia LPN  Activity Management:   activity encouraged   ambulated to bathroom  Taken 7/28/2024 2007 by Carlos Garcia LPN  Activity Management: activity encouraged  VTE Prevention/Management:   sequential compression devices off   patient refused intervention  Intervention: Prevent Infection  Description: Maintain skin and mucous membrane integrity; promote hand, oral and pulmonary hygiene.  Optimize fluid balance, nutrition, sleep and glycemic  control to maximize infection resistance.  Identify potential sources of infection early to prevent or mitigate progression of infection (e.g., wound, lines, devices).  Evaluate ongoing need for invasive devices; remove promptly when no longer indicated.  Recent Flowsheet Documentation  Taken 7/29/2024 0400 by Carlos Garcia LPN  Infection Prevention:   visitors restricted/screened   single patient room provided  Taken 7/29/2024 0200 by Carlos Garcia LPN  Infection Prevention:   visitors restricted/screened   single patient room provided   rest/sleep promoted   personal protective equipment utilized   hand hygiene promoted  Taken 7/29/2024 0000 by Carlos Garcia LPN  Infection Prevention:   visitors restricted/screened   rest/sleep promoted   single patient room provided   personal protective equipment utilized   hand hygiene promoted  Taken 7/28/2024 2200 by Carlos Garcia LPN  Infection Prevention:   visitors restricted/screened   single patient room provided   rest/sleep promoted   personal protective equipment utilized   hand hygiene promoted  Taken 7/28/2024 2007 by Carlos Garcia LPN  Infection Prevention:   visitors restricted/screened   single patient room provided   rest/sleep promoted   personal protective equipment utilized   hand hygiene promoted  Goal: Optimal Comfort and Wellbeing  Outcome: Ongoing, Progressing  Intervention: Monitor Pain and Promote Comfort  Description: Assess pain level, treatment efficacy and patient response at regular intervals using a consistent pain scale.  Consider the presence and impact of preexisting chronic pain.  Encourage patient and caregiver involvement in pain assessment, interventions and safety measures.  Recent Flowsheet Documentation  Taken 7/29/2024 0400 by Carlos Garcia LPN  Pain Management Interventions:   see MAR   quiet environment facilitated  Taken 7/28/2024 2007 by Carlos Garcia LPN  Pain Management  Interventions:   see MAR   quiet environment facilitated   position adjusted   pain management plan reviewed with patient/caregiver  Goal: Readiness for Transition of Care  Outcome: Ongoing, Progressing     Problem: Pain Acute  Goal: Acceptable Pain Control and Functional Ability  Outcome: Ongoing, Progressing  Intervention: Prevent or Manage Pain  Description: Evaluate pain level, effect of treatment and patient response at regular intervals.  Minimize painful stimuli; coordinate care and adjust environment (e.g., light, noise, unnecessary movement); promote sleep/rest.  Match pharmacologic analgesia to severity and type of pain mechanism (e.g., neuropathic, muscle, inflammatory); consider multimodal approach (e.g., nonopioid, opioid, adjuvant).  Provide medication at regular intervals; titrate to patient response; premedicate for painful procedures.  Manage breakthrough pain with additional doses; consider rotation or switching medication.  Monitor for signs of substance tolerance (increased dose to reach desired effect, decreased effect with same dose).  Manage medication-induced effects, such as constipation, nausea, pruritus, urinary retention, somnolence and dizziness.  Provide multimodal interventions, such as as physical activity, therapeutic exercise, yoga, TENS (transcutaneous electrical nerve stimulation) and manual therapy.  Train in functional activity modifications, such as body mechanics, posture, ergonomics, energy conservation and activity pacing.  Consider addition of complementary or alternative therapy, such as acupuncture, hypnosis or therapeutic touch.  Recent Flowsheet Documentation  Taken 7/29/2024 0400 by Carlos Garcia LPN  Medication Review/Management: medications reviewed  Taken 7/29/2024 0200 by Carlos Garcia LPN  Medication Review/Management: medications reviewed  Taken 7/29/2024 0000 by Carlos Garcia LPN  Medication Review/Management: medications reviewed  Taken  7/28/2024 2200 by Carlos Garcia LPN  Medication Review/Management: medications reviewed  Taken 7/28/2024 2007 by Carlos Garcia LPN  Sleep/Rest Enhancement:   awakenings minimized   room darkened   noise level reduced   natural light exposure provided  Medication Review/Management: medications reviewed  Intervention: Develop Pain Management Plan  Description: Acknowledge patient as the expert in pain self-management.  Use a consistent, validated tool for pain assessment; include function and quality of life.  Evaluate risk for opioid use and dependence.  Set pain management goals; determine acceptable level of discomfort to allow for maximal functioning.  Determine [id number] pain management plan, including both pharmacologic and nonpharmacologic measures; integrate management of chronic (persistent) pain.  Identify and integrate past successful treatment measures, if able.  Encourage patient and caregiver involvement in pain assessment, interventions and safety measures.  Re-evaluate plan regularly.  Recent Flowsheet Documentation  Taken 7/29/2024 0400 by Carlos Garcia LPN  Pain Management Interventions:   see MAR   quiet environment facilitated  Taken 7/28/2024 2007 by Carlos Garcia LPN  Pain Management Interventions:   see MAR   quiet environment facilitated   position adjusted   pain management plan reviewed with patient/caregiver  Intervention: Optimize Psychosocial Wellbeing  Description: Facilitate patient’s self-control over pain by providing pain information and allowing choices in treatment.  Consider and address emotional response to pain.  Explore and promote use of coping strategies; address barriers to successful coping.  Evaluate and assist with psychosocial, cultural and spiritual factors impacting pain.  Modify pain perception using techniques, such as distraction, mindfulness, guided imagery, meditation or music.  Assess for risk factors for developing chronic  pain, such as depression, fear, pain avoidance and pain catastrophizing.  Consider referral for ongoing coping support, such as education, relaxation training and role of thoughts.  Recent Flowsheet Documentation  Taken 7/29/2024 0400 by Carlos Garcia LPN  Diversional Activities: television  Taken 7/28/2024 2007 by Carlos Garcia LPN  Supportive Measures:   verbalization of feelings encouraged   active listening utilized  Diversional Activities: television     Problem: Skin Injury Risk Increased  Goal: Skin Health and Integrity  Outcome: Ongoing, Progressing  Intervention: Optimize Skin Protection  Description: Perform a full pressure injury risk assessment, as indicated by screening, upon admission to care unit.  Reassess skin (injury risk, full inspection) frequently (e.g., scheduled interval, with change in condition) to provide optimal early detection and prevention.  Maintain adequate tissue perfusion (e.g., encourage fluid balance; avoid crossing legs, constrictive clothing or devices) to promote tissue oxygenation.  Maintain head of bed at lowest degree of elevation tolerated, considering medical condition and other restrictions.  Avoid positioning onto an area that remains reddened.  Minimize incontinence and moisture (e.g., toileting schedule; moisture-wicking pad, diaper or incontinence collection device; skin moisture barrier).  Cleanse skin promptly and gently when soiled utilizing a pH-balanced cleanser.  Relieve and redistribute pressure (e.g., scheduled position changes, weight shifts, use of support surface, medical device repositioning, protective dressing application, use of positioning device, microclimate control, use of pressure-injury-monitor  Encourage increased activity, such as sitting in a chair at the bedside or early mobilization, when able to tolerate.  Recent Flowsheet Documentation  Taken 7/29/2024 0400 by Carlos Garcia LPN  Head of Bed (HOB) Positioning: HOB at  20-30 degrees  Taken 7/29/2024 0200 by Carlos Garcia LPN  Head of Bed (HOB) Positioning: HOB at 20-30 degrees  Taken 7/29/2024 0000 by Cralos Garcia LPN  Head of Bed (HOB) Positioning: HOB lowered  Taken 7/28/2024 2200 by Carlos Garcia LPN  Head of Bed (HOB) Positioning: HOB at 20-30 degrees  Taken 7/28/2024 2007 by Carlos Garcia LPN  Pressure Reduction Techniques: frequent weight shift encouraged  Head of Bed (HOB) Positioning: HOB at 20-30 degrees  Pressure Reduction Devices: pressure-redistributing mattress utilized  Skin Protection: adhesive use limited     Problem: Fall Injury Risk  Goal: Absence of Fall and Fall-Related Injury  Outcome: Ongoing, Progressing  Intervention: Identify and Manage Contributors  Description: Develop a fall prevention plan with the patient and caregiver/family.  Provide reorientation, appropriate sensory stimulation and routines with changes in mental status to decrease risk of fall.  Promote use of personal vision and auditory aids.  Assess assistance level required for safe and effective self-care; provide support as needed, such as toileting, mobilization. For age 65 and older, implement timed toileting with assistance.  Encourage physical activity, such as performance of mobility and self-care at highest level of patient ability, multicomponent exercise program and provision of appropriate assistive devices.  If fall occurs, assess the severity of injury; implement fall injury protocol. Determine the cause and revise fall injury prevention plan.  Regularly review medication contribution to fall risk; adjust medication administration times to minimize risk of falling.  Consider risk related to polypharmacy and age.  Balance adequate pain management with potential for oversedation.  Recent Flowsheet Documentation  Taken 7/29/2024 0400 by Carlos Garcia LPN  Medication Review/Management: medications reviewed  Taken 7/29/2024 0200 by Carlos Garica  L, LPN  Medication Review/Management: medications reviewed  Taken 7/29/2024 0000 by Carlos Garcia LPN  Medication Review/Management: medications reviewed  Taken 7/28/2024 2200 by Carlos Garcia LPN  Medication Review/Management: medications reviewed  Taken 7/28/2024 2007 by Carlos Garcia LPN  Medication Review/Management: medications reviewed  Intervention: Promote Injury-Free Environment  Description: Provide a safe, barrier-free environment that encourages independent activity.  Keep care area uncluttered and well-lighted.  Determine need for increased observation or monitoring.  Avoid use of devices that minimize mobility, such as restraints or indwelling urinary catheter.  Recent Flowsheet Documentation  Taken 7/29/2024 0400 by Carlos Garcia LPN  Safety Promotion/Fall Prevention:   safety round/check completed   room organization consistent   muscle strengthening facilitated   nonskid shoes/slippers when out of bed   lighting adjusted   mobility aid in reach   fall prevention program maintained   clutter free environment maintained   assistive device/personal items within reach   activity supervised  Taken 7/29/2024 0200 by Carlos Garcia LPN  Safety Promotion/Fall Prevention:   safety round/check completed   room organization consistent   nonskid shoes/slippers when out of bed   muscle strengthening facilitated   mobility aid in reach   lighting adjusted   fall prevention program maintained   clutter free environment maintained   assistive device/personal items within reach   activity supervised  Taken 7/29/2024 0000 by Carlos Garcia LPN  Safety Promotion/Fall Prevention:   safety round/check completed   room organization consistent   patient off unit   nonskid shoes/slippers when out of bed   muscle strengthening facilitated   mobility aid in reach   lighting adjusted   fall prevention program maintained   assistive device/personal items within reach   clutter free  environment maintained   activity supervised  Taken 7/28/2024 2200 by Carlos Garcia LPN  Safety Promotion/Fall Prevention:   safety round/check completed   room organization consistent   nonskid shoes/slippers when out of bed   muscle strengthening facilitated   mobility aid in reach   lighting adjusted   fall prevention program maintained   elopement precautions   clutter free environment maintained   assistive device/personal items within reach   activity supervised  Taken 7/28/2024 2007 by Carlos Garcia LPN  Safety Promotion/Fall Prevention:   safety round/check completed   room organization consistent   nonskid shoes/slippers when out of bed   muscle strengthening facilitated   mobility aid in reach   fall prevention program maintained   clutter free environment maintained   assistive device/personal items within reach   activity supervised     Problem: Alcohol Withdrawal  Goal: Alcohol Withdrawal Symptom Control  Outcome: Ongoing, Progressing     Problem: Acute Neurologic Deterioration (Alcohol Withdrawal)  Goal: Optimal Neurologic Function  Outcome: Ongoing, Progressing     Problem: Substance Misuse (Alcohol Withdrawal)  Goal: Readiness for Change Identified  Outcome: Ongoing, Progressing  Intervention: Partner to Facilitate Behavior Change  Description: Assess pattern of substance use from multiple information sources.  Provide education about risks of current use; develop a mutual treatment plan.  Assess readiness to change; identify stage of change and current level of insight.  Explore motivations for change, affirming change-related statements; identify and develop recovery goals.  Elicit recognition of gap between current behavior and desired life goals.  Explore ambivalence associated with commitment to behavior change; support steps toward change, however small.  Identify internal and external triggers and methods to cope.  Assist with transition to next level of care; establish or  re-establish connections with community resources; identify and address safety risk.  Identify and address factors that impact overall treatment adherence (e.g., childcare, transportation, appointment times, cost).  Recent Flowsheet Documentation  Taken 7/28/2024 2007 by Carlos Garcia LPN  Supportive Measures:   verbalization of feelings encouraged   active listening utilized  Intervention: Promote Psychosocial Wellbeing  Description: Use a nonjudgmental approach to establish a therapeutic alliance and trust; normalize and validate the patient and family/caregiver’s experience.  Include family/caregiver in assessment and planning, if supportive; honor confidentiality.  Encourage verbalization of feelings and concerns; partner to identify and utilize patient and family/caregiver strengths and coping strategies.  Emphasize importance of a strong support system; encourage support of family and friends.  Screen and monitor for signs and symptoms of co-occurring mental illness.  Recent Flowsheet Documentation  Taken 7/29/2024 0400 by Carlos Garcia LPN  Family/Support System Care: support provided  Taken 7/28/2024 2007 by Carlos Garcia LPN  Family/Support System Care: support provided   Goal Outcome Evaluation: on going

## 2024-07-29 NOTE — PLAN OF CARE
Problem: Adult Inpatient Plan of Care  Goal: Absence of Hospital-Acquired Illness or Injury  Intervention: Identify and Manage Fall Risk  Description: Perform standard risk assessment on admission using a validated tool or comprehensive approach appropriate to the patient; reassess fall risk frequently, with change in status or transfer to another level of care.  Communicate fall injury risk to interprofessional healthcare team.  Determine need for increased observation, equipment and environmental modification, such as low bed, signage and supportive, nonskid footwear.  Adjust safety measures to individual developmental age, stage and identified risk factors.  Reinforce the importance of safety and physical activity with patient and family.  Perform regular intentional rounding to assess need for position change, pain assessment and personal needs, including assistance with toileting.  Recent Flowsheet Documentation  Taken 7/29/2024 0000 by Carlos Garcia LPN  Safety Promotion/Fall Prevention:   safety round/check completed   room organization consistent   patient off unit   nonskid shoes/slippers when out of bed   muscle strengthening facilitated   mobility aid in reach   lighting adjusted   fall prevention program maintained   assistive device/personal items within reach   clutter free environment maintained   activity supervised  Taken 7/28/2024 2200 by Carlos Garcia LPN  Safety Promotion/Fall Prevention:   safety round/check completed   room organization consistent   nonskid shoes/slippers when out of bed   muscle strengthening facilitated   mobility aid in reach   lighting adjusted   fall prevention program maintained   elopement precautions   clutter free environment maintained   assistive device/personal items within reach   activity supervised  Taken 7/28/2024 2007 by Carlos Garcia LPN  Safety Promotion/Fall Prevention:   safety round/check completed   room organization consistent    nonskid shoes/slippers when out of bed   muscle strengthening facilitated   mobility aid in reach   fall prevention program maintained   clutter free environment maintained   assistive device/personal items within reach   activity supervised  Intervention: Prevent Skin Injury  Description: Perform a screening for skin injury risk, such as pressure or moisture associated skin damage on admission and at regular intervals throughout hospital stay.  Keep all areas of skin (especially folds) clean and dry.  Maintain adequate skin hydration.  Relieve and redistribute pressure and protect bony prominences; implement measures based on patient-specific risk factors.  Match turning and repositioning schedule to clinical condition.  Encourage weight shift frequently; assist with reposition if unable to complete independently.  Float heels off bed; avoid pressure on the Achilles tendon.  Keep skin free from extended contact with medical devices.  Encourage functional activity and mobility, as early as tolerated.  Use aids (e.g., slide boards, mechanical lift) during transfer.  Recent Flowsheet Documentation  Taken 7/29/2024 0000 by Carlos Garcia LPN  Body Position: position changed independently  Taken 7/28/2024 2200 by Carlos Garcia LPN  Body Position: position changed independently  Taken 7/28/2024 2007 by Carlos Garcia LPN  Body Position: position changed independently  Skin Protection: adhesive use limited  Intervention: Prevent and Manage VTE (Venous Thromboembolism) Risk  Description: Assess for VTE (venous thromboembolism) risk.  Encourage and assist with early ambulation.  Initiate and maintain compression or other therapy, as indicated, based on identified risk in accordance with organizational protocol and provider order.  Encourage both active and passive leg exercises while in bed, if unable to ambulate.  Recent Flowsheet Documentation  Taken 7/29/2024 0000 by Carlos Garcia LPN  Activity  Management:   activity encouraged   ambulated to bathroom  Taken 7/28/2024 2200 by Carlos Garcia LPN  Activity Management:   activity encouraged   ambulated to bathroom  Taken 7/28/2024 2007 by Carlos Garcia LPN  Activity Management: activity encouraged  VTE Prevention/Management:   sequential compression devices off   patient refused intervention  Intervention: Prevent Infection  Description: Maintain skin and mucous membrane integrity; promote hand, oral and pulmonary hygiene.  Optimize fluid balance, nutrition, sleep and glycemic control to maximize infection resistance.  Identify potential sources of infection early to prevent or mitigate progression of infection (e.g., wound, lines, devices).  Evaluate ongoing need for invasive devices; remove promptly when no longer indicated.  Recent Flowsheet Documentation  Taken 7/29/2024 0000 by Carlos Garcia LPN  Infection Prevention:   visitors restricted/screened   rest/sleep promoted   single patient room provided   personal protective equipment utilized   hand hygiene promoted  Taken 7/28/2024 2200 by Carlos Garcia LPN  Infection Prevention:   visitors restricted/screened   single patient room provided   rest/sleep promoted   personal protective equipment utilized   hand hygiene promoted  Taken 7/28/2024 2007 by Carlos Garcia LPN  Infection Prevention:   visitors restricted/screened   single patient room provided   rest/sleep promoted   personal protective equipment utilized   hand hygiene promoted  Goal: Optimal Comfort and Wellbeing  Intervention: Monitor Pain and Promote Comfort  Description: Assess pain level, treatment efficacy and patient response at regular intervals using a consistent pain scale.  Consider the presence and impact of preexisting chronic pain.  Encourage patient and caregiver involvement in pain assessment, interventions and safety measures.  Recent Flowsheet Documentation  Taken 7/28/2024 2007 by Radha  Carlos DONALDSON LPN  Pain Management Interventions:   see MAR   quiet environment facilitated   position adjusted   pain management plan reviewed with patient/caregiver     Problem: Pain Acute  Goal: Acceptable Pain Control and Functional Ability  Intervention: Prevent or Manage Pain  Description: Evaluate pain level, effect of treatment and patient response at regular intervals.  Minimize painful stimuli; coordinate care and adjust environment (e.g., light, noise, unnecessary movement); promote sleep/rest.  Match pharmacologic analgesia to severity and type of pain mechanism (e.g., neuropathic, muscle, inflammatory); consider multimodal approach (e.g., nonopioid, opioid, adjuvant).  Provide medication at regular intervals; titrate to patient response; premedicate for painful procedures.  Manage breakthrough pain with additional doses; consider rotation or switching medication.  Monitor for signs of substance tolerance (increased dose to reach desired effect, decreased effect with same dose).  Manage medication-induced effects, such as constipation, nausea, pruritus, urinary retention, somnolence and dizziness.  Provide multimodal interventions, such as as physical activity, therapeutic exercise, yoga, TENS (transcutaneous electrical nerve stimulation) and manual therapy.  Train in functional activity modifications, such as body mechanics, posture, ergonomics, energy conservation and activity pacing.  Consider addition of complementary or alternative therapy, such as acupuncture, hypnosis or therapeutic touch.  Recent Flowsheet Documentation  Taken 7/29/2024 0000 by Carlos Garcia LPN  Medication Review/Management: medications reviewed  Taken 7/28/2024 2200 by Carlos Garcia LPN  Medication Review/Management: medications reviewed  Taken 7/28/2024 2007 by Carlos Garcia LPN  Sleep/Rest Enhancement:   awakenings minimized   room darkened   noise level reduced   natural light exposure provided  Medication  Review/Management: medications reviewed  Intervention: Develop Pain Management Plan  Description: Acknowledge patient as the expert in pain self-management.  Use a consistent, validated tool for pain assessment; include function and quality of life.  Evaluate risk for opioid use and dependence.  Set pain management goals; determine acceptable level of discomfort to allow for maximal functioning.  Determine [id number] pain management plan, including both pharmacologic and nonpharmacologic measures; integrate management of chronic (persistent) pain.  Identify and integrate past successful treatment measures, if able.  Encourage patient and caregiver involvement in pain assessment, interventions and safety measures.  Re-evaluate plan regularly.  Recent Flowsheet Documentation  Taken 7/28/2024 2007 by Carlos Garcia LPN  Pain Management Interventions:   see MAR   quiet environment facilitated   position adjusted   pain management plan reviewed with patient/caregiver  Intervention: Optimize Psychosocial Wellbeing  Description: Facilitate patient’s self-control over pain by providing pain information and allowing choices in treatment.  Consider and address emotional response to pain.  Explore and promote use of coping strategies; address barriers to successful coping.  Evaluate and assist with psychosocial, cultural and spiritual factors impacting pain.  Modify pain perception using techniques, such as distraction, mindfulness, guided imagery, meditation or music.  Assess for risk factors for developing chronic pain, such as depression, fear, pain avoidance and pain catastrophizing.  Consider referral for ongoing coping support, such as education, relaxation training and role of thoughts.  Recent Flowsheet Documentation  Taken 7/28/2024 2007 by Carlos Garcia LPN  Supportive Measures:   verbalization of feelings encouraged   active listening utilized  Diversional Activities: television     Problem:  Skin Injury Risk Increased  Goal: Skin Health and Integrity  Intervention: Optimize Skin Protection  Description: Perform a full pressure injury risk assessment, as indicated by screening, upon admission to care unit.  Reassess skin (injury risk, full inspection) frequently (e.g., scheduled interval, with change in condition) to provide optimal early detection and prevention.  Maintain adequate tissue perfusion (e.g., encourage fluid balance; avoid crossing legs, constrictive clothing or devices) to promote tissue oxygenation.  Maintain head of bed at lowest degree of elevation tolerated, considering medical condition and other restrictions.  Avoid positioning onto an area that remains reddened.  Minimize incontinence and moisture (e.g., toileting schedule; moisture-wicking pad, diaper or incontinence collection device; skin moisture barrier).  Cleanse skin promptly and gently when soiled utilizing a pH-balanced cleanser.  Relieve and redistribute pressure (e.g., scheduled position changes, weight shifts, use of support surface, medical device repositioning, protective dressing application, use of positioning device, microclimate control, use of pressure-injury-monitor  Encourage increased activity, such as sitting in a chair at the bedside or early mobilization, when able to tolerate.  Recent Flowsheet Documentation  Taken 7/29/2024 0000 by Carlos Garcia LPN  Head of Bed (HOB) Positioning: HOB lowered  Taken 7/28/2024 2200 by Carlos Garica LPN  Head of Bed (HOB) Positioning: HOB at 20-30 degrees  Taken 7/28/2024 2007 by Carlos Garcia LPN  Pressure Reduction Techniques: frequent weight shift encouraged  Head of Bed (HOB) Positioning: HOB at 20-30 degrees  Pressure Reduction Devices: pressure-redistributing mattress utilized  Skin Protection: adhesive use limited     Problem: Fall Injury Risk  Goal: Absence of Fall and Fall-Related Injury  Intervention: Identify and Manage  Contributors  Description: Develop a fall prevention plan with the patient and caregiver/family.  Provide reorientation, appropriate sensory stimulation and routines with changes in mental status to decrease risk of fall.  Promote use of personal vision and auditory aids.  Assess assistance level required for safe and effective self-care; provide support as needed, such as toileting, mobilization. For age 65 and older, implement timed toileting with assistance.  Encourage physical activity, such as performance of mobility and self-care at highest level of patient ability, multicomponent exercise program and provision of appropriate assistive devices.  If fall occurs, assess the severity of injury; implement fall injury protocol. Determine the cause and revise fall injury prevention plan.  Regularly review medication contribution to fall risk; adjust medication administration times to minimize risk of falling.  Consider risk related to polypharmacy and age.  Balance adequate pain management with potential for oversedation.  Recent Flowsheet Documentation  Taken 7/29/2024 0000 by Carlos Garcia LPN  Medication Review/Management: medications reviewed  Taken 7/28/2024 2200 by Carlos Garcia LPN  Medication Review/Management: medications reviewed  Taken 7/28/2024 2007 by Carlos Garcia LPN  Medication Review/Management: medications reviewed  Intervention: Promote Injury-Free Environment  Description: Provide a safe, barrier-free environment that encourages independent activity.  Keep care area uncluttered and well-lighted.  Determine need for increased observation or monitoring.  Avoid use of devices that minimize mobility, such as restraints or indwelling urinary catheter.  Recent Flowsheet Documentation  Taken 7/29/2024 0000 by Carlos Garcia LPN  Safety Promotion/Fall Prevention:   safety round/check completed   room organization consistent   patient off unit   nonskid shoes/slippers when out  of bed   muscle strengthening facilitated   mobility aid in reach   lighting adjusted   fall prevention program maintained   assistive device/personal items within reach   clutter free environment maintained   activity supervised  Taken 7/28/2024 2200 by Carlos Garcia LPN  Safety Promotion/Fall Prevention:   safety round/check completed   room organization consistent   nonskid shoes/slippers when out of bed   muscle strengthening facilitated   mobility aid in reach   lighting adjusted   fall prevention program maintained   elopement precautions   clutter free environment maintained   assistive device/personal items within reach   activity supervised  Taken 7/28/2024 2007 by Carlos Garcia LPN  Safety Promotion/Fall Prevention:   safety round/check completed   room organization consistent   nonskid shoes/slippers when out of bed   muscle strengthening facilitated   mobility aid in reach   fall prevention program maintained   clutter free environment maintained   assistive device/personal items within reach   activity supervised     Problem: Substance Misuse (Alcohol Withdrawal)  Goal: Readiness for Change Identified  Intervention: Partner to Facilitate Behavior Change  Description: Assess pattern of substance use from multiple information sources.  Provide education about risks of current use; develop a mutual treatment plan.  Assess readiness to change; identify stage of change and current level of insight.  Explore motivations for change, affirming change-related statements; identify and develop recovery goals.  Elicit recognition of gap between current behavior and desired life goals.  Explore ambivalence associated with commitment to behavior change; support steps toward change, however small.  Identify internal and external triggers and methods to cope.  Assist with transition to next level of care; establish or re-establish connections with community resources; identify and address safety  risk.  Identify and address factors that impact overall treatment adherence (e.g., childcare, transportation, appointment times, cost).  Recent Flowsheet Documentation  Taken 7/28/2024 2007 by Carlos Garcia LPN  Supportive Measures:   verbalization of feelings encouraged   active listening utilized  Intervention: Promote Psychosocial Wellbeing  Description: Use a nonjudgmental approach to establish a therapeutic alliance and trust; normalize and validate the patient and family/caregiver’s experience.  Include family/caregiver in assessment and planning, if supportive; honor confidentiality.  Encourage verbalization of feelings and concerns; partner to identify and utilize patient and family/caregiver strengths and coping strategies.  Emphasize importance of a strong support system; encourage support of family and friends.  Screen and monitor for signs and symptoms of co-occurring mental illness.  Recent Flowsheet Documentation  Taken 7/28/2024 2007 by Carlos Garcia LPN  Family/Support System Care: support provided   Goal Outcome Evaluation: Plan of care reviewed cont to monitor , possible transfer to [facility], case mgmt / GI md to reach out to other facility to transfer pt once determine that pt will be transfer and admitted, progress to goal in monitor and on going

## 2024-07-29 NOTE — PROGRESS NOTES
"Nutrition Services    Patient Name: Yonas Charles  YOB: 1995  MRN: [id number]  Admission date: 7/18/2024    PROGRESS NOTE      Encounter Information: Check on for PO intakes.  GI continues to follow.         PO Diet: Diet: Cardiac; Low Sodium (2g); Texture: Soft to Chew (NDD 3); Soft to Chew: Chopped Meat; Fluid Consistency: Thin (IDDSI 0)   PO Supplements: Boost Plus TID  Yogurt TID   PO Intake:  63% average PO intakes x 8 documented meals since admission        Current nutrition support:    Nutrition support review:        Labs (reviewed below): Hyponatremia   Hypokalemia - replaced today   Elevated LFTs  Hypomagnesemia  Hypophosphatemia         GI Function:  Last documented BM 7/29 (today)       Nutrition Intervention Updates: Continue current diet and encourage good PO intakes.        Results from last 7 days   Lab Units 07/29/24  0502 07/28/24  1907 07/28/24  0234 07/27/24  2119 07/27/24  0414   SODIUM mmol/L 131*  --  131*  --  135*   POTASSIUM mmol/L 3.0* 3.0* 3.3*   < > 3.4*   CHLORIDE mmol/L 96*  --  97*  --  98   CO2 mmol/L 22.2  --  22.2  --  21.9*   BUN mg/dL 3*  --  4*  --  5*   CREATININE mg/dL 0.47*  --  0.52*  --  0.39*   CALCIUM mg/dL 8.0*  --  8.3*  --  8.5*   BILIRUBIN mg/dL 21.4*  --  20.8*  --  20.4*   ALK PHOS U/L 346*  --  294*  --  307*   ALT (SGPT) U/L 66*  --  63*  --  62*   AST (SGOT) U/L 159*  --  163*  --  176*   GLUCOSE mg/dL 98  --  83  --  111*    < > = values in this interval not displayed.     Results from last 7 days   Lab Units 07/29/24  0900 07/29/24  0502 07/23/24  1638 07/23/24  0435   MAGNESIUM mg/dL  --  1.2*  --  1.7   PHOSPHORUS mg/dL  --  2.3*   < > 2.0*   HEMOGLOBIN g/dL 9.2*  --    < > 8.8*   HEMATOCRIT % 25.2*  --    < > 24.2*    < > = values in this interval not displayed.     No results found for: \"COVID19\"  No results found for: \"HGBA1C\"      RD to follow up per protocol.    Electronically signed by:  Joyce Gardiner RD  07/29/24 12:04 EDT    "

## 2024-07-29 NOTE — PROGRESS NOTES
LOS: 11 days   Patient Care Team:  Provider, No Known as PCP - General      Subjective     Interval History: Patient is doing about the same this morning T. bili and INR not improving.  Patient's cell counts have increased since starting Neupogen.    Subjective: Patient states he is feeling about the same.      ROS:   No chest pain, shortness of breath, or cough.      Medication Review:     Current Facility-Administered Medications:     acetaminophen (TYLENOL) tablet 650 mg, 650 mg, Oral, Q4H PRN **OR** acetaminophen (TYLENOL) 160 MG/5ML oral solution 650 mg, 650 mg, Oral, Q4H PRN **OR** acetaminophen (TYLENOL) suppository 650 mg, 650 mg, Rectal, Q4H PRN, Mari Bustos MD    [COMPLETED] acetylcysteine (ACETADOTE) 8,360 mg in dextrose (D5W) 5 % 200 mL infusion, 150 mg/kg, Intravenous, Once, Last Rate: 241.8 mL/hr at 07/27/24 1441, 8,360 mg at 07/27/24 1441 **FOLLOWED BY** [COMPLETED] acetylcysteine (ACETADOTE) 2,780 mg in dextrose (D5W) 5 % 500 mL infusion, 50 mg/kg, Intravenous, Once, Last Rate: 125 mL/hr at 07/27/24 1630, 2,780 mg at 07/27/24 1630 **FOLLOWED BY** acetylcysteine (ACETADOTE) 6,000 mg in dextrose (D5W) 5 % 1,000 mL infusion, 6.25 mg/kg/hr, Intravenous, Continuous, Kaci Polo APRN, Last Rate: 58 mL/hr at 07/29/24 0510, 6.25 mg/kg/hr at 07/29/24 0510    sennosides-docusate (PERICOLACE) 8.6-50 MG per tablet 2 tablet, 2 tablet, Oral, BID PRN **AND** polyethylene glycol (MIRALAX) packet 17 g, 17 g, Oral, Daily PRN **AND** bisacodyl (DULCOLAX) EC tablet 5 mg, 5 mg, Oral, Daily PRN **AND** bisacodyl (DULCOLAX) suppository 10 mg, 10 mg, Rectal, Daily PRN, Corazon Bustosas Ali, MD    Calcium Replacement - Follow Nurse / BPA Driven Protocol, , Does not apply, Juli HARTLEY Abbas Ali, MD    famotidine (PEPCID) tablet 40 mg, 40 mg, Oral, Nightly, Waleska Chery, APRN, 40 mg at 07/28/24 2050    Filgrastim (NEUPOGEN) injection 300 mcg, 300 mcg, Subcutaneous, Daily With Lunch, Waleska Chery, APRN, 300  mcg at 07/28/24 1420    folic acid (FOLVITE) tablet 1 mg, 1 mg, Oral, Daily, Mari Bustos MD, 1 mg at 07/29/24 0827    furosemide (LASIX) tablet 40 mg, 40 mg, Oral, Daily, Quang Butts MD, 40 mg at 07/29/24 0827    guaiFENesin (MUCINEX) 12 hr tablet 600 mg, 600 mg, Oral, Q12H, Hayley Rodriguez DO, 600 mg at 07/29/24 0826    guaiFENesin-codeine (GUAIFENESIN AC) 100-10 MG/5ML liquid 5 mL, 5 mL, Oral, Q4H PRN, Frank Wild MD, 5 mL at 07/24/24 0318    hydrocortisone 1 % cream 1 Application, 1 Application, Topical, Q8H, Hayley Rodriguez DO, 1 Application at 07/29/24 0604    lactulose (CHRONULAC) 10 GM/15ML solution 10 g, 10 g, Oral, Daily, Kaci Polo APRN, 10 g at 07/28/24 0834    Magnesium Standard Dose Replacement - Follow Nurse / BPA Driven Protocol, , Does not apply, PRN, Mari Bustos MD    magnesium sulfate 2g/50 mL (PREMIX) infusion, 2 g, Intravenous, Q2H, Hayley Rodriguez DO    multivitamin with minerals 1 tablet, 1 tablet, Oral, Daily, Mari Bustos MD, 1 tablet at 07/29/24 0827    ondansetron ODT (ZOFRAN-ODT) disintegrating tablet 4 mg, 4 mg, Oral, Q6H PRN **OR** ondansetron (ZOFRAN) injection 4 mg, 4 mg, Intravenous, Q6H PRN, Mari Bustos MD, 4 mg at 07/18/24 1330    pantoprazole (PROTONIX) EC tablet 40 mg, 40 mg, Oral, BID AC, Kaci Polo APRN, 40 mg at 07/29/24 0827    Phosphorus Replacement - Follow Nurse / BPA Driven Protocol, , Does not apply, PRN, Mari Bustos MD    potassium chloride (KLOR-CON M20) CR tablet 40 mEq, 40 mEq, Oral, Q4H, Quang Butts MD, 40 mEq at 07/29/24 0827    Potassium Replacement - Follow Nurse / BPA Driven Protocol, , Does not apply, PRN, Mari Bustos MD    [Held by provider] sertraline (ZOLOFT) tablet 50 mg, 50 mg, Oral, Daily, Mari Bustos MD, 50 mg at 07/18/24 1258    sodium chloride 0.9 % flush 10 mL, 10 mL, Intravenous, PRN, Mari Bustos MD    sodium chloride 0.9 % flush 10 mL, 10 mL, Intravenous, Q12H,  Mari Bustos MD, 10 mL at 07/29/24 0832    sodium chloride 0.9 % flush 10 mL, 10 mL, Intravenous, PRN, Mari Bustos MD    sodium chloride 0.9 % infusion 40 mL, 40 mL, Intravenous, PRN, Mari Bustos MD    sucralfate (CARAFATE) tablet 1 g, 1 g, Oral, 4x Daily AC & at Bedtime, Kaci Polo, APRN, 1 g at 07/29/24 0827    [COMPLETED] thiamine (B-1) injection 200 mg, 200 mg, Intravenous, Q8H, 200 mg at 07/23/24 1437 **FOLLOWED BY** thiamine (VITAMIN B-1) tablet 100 mg, 100 mg, Oral, Daily, Mari Bustos MD, 100 mg at 07/29/24 0827    zinc sulfate (ZINCATE) capsule 220 mg, 220 mg, Oral, Daily, Waleska Chery APRN, 220 mg at 07/29/24 0827      Objective resting in bed, NAD, no family present    Vital Signs  Vitals:    07/28/24 1925 07/28/24 2315 07/29/24 0305 07/29/24 0815   BP: 123/73 118/70 126/69 111/75   BP Location: Left arm Left arm Left arm Left arm   Patient Position: Lying Sitting Sitting Sitting   Pulse: 109 120 106 109   Resp: 16 26 18 17   Temp: 97.9 °F (36.6 °C) 97.9 °F (36.6 °C) 98.3 °F (36.8 °C) 98.3 °F (36.8 °C)   TempSrc: Oral Oral Oral Oral   SpO2: 96% 96% 95% 98%   Weight:       Height:           Physical Exam:     General Appearance:    Awake and alert, in no acute distress   Head:    Normocephalic, without obvious abnormality   Eyes:          Conjunctivae normal, icteric sclera   Throat:   No oral lesions, no thrush, oral mucosa moist   Neck:   supple, no JVD   Lungs:     respirations regular, even and unlabored   Abdomen:   Nontender, mild to moderate distention, soft   Rectal:     Deferred   Extremities:   No edema, no cyanosis   Skin:   No bruising or rash, jaundice        Results Review:    CBC    Results from last 7 days   Lab Units 07/29/24  0900 07/28/24  0234 07/27/24  0414 07/26/24  0108 07/24/24  2322 07/24/24  0324 07/23/24  0435   WBC 10*3/mm3 39.35* 40.75* 34.41* 25.79* 16.67* 15.98* 14.99*   HEMOGLOBIN g/dL 9.2* 8.9* 8.9* 8.5* 9.3* 9.2* 8.8*   PLATELETS  "10*3/mm3 159 161 172 161 170 162 148     CMP   Results from last 7 days   Lab Units 07/29/24  0502 07/28/24  1907 07/28/24  0234 07/27/24  2119 07/27/24  0414 07/26/24  1325 07/26/24  0108 07/25/24  0953 07/24/24  2322 07/24/24  0324 07/23/24  1638 07/23/24  0435   SODIUM mmol/L 131*  --  131*  --  135*  --  134*  --  135* 134*  --  134*   POTASSIUM mmol/L 3.0* 3.0* 3.3* 3.1* 3.4* 4.3 3.4* 4.0 3.4* 4.0  --  4.5   CHLORIDE mmol/L 96*  --  97*  --  98  --  98  --  99 101  --  102   CO2 mmol/L 22.2  --  22.2  --  21.9*  --  25.0  --  25.4 25.0  --  25.9   BUN mg/dL 3*  --  4*  --  5*  --  6  --  5* 5*  --  5*   CREATININE mg/dL 0.47*  --  0.52*  --  0.39*  --  0.50*  --  0.51* 0.49*  --  0.55*   GLUCOSE mg/dL 98  --  83  --  111*  --  87  --  115* 93  --  89   ALBUMIN g/dL 2.9*  3.0*  --  3.0*  --  3.0*  --  2.9*  --  3.3* 3.2*  --  3.2*   BILIRUBIN mg/dL 21.4*  --  20.8*  --  20.4*  --  17.8*  --  18.2* 16.9*  --  16.4*   ALK PHOS U/L 346*  --  294*  --  307*  --  255*  --  283* 275*  --  261*   AST (SGOT) U/L 159*  --  163*  --  176*  --  143*  --  141* 126*  --  116*   ALT (SGPT) U/L 66*  --  63*  --  62*  --  49*  --  47* 35  --  29   MAGNESIUM mg/dL 1.2*  --   --   --   --   --   --   --   --   --   --  1.7   PHOSPHORUS mg/dL 2.3*  --  2.3*  --  2.5  --  3.0 2.5 2.1* 2.3*   < > 2.0*    < > = values in this interval not displayed.     Cr Clearance Estimated Creatinine Clearance: 182.7 mL/min (A) (by C-G formula based on SCr of 0.47 mg/dL (L)).  Coag   Results from last 7 days   Lab Units 07/29/24  0900 07/28/24  0234 07/27/24  0414 07/26/24  1325 07/26/24  0938 07/24/24  2322 07/24/24  0324   INR  2.25* 2.04* 1.85* 1.97* 2.00* 1.98* 1.88*     HbA1C No results found for: \"HGBA1C\"      Infection     UA      Microbiology Results (last 10 days)       ** No results found for the last 240 hours. **          Imaging Results (Last 72 Hours)       Procedure Component Value Units Date/Time    US Abdomen Limited [[id number]] " Collected: 07/28/24 1332     Updated: 07/28/24 1342    Narrative:      US ABDOMEN LIMITED    Date of Exam: 7/28/2024 10:50 AM EDT    Indication: Distention, rule out ascites.    Comparison: CT abdomen and pelvis 7/18/2024    Technique: Grayscale and color Doppler ultrasound evaluation of the right upper quadrant was performed.      Findings:  There is a small amount of right upper quadrant ascites adjacent to the liver measuring 3.2 x 1 x 4 cm. No ascites in the right lower quadrant, left lower quadrant and left upper quadrant. Splenomegaly noted as seen on recent CT.      Impression:      Impression:  Small right upper quadrant ascites.        Electronically Signed: Jaxon Gallagher MD    7/28/2024 1:40 PM EDT    Workstation ID: [id number]    CT Cervical Spine Without Contrast [[id number]] Collected: 07/27/24 2331     Updated: 07/27/24 2334    Narrative:        CT CERVICAL SPINE WO CONTRAST    Date of Exam: 7/27/2024 10:57 PM EDT    Indication: S/P fall.    Comparison: None available.    Technique: Axial CT images were obtained of the cervical spine without contrast administration.  Sagittal and coronal reconstructions were performed.  Automated exposure control and iterative reconstruction methods were used.      Findings:  Seven cervical vertebrae are identified. Alignment is anatomic. Disc spaces maintain normal height. Vertebral bodies maintain normal height. There is no evidence of an acute fracture or subluxation. There are no destructive osseous lesions. Paraspinal   musculature is grossly preserved. There is no evidence of cervical lymphadenopathy or a neck mass. The craniocervical junction appears within normal limits. Limited view of the posterior fossa contents is unremarkable. Limited view of the lung apices is   unremarkable.    The uncovertebral and facet joints appear normal. There is no significant neuroforaminal or spinal canal stenosis. No large focal disc protrusion or extrusion.      Impression:       Impression:  No acute osseous abnormality.        Electronically Signed: Juno Ernst MD    7/27/2024 11:32 PM EDT    Workstation ID: [id number]    CT Head Without Contrast [[id number]] Collected: 07/27/24 2330     Updated: 07/27/24 2333    Narrative:      CT HEAD WO CONTRAST    Date of Exam: 7/27/2024 10:57 PM EDT    Indication: S/P fall.    Comparison: 7/19/2024.    Technique: Axial CT images were obtained of the head without contrast administration.  Coronal reconstructions were performed.  Automated exposure control and iterative reconstruction methods were used.      Findings:  Superficial soft tissues appear within normal limits. The calvarium is intact.  Paranasal sinuses and mastoid air cells appear well aerated.  Orbits are unremarkable.  There is no acute intracranial hemorrhage.  No mass effect or midline shift.  No   abnormal extra-axial collections.  Gray-white differentiation is within normal limits. Stable mild patchy white matter hypoattenuation. Ventricular size and configuration is normal for age.      Impression:      Impression:  1.No acute intracranial abnormality.  2.Mild chronic small vessel ischemic change.        Electronically Signed: Juno Ernst MD    7/27/2024 11:31 PM EDT    Workstation ID: [id number]    XR Chest 1 View [[id number]] Collected: 07/27/24 2311     Updated: 07/27/24 2314    Narrative:      XR CHEST 1 VW    Date of Exam: 7/27/2024 10:42 PM EDT    Indication: S/P fall, chest pain    Comparison: 7/24/2024.    Findings:  There is persistent mixed interstitial and airspace disease in the mid and lower lungs. No pneumothorax or pleural effusion. Heart size is unchanged. Pulmonary vasculature is indistinct. No acute osseous abnormality.      Impression:      Impression:  Persistent mixed interstitial and airspace disease in the mid and lower lungs.        Electronically Signed: Juno Ernst MD    7/27/2024 11:12 PM EDT    Workstation ID: [id number]            Assessment & Plan    Alcoholic hepatitis/cirrhosis  Elevated LFTs  Call withdrawal  Leukocytosis  Elevated INR    Plan:  Patient is a 28-year-old male with a history of EtOH abuse who presented to the ER on 7/18.  At that time patient was unarousable and withdrawing from alcohol. Today he is alert and oriented and states that he is feeling about the same.    , ALT 66, total bilirubin 21.4, alk phos 346, INR 2.25, platlets 159, white blood cell count 39.35, AFP normal    Liver enzymes consistently elevated and not improving despite steroid course.   Patient was recently started on Neupogen which would explain his current leukocytosis.  He states that his last drink of alcohol was on July 16.   He is currently drinking Ensure 3 times daily for nutrition and eating most of his meals.  He states upon discharge he plans to seek counseling.  CTs have shown trace amounts of ascites    Plan is to continue to trend liver enzymes, continue thiamine, zinc, folic acid.  Continue lactulose.  Continue to encourage nutrition.  Patient will eventually need a screening EGD for varices. Will get in contact with tertiary transfer for liver transplant evaluation if he fails to improve.    Electronically signed by MARI Verdin, 07/29/24, 10:31 AM EDT.

## 2024-07-29 NOTE — PROGRESS NOTES
"NEPHROLOGY PROGRESS NOTE------[facility]    [facility]  [phone]  Quang Butts MD      Patient Care Team:  Provider, No Known as PCP - General      Provider:  Quang Butts MD  Patient Name: Yonas Charles  :  1995    SUBJECTIVE:    F/U ELECTROLYTE ABNORMALITIES    No chest pain or SOA      Medication:  famotidine, 40 mg, Oral, Nightly  filgrastim (NEUPOGEN) injection, 300 mcg, Subcutaneous, Daily With Lunch  folic acid, 1 mg, Oral, Daily  furosemide, 40 mg, Oral, Daily  guaiFENesin, 600 mg, Oral, Q12H  hydrocortisone, 1 Application, Topical, Q8H  lactulose, 10 g, Oral, Daily  multivitamin with minerals, 1 tablet, Oral, Daily  pantoprazole, 40 mg, Oral, BID AC  [Held by provider] sertraline, 50 mg, Oral, Daily  sodium chloride, 10 mL, Intravenous, Q12H  sucralfate, 1 g, Oral, 4x Daily AC & at Bedtime  thiamine, 100 mg, Oral, Daily  zinc sulfate, 220 mg, Oral, Daily      acetylcysteine (ACETADOTE) 6000 mg in D5W infusion (NON-APAP LIVER FAILURE) Third Dose, 6.25 mg/kg/hr, Last Rate: 6.25 mg/kg/hr (24 0510)            OBJECTIVE    Vital Sign Min/Max for last 24 hours  Temp  Min: 97.4 °F (36.3 °C)  Max: 98.8 °F (37.1 °C)   BP  Min: 117/76  Max: 130/86   Pulse  Min: 106  Max: 120   Resp  Min: 16  Max: 26   SpO2  Min: 95 %  Max: 97 %   No data recorded   No data recorded     Flowsheet Rows      Flowsheet Row First Filed Value   Admission Height 165.1 cm (65\") Documented at 2024 0855   Admission Weight 55.7 kg (122 lb 12.7 oz) Documented at 2024 0855            No intake/output data recorded.  I/O last 3 completed shifts:  In: 1236 [P.O.:540; I.V.:696]  Out: -     Physical Exam:  General Appearance: NAD.   Head: normocephalic, without obvious abnormality and atraumatic    Eyes: conjunctivae and +ICTERIC SCLERAE  Neck: supple and no JVD  Lungs: CTA bilaterally  Heart: regular rhythm & normal rate and normal S1, S2   Chest Wall: no " "abnormalities observed  Abdomen: normal bowel sounds.  Distended  Extremities: moves extremities well, trace edema, no cyanosis  Skin: +JAUNDICE  Neurologic: A AND O X 4 WITHOUT FOCAL DEFICIT    Labs:    WBC WBC   Date Value Ref Range Status   07/28/2024 40.75 (C) 3.40 - 10.80 10*3/mm3 Final   07/27/2024 34.41 (C) 3.40 - 10.80 10*3/mm3 Final      HGB Hemoglobin   Date Value Ref Range Status   07/28/2024 8.9 (L) 13.0 - 17.7 g/dL Final   07/27/2024 8.9 (L) 13.0 - 17.7 g/dL Final      HCT Hematocrit   Date Value Ref Range Status   07/28/2024 24.6 (L) 37.5 - 51.0 % Final   07/27/2024 24.9 (L) 37.5 - 51.0 % Final      Platelets No results found for: \"LABPLAT\"   MCV MCV   Date Value Ref Range Status   07/28/2024 95.0 79.0 - 97.0 fL Final   07/27/2024 97.3 (H) 79.0 - 97.0 fL Final          Sodium Sodium   Date Value Ref Range Status   07/29/2024 131 (L) 136 - 145 mmol/L Final   07/28/2024 131 (L) 136 - 145 mmol/L Final   07/27/2024 135 (L) 136 - 145 mmol/L Final      Potassium Potassium   Date Value Ref Range Status   07/29/2024 3.0 (L) 3.5 - 5.2 mmol/L Final   07/28/2024 3.0 (L) 3.5 - 5.2 mmol/L Final   07/28/2024 3.3 (L) 3.5 - 5.2 mmol/L Final   07/27/2024 3.1 (L) 3.5 - 5.2 mmol/L Final   07/27/2024 3.4 (L) 3.5 - 5.2 mmol/L Final     Comment:     Slight hemolysis detected by analyzer. Result may be falsely elevated.   07/26/2024 4.3 3.5 - 5.2 mmol/L Final      Chloride Chloride   Date Value Ref Range Status   07/29/2024 96 (L) 98 - 107 mmol/L Final   07/28/2024 97 (L) 98 - 107 mmol/L Final   07/27/2024 98 98 - 107 mmol/L Final      CO2 CO2   Date Value Ref Range Status   07/29/2024 22.2 22.0 - 29.0 mmol/L Final   07/28/2024 22.2 22.0 - 29.0 mmol/L Final   07/27/2024 21.9 (L) 22.0 - 29.0 mmol/L Final      BUN BUN   Date Value Ref Range Status   07/29/2024 3 (L) 6 - 20 mg/dL Final   07/28/2024 4 (L) 6 - 20 mg/dL Final   07/27/2024 5 (L) 6 - 20 mg/dL Final      Creatinine Creatinine   Date Value Ref Range Status " "  07/29/2024 0.47 (L) 0.76 - 1.27 mg/dL Final   07/28/2024 0.52 (L) 0.76 - 1.27 mg/dL Final   07/27/2024 0.39 (L) 0.76 - 1.27 mg/dL Final      Calcium Calcium   Date Value Ref Range Status   07/29/2024 8.0 (L) 8.6 - 10.5 mg/dL Final   07/28/2024 8.3 (L) 8.6 - 10.5 mg/dL Final   07/27/2024 8.5 (L) 8.6 - 10.5 mg/dL Final      PO4 No components found for: \"PO4\"   Albumin Albumin   Date Value Ref Range Status   07/29/2024 3.0 (L) 3.5 - 5.2 g/dL Final   07/28/2024 3.0 (L) 3.5 - 5.2 g/dL Final   07/27/2024 3.0 (L) 3.5 - 5.2 g/dL Final      Magnesium No results found for: \"MG\"     Uric Acid No components found for: \"URIC ACID\"     Imaging Results (Last 72 Hours)       Procedure Component Value Units Date/Time    US Abdomen Limited [[id number]] Collected: 07/28/24 1332     Updated: 07/28/24 1342    Narrative:      US ABDOMEN LIMITED    Date of Exam: 7/28/2024 10:50 AM EDT    Indication: Distention, rule out ascites.    Comparison: CT abdomen and pelvis 7/18/2024    Technique: Grayscale and color Doppler ultrasound evaluation of the right upper quadrant was performed.      Findings:  There is a small amount of right upper quadrant ascites adjacent to the liver measuring 3.2 x 1 x 4 cm. No ascites in the right lower quadrant, left lower quadrant and left upper quadrant. Splenomegaly noted as seen on recent CT.      Impression:      Impression:  Small right upper quadrant ascites.        Electronically Signed: Jaxon Gallagher MD    7/28/2024 1:40 PM EDT    Workstation ID: [id number]    CT Cervical Spine Without Contrast [[id number]] Collected: 07/27/24 2331     Updated: 07/27/24 2334    Narrative:        CT CERVICAL SPINE WO CONTRAST    Date of Exam: 7/27/2024 10:57 PM EDT    Indication: S/P fall.    Comparison: None available.    Technique: Axial CT images were obtained of the cervical spine without contrast administration.  Sagittal and coronal reconstructions were performed.  Automated exposure control and iterative reconstruction " methods were used.      Findings:  Seven cervical vertebrae are identified. Alignment is anatomic. Disc spaces maintain normal height. Vertebral bodies maintain normal height. There is no evidence of an acute fracture or subluxation. There are no destructive osseous lesions. Paraspinal   musculature is grossly preserved. There is no evidence of cervical lymphadenopathy or a neck mass. The craniocervical junction appears within normal limits. Limited view of the posterior fossa contents is unremarkable. Limited view of the lung apices is   unremarkable.    The uncovertebral and facet joints appear normal. There is no significant neuroforaminal or spinal canal stenosis. No large focal disc protrusion or extrusion.      Impression:      Impression:  No acute osseous abnormality.        Electronically Signed: Juno Ernst MD    7/27/2024 11:32 PM EDT    Workstation ID: [id number]    CT Head Without Contrast [[id number]] Collected: 07/27/24 2330     Updated: 07/27/24 2333    Narrative:      CT HEAD WO CONTRAST    Date of Exam: 7/27/2024 10:57 PM EDT    Indication: S/P fall.    Comparison: 7/19/2024.    Technique: Axial CT images were obtained of the head without contrast administration.  Coronal reconstructions were performed.  Automated exposure control and iterative reconstruction methods were used.      Findings:  Superficial soft tissues appear within normal limits. The calvarium is intact.  Paranasal sinuses and mastoid air cells appear well aerated.  Orbits are unremarkable.  There is no acute intracranial hemorrhage.  No mass effect or midline shift.  No   abnormal extra-axial collections.  Gray-white differentiation is within normal limits. Stable mild patchy white matter hypoattenuation. Ventricular size and configuration is normal for age.      Impression:      Impression:  1.No acute intracranial abnormality.  2.Mild chronic small vessel ischemic change.        Electronically Signed: Juno Ernst MD     7/27/2024 11:31 PM EDT    Workstation ID: [id number]    XR Chest 1 View [[id number]] Collected: 07/27/24 2311     Updated: 07/27/24 2314    Narrative:      XR CHEST 1 VW    Date of Exam: 7/27/2024 10:42 PM EDT    Indication: S/P fall, chest pain    Comparison: 7/24/2024.    Findings:  There is persistent mixed interstitial and airspace disease in the mid and lower lungs. No pneumothorax or pleural effusion. Heart size is unchanged. Pulmonary vasculature is indistinct. No acute osseous abnormality.      Impression:      Impression:  Persistent mixed interstitial and airspace disease in the mid and lower lungs.        Electronically Signed: Juno Ernst MD    7/27/2024 11:12 PM EDT    Workstation ID: [id number]            Results for orders placed during the hospital encounter of 07/18/24    XR Chest 1 View    Narrative  XR CHEST 1 VW    Date of Exam: 7/27/2024 10:42 PM EDT    Indication: S/P fall, chest pain    Comparison: 7/24/2024.    Findings:  There is persistent mixed interstitial and airspace disease in the mid and lower lungs. No pneumothorax or pleural effusion. Heart size is unchanged. Pulmonary vasculature is indistinct. No acute osseous abnormality.    Impression  Impression:  Persistent mixed interstitial and airspace disease in the mid and lower lungs.        Electronically Signed: Juno Ernst MD  7/27/2024 11:12 PM EDT  Workstation ID: [id number]      XR Chest 1 View    Narrative  XR CHEST 1 VW    Date of Exam: 7/24/2024 1:25 PM EDT    Indication: coughing, SOB    Comparison: 7/18/2024    Findings:  There are lower lung volumes. Cardiac size appears increased. There is airspace disease in both lung bases with small bilateral pleural effusions. Pulmonary vascular markings have increased compared with the last study.    Impression  Impression:    1. Increased cardiac size with evidence of mild passive congestion.  2. Bibasilar airspace disease with bilateral pleural effusions.      Electronically Signed:  Vitor Chin MD  7/24/2024 1:31 PM EDT  Workstation ID: [id number]      XR Chest PA & Lateral    Narrative  DATE OF EXAM:  7/18/2024 12:36 PM    PROCEDURE:  XR CHEST PA AND LATERAL-    INDICATIONS:  rule out aspiration pneumonia; E87.6-Hypokalemia; F10.939-Alcohol use,  unspecified with withdrawal, unspecified; R17-Unspecified jaundice    COMPARISON:  No Comparisons Available    TECHNIQUE:  Two radiologic views of the chest , PA and lateral were obtained.    FINDINGS:  Heart size normal. Negative for lobar consolidation, edema, effusion or  pneumothorax. Osseous structures unremarkable.    Impression  No active pulmonary process.      Electronically Signed By-Armani Small MD On:7/18/2024 1:00 PM            ASSESSMENT / PLAN      Alcohol withdrawal    HYPONATREMIA------stable. Likely related to underlying liver disease. Fluid restrict     2. HYPOKALEMIA-------Resolved.       3. HYPOMAGNESEMIA-------Replaced     4. HYPOCALCEMIA------Replace IV     5. ALCOHOL ABUSE/CIRRHOSIS/JAUNDICE------Thiamine, Folate, IVFs, prn IV Ativan. Withdrawal prcautions     6. MIXED METABOLIC ALKALOSIS/METABOLIC ACIDOSIS-----Better     7. ANEMIA------Normocytic with normal RDW. Follow for PRBC need     8. DEPRESSION------Suicide precautions. Hold Zoloft given severe hyponatremia     9. HYPOALBUMINEMIA-----IV Albumin to temporize and po supplements     10. ELEVATED INR------Secondary to liver disease     11. THROMBOCYTOPENIA------No heparin. Secondary to liver disease     12. PUD PROPHYLAXIS-----IV PPI     13. DVT PROPHYLAXIS-----SCDs    14. HYPOPHOSPHATEMIA------Replaced    Sodium down, but mostly stable  Has some excess volume  Continue Lasix 40 mg p.o. daily  Replace potassium  Keep on fluid restriction  Labs in am        Quang Butts MD  [facility]  [phone]  07/29/24  07:02 EDT

## 2024-07-29 NOTE — PROGRESS NOTES
[facility] MEDICINE SERVICE  DAILY PROGRESS NOTE    NAME: Yonas Charles  : 1995  MRN: [id number]      LOS: 11 days     PROVIDER OF SERVICE: Hayley Rodriguez DO    Chief Complaint: Alcohol withdrawal    Subjective:     Interval History: Patient seen and examined this morning.  Doing about the same, abdominal ultrasound results discussed with him.  T. bili and INR continues to trend up.  Awaiting further recommendations from GI today.    Review of Systems:   Pertinent positives as noted in HPI/subjective.  All other systems were reviewed and are negative.      Vital Signs  Temp:  [97.4 °F (36.3 °C)-98.3 °F (36.8 °C)] 98.3 °F (36.8 °C)  Heart Rate:  [106-120] 109  Resp:  [16-26] 17  BP: (111-130)/(69-86) 111/75   Body mass index is 20.43 kg/m².    Physical exam:     General: Awake, alert, young male, NAD  Eyes: PERRL, EOMI, sclera jaundiced  Cardiovascular: Regular rate and rhythm, no murmurs  Respiratory: Clear to auscultation bilaterally, no wheezing or rales, unlabored breathing  Abdomen: Soft, distended, nontender, positive bowel sounds, no guarding  Skin: Warm, jaundiced        Scheduled Meds   famotidine, 40 mg, Oral, Nightly  filgrastim (NEUPOGEN) injection, 300 mcg, Subcutaneous, Daily With Lunch  folic acid, 1 mg, Oral, Daily  furosemide, 40 mg, Oral, Daily  guaiFENesin, 600 mg, Oral, Q12H  hydrocortisone, 1 Application, Topical, Q8H  lactulose, 10 g, Oral, Daily  magnesium sulfate, 2 g, Intravenous, Q2H  multivitamin with minerals, 1 tablet, Oral, Daily  pantoprazole, 40 mg, Oral, BID AC  potassium chloride, 40 mEq, Oral, Q4H  [Held by provider] sertraline, 50 mg, Oral, Daily  sodium chloride, 10 mL, Intravenous, Q12H  sucralfate, 1 g, Oral, 4x Daily AC & at Bedtime  thiamine, 100 mg, Oral, Daily  zinc sulfate, 220 mg, Oral, Daily       PRN Meds     acetaminophen **OR** acetaminophen **OR** acetaminophen    senna-docusate sodium **AND** polyethylene glycol **AND** bisacodyl **AND** bisacodyl     Calcium Replacement - Follow Nurse / BPA Driven Protocol    guaiFENesin-codeine    Magnesium Standard Dose Replacement - Follow Nurse / BPA Driven Protocol    ondansetron ODT **OR** ondansetron    Phosphorus Replacement - Follow Nurse / BPA Driven Protocol    Potassium Replacement - Follow Nurse / BPA Driven Protocol    sodium chloride    sodium chloride    sodium chloride   Infusions  acetylcysteine (ACETADOTE) 6000 mg in D5W infusion (NON-APAP LIVER FAILURE) Third Dose, 6.25 mg/kg/hr, Last Rate: 6.25 mg/kg/hr (07/29/24 0510)            Diagnostic Data    Results from last 7 days   Lab Units 07/29/24  0900 07/29/24  0502   WBC 10*3/mm3 39.35*  --    HEMOGLOBIN g/dL 9.2*  --    HEMATOCRIT % 25.2*  --    PLATELETS 10*3/mm3 159  --    GLUCOSE mg/dL  --  98   CREATININE mg/dL  --  0.47*   BUN mg/dL  --  3*   SODIUM mmol/L  --  131*   POTASSIUM mmol/L  --  3.0*   AST (SGOT) U/L  --  159*   ALT (SGPT) U/L  --  66*   ALK PHOS U/L  --  346*   BILIRUBIN mg/dL  --  21.4*   ANION GAP mmol/L  --  12.8       US Abdomen Limited    Result Date: 7/28/2024  Impression: Small right upper quadrant ascites. Electronically Signed: Jaxon Gallagher MD  7/28/2024 1:40 PM EDT  Workstation ID: [id number]    CT Cervical Spine Without Contrast    Result Date: 7/27/2024  Impression: No acute osseous abnormality. Electronically Signed: Juno Ernst MD  7/27/2024 11:32 PM EDT  Workstation ID: [id number]    CT Head Without Contrast    Result Date: 7/27/2024  Impression: 1.No acute intracranial abnormality. 2.Mild chronic small vessel ischemic change. Electronically Signed: Juno Ernst MD  7/27/2024 11:31 PM EDT  Workstation ID: [id number]    XR Chest 1 View    Result Date: 7/27/2024  Impression: Persistent mixed interstitial and airspace disease in the mid and lower lungs. Electronically Signed: Juno Ernst MD  7/27/2024 11:12 PM EDT  Workstation ID: [id number]       I reviewed the patient's new clinical results.    Assessment/Plan:     Active and  Resolved Problems  Active Hospital Problems    Diagnosis  POA    **Alcohol withdrawal [F10.939]  Yes      Resolved Hospital Problems   No resolved problems to display.       Acute alcoholic hepatitis with alcoholic cirrhosis  -Secondary to excessive alcohol use  -Initiated on prednisolone given elevated discriminant function score on admission  -T. bili and LFTs continues to rise, prednisolone discontinued per GI and Neupogen started on 7/25  -Monitor LFTs  -Withdrawal symptoms improved and stable now  -Guarded prognosis overall, GI following and may need transfer to tertiary care center for transplant evaluation if does not improve on Neupogen  -Abdomen ultrasound with only mild right upper quadrant ascites noted  -Awaiting further GI recommendations regarding possible transfer to tertiary care center    Leukocytosis  -Reactive from Neupogen  -Remains afebrile, no signs of any acute infection at this time  -Continue to monitor    Coagulopathy  -Related to liver disease as above  -Monitor INR  -vitamin K if needed    Hyponatremia 2/2 beer potomania  -Improved sodium level with fluid restriction  -Off IV fluids  -Nephrology following    Acute hypokalemia, hypomagnesemia, hypocalcemia  -Secondary to malnutrition in the setting of excessive alcohol use  -Replete as needed and monitor     VTE Prophylaxis:  Mechanical VTE prophylaxis orders are present.       Code status is   Code Status and Medical Interventions:   Ordered at: 07/18/24 1214     Code Status (Patient has no pulse and is not breathing):    CPR (Attempt to Resuscitate)     Medical Interventions (Patient has pulse or is breathing):    Full Support       Disposition: May need to transfer to tertiary care center for further management.    Signature: Electronically signed by Hayley Rodriguez DO, 07/29/24, 10:16 EDT.  [facility] Hospitalist Team    Part of this note may be an electronic transcription/translation of spoken language to printed text using the  Dragon Dictation System.

## 2024-07-29 NOTE — CASE MANAGEMENT/SOCIAL WORK
Continued Stay Note  EVI Kinsey     Patient Name: Yonas Charles  MRN: [id number]  Today's Date: 7/29/2024    Admit Date: 7/18/2024    Plan: From home with parents. Patient has expressed interest in Inpatient Substance Abuse Tx Program. Watch for potential need to transfer to another acute care hospital   Discharge Plan       Row Name 07/29/24 1137       Plan    Plan From home with parents. Patient has expressed interest in Inpatient Substance Abuse Tx Program. Watch for potential need to transfer to another acute care hospital    Patient/Family in Agreement with Plan yes    Plan Comments BArriers to discharge: Watch WBC (likely elevated from 5 day fours of neupogen started 7/25).  Watch INR, watch Na, Watch Liver enzmes.  Continue lactulose.  Iv acetylcystine.             Expected Discharge Date and Time       Expected Discharge Date Expected Discharge Time    Jul 31, 2024           Danay Al RN     Office Phone [phone]  Office Cell [phone]

## 2024-07-30 ENCOUNTER — INPATIENT HOSPITAL (OUTPATIENT)
Dept: URBAN - METROPOLITAN AREA HOSPITAL 84 | Facility: HOSPITAL | Age: 29
End: 2024-07-30
Payer: MEDICAID

## 2024-07-30 DIAGNOSIS — D72.829 ELEVATED WHITE BLOOD CELL COUNT, UNSPECIFIED: ICD-10-CM

## 2024-07-30 DIAGNOSIS — R79.1 ABNORMAL COAGULATION PROFILE: ICD-10-CM

## 2024-07-30 DIAGNOSIS — K70.30 ALCOHOLIC CIRRHOSIS OF LIVER WITHOUT ASCITES: ICD-10-CM

## 2024-07-30 DIAGNOSIS — K70.10 ALCOHOLIC HEPATITIS WITHOUT ASCITES: ICD-10-CM

## 2024-07-30 LAB
ALBUMIN SERPL-MCNC: 2.9 G/DL (ref 3.5–5.2)
ALBUMIN/GLOB SERPL: 1.2 G/DL
ALP SERPL-CCNC: 370 U/L (ref 39–117)
ALT SERPL W P-5'-P-CCNC: 60 U/L (ref 1–41)
ANION GAP SERPL CALCULATED.3IONS-SCNC: 10.2 MMOL/L (ref 5–15)
ANISOCYTOSIS BLD QL: ABNORMAL
AST SERPL-CCNC: 143 U/L (ref 1–40)
BASOPHILS # BLD MANUAL: 0.93 10*3/MM3 (ref 0–0.2)
BASOPHILS NFR BLD MANUAL: 2 % (ref 0–1.5)
BILIRUB SERPL-MCNC: 21.2 MG/DL (ref 0–1.2)
BUN SERPL-MCNC: 3 MG/DL (ref 6–20)
BUN/CREAT SERPL: 7 (ref 7–25)
CALCIUM SPEC-SCNC: 7.7 MG/DL (ref 8.6–10.5)
CHLORIDE SERPL-SCNC: 100 MMOL/L (ref 98–107)
CO2 SERPL-SCNC: 22.8 MMOL/L (ref 22–29)
CREAT SERPL-MCNC: 0.43 MG/DL (ref 0.76–1.27)
DEPRECATED RDW RBC AUTO: 67.2 FL (ref 37–54)
EGFRCR SERPLBLD CKD-EPI 2021: 148.2 ML/MIN/1.73
ERYTHROCYTE [DISTWIDTH] IN BLOOD BY AUTOMATED COUNT: 20.1 % (ref 12.3–15.4)
GLOBULIN UR ELPH-MCNC: 2.5 GM/DL
GLUCOSE SERPL-MCNC: 90 MG/DL (ref 65–99)
HCT VFR BLD AUTO: 23.5 % (ref 37.5–51)
HGB BLD-MCNC: 8.5 G/DL (ref 13–17.7)
INR PPP: 2.03 (ref 0.93–1.1)
LYMPHOCYTES # BLD MANUAL: 7.88 10*3/MM3 (ref 0.7–3.1)
LYMPHOCYTES NFR BLD MANUAL: 7 % (ref 5–12)
MAGNESIUM SERPL-MCNC: 2.2 MG/DL (ref 1.6–2.6)
MCH RBC QN AUTO: 34.4 PG (ref 26.6–33)
MCHC RBC AUTO-ENTMCNC: 36.2 G/DL (ref 31.5–35.7)
MCV RBC AUTO: 95.1 FL (ref 79–97)
METAMYELOCYTES NFR BLD MANUAL: 1 % (ref 0–0)
MONOCYTES # BLD: 3.24 10*3/MM3 (ref 0.1–0.9)
NEUTROPHILS # BLD AUTO: 33.82 10*3/MM3 (ref 1.7–7)
NEUTROPHILS NFR BLD MANUAL: 48 % (ref 42.7–76)
NEUTS BAND NFR BLD MANUAL: 25 % (ref 0–5)
PHOSPHATE SERPL-MCNC: 1.5 MG/DL (ref 2.5–4.5)
PLAT MORPH BLD: NORMAL
PLATELET # BLD AUTO: 145 10*3/MM3 (ref 140–450)
PMV BLD AUTO: 10.7 FL (ref 6–12)
POIKILOCYTOSIS BLD QL SMEAR: ABNORMAL
POTASSIUM SERPL-SCNC: 3.6 MMOL/L (ref 3.5–5.2)
POTASSIUM SERPL-SCNC: 3.8 MMOL/L (ref 3.5–5.2)
PROT SERPL-MCNC: 5.4 G/DL (ref 6–8.5)
PROTHROMBIN TIME: 21 SECONDS (ref 9.6–11.7)
RBC # BLD AUTO: 2.47 10*6/MM3 (ref 4.14–5.8)
SCAN SLIDE: NORMAL
SODIUM SERPL-SCNC: 133 MMOL/L (ref 136–145)
VARIANT LYMPHS NFR BLD MANUAL: 1 % (ref 0–5)
VARIANT LYMPHS NFR BLD MANUAL: 16 % (ref 19.6–45.3)
WBC MORPH BLD: NORMAL
WBC NRBC COR # BLD AUTO: 46.33 10*3/MM3 (ref 3.4–10.8)

## 2024-07-30 PROCEDURE — 0 DEXTROSE 5 % SOLUTION 1,000 ML FLEX CONT: Performed by: NURSE PRACTITIONER

## 2024-07-30 PROCEDURE — 25810000003 SODIUM CHLORIDE 0.9 % SOLUTION: Performed by: INTERNAL MEDICINE

## 2024-07-30 PROCEDURE — 84100 ASSAY OF PHOSPHORUS: CPT | Performed by: INTERNAL MEDICINE

## 2024-07-30 PROCEDURE — 84132 ASSAY OF SERUM POTASSIUM: CPT | Performed by: INTERNAL MEDICINE

## 2024-07-30 PROCEDURE — 85007 BL SMEAR W/DIFF WBC COUNT: CPT | Performed by: NURSE PRACTITIONER

## 2024-07-30 PROCEDURE — 80053 COMPREHEN METABOLIC PANEL: CPT | Performed by: INTERNAL MEDICINE

## 2024-07-30 PROCEDURE — 99233 SBSQ HOSP IP/OBS HIGH 50: CPT | Performed by: NURSE PRACTITIONER

## 2024-07-30 PROCEDURE — 85610 PROTHROMBIN TIME: CPT | Performed by: NURSE PRACTITIONER

## 2024-07-30 PROCEDURE — 25010000002 ACETYLCYSTEINE PER 100 MG: Performed by: NURSE PRACTITIONER

## 2024-07-30 PROCEDURE — 85025 COMPLETE CBC W/AUTO DIFF WBC: CPT | Performed by: NURSE PRACTITIONER

## 2024-07-30 PROCEDURE — 83735 ASSAY OF MAGNESIUM: CPT | Performed by: INTERNAL MEDICINE

## 2024-07-30 RX ORDER — FENTANYL/ROPIVACAINE/NS/PF 2-625MCG/1
15 PLASTIC BAG, INJECTION (ML) EPIDURAL
Status: COMPLETED | OUTPATIENT
Start: 2024-07-30 | End: 2024-07-30

## 2024-07-30 RX ORDER — IBUPROFEN 200 MG
1 CAPSULE ORAL DAILY
Status: DISCONTINUED | OUTPATIENT
Start: 2024-07-30 | End: 2024-08-02 | Stop reason: HOSPADM

## 2024-07-30 RX ORDER — POTASSIUM CHLORIDE 20 MEQ/1
20 TABLET, EXTENDED RELEASE ORAL DAILY
Status: DISCONTINUED | OUTPATIENT
Start: 2024-07-30 | End: 2024-07-31

## 2024-07-30 RX ORDER — HYDROXYZINE HYDROCHLORIDE 25 MG/1
25 TABLET, FILM COATED ORAL EVERY 8 HOURS PRN
Status: DISCONTINUED | OUTPATIENT
Start: 2024-07-30 | End: 2024-08-02 | Stop reason: HOSPADM

## 2024-07-30 RX ADMIN — SUCRALFATE 1 G: 1 TABLET ORAL at 08:52

## 2024-07-30 RX ADMIN — Medication 1 TABLET: at 08:51

## 2024-07-30 RX ADMIN — ACETYLCYSTEINE 6.25 MG/KG/HR: 200 INJECTION, SOLUTION INTRAVENOUS at 05:09

## 2024-07-30 RX ADMIN — SUCRALFATE 1 G: 1 TABLET ORAL at 21:20

## 2024-07-30 RX ADMIN — SUCRALFATE 1 G: 1 TABLET ORAL at 12:00

## 2024-07-30 RX ADMIN — Medication 220 MG: at 08:51

## 2024-07-30 RX ADMIN — FUROSEMIDE 40 MG: 40 TABLET ORAL at 08:52

## 2024-07-30 RX ADMIN — FOLIC ACID 1 MG: 1 TABLET ORAL at 08:52

## 2024-07-30 RX ADMIN — HYDROCORTISONE 1 APPLICATION: 1 CREAM TOPICAL at 14:00

## 2024-07-30 RX ADMIN — BACITRACIN ZINC, NEOMYCIN, POLYMYXIN B SULFAT 1 APPLICATION: 5000; 3.5; 4 OINTMENT TOPICAL at 17:23

## 2024-07-30 RX ADMIN — POTASSIUM PHOSPHATE, MONOBASIC AND POTASSIUM PHOSPHATE, DIBASIC 15 MMOL: 224; 236 INJECTION, SOLUTION, CONCENTRATE INTRAVENOUS at 08:52

## 2024-07-30 RX ADMIN — POTASSIUM PHOSPHATE, MONOBASIC AND POTASSIUM PHOSPHATE, DIBASIC 15 MMOL: 224; 236 INJECTION, SOLUTION, CONCENTRATE INTRAVENOUS at 12:00

## 2024-07-30 RX ADMIN — HYDROXYZINE HYDROCHLORIDE 25 MG: 25 TABLET ORAL at 21:20

## 2024-07-30 RX ADMIN — SUCRALFATE 1 G: 1 TABLET ORAL at 17:23

## 2024-07-30 RX ADMIN — POTASSIUM CHLORIDE 40 MEQ: 1500 TABLET, EXTENDED RELEASE ORAL at 02:34

## 2024-07-30 RX ADMIN — POTASSIUM CHLORIDE 40 MEQ: 1500 TABLET, EXTENDED RELEASE ORAL at 05:09

## 2024-07-30 RX ADMIN — GUAIFENESIN 600 MG: 600 TABLET, EXTENDED RELEASE ORAL at 08:52

## 2024-07-30 RX ADMIN — PANTOPRAZOLE SODIUM 40 MG: 40 TABLET, DELAYED RELEASE ORAL at 08:51

## 2024-07-30 RX ADMIN — FAMOTIDINE 40 MG: 20 TABLET, FILM COATED ORAL at 21:20

## 2024-07-30 RX ADMIN — Medication 10 ML: at 09:06

## 2024-07-30 RX ADMIN — PANTOPRAZOLE SODIUM 40 MG: 40 TABLET, DELAYED RELEASE ORAL at 17:23

## 2024-07-30 RX ADMIN — GUAIFENESIN 600 MG: 600 TABLET, EXTENDED RELEASE ORAL at 21:21

## 2024-07-30 RX ADMIN — Medication 10 ML: at 21:21

## 2024-07-30 RX ADMIN — Medication 100 MG: at 08:51

## 2024-07-30 NOTE — NURSING NOTE
On previous shift pt family member scratch of a scab, a meplix drsg applied , drsg became saturated, Drsg removed area clean with saline and applied a new drsg applying continuous pressure, then applied a clear tegaderm so drsg and drainage could be monitor. The drsg also became saturated and required a new drsg. Again drsg was removed clean, pressure applied and attempt to use coban and then a cool pack in hopes this would help with bleeding. On call provider notified and was advise to cont to applied pressure and use pressure drsg.  After approx 2 hrs drsg was again saturated and MD secure chatted  after new drsg was applied and obtain new orders for Vit K IV x1. Med adminstered. Cont to change drsg with pressure drsg. Reassured pt through out shift , Pt having anxiety encourage pt to not to focus on the bleeding VS monitor and wound and labs

## 2024-07-30 NOTE — PROGRESS NOTES
" LOS: 12 days   Patient Care Team:  Provider, No Known as PCP - General      Subjective \"I was bleeding from a scab throughout the night\"    Interval History:     Subjective: He reports a pinpoint scab that was scraped off last night has been bleeding intermittently throughout the night.  He is eating well but continues to have right upper quadrant soreness.  No vomiting.  Having good bowel movements.  Eating well.    ROS:   No chest pain, shortness of breath, or cough.      Medication Review:     Current Facility-Administered Medications:     acetaminophen (TYLENOL) tablet 650 mg, 650 mg, Oral, Q4H PRN **OR** acetaminophen (TYLENOL) 160 MG/5ML oral solution 650 mg, 650 mg, Oral, Q4H PRN **OR** acetaminophen (TYLENOL) suppository 650 mg, 650 mg, Rectal, Q4H PRN, Mari Bustos MD    [COMPLETED] acetylcysteine (ACETADOTE) 8,360 mg in dextrose (D5W) 5 % 200 mL infusion, 150 mg/kg, Intravenous, Once, Last Rate: 241.8 mL/hr at 07/27/24 1441, 8,360 mg at 07/27/24 1441 **FOLLOWED BY** [COMPLETED] acetylcysteine (ACETADOTE) 2,780 mg in dextrose (D5W) 5 % 500 mL infusion, 50 mg/kg, Intravenous, Once, Last Rate: 125 mL/hr at 07/27/24 1630, 2,780 mg at 07/27/24 1630 **FOLLOWED BY** acetylcysteine (ACETADOTE) 6,000 mg in dextrose (D5W) 5 % 1,000 mL infusion, 6.25 mg/kg/hr, Intravenous, Continuous, Kaci Polo APRN, Last Rate: 58 mL/hr at 07/30/24 0509, 6.25 mg/kg/hr at 07/30/24 0509    sennosides-docusate (PERICOLACE) 8.6-50 MG per tablet 2 tablet, 2 tablet, Oral, BID PRN **AND** polyethylene glycol (MIRALAX) packet 17 g, 17 g, Oral, Daily PRN **AND** bisacodyl (DULCOLAX) EC tablet 5 mg, 5 mg, Oral, Daily PRN **AND** bisacodyl (DULCOLAX) suppository 10 mg, 10 mg, Rectal, Daily PRN, Mari Bustos MD    Calcium Replacement - Follow Nurse / BPA Driven Protocol, , Does not apply, Juli HARTLEY Abbas Ali, MD    famotidine (PEPCID) tablet 40 mg, 40 mg, Oral, Nightly, Waleska Chery, APRN, 40 mg at 07/29/24 2122    " folic acid (FOLVITE) tablet 1 mg, 1 mg, Oral, Daily, Mari Bustos MD, 1 mg at 07/30/24 0852    furosemide (LASIX) tablet 40 mg, 40 mg, Oral, Daily, Quang Butts MD, 40 mg at 07/30/24 0852    guaiFENesin (MUCINEX) 12 hr tablet 600 mg, 600 mg, Oral, Q12H, Hayley Rodriguez DO, 600 mg at 07/30/24 0852    guaiFENesin-codeine (GUAIFENESIN AC) 100-10 MG/5ML liquid 5 mL, 5 mL, Oral, Q4H PRN, Frank Wild MD, 5 mL at 07/24/24 0318    hydrocortisone 1 % cream 1 Application, 1 Application, Topical, Q8H, Hayley Rodriguez DO, 1 Application at 07/29/24 0604    lactulose (CHRONULAC) 10 GM/15ML solution 10 g, 10 g, Oral, Daily, Kaci Polo APRN, 10 g at 07/28/24 0834    Magnesium Standard Dose Replacement - Follow Nurse / BPA Driven Protocol, , Does not apply, PRN, Mari Bustos MD    multivitamin with minerals 1 tablet, 1 tablet, Oral, Daily, Mari Bustos MD, 1 tablet at 07/30/24 0851    ondansetron ODT (ZOFRAN-ODT) disintegrating tablet 4 mg, 4 mg, Oral, Q6H PRN **OR** ondansetron (ZOFRAN) injection 4 mg, 4 mg, Intravenous, Q6H PRN, Mari Bustos MD, 4 mg at 07/18/24 1330    pantoprazole (PROTONIX) EC tablet 40 mg, 40 mg, Oral, BID AC, Kaci Polo APRN, 40 mg at 07/30/24 0851    Phosphorus Replacement - Follow Nurse / BPA Driven Protocol, , Does not apply, PRN, Mari Bustos MD    potassium phosphate 15 mmol in 0.9% normal saline 250 mL IVPB, 15 mmol, Intravenous, Q3H, Robson Hudson MD, 15 mmol at 07/30/24 0852    Potassium Replacement - Follow Nurse / BPA Driven Protocol, , Does not apply, PRN, Mari Bustos MD    [Held by provider] sertraline (ZOLOFT) tablet 50 mg, 50 mg, Oral, Daily, Mari Bustos MD, 50 mg at 07/18/24 1258    silver nitrate 75-25 % applicator 1 Application, 1 Application, Topical, Once, Frank Wild MD    sodium chloride 0.9 % flush 10 mL, 10 mL, Intravenous, PRN, Mari Bustos MD    sodium chloride 0.9 % flush 10 mL, 10 mL,  Intravenous, Q12H, Mari Bustos MD, 10 mL at 07/30/24 0906    sodium chloride 0.9 % flush 10 mL, 10 mL, Intravenous, PRN, Mari Bustos MD    sodium chloride 0.9 % infusion 40 mL, 40 mL, Intravenous, PRN, Mari Bustos MD    sucralfate (CARAFATE) tablet 1 g, 1 g, Oral, 4x Daily AC & at Bedtime, Kaci Polo APRN, 1 g at 07/30/24 0852    [COMPLETED] thiamine (B-1) injection 200 mg, 200 mg, Intravenous, Q8H, 200 mg at 07/23/24 1437 **FOLLOWED BY** thiamine (VITAMIN B-1) tablet 100 mg, 100 mg, Oral, Daily, Mari Bustos MD, 100 mg at 07/30/24 0851    zinc sulfate (ZINCATE) capsule 220 mg, 220 mg, Oral, Daily, Waleska Chery APRN, 220 mg at 07/30/24 0851      Objective resting in bed, no acute distress, nurse in room    Vital Signs  Vitals:    07/29/24 1931 07/30/24 0005 07/30/24 0413 07/30/24 0733   BP: 126/71 114/57 112/67 119/69   BP Location: Left arm Left arm Left arm Right arm   Patient Position: Lying Lying Lying Lying   Pulse: 113 115 92 99   Resp: 20 25 20 20   Temp:  98.1 °F (36.7 °C) 98.3 °F (36.8 °C) 98.1 °F (36.7 °C)   TempSrc:  Oral Oral Oral   SpO2: 94% 96% 96%    Weight:       Height:           Physical Exam:     General Appearance:    Awake and alert, in no acute distress   Head:    Normocephalic, without obvious abnormality   Eyes:          Conjunctivae normal, icteric sclera   Throat:   No oral lesions, no thrush, oral mucosa moist   Neck:   supple, no JVD   Lungs:     respirations regular, even and unlabored   Abdomen:     Soft, mildly distended   Rectal:     Deferred   Extremities:   No edema, no cyanosis   Skin:   No bruising or rash,  jaundice        Results Review:    CBC    Results from last 7 days   Lab Units 07/30/24  0518 07/29/24  0900 07/28/24  0234 07/27/24  0414 07/26/24  0108 07/24/24  2322 07/24/24  0324   WBC 10*3/mm3 46.33* 39.35* 40.75* 34.41* 25.79* 16.67* 15.98*   HEMOGLOBIN g/dL 8.5* 9.2* 8.9* 8.9* 8.5* 9.3* 9.2*   PLATELETS 10*3/mm3 145 159 161 172  "161 170 162     CMP   Results from last 7 days   Lab Units 07/30/24  0518 07/29/24 2012 07/29/24  0502 07/28/24  1907 07/28/24  0234 07/27/24  2119 07/27/24  0414 07/26/24  1325 07/26/24  0108 07/25/24  0953 07/24/24  2322 07/24/24  0324   SODIUM mmol/L 133*  --  131*  --  131*  --  135*  --  134*  --  135* 134*   POTASSIUM mmol/L 3.6 3.0* 3.0* 3.0* 3.3* 3.1* 3.4*   < > 3.4* 4.0 3.4* 4.0   CHLORIDE mmol/L 100  --  96*  --  97*  --  98  --  98  --  99 101   CO2 mmol/L 22.8  --  22.2  --  22.2  --  21.9*  --  25.0  --  25.4 25.0   BUN mg/dL 3*  --  3*  --  4*  --  5*  --  6  --  5* 5*   CREATININE mg/dL 0.43*  --  0.47*  --  0.52*  --  0.39*  --  0.50*  --  0.51* 0.49*   GLUCOSE mg/dL 90  --  98  --  83  --  111*  --  87  --  115* 93   ALBUMIN g/dL 2.9*  --  2.9*  3.0*  --  3.0*  --  3.0*  --  2.9*  --  3.3* 3.2*   BILIRUBIN mg/dL 21.2*  --  21.4*  --  20.8*  --  20.4*  --  17.8*  --  18.2* 16.9*   ALK PHOS U/L 370*  --  346*  --  294*  --  307*  --  255*  --  283* 275*   AST (SGOT) U/L 143*  --  159*  --  163*  --  176*  --  143*  --  141* 126*   ALT (SGPT) U/L 60*  --  66*  --  63*  --  62*  --  49*  --  47* 35   MAGNESIUM mg/dL 2.2  --  1.2*  --   --   --   --   --   --   --   --   --    PHOSPHORUS mg/dL 1.5*  --  2.3*  --  2.3*  --  2.5  --  3.0 2.5 2.1* 2.3*    < > = values in this interval not displayed.     Cr Clearance Estimated Creatinine Clearance: 199.7 mL/min (A) (by C-G formula based on SCr of 0.43 mg/dL (L)).  Coag   Results from last 7 days   Lab Units 07/30/24  0518 07/29/24  0900 07/28/24  0234 07/27/24  0414 07/26/24  1325 07/26/24  0938 07/24/24  2322   INR  2.03* 2.25* 2.04* 1.85* 1.97* 2.00* 1.98*     HbA1C No results found for: \"HGBA1C\"      Infection     UA      Microbiology Results (last 10 days)       ** No results found for the last 240 hours. **          Imaging Results (Last 72 Hours)       Procedure Component Value Units Date/Time    US Abdomen Limited [[id number]] Collected: 07/28/24 " 1332     Updated: 07/28/24 1342    Narrative:      US ABDOMEN LIMITED    Date of Exam: 7/28/2024 10:50 AM EDT    Indication: Distention, rule out ascites.    Comparison: CT abdomen and pelvis 7/18/2024    Technique: Grayscale and color Doppler ultrasound evaluation of the right upper quadrant was performed.      Findings:  There is a small amount of right upper quadrant ascites adjacent to the liver measuring 3.2 x 1 x 4 cm. No ascites in the right lower quadrant, left lower quadrant and left upper quadrant. Splenomegaly noted as seen on recent CT.      Impression:      Impression:  Small right upper quadrant ascites.        Electronically Signed: Jaxon Gallagher MD    7/28/2024 1:40 PM EDT    Workstation ID: [id number]    CT Cervical Spine Without Contrast [[id number]] Collected: 07/27/24 2331     Updated: 07/27/24 2334    Narrative:        CT CERVICAL SPINE WO CONTRAST    Date of Exam: 7/27/2024 10:57 PM EDT    Indication: S/P fall.    Comparison: None available.    Technique: Axial CT images were obtained of the cervical spine without contrast administration.  Sagittal and coronal reconstructions were performed.  Automated exposure control and iterative reconstruction methods were used.      Findings:  Seven cervical vertebrae are identified. Alignment is anatomic. Disc spaces maintain normal height. Vertebral bodies maintain normal height. There is no evidence of an acute fracture or subluxation. There are no destructive osseous lesions. Paraspinal   musculature is grossly preserved. There is no evidence of cervical lymphadenopathy or a neck mass. The craniocervical junction appears within normal limits. Limited view of the posterior fossa contents is unremarkable. Limited view of the lung apices is   unremarkable.    The uncovertebral and facet joints appear normal. There is no significant neuroforaminal or spinal canal stenosis. No large focal disc protrusion or extrusion.      Impression:      Impression:  No acute  osseous abnormality.        Electronically Signed: Juno Ernst MD    7/27/2024 11:32 PM EDT    Workstation ID: [id number]    CT Head Without Contrast [[id number]] Collected: 07/27/24 2330     Updated: 07/27/24 2333    Narrative:      CT HEAD WO CONTRAST    Date of Exam: 7/27/2024 10:57 PM EDT    Indication: S/P fall.    Comparison: 7/19/2024.    Technique: Axial CT images were obtained of the head without contrast administration.  Coronal reconstructions were performed.  Automated exposure control and iterative reconstruction methods were used.      Findings:  Superficial soft tissues appear within normal limits. The calvarium is intact.  Paranasal sinuses and mastoid air cells appear well aerated.  Orbits are unremarkable.  There is no acute intracranial hemorrhage.  No mass effect or midline shift.  No   abnormal extra-axial collections.  Gray-white differentiation is within normal limits. Stable mild patchy white matter hypoattenuation. Ventricular size and configuration is normal for age.      Impression:      Impression:  1.No acute intracranial abnormality.  2.Mild chronic small vessel ischemic change.        Electronically Signed: Juno Ernst MD    7/27/2024 11:31 PM EDT    Workstation ID: [id number]    XR Chest 1 View [[id number]] Collected: 07/27/24 2311     Updated: 07/27/24 2314    Narrative:      XR CHEST 1 VW    Date of Exam: 7/27/2024 10:42 PM EDT    Indication: S/P fall, chest pain    Comparison: 7/24/2024.    Findings:  There is persistent mixed interstitial and airspace disease in the mid and lower lungs. No pneumothorax or pleural effusion. Heart size is unchanged. Pulmonary vasculature is indistinct. No acute osseous abnormality.      Impression:      Impression:  Persistent mixed interstitial and airspace disease in the mid and lower lungs.        Electronically Signed: Juno Ernst MD    7/27/2024 11:12 PM EDT    Workstation ID: [id number]            Assessment & Plan   Alcoholic  hepatitis/cirrhosis  Elevated LFTs  Alcohol withdrawal  Leukocytosis  Elevated INR     Plan:  Patient is a 28-year-old male with a history of EtOH abuse who presented to the ER on 7/18.  At that time patient was unarousable and withdrawing from alcohol.      , ALT 66, total bilirubin 21.4, alk phos 346, INR 2.25, platlets 159, white blood cell count 39.35, AFP normal     Total bilirubin 21.2 and stable.  INR 2.03 which is improved from 2.25.  Finished Neupogen.  Remains on thiamine, zinc, Carafate, pantoprazole, multivitamin, lactulose, folic acid, diuretics and electrolyte replacement.  On acetylcysteine per protocol.  Continue good nutritional support.  We will plan transfer to tertiary center for liver transplant evaluation only if he does not continue to improve.  Bleeding from a scab overnight, consider consulting surgery if persists.  Continue supportive care.    Electronically signed by MARI Verdin, 07/30/24, 9:44 AM EDT.

## 2024-07-30 NOTE — PLAN OF CARE
Problem: Adult Inpatient Plan of Care  Goal: Absence of Hospital-Acquired Illness or Injury  Intervention: Identify and Manage Fall Risk  Description: Perform standard risk assessment on admission using a validated tool or comprehensive approach appropriate to the patient; reassess fall risk frequently, with change in status or transfer to another level of care.  Communicate fall injury risk to interprofessional healthcare team.  Determine need for increased observation, equipment and environmental modification, such as low bed, signage and supportive, nonskid footwear.  Adjust safety measures to individual developmental age, stage and identified risk factors.  Reinforce the importance of safety and physical activity with patient and family.  Perform regular intentional rounding to assess need for position change, pain assessment and personal needs, including assistance with toileting.  Recent Flowsheet Documentation  Taken 7/30/2024 0200 by Carlos Garcia LPN  Safety Promotion/Fall Prevention:   safety round/check completed   muscle strengthening facilitated   nonskid shoes/slippers when out of bed   mobility aid in reach   lighting adjusted   fall prevention program maintained   clutter free environment maintained   activity supervised   assistive device/personal items within reach  Taken 7/30/2024 0000 by Carlos Garcia LPN  Safety Promotion/Fall Prevention:   safety round/check completed   room organization consistent   nonskid shoes/slippers when out of bed   muscle strengthening facilitated   mobility aid in reach   lighting adjusted   fall prevention program maintained   clutter free environment maintained   assistive device/personal items within reach   activity supervised  Taken 7/29/2024 2200 by Carlos Garcia LPN  Safety Promotion/Fall Prevention:   safety round/check completed   room organization consistent   nonskid shoes/slippers when out of bed   mobility aid in reach   muscle  strengthening facilitated   lighting adjusted   fall prevention program maintained   clutter free environment maintained   assistive device/personal items within reach   activity supervised  Taken 7/29/2024 2000 by Carlos Garcia LPN  Safety Promotion/Fall Prevention:   safety round/check completed   room organization consistent   nonskid shoes/slippers when out of bed   muscle strengthening facilitated   mobility aid in reach   lighting adjusted   elopement precautions   fall prevention program maintained   clutter free environment maintained   assistive device/personal items within reach   activity supervised  Intervention: Prevent Skin Injury  Description: Perform a screening for skin injury risk, such as pressure or moisture associated skin damage on admission and at regular intervals throughout hospital stay.  Keep all areas of skin (especially folds) clean and dry.  Maintain adequate skin hydration.  Relieve and redistribute pressure and protect bony prominences; implement measures based on patient-specific risk factors.  Match turning and repositioning schedule to clinical condition.  Encourage weight shift frequently; assist with reposition if unable to complete independently.  Float heels off bed; avoid pressure on the Achilles tendon.  Keep skin free from extended contact with medical devices.  Encourage functional activity and mobility, as early as tolerated.  Use aids (e.g., slide boards, mechanical lift) during transfer.  Recent Flowsheet Documentation  Taken 7/30/2024 0000 by Carlos Garcia LPN  Body Position: position changed independently  Taken 7/29/2024 2200 by Carlos Garcia LPN  Body Position:   position changed independently   weight shifting  Taken 7/29/2024 2000 by Carlos Garcia LPN  Body Position: position changed independently  Intervention: Prevent and Manage VTE (Venous Thromboembolism) Risk  Description: Assess for VTE (venous thromboembolism) risk.  Encourage and  assist with early ambulation.  Initiate and maintain compression or other therapy, as indicated, based on identified risk in accordance with organizational protocol and provider order.  Encourage both active and passive leg exercises while in bed, if unable to ambulate.  Recent Flowsheet Documentation  Taken 7/30/2024 0000 by Carlos Garcia LPN  Activity Management:   activity encouraged   ambulated to bathroom  Taken 7/29/2024 2200 by Carlos Garcia LPN  Activity Management:   ambulated to bathroom   activity encouraged  Taken 7/29/2024 2000 by Carlos Garcia LPN  Activity Management:   activity encouraged   ambulated to bathroom  VTE Prevention/Management:   sequential compression devices off   patient refused intervention  Intervention: Prevent Infection  Description: Maintain skin and mucous membrane integrity; promote hand, oral and pulmonary hygiene.  Optimize fluid balance, nutrition, sleep and glycemic control to maximize infection resistance.  Identify potential sources of infection early to prevent or mitigate progression of infection (e.g., wound, lines, devices).  Evaluate ongoing need for invasive devices; remove promptly when no longer indicated.  Recent Flowsheet Documentation  Taken 7/30/2024 0200 by Carlos Garcia LPN  Infection Prevention:   visitors restricted/screened   single patient room provided   rest/sleep promoted   personal protective equipment utilized   hand hygiene promoted  Taken 7/30/2024 0000 by Carlos Garcia LPN  Infection Prevention:   visitors restricted/screened   single patient room provided   rest/sleep promoted   personal protective equipment utilized   hand hygiene promoted  Taken 7/29/2024 2200 by Carlos Garcia LPN  Infection Prevention:   visitors restricted/screened   single patient room provided   rest/sleep promoted   personal protective equipment utilized   hand hygiene promoted  Taken 7/29/2024 2000 by Carlos Garcia  LPN  Infection Prevention:   single patient room provided   visitors restricted/screened   rest/sleep promoted   personal protective equipment utilized   hand hygiene promoted  Goal: Optimal Comfort and Wellbeing  Intervention: Monitor Pain and Promote Comfort  Description: Assess pain level, treatment efficacy and patient response at regular intervals using a consistent pain scale.  Consider the presence and impact of preexisting chronic pain.  Encourage patient and caregiver involvement in pain assessment, interventions and safety measures.  Recent Flowsheet Documentation  Taken 7/30/2024 0000 by Carlos Garcia LPN  Pain Management Interventions:   see MAR   quiet environment facilitated   position adjusted   pain management plan reviewed with patient/caregiver   medication offered but refused  Taken 7/29/2024 2000 by Carlos Garcia LPN  Pain Management Interventions:   see MAR   quiet environment facilitated   no interventions per patient request   pain management plan reviewed with patient/caregiver   medication offered but refused     Problem: Pain Acute  Goal: Acceptable Pain Control and Functional Ability  Intervention: Prevent or Manage Pain  Description: Evaluate pain level, effect of treatment and patient response at regular intervals.  Minimize painful stimuli; coordinate care and adjust environment (e.g., light, noise, unnecessary movement); promote sleep/rest.  Match pharmacologic analgesia to severity and type of pain mechanism (e.g., neuropathic, muscle, inflammatory); consider multimodal approach (e.g., nonopioid, opioid, adjuvant).  Provide medication at regular intervals; titrate to patient response; premedicate for painful procedures.  Manage breakthrough pain with additional doses; consider rotation or switching medication.  Monitor for signs of substance tolerance (increased dose to reach desired effect, decreased effect with same dose).  Manage medication-induced effects, such as  constipation, nausea, pruritus, urinary retention, somnolence and dizziness.  Provide multimodal interventions, such as as physical activity, therapeutic exercise, yoga, TENS (transcutaneous electrical nerve stimulation) and manual therapy.  Train in functional activity modifications, such as body mechanics, posture, ergonomics, energy conservation and activity pacing.  Consider addition of complementary or alternative therapy, such as acupuncture, hypnosis or therapeutic touch.  Recent Flowsheet Documentation  Taken 7/30/2024 0200 by Carlos Garcia LPN  Medication Review/Management: medications reviewed  Taken 7/30/2024 0000 by Carlos Garcia LPN  Sleep/Rest Enhancement: awakenings minimized  Medication Review/Management: medications reviewed  Taken 7/29/2024 2200 by Carlos Garcia LPN  Medication Review/Management: medications reviewed  Taken 7/29/2024 2000 by Carlos Garcia LPN  Sensory Stimulation Regulation: television on  Sleep/Rest Enhancement: awakenings minimized  Medication Review/Management: medications reviewed  Intervention: Develop Pain Management Plan  Description: Acknowledge patient as the expert in pain self-management.  Use a consistent, validated tool for pain assessment; include function and quality of life.  Evaluate risk for opioid use and dependence.  Set pain management goals; determine acceptable level of discomfort to allow for maximal functioning.  Determine [id number] pain management plan, including both pharmacologic and nonpharmacologic measures; integrate management of chronic (persistent) pain.  Identify and integrate past successful treatment measures, if able.  Encourage patient and caregiver involvement in pain assessment, interventions and safety measures.  Re-evaluate plan regularly.  Recent Flowsheet Documentation  Taken 7/30/2024 0000 by Carlos Garcia LPN  Pain Management Interventions:   see MAR   quiet environment facilitated    position adjusted   pain management plan reviewed with patient/caregiver   medication offered but refused  Taken 7/29/2024 2000 by Carlos Garcia LPN  Pain Management Interventions:   see MAR   quiet environment facilitated   no interventions per patient request   pain management plan reviewed with patient/caregiver   medication offered but refused  Intervention: Optimize Psychosocial Wellbeing  Description: Facilitate patient’s self-control over pain by providing pain information and allowing choices in treatment.  Consider and address emotional response to pain.  Explore and promote use of coping strategies; address barriers to successful coping.  Evaluate and assist with psychosocial, cultural and spiritual factors impacting pain.  Modify pain perception using techniques, such as distraction, mindfulness, guided imagery, meditation or music.  Assess for risk factors for developing chronic pain, such as depression, fear, pain avoidance and pain catastrophizing.  Consider referral for ongoing coping support, such as education, relaxation training and role of thoughts.  Recent Flowsheet Documentation  Taken 7/29/2024 2000 by Carlos Garcia LPN  Supportive Measures: verbalization of feelings encouraged  Diversional Activities: television     Problem: Skin Injury Risk Increased  Goal: Skin Health and Integrity  Intervention: Optimize Skin Protection  Description: Perform a full pressure injury risk assessment, as indicated by screening, upon admission to care unit.  Reassess skin (injury risk, full inspection) frequently (e.g., scheduled interval, with change in condition) to provide optimal early detection and prevention.  Maintain adequate tissue perfusion (e.g., encourage fluid balance; avoid crossing legs, constrictive clothing or devices) to promote tissue oxygenation.  Maintain head of bed at lowest degree of elevation tolerated, considering medical condition and other restrictions.  Avoid  positioning onto an area that remains reddened.  Minimize incontinence and moisture (e.g., toileting schedule; moisture-wicking pad, diaper or incontinence collection device; skin moisture barrier).  Cleanse skin promptly and gently when soiled utilizing a pH-balanced cleanser.  Relieve and redistribute pressure (e.g., scheduled position changes, weight shifts, use of support surface, medical device repositioning, protective dressing application, use of positioning device, microclimate control, use of pressure-injury-monitor  Encourage increased activity, such as sitting in a chair at the bedside or early mobilization, when able to tolerate.  Recent Flowsheet Documentation  Taken 7/30/2024 0000 by Carlos Garcia LPN  Pressure Reduction Techniques: frequent weight shift encouraged  Head of Bed (HOB) Positioning: HOB at 30-45 degrees  Pressure Reduction Devices: pressure-redistributing mattress utilized  Taken 7/29/2024 2200 by Carlos Garcia LPN  Pressure Reduction Techniques: frequent weight shift encouraged  Head of Bed (HOB) Positioning: HOB at 30-45 degrees  Pressure Reduction Devices: pressure-redistributing mattress utilized  Taken 7/29/2024 2000 by Carlos Garica LPN  Pressure Reduction Techniques: frequent weight shift encouraged  Pressure Reduction Devices: pressure-redistributing mattress utilized     Problem: Fall Injury Risk  Goal: Absence of Fall and Fall-Related Injury  Intervention: Identify and Manage Contributors  Description: Develop a fall prevention plan with the patient and caregiver/family.  Provide reorientation, appropriate sensory stimulation and routines with changes in mental status to decrease risk of fall.  Promote use of personal vision and auditory aids.  Assess assistance level required for safe and effective self-care; provide support as needed, such as toileting, mobilization. For age 65 and older, implement timed toileting with assistance.  Encourage physical  activity, such as performance of mobility and self-care at highest level of patient ability, multicomponent exercise program and provision of appropriate assistive devices.  If fall occurs, assess the severity of injury; implement fall injury protocol. Determine the cause and revise fall injury prevention plan.  Regularly review medication contribution to fall risk; adjust medication administration times to minimize risk of falling.  Consider risk related to polypharmacy and age.  Balance adequate pain management with potential for oversedation.  Recent Flowsheet Documentation  Taken 7/30/2024 0200 by Carlos Garcia LPN  Medication Review/Management: medications reviewed  Taken 7/30/2024 0000 by Carlos Garcia LPN  Medication Review/Management: medications reviewed  Taken 7/29/2024 2200 by Carlos Garcia LPN  Medication Review/Management: medications reviewed  Taken 7/29/2024 2000 by Carlos Garcia LPN  Medication Review/Management: medications reviewed  Intervention: Promote Injury-Free Environment  Description: Provide a safe, barrier-free environment that encourages independent activity.  Keep care area uncluttered and well-lighted.  Determine need for increased observation or monitoring.  Avoid use of devices that minimize mobility, such as restraints or indwelling urinary catheter.  Recent Flowsheet Documentation  Taken 7/30/2024 0200 by Carlos Garcia LPN  Safety Promotion/Fall Prevention:   safety round/check completed   muscle strengthening facilitated   nonskid shoes/slippers when out of bed   mobility aid in reach   lighting adjusted   fall prevention program maintained   clutter free environment maintained   activity supervised   assistive device/personal items within reach  Taken 7/30/2024 0000 by Carols Garcia LPN  Safety Promotion/Fall Prevention:   safety round/check completed   room organization consistent   nonskid shoes/slippers when out of bed   muscle  strengthening facilitated   mobility aid in reach   lighting adjusted   fall prevention program maintained   clutter free environment maintained   assistive device/personal items within reach   activity supervised  Taken 7/29/2024 2200 by Carlos Garcia LPN  Safety Promotion/Fall Prevention:   safety round/check completed   room organization consistent   nonskid shoes/slippers when out of bed   mobility aid in reach   muscle strengthening facilitated   lighting adjusted   fall prevention program maintained   clutter free environment maintained   assistive device/personal items within reach   activity supervised  Taken 7/29/2024 2000 by Carlos Garcia LPN  Safety Promotion/Fall Prevention:   safety round/check completed   room organization consistent   nonskid shoes/slippers when out of bed   muscle strengthening facilitated   mobility aid in reach   lighting adjusted   elopement precautions   fall prevention program maintained   clutter free environment maintained   assistive device/personal items within reach   activity supervised     Problem: Alcohol Withdrawal  Goal: Alcohol Withdrawal Symptom Control  Intervention: Minimize or Manage Alcohol Withdrawal Symptoms  Description: Assess and monitor withdrawal symptom severity with a validated alcohol withdrawal tool.  Verify last intake of alcohol and consumption habits; recognize that time since last alcohol use and average daily amount consumed may not predict the severity of alcohol withdrawal.  Assess physiologic status frequently, including neurologic, hemodynamic and cardiac condition. Be prepared to implement emergency measures if symptoms progress or condition deteriorates.  Assess and monitor airway, breathing and circulation; maintain close surveillance for deterioration.  Maintain patent airway with use of positioning, airway adjuncts and secretion clearance.  Provide oxygen therapy judiciously, if hypoxemia present; use lung-protective  measures, if positive-pressure ventilation needed.  Screen for malnutrition risk, such as unintentional weight loss and poor appetite; if vitamin and mineral deficiency suspected, such as thiamine, anticipate providing supplementation.  Provide intravenous fluids; monitor and address fluid and electrolyte imbalances; reintroduce oral fluids when safe to swallow.  Anticipate administering pharmacologic treatment using a loading dose strategy, such as benzodiazepine, a predetermined fixed medication tapering schedule with additional dosing as needed or a symptom-triggered approach.  Provide a safe environment (e.g., seizure precautions, fall risk). Adjust environment to minimize stimulation.  Recent Flowsheet Documentation  Taken 7/30/2024 0200 by Carlos Garcia LPN  Seizure Precautions: side rails padded  Taken 7/30/2024 0000 by Carlos Garcia LPN  Seizure Precautions:   side rails padded   soft boundaries provided  Taken 7/29/2024 2200 by Carlos Garcia LPN  Seizure Precautions:   side rails padded   soft boundaries provided  Taken 7/29/2024 2000 by Carlos Garcia LPN  Sensory Stimulation Regulation: television on  Seizure Precautions:   activity supervised   side rails padded   soft boundaries provided     Problem: Acute Neurologic Deterioration (Alcohol Withdrawal)  Goal: Optimal Neurologic Function  Intervention: Minimize or Manage Acute Neurologic Symptoms  Description: Maintain close observation. Assess neurologic status frequently for progression of symptoms. Monitor electrocardiogram closely; address changes in rate and rhythm.  Elevate head of bed; place head in midline position, as tolerated, to enhance venous outflow and maintain cerebral perfusion.  Minimize stimulation and activity that increases intrathoracic or intra-abdominal pressure, such as hip flexion, Valsalva maneuver, coughing or vomiting. Provide a quiet and calm environment.  Anticipate fluid and pharmacologic therapy  to maintain blood pressure and cerebral perfusion; titrate to targeted threshold and patient response.  Administer pharmacologic therapy as prescribed (e.g., benzodiazepine, sedative, anticonvulsant, beta-blocker, thiamine).  Avoid hypoglycemia; maintain glycemic control.  Manage fever to preserve cerebral metabolism; use rapid cooling measures, such as external-cooling device, sponge or tub bath, internal-cooling method.  Anticipate the need for pharmacologic therapy for refractory delirium tremens.  Anticipate diagnostic testing, such as EEG (electroencephalogram), MRI (magnetic resonance imaging) or CT (computed tomography) for new onset seizures or unexplained neurologic symptoms.  Recent Flowsheet Documentation  Taken 7/29/2024 2000 by Carlos Garcia LPN  Sensory Stimulation Regulation: television on  Intervention: Prevent Seizure-Related Injury  Description: Remain at bedside throughout seizure activity; monitor status and intervene as necessary.  Place head of bed flat and turn to side-lying position to prevent aspiration and promote safety; do not restrain.  Do not insert anything into the mouth, unless the airway becomes compromised.  Anticipate administration of pharmacologic therapy (e.g., benzodiazepine, barbiturate).  Assess for injury following seizure; monitor, reassure and reorient patient.  Recent Flowsheet Documentation  Taken 7/30/2024 0200 by Carlos Garcia LPN  Seizure Precautions: side rails padded  Taken 7/30/2024 0000 by Carlos Garcia LPN  Seizure Precautions:   side rails padded   soft boundaries provided  Taken 7/29/2024 2200 by Carlos Garcia LPN  Seizure Precautions:   side rails padded   soft boundaries provided  Taken 7/29/2024 2000 by Carlos Garcia LPN  Seizure Precautions:   activity supervised   side rails padded   soft boundaries provided     Problem: Substance Misuse (Alcohol Withdrawal)  Goal: Readiness for Change Identified  Intervention: Partner  to Facilitate Behavior Change  Description: Assess pattern of substance use from multiple information sources.  Provide education about risks of current use; develop a mutual treatment plan.  Assess readiness to change; identify stage of change and current level of insight.  Explore motivations for change, affirming change-related statements; identify and develop recovery goals.  Elicit recognition of gap between current behavior and desired life goals.  Explore ambivalence associated with commitment to behavior change; support steps toward change, however small.  Identify internal and external triggers and methods to cope.  Assist with transition to next level of care; establish or re-establish connections with community resources; identify and address safety risk.  Identify and address factors that impact overall treatment adherence (e.g., childcare, transportation, appointment times, cost).  Recent Flowsheet Documentation  Taken 7/29/2024 2000 by Carlos Garcia LPN  Supportive Measures: verbalization of feelings encouraged  Intervention: Promote Psychosocial Wellbeing  Description: Use a nonjudgmental approach to establish a therapeutic alliance and trust; normalize and validate the patient and family/caregiver’s experience.  Include family/caregiver in assessment and planning, if supportive; honor confidentiality.  Encourage verbalization of feelings and concerns; partner to identify and utilize patient and family/caregiver strengths and coping strategies.  Emphasize importance of a strong support system; encourage support of family and friends.  Screen and monitor for signs and symptoms of co-occurring mental illness.  Recent Flowsheet Documentation  Taken 7/29/2024 2000 by Carlos Garcia LPN  Family/Support System Care: involvement promoted     Problem: Adjustment to Illness (Sepsis/Septic Shock)  Goal: Optimal Coping  Intervention: Optimize Psychosocial Adjustment to Illness  Description:  Acknowledge, normalize, validate intensity and complexity of patient and support system response to situation.  Provide opportunity for expression of thoughts, feelings and concerns; respond with compassion and reassurance.  Decrease stress and anxiety by providing information about patient’s status and treatment.  Facilitate support system presence and participation in care; consider providing a diary in intensive care situation.  Support coping by recognizing current coping strategies; provide aid in developing new strategies.  Acknowledge and normalize difficulty in managing life-long lifestyle changes and expectations.  Assess and monitor for signs and symptoms of psychologic distress, anxiety and depression.  Consider palliative care consult for goals of care conversation, if the condition is worsening despite treatment.  Recent Flowsheet Documentation  Taken 7/29/2024 2000 by Carlos Garcia LPN  Supportive Measures: verbalization of feelings encouraged  Family/Support System Care: involvement promoted     Problem: Bleeding (Sepsis/Septic Shock)  Goal: Absence of Bleeding  Intervention: Monitor and Manage Bleeding  Description: Maintain bleeding precautions; provide safe environment and gentle care activities, such as positioning, oral and skin care.  Avoid invasive procedures and medication that increase risk of bleeding; monitor for signs of bleeding frequently.  Anticipate need for fluid volume replacement (e.g., intravenous fluid, blood products); use weight-based calculations.  Consider adjunctive supportive therapy, such as platelet infusion or heparin.  Provide protective hemostasis by applying direct pressure to a visible bleeding site.  Recent Flowsheet Documentation  Taken 7/30/2024 0200 by Carlos Garcia LPN  Bleeding Precautions: monitored for signs of bleeding  Bleeding Management:   direct pressure applied   dressing monitored   pressure dressing applied  Taken 7/30/2024 0000 by  Carlos Garcia LPN  Bleeding Precautions: monitored for signs of bleeding  Bleeding Management:   pressure dressing applied   direct pressure applied   dressing monitored  Taken 7/29/2024 2200 by Carlos Garcia LPN  Bleeding Management:   direct pressure applied   pressure dressing applied   dressing monitored  Taken 7/29/2024 2000 by Carlos Garcia LPN  Bleeding Management:   pressure dressing applied   dressing monitored   direct pressure applied     Problem: Infection Progression (Sepsis/Septic Shock)  Goal: Absence of Infection Signs and Symptoms  Intervention: Initiate Sepsis Management  Description: Provide fluid therapy, such as crystalloid or albumin, to increase intravascular volume, organ perfusion and oxygen delivery.  Provide respiratory support, such as oxygen therapy, noninvasive or invasive positive pressure ventilation, to achieve oxygenation and ventilation goal; avoid hyperoxemia.  Obtain cultures prior to initiating antimicrobial therapy when possible. Do not delay for laboratory results in the presence of high suspicion or clinical indicators.  Administer intravenous broad-spectrum antimicrobial therapy promptly.  Implement hemodynamic monitoring to guide intravascular support based on individual targeted parameters.  Determine and address underlying source of infection aggressively; implement transmission-based precautions and isolation, as indicated.  Recent Flowsheet Documentation  Taken 7/30/2024 0200 by Carlos Garcia LPN  Infection Prevention:   visitors restricted/screened   single patient room provided   rest/sleep promoted   personal protective equipment utilized   hand hygiene promoted  Taken 7/30/2024 0000 by Carlos Garcia LPN  Infection Prevention:   visitors restricted/screened   single patient room provided   rest/sleep promoted   personal protective equipment utilized   hand hygiene promoted  Taken 7/29/2024 2200 by Carlos aGrcia LPN  Infection  Prevention:   visitors restricted/screened   single patient room provided   rest/sleep promoted   personal protective equipment utilized   hand hygiene promoted  Taken 7/29/2024 2000 by Carlos Garcia LPN  Infection Prevention:   single patient room provided   visitors restricted/screened   rest/sleep promoted   personal protective equipment utilized   hand hygiene promoted  Intervention: Promote Recovery  Description: Encourage pulmonary hygiene, such as cough-enhancement and airway-clearance techniques, that may include use of incentive spirometry, deep breathing and cough.  Encourage early rehabilitation and physical activity to optimize functional ability and activity tolerance, as well as minimize delirium.  Promote energy conservation; minimize oxygen demand and consumption by adjusting environment, decreasing stimulation, maintaining normothermia and treating pain.  Optimize fluid balance, nutrition intake, sleep and glycemic control to maintain tissue perfusion and enhance immune response.  Recent Flowsheet Documentation  Taken 7/30/2024 0000 by Carlos Garcia LPN  Activity Management:   activity encouraged   ambulated to bathroom  Sleep/Rest Enhancement: awakenings minimized  Taken 7/29/2024 2200 by Carlos Garcia LPN  Activity Management:   ambulated to bathroom   activity encouraged  Taken 7/29/2024 2000 by Carlos Garcia LPN  Activity Management:   activity encouraged   ambulated to bathroom  Sleep/Rest Enhancement: awakenings minimized  Intervention: Promote Stabilization  Description: Monitor for signs of fluid responsiveness and overload; consider fluid adjustment and diuretic therapy.  Anticipate use of vasoactive agent to support microperfusion and oxygen delivery; titrate to response.  Monitor laboratory value, diagnostic test and clinical status trends for signs of infection progression and multiple organ failure.  Assess effectiveness of and potential for de-escalation of  the antimicrobial regimen daily.  Provide fever-reduction and comfort measures.  Monitor and manage electrolyte imbalance, such as hypocalcemia.  Use lung protective ventilation measures, such as low volume, inspiratory pressure, optimal positive end-expiratory pressure, to minimize the risk of ventilator-induced lung injury; ensure minute volume demands.  Prepare for supportive therapy, such as corticosteroid therapy, coagulopathy management, CRRT (continuous renal replacement therapy), hemofiltration and cardiac-assist device.  Recent Flowsheet Documentation  Taken 7/30/2024 0000 by Carlos Garcia LPN  Fluid/Electrolyte Management: fluids restricted   Goal Outcome Evaluation: progress toward goal ongoing INR elevated WBC elevated, K low even with treatment , old wound that scab was accidental remove by family member , Vit K adminstered and Pressure drsg applied freq, awaiting plan on discharge to be determine

## 2024-07-30 NOTE — PLAN OF CARE
Problem: Adult Inpatient Plan of Care  Goal: Plan of Care Review  Outcome: Ongoing, Progressing  Goal: Patient-Specific Goal (Individualized)  Outcome: Ongoing, Progressing  Goal: Absence of Hospital-Acquired Illness or Injury  Outcome: Ongoing, Progressing  Intervention: Identify and Manage Fall Risk  Recent Flowsheet Documentation  Taken 7/30/2024 1638 by Kimmie Winters LPN  Safety Promotion/Fall Prevention:   activity supervised   clutter free environment maintained   assistive device/personal items within reach   lighting adjusted   room organization consistent   safety round/check completed  Taken 7/30/2024 1402 by Kimmie Winters LPN  Safety Promotion/Fall Prevention:   activity supervised   assistive device/personal items within reach   clutter free environment maintained   lighting adjusted   room organization consistent   safety round/check completed  Taken 7/30/2024 1205 by Kimmie Winters LPN  Safety Promotion/Fall Prevention:   activity supervised   assistive device/personal items within reach   clutter free environment maintained   lighting adjusted   safety round/check completed   room organization consistent  Taken 7/30/2024 1002 by Kimmie Winters LPN  Safety Promotion/Fall Prevention:   activity supervised   assistive device/personal items within reach   clutter free environment maintained   lighting adjusted   room organization consistent   safety round/check completed  Taken 7/30/2024 0800 by Kimmie Winters LPN  Safety Promotion/Fall Prevention:   assistive device/personal items within reach   activity supervised   clutter free environment maintained   lighting adjusted   room organization consistent   safety round/check completed  Intervention: Prevent Skin Injury  Recent Flowsheet Documentation  Taken 7/30/2024 0800 by Kimmie Winters LPN  Skin Protection:   adhesive use limited   incontinence pads utilized   tubing/devices free from skin contact  Intervention: Prevent and Manage VTE  (Venous Thromboembolism) Risk  Recent Flowsheet Documentation  Taken 7/30/2024 0800 by Kimmie Winters LPN  VTE Prevention/Management:   bilateral   sequential compression devices off   patient refused intervention  Range of Motion: active ROM (range of motion) encouraged  Intervention: Prevent Infection  Recent Flowsheet Documentation  Taken 7/30/2024 1638 by Kimmie Winters LPN  Infection Prevention:   cohorting utilized   environmental surveillance performed   hand hygiene promoted   personal protective equipment utilized   single patient room provided   rest/sleep promoted   visitors restricted/screened  Taken 7/30/2024 1402 by Kimmie Winters LPN  Infection Prevention:   cohorting utilized   environmental surveillance performed   hand hygiene promoted   personal protective equipment utilized   visitors restricted/screened   single patient room provided   rest/sleep promoted  Taken 7/30/2024 1205 by Kimmie Winters LPN  Infection Prevention:   cohorting utilized   environmental surveillance performed   hand hygiene promoted   personal protective equipment utilized   rest/sleep promoted   single patient room provided   visitors restricted/screened  Taken 7/30/2024 1002 by Kimmie Winters LPN  Infection Prevention:   cohorting utilized   environmental surveillance performed   hand hygiene promoted   personal protective equipment utilized   rest/sleep promoted   single patient room provided   visitors restricted/screened  Taken 7/30/2024 0800 by Kimmie Winters LPN  Infection Prevention:   cohorting utilized   environmental surveillance performed   hand hygiene promoted   personal protective equipment utilized   rest/sleep promoted   single patient room provided   visitors restricted/screened  Goal: Optimal Comfort and Wellbeing  Outcome: Ongoing, Progressing  Intervention: Monitor Pain and Promote Comfort  Recent Flowsheet Documentation  Taken 7/30/2024 1638 by Kimmie Winters LPN  Pain Management  Interventions:   care clustered   diversional activity provided   pillow support provided   position adjusted  Taken 7/30/2024 1205 by Kimmie Winters LPN  Pain Management Interventions:   care clustered   diversional activity provided  Taken 7/30/2024 0800 by Kimmie Winters LPN  Pain Management Interventions:   cold applied   diversional activity provided   pillow support provided   position adjusted  Intervention: Provide Person-Centered Care  Recent Flowsheet Documentation  Taken 7/30/2024 0800 by Kimmie Winters LPN  Trust Relationship/Rapport:   care explained   choices provided   thoughts/feelings acknowledged  Goal: Readiness for Transition of Care  Outcome: Ongoing, Progressing   Goal Outcome Evaluation:        Patients left shoulder area abrasion was bleeding earlier in shift. Bleeding was controlled with Silicone foam dressing applied with no further bleeding noted. Patient has no c/o pain or discomfort noted at this time. Patient IV fluids will complete this shift. Patient remains Jaundice with yellow sclera. Call Light within reach.

## 2024-07-30 NOTE — PROGRESS NOTES
"NEPHROLOGY PROGRESS NOTE------[facility]    [facility]  [phone]  Quang Butts MD      Patient Care Team:  Provider, No Known as PCP - General      Provider:  Quang Butts MD  Patient Name: Yonas Charles  :  1995    SUBJECTIVE:    F/U ELECTROLYTE ABNORMALITIES    No chest pain or SOA      Medication:  famotidine, 40 mg, Oral, Nightly  folic acid, 1 mg, Oral, Daily  furosemide, 40 mg, Oral, Daily  guaiFENesin, 600 mg, Oral, Q12H  hydrocortisone, 1 Application, Topical, Q8H  lactulose, 10 g, Oral, Daily  multivitamin with minerals, 1 tablet, Oral, Daily  pantoprazole, 40 mg, Oral, BID AC  potassium phosphate, 15 mmol, Intravenous, Q3H  [Held by provider] sertraline, 50 mg, Oral, Daily  silver nitrate, 1 Application, Topical, Once  sodium chloride, 10 mL, Intravenous, Q12H  sucralfate, 1 g, Oral, 4x Daily AC & at Bedtime  thiamine, 100 mg, Oral, Daily  zinc sulfate, 220 mg, Oral, Daily      acetylcysteine (ACETADOTE) 6000 mg in D5W infusion (NON-APAP LIVER FAILURE) Third Dose, 6.25 mg/kg/hr, Last Rate: 6.25 mg/kg/hr (24 0509)            OBJECTIVE    Vital Sign Min/Max for last 24 hours  Temp  Min: 98.1 °F (36.7 °C)  Max: 98.3 °F (36.8 °C)   BP  Min: 112/67  Max: 126/71   Pulse  Min: 92  Max: 116   Resp  Min: 16  Max: 25   SpO2  Min: 94 %  Max: 96 %   No data recorded   No data recorded     Flowsheet Rows      Flowsheet Row First Filed Value   Admission Height 165.1 cm (65\") Documented at 2024 0855   Admission Weight 55.7 kg (122 lb 12.7 oz) Documented at 2024 0855            No intake/output data recorded.  I/O last 3 completed shifts:  In: 1536 [P.O.:840; I.V.:696]  Out: -     Physical Exam:  General Appearance: NAD.   Head: normocephalic, without obvious abnormality and atraumatic    Eyes: conjunctivae and +ICTERIC SCLERAE  Neck: supple and no JVD  Lungs: CTA bilaterally  Heart: regular rhythm & normal rate and normal S1, " "S2   Chest Wall: no abnormalities observed  Abdomen: normal bowel sounds.  Distended  Extremities: moves extremities well, trace edema, no cyanosis  Skin: +JAUNDICE  Neurologic: A AND O X 4 WITHOUT FOCAL DEFICIT    Labs:    WBC WBC   Date Value Ref Range Status   07/30/2024 46.33 (C) 3.40 - 10.80 10*3/mm3 Final   07/29/2024 39.35 (C) 3.40 - 10.80 10*3/mm3 Final   07/28/2024 40.75 (C) 3.40 - 10.80 10*3/mm3 Final      HGB Hemoglobin   Date Value Ref Range Status   07/30/2024 8.5 (L) 13.0 - 17.7 g/dL Final   07/29/2024 9.2 (L) 13.0 - 17.7 g/dL Final   07/28/2024 8.9 (L) 13.0 - 17.7 g/dL Final      HCT Hematocrit   Date Value Ref Range Status   07/30/2024 23.5 (L) 37.5 - 51.0 % Final   07/29/2024 25.2 (L) 37.5 - 51.0 % Final   07/28/2024 24.6 (L) 37.5 - 51.0 % Final      Platelets No results found for: \"LABPLAT\"   MCV MCV   Date Value Ref Range Status   07/30/2024 95.1 79.0 - 97.0 fL Final   07/29/2024 94.7 79.0 - 97.0 fL Final   07/28/2024 95.0 79.0 - 97.0 fL Final          Sodium Sodium   Date Value Ref Range Status   07/30/2024 133 (L) 136 - 145 mmol/L Final   07/29/2024 131 (L) 136 - 145 mmol/L Final   07/28/2024 131 (L) 136 - 145 mmol/L Final      Potassium Potassium   Date Value Ref Range Status   07/30/2024 3.6 3.5 - 5.2 mmol/L Final   07/29/2024 3.0 (L) 3.5 - 5.2 mmol/L Final   07/29/2024 3.0 (L) 3.5 - 5.2 mmol/L Final   07/28/2024 3.0 (L) 3.5 - 5.2 mmol/L Final   07/28/2024 3.3 (L) 3.5 - 5.2 mmol/L Final   07/27/2024 3.1 (L) 3.5 - 5.2 mmol/L Final      Chloride Chloride   Date Value Ref Range Status   07/30/2024 100 98 - 107 mmol/L Final   07/29/2024 96 (L) 98 - 107 mmol/L Final   07/28/2024 97 (L) 98 - 107 mmol/L Final      CO2 CO2   Date Value Ref Range Status   07/30/2024 22.8 22.0 - 29.0 mmol/L Final   07/29/2024 22.2 22.0 - 29.0 mmol/L Final   07/28/2024 22.2 22.0 - 29.0 mmol/L Final      BUN BUN   Date Value Ref Range Status   07/30/2024 3 (L) 6 - 20 mg/dL Final   07/29/2024 3 (L) 6 - 20 mg/dL Final " "  07/28/2024 4 (L) 6 - 20 mg/dL Final      Creatinine Creatinine   Date Value Ref Range Status   07/30/2024 0.43 (L) 0.76 - 1.27 mg/dL Final   07/29/2024 0.47 (L) 0.76 - 1.27 mg/dL Final   07/28/2024 0.52 (L) 0.76 - 1.27 mg/dL Final      Calcium Calcium   Date Value Ref Range Status   07/30/2024 7.7 (L) 8.6 - 10.5 mg/dL Final   07/29/2024 8.0 (L) 8.6 - 10.5 mg/dL Final   07/28/2024 8.3 (L) 8.6 - 10.5 mg/dL Final      PO4 No components found for: \"PO4\"   Albumin Albumin   Date Value Ref Range Status   07/30/2024 2.9 (L) 3.5 - 5.2 g/dL Final   07/29/2024 3.0 (L) 3.5 - 5.2 g/dL Final   07/29/2024 2.9 (L) 3.5 - 5.2 g/dL Final   07/28/2024 3.0 (L) 3.5 - 5.2 g/dL Final      Magnesium Magnesium   Date Value Ref Range Status   07/30/2024 2.2 1.6 - 2.6 mg/dL Final   07/29/2024 1.2 (L) 1.6 - 2.6 mg/dL Final        Uric Acid No components found for: \"URIC ACID\"     Imaging Results (Last 72 Hours)       Procedure Component Value Units Date/Time    US Abdomen Limited [[id number]] Collected: 07/28/24 1332     Updated: 07/28/24 1342    Narrative:      US ABDOMEN LIMITED    Date of Exam: 7/28/2024 10:50 AM EDT    Indication: Distention, rule out ascites.    Comparison: CT abdomen and pelvis 7/18/2024    Technique: Grayscale and color Doppler ultrasound evaluation of the right upper quadrant was performed.      Findings:  There is a small amount of right upper quadrant ascites adjacent to the liver measuring 3.2 x 1 x 4 cm. No ascites in the right lower quadrant, left lower quadrant and left upper quadrant. Splenomegaly noted as seen on recent CT.      Impression:      Impression:  Small right upper quadrant ascites.        Electronically Signed: Jaxon Gallagher MD    7/28/2024 1:40 PM EDT    Workstation ID: [id number]    CT Cervical Spine Without Contrast [[id number]] Collected: 07/27/24 2331     Updated: 07/27/24 2334    Narrative:        CT CERVICAL SPINE WO CONTRAST    Date of Exam: 7/27/2024 10:57 PM EDT    Indication: S/P " fall.    Comparison: None available.    Technique: Axial CT images were obtained of the cervical spine without contrast administration.  Sagittal and coronal reconstructions were performed.  Automated exposure control and iterative reconstruction methods were used.      Findings:  Seven cervical vertebrae are identified. Alignment is anatomic. Disc spaces maintain normal height. Vertebral bodies maintain normal height. There is no evidence of an acute fracture or subluxation. There are no destructive osseous lesions. Paraspinal   musculature is grossly preserved. There is no evidence of cervical lymphadenopathy or a neck mass. The craniocervical junction appears within normal limits. Limited view of the posterior fossa contents is unremarkable. Limited view of the lung apices is   unremarkable.    The uncovertebral and facet joints appear normal. There is no significant neuroforaminal or spinal canal stenosis. No large focal disc protrusion or extrusion.      Impression:      Impression:  No acute osseous abnormality.        Electronically Signed: Juno Ernst MD    7/27/2024 11:32 PM EDT    Workstation ID: [id number]    CT Head Without Contrast [[id number]] Collected: 07/27/24 2330     Updated: 07/27/24 2333    Narrative:      CT HEAD WO CONTRAST    Date of Exam: 7/27/2024 10:57 PM EDT    Indication: S/P fall.    Comparison: 7/19/2024.    Technique: Axial CT images were obtained of the head without contrast administration.  Coronal reconstructions were performed.  Automated exposure control and iterative reconstruction methods were used.      Findings:  Superficial soft tissues appear within normal limits. The calvarium is intact.  Paranasal sinuses and mastoid air cells appear well aerated.  Orbits are unremarkable.  There is no acute intracranial hemorrhage.  No mass effect or midline shift.  No   abnormal extra-axial collections.  Gray-white differentiation is within normal limits. Stable mild patchy white matter  hypoattenuation. Ventricular size and configuration is normal for age.      Impression:      Impression:  1.No acute intracranial abnormality.  2.Mild chronic small vessel ischemic change.        Electronically Signed: Juno Ernst MD    7/27/2024 11:31 PM EDT    Workstation ID: [id number]    XR Chest 1 View [[id number]] Collected: 07/27/24 2311     Updated: 07/27/24 2314    Narrative:      XR CHEST 1 VW    Date of Exam: 7/27/2024 10:42 PM EDT    Indication: S/P fall, chest pain    Comparison: 7/24/2024.    Findings:  There is persistent mixed interstitial and airspace disease in the mid and lower lungs. No pneumothorax or pleural effusion. Heart size is unchanged. Pulmonary vasculature is indistinct. No acute osseous abnormality.      Impression:      Impression:  Persistent mixed interstitial and airspace disease in the mid and lower lungs.        Electronically Signed: Juno Ernst MD    7/27/2024 11:12 PM EDT    Workstation ID: [id number]            Results for orders placed during the hospital encounter of 07/18/24    XR Chest 1 View    Narrative  XR CHEST 1 VW    Date of Exam: 7/27/2024 10:42 PM EDT    Indication: S/P fall, chest pain    Comparison: 7/24/2024.    Findings:  There is persistent mixed interstitial and airspace disease in the mid and lower lungs. No pneumothorax or pleural effusion. Heart size is unchanged. Pulmonary vasculature is indistinct. No acute osseous abnormality.    Impression  Impression:  Persistent mixed interstitial and airspace disease in the mid and lower lungs.        Electronically Signed: Juno Ernst MD  7/27/2024 11:12 PM EDT  Workstation ID: [id number]      XR Chest 1 View    Narrative  XR CHEST 1 VW    Date of Exam: 7/24/2024 1:25 PM EDT    Indication: coughing, SOB    Comparison: 7/18/2024    Findings:  There are lower lung volumes. Cardiac size appears increased. There is airspace disease in both lung bases with small bilateral pleural effusions. Pulmonary vascular  markings have increased compared with the last study.    Impression  Impression:    1. Increased cardiac size with evidence of mild passive congestion.  2. Bibasilar airspace disease with bilateral pleural effusions.      Electronically Signed: Vitor Chin MD  7/24/2024 1:31 PM EDT  Workstation ID: [id number]      XR Chest PA & Lateral    Narrative  DATE OF EXAM:  7/18/2024 12:36 PM    PROCEDURE:  XR CHEST PA AND LATERAL-    INDICATIONS:  rule out aspiration pneumonia; E87.6-Hypokalemia; F10.939-Alcohol use,  unspecified with withdrawal, unspecified; R17-Unspecified jaundice    COMPARISON:  No Comparisons Available    TECHNIQUE:  Two radiologic views of the chest , PA and lateral were obtained.    FINDINGS:  Heart size normal. Negative for lobar consolidation, edema, effusion or  pneumothorax. Osseous structures unremarkable.    Impression  No active pulmonary process.      Electronically Signed By-Armani Small MD On:7/18/2024 1:00 PM            ASSESSMENT / PLAN      Alcohol withdrawal    HYPONATREMIA------stable. Likely related to underlying liver disease. Fluid restrict     2. HYPOKALEMIA-------Resolved.       3. HYPOMAGNESEMIA-------Replaced     4. HYPOCALCEMIA------Replace IV     5. ALCOHOL ABUSE/CIRRHOSIS/JAUNDICE------Thiamine, Folate, IVFs, prn IV Ativan. Withdrawal prcautions     6. MIXED METABOLIC ALKALOSIS/METABOLIC ACIDOSIS-----Better     7. ANEMIA------Normocytic with normal RDW. Follow for PRBC need     8. DEPRESSION------Suicide precautions. Hold Zoloft given severe hyponatremia     9. HYPOALBUMINEMIA-----IV Albumin to temporize and po supplements     10. ELEVATED INR------Secondary to liver disease     11. THROMBOCYTOPENIA------No heparin. Secondary to liver disease     12. PUD PROPHYLAXIS-----IV PPI     13. DVT PROPHYLAXIS-----SCDs    14. HYPOPHOSPHATEMIA------Replaced    Sodium stable  Continue oral Lasix  Add scheduled potassium 20 mEq p.o. daily  Keep on fluid restriction 1500 mL p.o.  daily  Labs in am        Quang Butts MD  [facility]  [phone]  07/30/24  10:10 EDT

## 2024-07-30 NOTE — PROGRESS NOTES
[facility] MEDICINE SERVICE  DAILY PROGRESS NOTE    NAME: Yonas Charles  : 1995  MRN: [id number]      LOS: 12 days     PROVIDER OF SERVICE: Frank Wild MD    Chief Complaint: Alcohol withdrawal    Subjective:     Interval History: Patient did have an issue overnight where he had a scab that was picked off on his left shoulder and continued to bleed for several hours despite multiple dressing changes.  This has seemed to resolve and the patient is in good spirits today.    Review of Systems:   Pertinent positives as noted in HPI/subjective.  All other systems were reviewed and are negative.      Vital Signs  Temp:  [97.7 °F (36.5 °C)-98.3 °F (36.8 °C)] 97.7 °F (36.5 °C)  Heart Rate:  [] 99  Resp:  [16-25] 16  BP: (112-126)/(57-72) 119/69  Flow (L/min):  [2] 2   Body mass index is 20.43 kg/m².    Physical exam:     General: Awake, alert, young male, NAD  Eyes: PERRL, EOMI, sclera jaundiced  Cardiovascular: Regular rate and rhythm, no murmurs  Respiratory: Clear to auscultation bilaterally, no wheezing or rales, unlabored breathing  Abdomen: Soft, distended, nontender, positive bowel sounds, no guarding  Skin: Warm, jaundiced, left shoulder dressing intact        Scheduled Meds   famotidine, 40 mg, Oral, Nightly  folic acid, 1 mg, Oral, Daily  furosemide, 40 mg, Oral, Daily  guaiFENesin, 600 mg, Oral, Q12H  hydrocortisone, 1 Application, Topical, Q8H  lactulose, 10 g, Oral, Daily  multivitamin with minerals, 1 tablet, Oral, Daily  pantoprazole, 40 mg, Oral, BID AC  potassium chloride, 20 mEq, Oral, Daily  potassium phosphate, 15 mmol, Intravenous, Q3H  [Held by provider] sertraline, 50 mg, Oral, Daily  silver nitrate, 1 Application, Topical, Once  sodium chloride, 10 mL, Intravenous, Q12H  sucralfate, 1 g, Oral, 4x Daily AC & at Bedtime  thiamine, 100 mg, Oral, Daily  zinc sulfate, 220 mg, Oral, Daily       PRN Meds     acetaminophen **OR** acetaminophen **OR** acetaminophen    senna-docusate  sodium **AND** polyethylene glycol **AND** bisacodyl **AND** bisacodyl    Calcium Replacement - Follow Nurse / BPA Driven Protocol    guaiFENesin-codeine    Magnesium Standard Dose Replacement - Follow Nurse / BPA Driven Protocol    ondansetron ODT **OR** ondansetron    Phosphorus Replacement - Follow Nurse / BPA Driven Protocol    Potassium Replacement - Follow Nurse / BPA Driven Protocol    sodium chloride    sodium chloride    sodium chloride   Infusions  acetylcysteine (ACETADOTE) 6000 mg in D5W infusion (NON-APAP LIVER FAILURE) Third Dose, 6.25 mg/kg/hr, Last Rate: 6.25 mg/kg/hr (07/30/24 0509)            Diagnostic Data    Results from last 7 days   Lab Units 07/30/24  1001 07/30/24  0518   WBC 10*3/mm3  --  46.33*   HEMOGLOBIN g/dL  --  8.5*   HEMATOCRIT %  --  23.5*   PLATELETS 10*3/mm3  --  145   GLUCOSE mg/dL  --  90   CREATININE mg/dL  --  0.43*   BUN mg/dL  --  3*   SODIUM mmol/L  --  133*   POTASSIUM mmol/L 3.8 3.6   AST (SGOT) U/L  --  143*   ALT (SGPT) U/L  --  60*   ALK PHOS U/L  --  370*   BILIRUBIN mg/dL  --  21.2*   ANION GAP mmol/L  --  10.2       No radiology results for the last day      I reviewed the patient's new clinical results.    Assessment/Plan:     Active and Resolved Problems  Active Hospital Problems    Diagnosis  POA    **Alcohol withdrawal [F10.939]  Yes      Resolved Hospital Problems   No resolved problems to display.       Acute alcoholic hepatitis with alcoholic cirrhosis  -Secondary to excessive alcohol use  -Initiated on prednisolone given elevated discriminant function score on admission  -T. bili and LFTs still remain fairly elevated  -Prednisolone was discontinued per GI given no significant improvement and Neupogen started on 7/25  -Monitor LFTs  -Withdrawal symptoms improved and stable now  -Guarded prognosis overall; GI recommended continued observation for now as there is no indication for emergent transplant surgery or referral  -Abdomen ultrasound with only mild  right upper quadrant ascites noted    Leukocytosis  -Reactive from Neupogen  -Remains afebrile, no signs of any acute infection at this time  -Continue to monitor    Coagulopathy  -Related to liver disease as above  -Monitor INR  -Mild improvement in INR today although patient was given vitamin K overnight for persistent bleeding from left shoulder wound  -vitamin K if needed    Hyponatremia 2/2 beer potomania  -Improved sodium level with fluid restriction  -Off IV fluids  -Nephrology following    Acute hypokalemia, hypomagnesemia, hypocalcemia  -Secondary to malnutrition in the setting of excessive alcohol use  -Replete as needed and monitor     VTE Prophylaxis:  Mechanical VTE prophylaxis orders are present.       Code status is   Code Status and Medical Interventions:   Ordered at: 07/18/24 1214     Code Status (Patient has no pulse and is not breathing):    CPR (Attempt to Resuscitate)     Medical Interventions (Patient has pulse or is breathing):    Full Support       Disposition: May need to transfer to tertiary care center for further management.  Otherwise possible DC on 8/2    Signature: Electronically signed by Frank Wild MD, 07/30/24, 13:03 EDT.  [facility] Hospitalist Team    Part of this note may be an electronic transcription/translation of spoken language to printed text using the Dragon Dictation System.

## 2024-07-31 ENCOUNTER — INPATIENT HOSPITAL (OUTPATIENT)
Dept: URBAN - METROPOLITAN AREA HOSPITAL 84 | Facility: HOSPITAL | Age: 29
End: 2024-07-31
Payer: MEDICAID

## 2024-07-31 DIAGNOSIS — K70.10 ALCOHOLIC HEPATITIS WITHOUT ASCITES: ICD-10-CM

## 2024-07-31 DIAGNOSIS — D72.829 ELEVATED WHITE BLOOD CELL COUNT, UNSPECIFIED: ICD-10-CM

## 2024-07-31 DIAGNOSIS — F10.10 ALCOHOL ABUSE, UNCOMPLICATED: ICD-10-CM

## 2024-07-31 DIAGNOSIS — K70.30 ALCOHOLIC CIRRHOSIS OF LIVER WITHOUT ASCITES: ICD-10-CM

## 2024-07-31 DIAGNOSIS — R79.1 ABNORMAL COAGULATION PROFILE: ICD-10-CM

## 2024-07-31 LAB
ABO GROUP BLD: NORMAL
ALBUMIN SERPL-MCNC: 2.6 G/DL (ref 3.5–5.2)
ALBUMIN/GLOB SERPL: 1.2 G/DL
ALP SERPL-CCNC: 515 U/L (ref 39–117)
ALT SERPL W P-5'-P-CCNC: 51 U/L (ref 1–41)
ANION GAP SERPL CALCULATED.3IONS-SCNC: 10.1 MMOL/L (ref 5–15)
AST SERPL-CCNC: 138 U/L (ref 1–40)
BILIRUB SERPL-MCNC: 19 MG/DL (ref 0–1.2)
BLASTS NFR BLD MANUAL: 2 % (ref 0–0)
BLD GP AB SCN SERPL QL: NEGATIVE
BUN SERPL-MCNC: 4 MG/DL (ref 6–20)
BUN/CREAT SERPL: 14.8 (ref 7–25)
CALCIUM SPEC-SCNC: 7.6 MG/DL (ref 8.6–10.5)
CHLORIDE SERPL-SCNC: 100 MMOL/L (ref 98–107)
CLUMPED PLATELETS: PRESENT
CO2 SERPL-SCNC: 23.9 MMOL/L (ref 22–29)
CREAT SERPL-MCNC: 0.27 MG/DL (ref 0.76–1.27)
DEPRECATED RDW RBC AUTO: 67 FL (ref 37–54)
EGFRCR SERPLBLD CKD-EPI 2021: 170.6 ML/MIN/1.73
ERYTHROCYTE [DISTWIDTH] IN BLOOD BY AUTOMATED COUNT: 20.3 % (ref 12.3–15.4)
GLOBULIN UR ELPH-MCNC: 2.1 GM/DL
GLUCOSE SERPL-MCNC: 82 MG/DL (ref 65–99)
HCT VFR BLD AUTO: 19.6 % (ref 37.5–51)
HGB BLD-MCNC: 7 G/DL (ref 13–17.7)
INR PPP: 1.88 (ref 0.93–1.1)
LAB AP CASE REPORT: NORMAL
LARGE PLATELETS: ABNORMAL
LYMPHOCYTES # BLD MANUAL: 5.93 10*3/MM3 (ref 0.7–3.1)
LYMPHOCYTES NFR BLD MANUAL: 6 % (ref 5–12)
MCH RBC QN AUTO: 34.5 PG (ref 26.6–33)
MCHC RBC AUTO-ENTMCNC: 35.7 G/DL (ref 31.5–35.7)
MCV RBC AUTO: 96.6 FL (ref 79–97)
METAMYELOCYTES NFR BLD MANUAL: 10 % (ref 0–0)
MONOCYTES # BLD: 2.54 10*3/MM3 (ref 0.1–0.9)
MYELOCYTES NFR BLD MANUAL: 1 % (ref 0–0)
NEUTROPHILS # BLD AUTO: 27.96 10*3/MM3 (ref 1.7–7)
NEUTROPHILS NFR BLD MANUAL: 46 % (ref 42.7–76)
NEUTS BAND NFR BLD MANUAL: 20 % (ref 0–5)
NRBC SPEC MANUAL: 1 /100 WBC (ref 0–0.2)
PATH REPORT.FINAL DX SPEC: NORMAL
PATHOLOGY REVIEW: YES
PHOSPHATE SERPL-MCNC: 2.3 MG/DL (ref 2.5–4.5)
PLATELET # BLD AUTO: 142 10*3/MM3 (ref 140–450)
PMV BLD AUTO: 10.7 FL (ref 6–12)
POTASSIUM SERPL-SCNC: 2.8 MMOL/L (ref 3.5–5.2)
POTASSIUM SERPL-SCNC: 3.2 MMOL/L (ref 3.5–5.2)
PROMYELOCYTES NFR BLD MANUAL: 1 % (ref 0–0)
PROT SERPL-MCNC: 4.7 G/DL (ref 6–8.5)
PROTHROMBIN TIME: 19.6 SECONDS (ref 9.6–11.7)
RBC # BLD AUTO: 2.03 10*6/MM3 (ref 4.14–5.8)
RBC MORPH BLD: NORMAL
RH BLD: POSITIVE
SCAN SLIDE: NORMAL
SMALL PLATELETS BLD QL SMEAR: ABNORMAL
SODIUM SERPL-SCNC: 134 MMOL/L (ref 136–145)
T&S EXPIRATION DATE: NORMAL
VARIANT LYMPHS NFR BLD MANUAL: 12 % (ref 19.6–45.3)
VARIANT LYMPHS NFR BLD MANUAL: 2 % (ref 0–5)
WBC MORPH BLD: NORMAL
WBC NRBC COR # BLD AUTO: 42.36 10*3/MM3 (ref 3.4–10.8)

## 2024-07-31 PROCEDURE — 86923 COMPATIBILITY TEST ELECTRIC: CPT

## 2024-07-31 PROCEDURE — 85007 BL SMEAR W/DIFF WBC COUNT: CPT | Performed by: NURSE PRACTITIONER

## 2024-07-31 PROCEDURE — 99232 SBSQ HOSP IP/OBS MODERATE 35: CPT | Performed by: NURSE PRACTITIONER

## 2024-07-31 PROCEDURE — 85610 PROTHROMBIN TIME: CPT | Performed by: NURSE PRACTITIONER

## 2024-07-31 PROCEDURE — 86900 BLOOD TYPING SEROLOGIC ABO: CPT

## 2024-07-31 PROCEDURE — 36430 TRANSFUSION BLD/BLD COMPNT: CPT

## 2024-07-31 PROCEDURE — 84100 ASSAY OF PHOSPHORUS: CPT | Performed by: INTERNAL MEDICINE

## 2024-07-31 PROCEDURE — 84132 ASSAY OF SERUM POTASSIUM: CPT | Performed by: INTERNAL MEDICINE

## 2024-07-31 PROCEDURE — 86900 BLOOD TYPING SEROLOGIC ABO: CPT | Performed by: INTERNAL MEDICINE

## 2024-07-31 PROCEDURE — 85025 COMPLETE CBC W/AUTO DIFF WBC: CPT | Performed by: NURSE PRACTITIONER

## 2024-07-31 PROCEDURE — 86901 BLOOD TYPING SEROLOGIC RH(D): CPT

## 2024-07-31 PROCEDURE — P9016 RBC LEUKOCYTES REDUCED: HCPCS

## 2024-07-31 PROCEDURE — 86901 BLOOD TYPING SEROLOGIC RH(D): CPT | Performed by: INTERNAL MEDICINE

## 2024-07-31 PROCEDURE — 86850 RBC ANTIBODY SCREEN: CPT | Performed by: INTERNAL MEDICINE

## 2024-07-31 PROCEDURE — 80053 COMPREHEN METABOLIC PANEL: CPT | Performed by: INTERNAL MEDICINE

## 2024-07-31 RX ORDER — POTASSIUM CHLORIDE 20 MEQ/1
40 TABLET, EXTENDED RELEASE ORAL EVERY 4 HOURS
Status: COMPLETED | OUTPATIENT
Start: 2024-07-31 | End: 2024-07-31

## 2024-07-31 RX ORDER — POTASSIUM CHLORIDE 20 MEQ/1
40 TABLET, EXTENDED RELEASE ORAL ONCE
Status: COMPLETED | OUTPATIENT
Start: 2024-07-31 | End: 2024-07-31

## 2024-07-31 RX ORDER — METOPROLOL SUCCINATE 25 MG/1
25 TABLET, EXTENDED RELEASE ORAL
Status: DISCONTINUED | OUTPATIENT
Start: 2024-07-31 | End: 2024-08-02 | Stop reason: HOSPADM

## 2024-07-31 RX ORDER — POTASSIUM CHLORIDE 20 MEQ/1
40 TABLET, EXTENDED RELEASE ORAL EVERY 4 HOURS
Status: COMPLETED | OUTPATIENT
Start: 2024-07-31 | End: 2024-08-01

## 2024-07-31 RX ORDER — POTASSIUM CHLORIDE 20 MEQ/1
20 TABLET, EXTENDED RELEASE ORAL DAILY
Status: DISCONTINUED | OUTPATIENT
Start: 2024-08-01 | End: 2024-08-02 | Stop reason: HOSPADM

## 2024-07-31 RX ADMIN — FAMOTIDINE 40 MG: 20 TABLET, FILM COATED ORAL at 20:25

## 2024-07-31 RX ADMIN — SUCRALFATE 1 G: 1 TABLET ORAL at 10:03

## 2024-07-31 RX ADMIN — FUROSEMIDE 40 MG: 40 TABLET ORAL at 08:14

## 2024-07-31 RX ADMIN — SUCRALFATE 1 G: 1 TABLET ORAL at 20:25

## 2024-07-31 RX ADMIN — POTASSIUM CHLORIDE 40 MEQ: 1500 TABLET, EXTENDED RELEASE ORAL at 13:03

## 2024-07-31 RX ADMIN — PANTOPRAZOLE SODIUM 40 MG: 40 TABLET, DELAYED RELEASE ORAL at 17:42

## 2024-07-31 RX ADMIN — BACITRACIN ZINC, NEOMYCIN, POLYMYXIN B SULFAT 1 APPLICATION: 5000; 3.5; 4 OINTMENT TOPICAL at 08:14

## 2024-07-31 RX ADMIN — POTASSIUM CHLORIDE 40 MEQ: 1500 TABLET, EXTENDED RELEASE ORAL at 08:15

## 2024-07-31 RX ADMIN — POTASSIUM CHLORIDE 40 MEQ: 1500 TABLET, EXTENDED RELEASE ORAL at 05:12

## 2024-07-31 RX ADMIN — SUCRALFATE 1 G: 1 TABLET ORAL at 17:42

## 2024-07-31 RX ADMIN — SUCRALFATE 1 G: 1 TABLET ORAL at 08:14

## 2024-07-31 RX ADMIN — Medication 220 MG: at 08:14

## 2024-07-31 RX ADMIN — METOPROLOL SUCCINATE 25 MG: 25 TABLET, EXTENDED RELEASE ORAL at 10:03

## 2024-07-31 RX ADMIN — GUAIFENESIN 600 MG: 600 TABLET, EXTENDED RELEASE ORAL at 08:14

## 2024-07-31 RX ADMIN — Medication 1 TABLET: at 08:13

## 2024-07-31 RX ADMIN — Medication 100 MG: at 08:14

## 2024-07-31 RX ADMIN — Medication 10 ML: at 20:25

## 2024-07-31 RX ADMIN — HYDROCORTISONE 1 APPLICATION: 1 CREAM TOPICAL at 13:03

## 2024-07-31 RX ADMIN — HYDROCORTISONE 1 APPLICATION: 1 CREAM TOPICAL at 20:26

## 2024-07-31 RX ADMIN — FOLIC ACID 1 MG: 1 TABLET ORAL at 08:14

## 2024-07-31 RX ADMIN — POTASSIUM CHLORIDE 40 MEQ: 1500 TABLET, EXTENDED RELEASE ORAL at 20:25

## 2024-07-31 RX ADMIN — PANTOPRAZOLE SODIUM 40 MG: 40 TABLET, DELAYED RELEASE ORAL at 08:14

## 2024-07-31 RX ADMIN — POTASSIUM CHLORIDE 40 MEQ: 1500 TABLET, EXTENDED RELEASE ORAL at 23:16

## 2024-07-31 RX ADMIN — Medication 10 ML: at 08:14

## 2024-07-31 RX ADMIN — GUAIFENESIN 600 MG: 600 TABLET, EXTENDED RELEASE ORAL at 20:25

## 2024-07-31 NOTE — PROGRESS NOTES
[facility] MEDICINE SERVICE  DAILY PROGRESS NOTE    NAME: Yonas Charles  : 1995  MRN: [id number]      LOS: 13 days     PROVIDER OF SERVICE: Frank Wild MD    Chief Complaint: Alcohol withdrawal    Subjective:     Interval History: Patient doing well today.  He states his left shoulder wound started bleeding yesterday morning.  He is tolerating more p.o. intake daily.    Review of Systems:   Pertinent positives as noted in HPI/subjective.  All other systems were reviewed and are negative.      Vital Signs  Temp:  [97.8 °F (36.6 °C)-98.6 °F (37 °C)] 98.3 °F (36.8 °C)  Heart Rate:  [108-121] 110  Resp:  [14-26] 16  BP: (112-131)/(54-73) 131/59   Body mass index is 20.43 kg/m².    Physical exam:     General: Awake, alert, young male, NAD  Eyes: PERRL, EOMI, sclera jaundiced  Cardiovascular: Regular rate and rhythm, no murmurs  Respiratory: Clear to auscultation bilaterally, no wheezing or rales, unlabored breathing  Abdomen: Soft, distended, nontender, positive bowel sounds, no guarding  Skin: Warm, jaundiced, left shoulder dressing intact        Scheduled Meds   famotidine, 40 mg, Oral, Nightly  folic acid, 1 mg, Oral, Daily  furosemide, 40 mg, Oral, Daily  guaiFENesin, 600 mg, Oral, Q12H  hydrocortisone, 1 Application, Topical, Q8H  lactulose, 10 g, Oral, Daily  metoprolol succinate XL, 25 mg, Oral, Q24H  multivitamin with minerals, 1 tablet, Oral, Daily  neomycin-bacitracin-polymyxin b, 1 Application, Topical, Daily  pantoprazole, 40 mg, Oral, BID AC  [START ON 2024] potassium chloride, 20 mEq, Oral, Daily  [Held by provider] sertraline, 50 mg, Oral, Daily  silver nitrate, 1 Application, Topical, Once  sodium chloride, 10 mL, Intravenous, Q12H  sucralfate, 1 g, Oral, 4x Daily AC & at Bedtime  thiamine, 100 mg, Oral, Daily  zinc sulfate, 220 mg, Oral, Daily       PRN Meds     acetaminophen **OR** acetaminophen **OR** acetaminophen    senna-docusate sodium **AND** polyethylene glycol **AND**  bisacodyl **AND** bisacodyl    Calcium Replacement - Follow Nurse / BPA Driven Protocol    guaiFENesin-codeine    hydrOXYzine    Magnesium Standard Dose Replacement - Follow Nurse / BPA Driven Protocol    ondansetron ODT **OR** ondansetron    Phosphorus Replacement - Follow Nurse / BPA Driven Protocol    Potassium Replacement - Follow Nurse / BPA Driven Protocol    sodium chloride    sodium chloride    sodium chloride   Infusions             Diagnostic Data    Results from last 7 days   Lab Units 07/31/24  0235   WBC 10*3/mm3 42.36*   HEMOGLOBIN g/dL 7.0*   HEMATOCRIT % 19.6*   PLATELETS 10*3/mm3 142   GLUCOSE mg/dL 82   CREATININE mg/dL 0.27*   BUN mg/dL 4*   SODIUM mmol/L 134*   POTASSIUM mmol/L 3.2*   AST (SGOT) U/L 138*   ALT (SGPT) U/L 51*   ALK PHOS U/L 515*   BILIRUBIN mg/dL 19.0*   ANION GAP mmol/L 10.1       No radiology results for the last day      I reviewed the patient's new clinical results.    Assessment/Plan:     Active and Resolved Problems  Active Hospital Problems    Diagnosis  POA    **Alcohol withdrawal [F10.939]  Yes      Resolved Hospital Problems   No resolved problems to display.       Acute alcoholic hepatitis with alcoholic cirrhosis  -Secondary to excessive alcohol use  -Initiated on prednisolone given elevated discriminant function score on admission  -T. bili and LFTs still remain fairly elevated although slowly improving  -INR improving as well  -Prednisolone was discontinued after 7 days per GI given no significant improvement and Neupogen started on 7/25  -Monitor LFTs  -Withdrawal symptoms improved and stable now  -Guarded prognosis overall; GI recommended continued observation for now as there is no indication for emergent transplant surgery or referral  -Abdomen ultrasound with only mild right upper quadrant ascites noted    Leukocytosis  -Reactive from Neupogen  -Remains afebrile, no signs of any acute infection at this time  -Continue to monitor    Coagulopathy  -Related to  liver disease as above  -Monitor INR  -Continued improvement in INR today, although patient was given a dose of vitamin K on 7/29  -vitamin K if needed    Hyponatremia 2/2 beer potomania  -Improved sodium level with fluid restriction  -Off IV fluids  -Nephrology following    Acute hypokalemia, hypomagnesemia, hypocalcemia  -Secondary to malnutrition in the setting of excessive alcohol use  -Replete as needed and monitor     VTE Prophylaxis:  Mechanical VTE prophylaxis orders are present.       Code status is   Code Status and Medical Interventions:   Ordered at: 07/18/24 1214     Code Status (Patient has no pulse and is not breathing):    CPR (Attempt to Resuscitate)     Medical Interventions (Patient has pulse or is breathing):    Full Support       Disposition: Possible DC on 8/1 or 8/2 if liver function continues to improve.    Signature: Electronically signed by Frank Wild MD, 07/31/24, 13:03 EDT.  [facility]    Part of this note may be an electronic transcription/translation of spoken language to printed text using the Dragon Dictation System.

## 2024-07-31 NOTE — PLAN OF CARE
Problem: Adult Inpatient Plan of Care  Goal: Absence of Hospital-Acquired Illness or Injury  Intervention: Identify and Manage Fall Risk  Description: Perform standard risk assessment on admission using a validated tool or comprehensive approach appropriate to the patient; reassess fall risk frequently, with change in status or transfer to another level of care.  Communicate fall injury risk to interprofessional healthcare team.  Determine need for increased observation, equipment and environmental modification, such as low bed, signage and supportive, nonskid footwear.  Adjust safety measures to individual developmental age, stage and identified risk factors.  Reinforce the importance of safety and physical activity with patient and family.  Perform regular intentional rounding to assess need for position change, pain assessment and personal needs, including assistance with toileting.  Recent Flowsheet Documentation  Taken 7/31/2024 0407 by Carlos Garcia LPN  Safety Promotion/Fall Prevention:   safety round/check completed   room organization consistent   nonskid shoes/slippers when out of bed   muscle strengthening facilitated   mobility aid in reach   lighting adjusted   fall prevention program maintained   clutter free environment maintained   assistive device/personal items within reach   activity supervised  Taken 7/31/2024 0200 by Carlos Garcia LPN  Safety Promotion/Fall Prevention:   safety round/check completed   room organization consistent   muscle strengthening facilitated   nonskid shoes/slippers when out of bed   mobility aid in reach   lighting adjusted   fall prevention program maintained   clutter free environment maintained   assistive device/personal items within reach   activity supervised  Taken 7/31/2024 0000 by Carlos Garcia LPN  Safety Promotion/Fall Prevention:   safety round/check completed   room organization consistent   nonskid shoes/slippers when out of bed    muscle strengthening facilitated   lighting adjusted   mobility aid in reach   fall prevention program maintained   clutter free environment maintained   assistive device/personal items within reach   activity supervised  Taken 7/30/2024 2208 by Carlos Garcia LPN  Safety Promotion/Fall Prevention:   toileting scheduled   safety round/check completed   room organization consistent   nonskid shoes/slippers when out of bed   muscle strengthening facilitated   mobility aid in reach   lighting adjusted   fall prevention program maintained   clutter free environment maintained   assistive device/personal items within reach   activity supervised  Taken 7/30/2024 2011 by Carlos Garcia LPN  Safety Promotion/Fall Prevention:   safety round/check completed   room organization consistent   nonskid shoes/slippers when out of bed   muscle strengthening facilitated   mobility aid in reach   lighting adjusted   fall prevention program maintained   clutter free environment maintained   assistive device/personal items within reach   activity supervised  Intervention: Prevent Skin Injury  Description: Perform a screening for skin injury risk, such as pressure or moisture associated skin damage on admission and at regular intervals throughout hospital stay.  Keep all areas of skin (especially folds) clean and dry.  Maintain adequate skin hydration.  Relieve and redistribute pressure and protect bony prominences; implement measures based on patient-specific risk factors.  Match turning and repositioning schedule to clinical condition.  Encourage weight shift frequently; assist with reposition if unable to complete independently.  Float heels off bed; avoid pressure on the Achilles tendon.  Keep skin free from extended contact with medical devices.  Encourage functional activity and mobility, as early as tolerated.  Use aids (e.g., slide boards, mechanical lift) during transfer.  Recent Flowsheet Documentation  Taken  7/31/2024 0407 by Carlos Garcia LPN  Body Position: weight shifting  Taken 7/31/2024 0200 by Carlos Garcia LPN  Body Position:   weight shifting   position changed independently  Taken 7/31/2024 0000 by Carlos Garcia LPN  Body Position: position changed independently  Skin Protection: silicone foam dressing in place  Taken 7/30/2024 2011 by Carlos Garcia LPN  Body Position: position changed independently  Intervention: Prevent and Manage VTE (Venous Thromboembolism) Risk  Description: Assess for VTE (venous thromboembolism) risk.  Encourage and assist with early ambulation.  Initiate and maintain compression or other therapy, as indicated, based on identified risk in accordance with organizational protocol and provider order.  Encourage both active and passive leg exercises while in bed, if unable to ambulate.  Recent Flowsheet Documentation  Taken 7/31/2024 0407 by Carlos Garcia LPN  Activity Management: activity encouraged  Taken 7/31/2024 0200 by Carlos Garcia LPN  Activity Management: activity encouraged  Taken 7/31/2024 0000 by Carlos Garcia LPN  Activity Management: activity encouraged  Taken 7/30/2024 2208 by Carlos Garcia LPN  Activity Management: activity encouraged  Taken 7/30/2024 2011 by Carlos Garcia LPN  Activity Management:   activity encouraged   ambulated to bathroom  Intervention: Prevent Infection  Description: Maintain skin and mucous membrane integrity; promote hand, oral and pulmonary hygiene.  Optimize fluid balance, nutrition, sleep and glycemic control to maximize infection resistance.  Identify potential sources of infection early to prevent or mitigate progression of infection (e.g., wound, lines, devices).  Evaluate ongoing need for invasive devices; remove promptly when no longer indicated.  Recent Flowsheet Documentation  Taken 7/31/2024 0407 by Carlos Garcia LPN  Infection Prevention:   visitors restricted/screened    single patient room provided   rest/sleep promoted   personal protective equipment utilized   hand hygiene promoted  Taken 7/31/2024 0200 by Carlos Garcia LPN  Infection Prevention:   visitors restricted/screened   single patient room provided   rest/sleep promoted   personal protective equipment utilized   hand hygiene promoted  Taken 7/31/2024 0000 by Carlos Garcia LPN  Infection Prevention:   visitors restricted/screened   single patient room provided   rest/sleep promoted   personal protective equipment utilized   hand hygiene promoted  Taken 7/30/2024 2208 by Carlos Garcia LPN  Infection Prevention:   visitors restricted/screened   single patient room provided   rest/sleep promoted   personal protective equipment utilized   hand hygiene promoted  Taken 7/30/2024 2011 by Carlos Garcia LPN  Infection Prevention:   visitors restricted/screened   single patient room provided   rest/sleep promoted   personal protective equipment utilized   hand hygiene promoted   equipment surfaces disinfected  Goal: Optimal Comfort and Wellbeing  Intervention: Monitor Pain and Promote Comfort  Description: Assess pain level, treatment efficacy and patient response at regular intervals using a consistent pain scale.  Consider the presence and impact of preexisting chronic pain.  Encourage patient and caregiver involvement in pain assessment, interventions and safety measures.  Recent Flowsheet Documentation  Taken 7/31/2024 0000 by Carlos Garcia LPN  Pain Management Interventions:   see MAR   quiet environment facilitated   position adjusted   pain management plan reviewed with patient/caregiver     Problem: Pain Acute  Goal: Acceptable Pain Control and Functional Ability  Intervention: Prevent or Manage Pain  Description: Evaluate pain level, effect of treatment and patient response at regular intervals.  Minimize painful stimuli; coordinate care and adjust environment (e.g., light, noise,  unnecessary movement); promote sleep/rest.  Match pharmacologic analgesia to severity and type of pain mechanism (e.g., neuropathic, muscle, inflammatory); consider multimodal approach (e.g., nonopioid, opioid, adjuvant).  Provide medication at regular intervals; titrate to patient response; premedicate for painful procedures.  Manage breakthrough pain with additional doses; consider rotation or switching medication.  Monitor for signs of substance tolerance (increased dose to reach desired effect, decreased effect with same dose).  Manage medication-induced effects, such as constipation, nausea, pruritus, urinary retention, somnolence and dizziness.  Provide multimodal interventions, such as as physical activity, therapeutic exercise, yoga, TENS (transcutaneous electrical nerve stimulation) and manual therapy.  Train in functional activity modifications, such as body mechanics, posture, ergonomics, energy conservation and activity pacing.  Consider addition of complementary or alternative therapy, such as acupuncture, hypnosis or therapeutic touch.  Recent Flowsheet Documentation  Taken 7/31/2024 0407 by Carlos Garcia LPN  Medication Review/Management: medications reviewed  Taken 7/31/2024 0200 by Carlos Garcia LPN  Medication Review/Management: medications reviewed  Taken 7/31/2024 0000 by Carlos Garcia LPN  Sensory Stimulation Regulation: care clustered  Medication Review/Management: medications reviewed  Taken 7/30/2024 2208 by Carlos Garcia LPN  Medication Review/Management: medications reviewed  Taken 7/30/2024 2011 by Carlos Garcia LPN  Sensory Stimulation Regulation: care clustered  Sleep/Rest Enhancement: awakenings minimized  Medication Review/Management: medications reviewed  Intervention: Develop Pain Management Plan  Description: Acknowledge patient as the expert in pain self-management.  Use a consistent, validated tool for pain assessment; include function and  quality of life.  Evaluate risk for opioid use and dependence.  Set pain management goals; determine acceptable level of discomfort to allow for maximal functioning.  Determine [id number] pain management plan, including both pharmacologic and nonpharmacologic measures; integrate management of chronic (persistent) pain.  Identify and integrate past successful treatment measures, if able.  Encourage patient and caregiver involvement in pain assessment, interventions and safety measures.  Re-evaluate plan regularly.  Recent Flowsheet Documentation  Taken 7/31/2024 0000 by Carlos Garcia LPN  Pain Management Interventions:   see MAR   quiet environment facilitated   position adjusted   pain management plan reviewed with patient/caregiver  Intervention: Optimize Psychosocial Wellbeing  Description: Facilitate patient’s self-control over pain by providing pain information and allowing choices in treatment.  Consider and address emotional response to pain.  Explore and promote use of coping strategies; address barriers to successful coping.  Evaluate and assist with psychosocial, cultural and spiritual factors impacting pain.  Modify pain perception using techniques, such as distraction, mindfulness, guided imagery, meditation or music.  Assess for risk factors for developing chronic pain, such as depression, fear, pain avoidance and pain catastrophizing.  Consider referral for ongoing coping support, such as education, relaxation training and role of thoughts.  Recent Flowsheet Documentation  Taken 7/31/2024 0000 by Carlos Garcia LPN  Supportive Measures:   verbalization of feelings encouraged   self-responsibility promoted  Taken 7/30/2024 2011 by Carlos Garcia LPN  Supportive Measures: verbalization of feelings encouraged  Diversional Activities: television     Problem: Skin Injury Risk Increased  Goal: Skin Health and Integrity  Intervention: Optimize Skin Protection  Description: Perform  a full pressure injury risk assessment, as indicated by screening, upon admission to care unit.  Reassess skin (injury risk, full inspection) frequently (e.g., scheduled interval, with change in condition) to provide optimal early detection and prevention.  Maintain adequate tissue perfusion (e.g., encourage fluid balance; avoid crossing legs, constrictive clothing or devices) to promote tissue oxygenation.  Maintain head of bed at lowest degree of elevation tolerated, considering medical condition and other restrictions.  Avoid positioning onto an area that remains reddened.  Minimize incontinence and moisture (e.g., toileting schedule; moisture-wicking pad, diaper or incontinence collection device; skin moisture barrier).  Cleanse skin promptly and gently when soiled utilizing a pH-balanced cleanser.  Relieve and redistribute pressure (e.g., scheduled position changes, weight shifts, use of support surface, medical device repositioning, protective dressing application, use of positioning device, microclimate control, use of pressure-injury-monitor  Encourage increased activity, such as sitting in a chair at the bedside or early mobilization, when able to tolerate.  Recent Flowsheet Documentation  Taken 7/31/2024 0407 by Carlos Garcia LPN  Head of Bed (HOB) Positioning: HOB at 20-30 degrees  Taken 7/31/2024 0200 by Carlos Garcia LPN  Head of Bed (HOB) Positioning: HOB at 20-30 degrees  Taken 7/31/2024 0000 by Carlos Garcia LPN  Pressure Reduction Techniques: frequent weight shift encouraged  Head of Bed (HOB) Positioning: HOB at 20-30 degrees  Pressure Reduction Devices: specialty bed utilized  Skin Protection: silicone foam dressing in place  Taken 7/30/2024 2011 by Carlos Garcia LPN  Head of Bed (HOB) Positioning: HOB at 20-30 degrees     Problem: Fall Injury Risk  Goal: Absence of Fall and Fall-Related Injury  Intervention: Identify and Manage Contributors  Description: Develop a fall  prevention plan with the patient and caregiver/family.  Provide reorientation, appropriate sensory stimulation and routines with changes in mental status to decrease risk of fall.  Promote use of personal vision and auditory aids.  Assess assistance level required for safe and effective self-care; provide support as needed, such as toileting, mobilization. For age 65 and older, implement timed toileting with assistance.  Encourage physical activity, such as performance of mobility and self-care at highest level of patient ability, multicomponent exercise program and provision of appropriate assistive devices.  If fall occurs, assess the severity of injury; implement fall injury protocol. Determine the cause and revise fall injury prevention plan.  Regularly review medication contribution to fall risk; adjust medication administration times to minimize risk of falling.  Consider risk related to polypharmacy and age.  Balance adequate pain management with potential for oversedation.  Recent Flowsheet Documentation  Taken 7/31/2024 0407 by Carlos Garcia LPN  Medication Review/Management: medications reviewed  Taken 7/31/2024 0200 by Carlos Garcia LPN  Medication Review/Management: medications reviewed  Taken 7/31/2024 0000 by Carlos Garcia LPN  Medication Review/Management: medications reviewed  Taken 7/30/2024 2208 by Carlos Garcia LPN  Medication Review/Management: medications reviewed  Taken 7/30/2024 2011 by Carlos Garcia LPN  Medication Review/Management: medications reviewed  Intervention: Promote Injury-Free Environment  Description: Provide a safe, barrier-free environment that encourages independent activity.  Keep care area uncluttered and well-lighted.  Determine need for increased observation or monitoring.  Avoid use of devices that minimize mobility, such as restraints or indwelling urinary catheter.  Recent Flowsheet Documentation  Taken 7/31/2024 0407 by Radha  Carlos L, LPN  Safety Promotion/Fall Prevention:   safety round/check completed   room organization consistent   nonskid shoes/slippers when out of bed   muscle strengthening facilitated   mobility aid in reach   lighting adjusted   fall prevention program maintained   clutter free environment maintained   assistive device/personal items within reach   activity supervised  Taken 7/31/2024 0200 by Carlos Garcia LPN  Safety Promotion/Fall Prevention:   safety round/check completed   room organization consistent   muscle strengthening facilitated   nonskid shoes/slippers when out of bed   mobility aid in reach   lighting adjusted   fall prevention program maintained   clutter free environment maintained   assistive device/personal items within reach   activity supervised  Taken 7/31/2024 0000 by Carlos Garcia LPN  Safety Promotion/Fall Prevention:   safety round/check completed   room organization consistent   nonskid shoes/slippers when out of bed   muscle strengthening facilitated   lighting adjusted   mobility aid in reach   fall prevention program maintained   clutter free environment maintained   assistive device/personal items within reach   activity supervised  Taken 7/30/2024 2208 by Carlos Garcia LPN  Safety Promotion/Fall Prevention:   toileting scheduled   safety round/check completed   room organization consistent   nonskid shoes/slippers when out of bed   muscle strengthening facilitated   mobility aid in reach   lighting adjusted   fall prevention program maintained   clutter free environment maintained   assistive device/personal items within reach   activity supervised  Taken 7/30/2024 2011 by Carlos Garcia LPN  Safety Promotion/Fall Prevention:   safety round/check completed   room organization consistent   nonskid shoes/slippers when out of bed   muscle strengthening facilitated   mobility aid in reach   lighting adjusted   fall prevention program maintained   clutter free  environment maintained   assistive device/personal items within reach   activity supervised     Problem: Alcohol Withdrawal  Goal: Alcohol Withdrawal Symptom Control  Intervention: Minimize or Manage Alcohol Withdrawal Symptoms  Description: Assess and monitor withdrawal symptom severity with a validated alcohol withdrawal tool.  Verify last intake of alcohol and consumption habits; recognize that time since last alcohol use and average daily amount consumed may not predict the severity of alcohol withdrawal.  Assess physiologic status frequently, including neurologic, hemodynamic and cardiac condition. Be prepared to implement emergency measures if symptoms progress or condition deteriorates.  Assess and monitor airway, breathing and circulation; maintain close surveillance for deterioration.  Maintain patent airway with use of positioning, airway adjuncts and secretion clearance.  Provide oxygen therapy judiciously, if hypoxemia present; use lung-protective measures, if positive-pressure ventilation needed.  Screen for malnutrition risk, such as unintentional weight loss and poor appetite; if vitamin and mineral deficiency suspected, such as thiamine, anticipate providing supplementation.  Provide intravenous fluids; monitor and address fluid and electrolyte imbalances; reintroduce oral fluids when safe to swallow.  Anticipate administering pharmacologic treatment using a loading dose strategy, such as benzodiazepine, a predetermined fixed medication tapering schedule with additional dosing as needed or a symptom-triggered approach.  Provide a safe environment (e.g., seizure precautions, fall risk). Adjust environment to minimize stimulation.  Recent Flowsheet Documentation  Taken 7/31/2024 0000 by Carlos Garcia LPN  Sensory Stimulation Regulation: care clustered  Taken 7/30/2024 2011 by Carlos Garcia LPN  Sensory Stimulation Regulation: care clustered  Seizure Precautions:   activity supervised    side rails padded   clutter-free environment maintained     Problem: Acute Neurologic Deterioration (Alcohol Withdrawal)  Goal: Optimal Neurologic Function  Intervention: Minimize or Manage Acute Neurologic Symptoms  Description: Maintain close observation. Assess neurologic status frequently for progression of symptoms. Monitor electrocardiogram closely; address changes in rate and rhythm.  Elevate head of bed; place head in midline position, as tolerated, to enhance venous outflow and maintain cerebral perfusion.  Minimize stimulation and activity that increases intrathoracic or intra-abdominal pressure, such as hip flexion, Valsalva maneuver, coughing or vomiting. Provide a quiet and calm environment.  Anticipate fluid and pharmacologic therapy to maintain blood pressure and cerebral perfusion; titrate to targeted threshold and patient response.  Administer pharmacologic therapy as prescribed (e.g., benzodiazepine, sedative, anticonvulsant, beta-blocker, thiamine).  Avoid hypoglycemia; maintain glycemic control.  Manage fever to preserve cerebral metabolism; use rapid cooling measures, such as external-cooling device, sponge or tub bath, internal-cooling method.  Anticipate the need for pharmacologic therapy for refractory delirium tremens.  Anticipate diagnostic testing, such as EEG (electroencephalogram), MRI (magnetic resonance imaging) or CT (computed tomography) for new onset seizures or unexplained neurologic symptoms.  Recent Flowsheet Documentation  Taken 7/31/2024 0000 by Carlos Garcia LPN  Sensory Stimulation Regulation: care clustered  Taken 7/30/2024 2011 by Carlos Garcia LPN  Sensory Stimulation Regulation: care clustered  Intervention: Prevent Seizure-Related Injury  Description: Remain at bedside throughout seizure activity; monitor status and intervene as necessary.  Place head of bed flat and turn to side-lying position to prevent aspiration and promote safety; do not restrain.  Do  not insert anything into the mouth, unless the airway becomes compromised.  Anticipate administration of pharmacologic therapy (e.g., benzodiazepine, barbiturate).  Assess for injury following seizure; monitor, reassure and reorient patient.  Recent Flowsheet Documentation  Taken 7/30/2024 2011 by Carlos Garcia LPN  Seizure Precautions:   activity supervised   side rails padded   clutter-free environment maintained     Problem: Substance Misuse (Alcohol Withdrawal)  Goal: Readiness for Change Identified  Intervention: Partner to Facilitate Behavior Change  Description: Assess pattern of substance use from multiple information sources.  Provide education about risks of current use; develop a mutual treatment plan.  Assess readiness to change; identify stage of change and current level of insight.  Explore motivations for change, affirming change-related statements; identify and develop recovery goals.  Elicit recognition of gap between current behavior and desired life goals.  Explore ambivalence associated with commitment to behavior change; support steps toward change, however small.  Identify internal and external triggers and methods to cope.  Assist with transition to next level of care; establish or re-establish connections with community resources; identify and address safety risk.  Identify and address factors that impact overall treatment adherence (e.g., childcare, transportation, appointment times, cost).  Recent Flowsheet Documentation  Taken 7/31/2024 0000 by Carlos Garcia LPN  Supportive Measures:   verbalization of feelings encouraged   self-responsibility promoted  Taken 7/30/2024 2011 by Carlos Garcia LPN  Supportive Measures: verbalization of feelings encouraged  Intervention: Promote Psychosocial Wellbeing  Description: Use a nonjudgmental approach to establish a therapeutic alliance and trust; normalize and validate the patient and family/caregiver’s experience.  Include  family/caregiver in assessment and planning, if supportive; honor confidentiality.  Encourage verbalization of feelings and concerns; partner to identify and utilize patient and family/caregiver strengths and coping strategies.  Emphasize importance of a strong support system; encourage support of family and friends.  Screen and monitor for signs and symptoms of co-occurring mental illness.  Recent Flowsheet Documentation  Taken 7/30/2024 2011 by Carlos Garcia LPN  Family/Support System Care: support provided     Problem: Adjustment to Illness (Sepsis/Septic Shock)  Goal: Optimal Coping  Intervention: Optimize Psychosocial Adjustment to Illness  Description: Acknowledge, normalize, validate intensity and complexity of patient and support system response to situation.  Provide opportunity for expression of thoughts, feelings and concerns; respond with compassion and reassurance.  Decrease stress and anxiety by providing information about patient’s status and treatment.  Facilitate support system presence and participation in care; consider providing a diary in intensive care situation.  Support coping by recognizing current coping strategies; provide aid in developing new strategies.  Acknowledge and normalize difficulty in managing life-long lifestyle changes and expectations.  Assess and monitor for signs and symptoms of psychologic distress, anxiety and depression.  Consider palliative care consult for goals of care conversation, if the condition is worsening despite treatment.  Recent Flowsheet Documentation  Taken 7/31/2024 0000 by Carlos Garcia LPN  Supportive Measures:   verbalization of feelings encouraged   self-responsibility promoted  Taken 7/30/2024 2011 by Carlos Garcia LPN  Supportive Measures: verbalization of feelings encouraged  Family/Support System Care: support provided     Problem: Infection Progression (Sepsis/Septic Shock)  Goal: Absence of Infection Signs and  Symptoms  Intervention: Initiate Sepsis Management  Description: Provide fluid therapy, such as crystalloid or albumin, to increase intravascular volume, organ perfusion and oxygen delivery.  Provide respiratory support, such as oxygen therapy, noninvasive or invasive positive pressure ventilation, to achieve oxygenation and ventilation goal; avoid hyperoxemia.  Obtain cultures prior to initiating antimicrobial therapy when possible. Do not delay for laboratory results in the presence of high suspicion or clinical indicators.  Administer intravenous broad-spectrum antimicrobial therapy promptly.  Implement hemodynamic monitoring to guide intravascular support based on individual targeted parameters.  Determine and address underlying source of infection aggressively; implement transmission-based precautions and isolation, as indicated.  Recent Flowsheet Documentation  Taken 7/31/2024 0407 by Carlos Garcia LPN  Infection Prevention:   visitors restricted/screened   single patient room provided   rest/sleep promoted   personal protective equipment utilized   hand hygiene promoted  Taken 7/31/2024 0200 by Carlos Garcia LPN  Infection Prevention:   visitors restricted/screened   single patient room provided   rest/sleep promoted   personal protective equipment utilized   hand hygiene promoted  Taken 7/31/2024 0000 by Carlos Garcia LPN  Infection Prevention:   visitors restricted/screened   single patient room provided   rest/sleep promoted   personal protective equipment utilized   hand hygiene promoted  Taken 7/30/2024 2208 by Carlos Garcia LPN  Infection Prevention:   visitors restricted/screened   single patient room provided   rest/sleep promoted   personal protective equipment utilized   hand hygiene promoted  Taken 7/30/2024 2011 by Carlos Garcia LPN  Infection Prevention:   visitors restricted/screened   single patient room provided   rest/sleep promoted   personal protective  equipment utilized   hand hygiene promoted   equipment surfaces disinfected  Intervention: Promote Recovery  Description: Encourage pulmonary hygiene, such as cough-enhancement and airway-clearance techniques, that may include use of incentive spirometry, deep breathing and cough.  Encourage early rehabilitation and physical activity to optimize functional ability and activity tolerance, as well as minimize delirium.  Promote energy conservation; minimize oxygen demand and consumption by adjusting environment, decreasing stimulation, maintaining normothermia and treating pain.  Optimize fluid balance, nutrition intake, sleep and glycemic control to maintain tissue perfusion and enhance immune response.  Recent Flowsheet Documentation  Taken 7/31/2024 0407 by Carlos Garcia LPN  Activity Management: activity encouraged  Taken 7/31/2024 0200 by Carlos Garcia LPN  Activity Management: activity encouraged  Taken 7/31/2024 0000 by Carlos Garcia LPN  Activity Management: activity encouraged  Taken 7/30/2024 2208 by Carlos Garcia LPN  Activity Management: activity encouraged  Taken 7/30/2024 2011 by Carlos Garcia LPN  Activity Management:   activity encouraged   ambulated to bathroom  Sleep/Rest Enhancement: awakenings minimized   Goal Outcome Evaluation:Plan of care reviewed, progressing toward part of goal, monitoring labs results, Discharge planing on going and TBD, Pot low and replacing per protocol , continue to monitor

## 2024-07-31 NOTE — PLAN OF CARE
Goal Outcome Evaluation:         Pt receiving unit of blood for Hbg below 8. Pt tolerating well. Pt condition improving. Pt safety and fall precautions maintained.

## 2024-07-31 NOTE — PROGRESS NOTES
" LOS: 13 days   Patient Care Team:  Provider, No Known as PCP - General      Subjective \" doing better today\"    Interval History:     Subjective: Tolerating diet denies nausea or vomiting.  No further bleeding from scab on his shoulder.  Having bowel movements.    ROS:   No chest pain, shortness of breath, or cough.      Medication Review:     Current Facility-Administered Medications:     acetaminophen (TYLENOL) tablet 650 mg, 650 mg, Oral, Q4H PRN **OR** acetaminophen (TYLENOL) 160 MG/5ML oral solution 650 mg, 650 mg, Oral, Q4H PRN **OR** acetaminophen (TYLENOL) suppository 650 mg, 650 mg, Rectal, Q4H PRN, Mari Bustos MD    sennosides-docusate (PERICOLACE) 8.6-50 MG per tablet 2 tablet, 2 tablet, Oral, BID PRN **AND** polyethylene glycol (MIRALAX) packet 17 g, 17 g, Oral, Daily PRN **AND** bisacodyl (DULCOLAX) EC tablet 5 mg, 5 mg, Oral, Daily PRN **AND** bisacodyl (DULCOLAX) suppository 10 mg, 10 mg, Rectal, Daily PRN, Mari Bustos MD    Calcium Replacement - Follow Nurse / BPA Driven Protocol, , Does not apply, PRN, Mari Bustos MD    famotidine (PEPCID) tablet 40 mg, 40 mg, Oral, Nightly, Waleska Chery, APRN, 40 mg at 07/30/24 2120    folic acid (FOLVITE) tablet 1 mg, 1 mg, Oral, Daily, Mari Bustos MD, 1 mg at 07/31/24 0814    furosemide (LASIX) tablet 40 mg, 40 mg, Oral, Daily, Quang Butts MD, 40 mg at 07/31/24 0814    guaiFENesin (MUCINEX) 12 hr tablet 600 mg, 600 mg, Oral, Q12H, Hayley Rodriguez DO, 600 mg at 07/31/24 0814    guaiFENesin-codeine (GUAIFENESIN AC) 100-10 MG/5ML liquid 5 mL, 5 mL, Oral, Q4H PRN, Frank Wild MD, 5 mL at 07/24/24 0318    hydrocortisone 1 % cream 1 Application, 1 Application, Topical, Q8H, Hayley Rodriguez DO, 1 Application at 07/30/24 1400    hydrOXYzine (ATARAX) tablet 25 mg, 25 mg, Oral, Q8H PRN, Frank Wild MD, 25 mg at 07/30/24 2120    lactulose (CHRONULAC) 10 GM/15ML solution 10 g, 10 g, Oral, Daily, Kaci Polo, APRN, 10 g at " 07/28/24 0834    Magnesium Standard Dose Replacement - Follow Nurse / BPA Driven Protocol, , Does not apply, NAEEM, Mari Bustos MD    metoprolol succinate XL (TOPROL-XL) 24 hr tablet 25 mg, 25 mg, Oral, Q24H, Frank Wild MD, 25 mg at 07/31/24 1003    multivitamin with minerals 1 tablet, 1 tablet, Oral, Daily, Mari Bustos MD, 1 tablet at 07/31/24 0813    neomycin-bacitracin-polymyxin b (NEOSPORIN) ointment 1 Application, 1 Application, Topical, Daily, Frank Wild MD, 1 Application at 07/31/24 0814    ondansetron ODT (ZOFRAN-ODT) disintegrating tablet 4 mg, 4 mg, Oral, Q6H PRN **OR** ondansetron (ZOFRAN) injection 4 mg, 4 mg, Intravenous, Q6H PRN, Mari Bustos MD, 4 mg at 07/18/24 1330    pantoprazole (PROTONIX) EC tablet 40 mg, 40 mg, Oral, BID AC, Kaci Polo, APRN, 40 mg at 07/31/24 0814    Phosphorus Replacement - Follow Nurse / BPA Driven Protocol, , Does not apply, Juli HARTLEY Abbas Ali, MD    [START ON 8/1/2024] potassium chloride (KLOR-CON M20) CR tablet 20 mEq, 20 mEq, Oral, Daily, Quang Butts MD    Potassium Replacement - Follow Nurse / BPA Driven Protocol, , Does not apply, Juli HARTLEY Abbas Ali, MD    [Held by provider] sertraline (ZOLOFT) tablet 50 mg, 50 mg, Oral, Daily, Mari Bustos MD, 50 mg at 07/18/24 1258    silver nitrate 75-25 % applicator 1 Application, 1 Application, Topical, Once, Frank Wild MD    sodium chloride 0.9 % flush 10 mL, 10 mL, Intravenous, PRN, Mari Bustos MD    sodium chloride 0.9 % flush 10 mL, 10 mL, Intravenous, Q12H, Mari Bustos MD, 10 mL at 07/31/24 0814    sodium chloride 0.9 % flush 10 mL, 10 mL, Intravenous, PRN, Mari Bustos MD    sodium chloride 0.9 % infusion 40 mL, 40 mL, Intravenous, PRN, Mari Bustos MD    sucralfate (CARAFATE) tablet 1 g, 1 g, Oral, 4x Daily AC & at Bedtime, Kaci Polo, APRN, 1 g at 07/31/24 1003    [COMPLETED] thiamine (B-1) injection 200 mg, 200 mg, Intravenous,  Q8H, 200 mg at 07/23/24 1437 **FOLLOWED BY** thiamine (VITAMIN B-1) tablet 100 mg, 100 mg, Oral, Daily, Mari Bustos MD, 100 mg at 07/31/24 0814    zinc sulfate (ZINCATE) capsule 220 mg, 220 mg, Oral, Daily, Waleska Chery APRN, 220 mg at 07/31/24 0814      Objective resting in bed, no acute distress    Vital Signs  Vitals:    07/30/24 2311 07/31/24 0407 07/31/24 0810 07/31/24 0811   BP: 120/63 114/60 122/70    BP Location:  Left arm Left arm    Patient Position: Lying Sitting Sitting    Pulse: 108 108 (!) 121 109   Resp: 20 14 21    Temp: 97.8 °F (36.6 °C) 97.8 °F (36.6 °C) 98.6 °F (37 °C)    TempSrc: Oral Oral Tympanic    SpO2: 98% 98% 98% 94%   Weight:       Height:           Physical Exam:     General Appearance:    Awake and alert, in no acute distress   Head:    Normocephalic, without obvious abnormality   Eyes:          Conjunctivae normal, icteric sclera   Throat:   No oral lesions, no thrush, oral mucosa moist   Neck:   supple, no JVD   Lungs:     respirations regular, even and unlabored       Rectal:     Deferred   Extremities:   no cyanosis   Skin:   No bruising or rash, + jaundice        Results Review:    CBC    Results from last 7 days   Lab Units 07/31/24  0235 07/30/24  0518 07/29/24  0900 07/28/24  0234 07/27/24  0414 07/26/24  0108 07/24/24  2322   WBC 10*3/mm3 42.36* 46.33* 39.35* 40.75* 34.41* 25.79* 16.67*   HEMOGLOBIN g/dL 7.0* 8.5* 9.2* 8.9* 8.9* 8.5* 9.3*   PLATELETS 10*3/mm3 142 145 159 161 172 161 170     CMP   Results from last 7 days   Lab Units 07/31/24  0235 07/30/24  1001 07/30/24  0518 07/29/24 2012 07/29/24  0502 07/28/24  1907 07/28/24  0234 07/27/24  2119 07/27/24  0414 07/26/24  1325 07/26/24  0108 07/25/24  0953 07/24/24  2322   SODIUM mmol/L 134*  --  133*  --  131*  --  131*  --  135*  --  134*  --  135*   POTASSIUM mmol/L 3.2* 3.8 3.6 3.0* 3.0* 3.0* 3.3*   < > 3.4*   < > 3.4* 4.0 3.4*   CHLORIDE mmol/L 100  --  100  --  96*  --  97*  --  98  --  98  --  99   CO2  "mmol/L 23.9  --  22.8  --  22.2  --  22.2  --  21.9*  --  25.0  --  25.4   BUN mg/dL 4*  --  3*  --  3*  --  4*  --  5*  --  6  --  5*   CREATININE mg/dL 0.27*  --  0.43*  --  0.47*  --  0.52*  --  0.39*  --  0.50*  --  0.51*   GLUCOSE mg/dL 82  --  90  --  98  --  83  --  111*  --  87  --  115*   ALBUMIN g/dL 2.6*  --  2.9*  --  2.9*  3.0*  --  3.0*  --  3.0*  --  2.9*  --  3.3*   BILIRUBIN mg/dL 19.0*  --  21.2*  --  21.4*  --  20.8*  --  20.4*  --  17.8*  --  18.2*   ALK PHOS U/L 515*  --  370*  --  346*  --  294*  --  307*  --  255*  --  283*   AST (SGOT) U/L 138*  --  143*  --  159*  --  163*  --  176*  --  143*  --  141*   ALT (SGPT) U/L 51*  --  60*  --  66*  --  63*  --  62*  --  49*  --  47*   MAGNESIUM mg/dL  --   --  2.2  --  1.2*  --   --   --   --   --   --   --   --    PHOSPHORUS mg/dL 2.3*  --  1.5*  --  2.3*  --  2.3*  --  2.5  --  3.0 2.5 2.1*    < > = values in this interval not displayed.     Cr Clearance Estimated Creatinine Clearance: 318 mL/min (A) (by C-G formula based on SCr of 0.27 mg/dL (L)).  Coag   Results from last 7 days   Lab Units 07/31/24  0235 07/30/24  0518 07/29/24  0900 07/28/24  0234 07/27/24  0414 07/26/24  1325 07/26/24  0938   INR  1.88* 2.03* 2.25* 2.04* 1.85* 1.97* 2.00*     HbA1C No results found for: \"HGBA1C\"      Infection     UA      Microbiology Results (last 10 days)       ** No results found for the last 240 hours. **          Imaging Results (Last 72 Hours)       Procedure Component Value Units Date/Time    US Abdomen Limited [[id number]] Collected: 07/28/24 1332     Updated: 07/28/24 1342    Narrative:      US ABDOMEN LIMITED    Date of Exam: 7/28/2024 10:50 AM EDT    Indication: Distention, rule out ascites.    Comparison: CT abdomen and pelvis 7/18/2024    Technique: Grayscale and color Doppler ultrasound evaluation of the right upper quadrant was performed.      Findings:  There is a small amount of right upper quadrant ascites adjacent to the liver measuring " 3.2 x 1 x 4 cm. No ascites in the right lower quadrant, left lower quadrant and left upper quadrant. Splenomegaly noted as seen on recent CT.      Impression:      Impression:  Small right upper quadrant ascites.        Electronically Signed: Jaxon Gallagher MD    7/28/2024 1:40 PM EDT    Workstation ID: [id number]            Assessment & Plan   Alcohol hepatitis  Anemia - acute  Alcohol withdrawal - resolved  Leukocytosis  Electrolyte abnormalities  Elevated INR - improved     Plan:  Patient is a 28-year-old male with a history of EtOH abuse who presented to the ER on 7/18.  At that time patient was unarousable and withdrawing from alcohol.       LFTs continue to improve.  Total bilirubin 19, alk phos 515,  and ALT 51.  INR also improved to 1.88.  Phos 2.3, sodium 134, potassium 3.2 -electrolyte replacement per primary.  WBC 42.36 which is improved, likely secondary to Neupogen.  Hemoglobin 7.0 with platelets 142.  Recent bleeding from a scab has resolved.  Continue to monitor and transfuse as needed for hemoglobin less than 7.    Remains on thiamine, zinc, Carafate, pantoprazole, multivitamin, lactulose, folic acid, diuretics and has finished acetylcysteine per protocol.  Complete alcohol cessation again encouraged.  We will plan outpatient referral to tertiary center for liver transplant evaluation.  Good nutrition encouraged.  If continues to improve, consider discharge home tomorrow.    Electronically signed by MARI Verdin, 07/31/24, 10:11 AM EDT.

## 2024-07-31 NOTE — CASE MANAGEMENT/SOCIAL WORK
Social Work Assessment   [facility]     Patient Name: Yonas Charles  MRN: [id number]  Today's Date: 7/31/2024    Admit Date: 7/18/2024     Discharge Plan       Row Name 07/31/24 2397       Plan    Plan Comments F/U visit with patient at bedside.  Patient is feeling better and does not want to jepardize going to [facility] too soon as he knows if not medically stable for d/c they will decline admission.  Patient is also considering going home for one day to get things packed/organized.  This should not be an issue as he is going to [facility] voluntarily.  SW will wait until tomorrow before submitting [facility] requests.  Patient's #1 preference is [place] and #2 preference if [place].  SW will continue to follow.               MICHELE Jones MSW    Phone: [phone]  Cell: [phone]  Fax: [phone]  [email or web address]

## 2024-07-31 NOTE — PROGRESS NOTES
"NEPHROLOGY PROGRESS NOTE------[facility]    [facility]  [phone]  Quang Butts MD      Patient Care Team:  Provider, No Known as PCP - General      Provider:  Quang Butts MD  Patient Name: Yonas Charles  :  1995    SUBJECTIVE:    F/U ELECTROLYTE ABNORMALITIES    No chest pain or SOA      Medication:  famotidine, 40 mg, Oral, Nightly  folic acid, 1 mg, Oral, Daily  furosemide, 40 mg, Oral, Daily  guaiFENesin, 600 mg, Oral, Q12H  hydrocortisone, 1 Application, Topical, Q8H  lactulose, 10 g, Oral, Daily  metoprolol succinate XL, 25 mg, Oral, Q24H  multivitamin with minerals, 1 tablet, Oral, Daily  neomycin-bacitracin-polymyxin b, 1 Application, Topical, Daily  pantoprazole, 40 mg, Oral, BID AC  [START ON 2024] potassium chloride, 20 mEq, Oral, Daily  [Held by provider] sertraline, 50 mg, Oral, Daily  silver nitrate, 1 Application, Topical, Once  sodium chloride, 10 mL, Intravenous, Q12H  sucralfate, 1 g, Oral, 4x Daily AC & at Bedtime  thiamine, 100 mg, Oral, Daily  zinc sulfate, 220 mg, Oral, Daily                 OBJECTIVE    Vital Sign Min/Max for last 24 hours  Temp  Min: 97.7 °F (36.5 °C)  Max: 98.6 °F (37 °C)   BP  Min: 112/54  Max: 122/70   Pulse  Min: 108  Max: 121   Resp  Min: 14  Max: 26   SpO2  Min: 94 %  Max: 98 %   No data recorded   No data recorded     Flowsheet Rows      Flowsheet Row First Filed Value   Admission Height 165.1 cm (65\") Documented at 2024 0855   Admission Weight 55.7 kg (122 lb 12.7 oz) Documented at 2024 0855            I/O this shift:  In: 240 [P.O.:240]  Out: -   I/O last 3 completed shifts:  In: 1260 [P.O.:1260]  Out: 0     Physical Exam:  General Appearance: NAD.   Head: normocephalic, without obvious abnormality and atraumatic    Eyes: conjunctivae and +ICTERIC SCLERAE  Neck: supple and no JVD  Lungs: CTA bilaterally  Heart: regular rhythm & normal rate and normal S1, S2   Chest Wall: no " "abnormalities observed  Abdomen: normal bowel sounds.  Distended  Extremities: moves extremities well, trace edema, no cyanosis  Skin: +JAUNDICE  Neurologic: A AND O X 4 WITHOUT FOCAL DEFICIT    Labs:    WBC WBC   Date Value Ref Range Status   07/31/2024 42.36 (C) 3.40 - 10.80 10*3/mm3 Final   07/30/2024 46.33 (C) 3.40 - 10.80 10*3/mm3 Final   07/29/2024 39.35 (C) 3.40 - 10.80 10*3/mm3 Final      HGB Hemoglobin   Date Value Ref Range Status   07/31/2024 7.0 (L) 13.0 - 17.7 g/dL Final   07/30/2024 8.5 (L) 13.0 - 17.7 g/dL Final   07/29/2024 9.2 (L) 13.0 - 17.7 g/dL Final      HCT Hematocrit   Date Value Ref Range Status   07/31/2024 19.6 (C) 37.5 - 51.0 % Final   07/30/2024 23.5 (L) 37.5 - 51.0 % Final   07/29/2024 25.2 (L) 37.5 - 51.0 % Final      Platelets No results found for: \"LABPLAT\"   MCV MCV   Date Value Ref Range Status   07/31/2024 96.6 79.0 - 97.0 fL Final   07/30/2024 95.1 79.0 - 97.0 fL Final   07/29/2024 94.7 79.0 - 97.0 fL Final          Sodium Sodium   Date Value Ref Range Status   07/31/2024 134 (L) 136 - 145 mmol/L Final   07/30/2024 133 (L) 136 - 145 mmol/L Final   07/29/2024 131 (L) 136 - 145 mmol/L Final      Potassium Potassium   Date Value Ref Range Status   07/31/2024 3.2 (L) 3.5 - 5.2 mmol/L Final   07/30/2024 3.8 3.5 - 5.2 mmol/L Final     Comment:     Specimen hemolyzed.  Result may be falsely elevated.   07/30/2024 3.6 3.5 - 5.2 mmol/L Final   07/29/2024 3.0 (L) 3.5 - 5.2 mmol/L Final   07/29/2024 3.0 (L) 3.5 - 5.2 mmol/L Final   07/28/2024 3.0 (L) 3.5 - 5.2 mmol/L Final      Chloride Chloride   Date Value Ref Range Status   07/31/2024 100 98 - 107 mmol/L Final   07/30/2024 100 98 - 107 mmol/L Final   07/29/2024 96 (L) 98 - 107 mmol/L Final      CO2 CO2   Date Value Ref Range Status   07/31/2024 23.9 22.0 - 29.0 mmol/L Final   07/30/2024 22.8 22.0 - 29.0 mmol/L Final   07/29/2024 22.2 22.0 - 29.0 mmol/L Final      BUN BUN   Date Value Ref Range Status   07/31/2024 4 (L) 6 - 20 mg/dL Final " "  07/30/2024 3 (L) 6 - 20 mg/dL Final   07/29/2024 3 (L) 6 - 20 mg/dL Final      Creatinine Creatinine   Date Value Ref Range Status   07/31/2024 0.27 (L) 0.76 - 1.27 mg/dL Final   07/30/2024 0.43 (L) 0.76 - 1.27 mg/dL Final   07/29/2024 0.47 (L) 0.76 - 1.27 mg/dL Final      Calcium Calcium   Date Value Ref Range Status   07/31/2024 7.6 (L) 8.6 - 10.5 mg/dL Final   07/30/2024 7.7 (L) 8.6 - 10.5 mg/dL Final   07/29/2024 8.0 (L) 8.6 - 10.5 mg/dL Final      PO4 No components found for: \"PO4\"   Albumin Albumin   Date Value Ref Range Status   07/31/2024 2.6 (L) 3.5 - 5.2 g/dL Final   07/30/2024 2.9 (L) 3.5 - 5.2 g/dL Final   07/29/2024 3.0 (L) 3.5 - 5.2 g/dL Final   07/29/2024 2.9 (L) 3.5 - 5.2 g/dL Final      Magnesium Magnesium   Date Value Ref Range Status   07/30/2024 2.2 1.6 - 2.6 mg/dL Final   07/29/2024 1.2 (L) 1.6 - 2.6 mg/dL Final        Uric Acid No components found for: \"URIC ACID\"     Imaging Results (Last 72 Hours)       Procedure Component Value Units Date/Time    US Abdomen Limited [[id number]] Collected: 07/28/24 1332     Updated: 07/28/24 1342    Narrative:      US ABDOMEN LIMITED    Date of Exam: 7/28/2024 10:50 AM EDT    Indication: Distention, rule out ascites.    Comparison: CT abdomen and pelvis 7/18/2024    Technique: Grayscale and color Doppler ultrasound evaluation of the right upper quadrant was performed.      Findings:  There is a small amount of right upper quadrant ascites adjacent to the liver measuring 3.2 x 1 x 4 cm. No ascites in the right lower quadrant, left lower quadrant and left upper quadrant. Splenomegaly noted as seen on recent CT.      Impression:      Impression:  Small right upper quadrant ascites.        Electronically Signed: Jaxon Gallagher MD    7/28/2024 1:40 PM EDT    Workstation ID: [id number]            Results for orders placed during the hospital encounter of 07/18/24    XR Chest 1 View    Narrative  XR CHEST 1 VW    Date of Exam: 7/27/2024 10:42 PM EDT    Indication: S/P " fall, chest pain    Comparison: 7/24/2024.    Findings:  There is persistent mixed interstitial and airspace disease in the mid and lower lungs. No pneumothorax or pleural effusion. Heart size is unchanged. Pulmonary vasculature is indistinct. No acute osseous abnormality.    Impression  Impression:  Persistent mixed interstitial and airspace disease in the mid and lower lungs.        Electronically Signed: Juno Ernst MD  7/27/2024 11:12 PM EDT  Workstation ID: [id number]      XR Chest 1 View    Narrative  XR CHEST 1 VW    Date of Exam: 7/24/2024 1:25 PM EDT    Indication: coughing, SOB    Comparison: 7/18/2024    Findings:  There are lower lung volumes. Cardiac size appears increased. There is airspace disease in both lung bases with small bilateral pleural effusions. Pulmonary vascular markings have increased compared with the last study.    Impression  Impression:    1. Increased cardiac size with evidence of mild passive congestion.  2. Bibasilar airspace disease with bilateral pleural effusions.      Electronically Signed: Vitor Chin MD  7/24/2024 1:31 PM EDT  Workstation ID: [id number]      XR Chest PA & Lateral    Narrative  DATE OF EXAM:  7/18/2024 12:36 PM    PROCEDURE:  XR CHEST PA AND LATERAL-    INDICATIONS:  rule out aspiration pneumonia; E87.6-Hypokalemia; F10.939-Alcohol use,  unspecified with withdrawal, unspecified; R17-Unspecified jaundice    COMPARISON:  No Comparisons Available    TECHNIQUE:  Two radiologic views of the chest , PA and lateral were obtained.    FINDINGS:  Heart size normal. Negative for lobar consolidation, edema, effusion or  pneumothorax. Osseous structures unremarkable.    Impression  No active pulmonary process.      Electronically Signed By-Armani Small MD On:7/18/2024 1:00 PM            ASSESSMENT / PLAN      Alcohol withdrawal    HYPONATREMIA------stable. Likely related to underlying liver disease. Fluid restrict     2. HYPOKALEMIA-------Resolved.       3.  HYPOMAGNESEMIA-------Replaced     4. HYPOCALCEMIA------Replace IV     5. ALCOHOL ABUSE/CIRRHOSIS/JAUNDICE------Thiamine, Folate, IVFs, prn IV Ativan. Withdrawal prcautions     6. MIXED METABOLIC ALKALOSIS/METABOLIC ACIDOSIS-----Better     7. ANEMIA------Normocytic with normal RDW. Follow for PRBC need     8. DEPRESSION------Suicide precautions. Hold Zoloft given severe hyponatremia     9. HYPOALBUMINEMIA-----IV Albumin to temporize and po supplements     10. ELEVATED INR------Secondary to liver disease     11. THROMBOCYTOPENIA------No heparin. Secondary to liver disease     12. PUD PROPHYLAXIS-----IV PPI     13. DVT PROPHYLAXIS-----SCDs    14. HYPOPHOSPHATEMIA------Replaced    Sodium stable  Continue oral Lasix  Replace potassium  Keep on fluid restriction 1500 mL p.o. daily  Labs in am        Quang Butts MD  [facility]  [phone]  07/31/24  10:44 EDT

## 2024-08-01 ENCOUNTER — INPATIENT HOSPITAL (OUTPATIENT)
Dept: URBAN - METROPOLITAN AREA HOSPITAL 84 | Facility: HOSPITAL | Age: 29
End: 2024-08-01

## 2024-08-01 DIAGNOSIS — R79.1 ABNORMAL COAGULATION PROFILE: ICD-10-CM

## 2024-08-01 DIAGNOSIS — F10.10 ALCOHOL ABUSE, UNCOMPLICATED: ICD-10-CM

## 2024-08-01 DIAGNOSIS — K70.30 ALCOHOLIC CIRRHOSIS OF LIVER WITHOUT ASCITES: ICD-10-CM

## 2024-08-01 DIAGNOSIS — K70.10 ALCOHOLIC HEPATITIS WITHOUT ASCITES: ICD-10-CM

## 2024-08-01 DIAGNOSIS — D72.829 ELEVATED WHITE BLOOD CELL COUNT, UNSPECIFIED: ICD-10-CM

## 2024-08-01 LAB
ALBUMIN SERPL-MCNC: 2.5 G/DL (ref 3.5–5.2)
ALBUMIN/GLOB SERPL: 1.1 G/DL
ALP SERPL-CCNC: 287 U/L (ref 39–117)
ALT SERPL W P-5'-P-CCNC: 47 U/L (ref 1–41)
ANION GAP SERPL CALCULATED.3IONS-SCNC: 9.5 MMOL/L (ref 5–15)
ANISOCYTOSIS BLD QL: ABNORMAL
AST SERPL-CCNC: 119 U/L (ref 1–40)
BILIRUB SERPL-MCNC: 18.9 MG/DL (ref 0–1.2)
BUN SERPL-MCNC: 6 MG/DL (ref 6–20)
BUN/CREAT SERPL: 16.7 (ref 7–25)
CALCIUM SPEC-SCNC: 7.5 MG/DL (ref 8.6–10.5)
CHLORIDE SERPL-SCNC: 104 MMOL/L (ref 98–107)
CO2 SERPL-SCNC: 22.5 MMOL/L (ref 22–29)
CREAT SERPL-MCNC: 0.36 MG/DL (ref 0.76–1.27)
DEPRECATED RDW RBC AUTO: 67.9 FL (ref 37–54)
EGFRCR SERPLBLD CKD-EPI 2021: 156.4 ML/MIN/1.73
ERYTHROCYTE [DISTWIDTH] IN BLOOD BY AUTOMATED COUNT: 20.4 % (ref 12.3–15.4)
GLOBULIN UR ELPH-MCNC: 2.2 GM/DL
GLUCOSE SERPL-MCNC: 83 MG/DL (ref 65–99)
HCT VFR BLD AUTO: 22.4 % (ref 37.5–51)
HGB BLD-MCNC: 7.8 G/DL (ref 13–17.7)
INR PPP: 1.85 (ref 0.93–1.1)
LYMPHOCYTES # BLD MANUAL: 2.52 10*3/MM3 (ref 0.7–3.1)
LYMPHOCYTES NFR BLD MANUAL: 6 % (ref 5–12)
MCH RBC QN AUTO: 33.1 PG (ref 26.6–33)
MCHC RBC AUTO-ENTMCNC: 34.8 G/DL (ref 31.5–35.7)
MCV RBC AUTO: 94.9 FL (ref 79–97)
METAMYELOCYTES NFR BLD MANUAL: 1 % (ref 0–0)
MONOCYTES # BLD: 2.52 10*3/MM3 (ref 0.1–0.9)
MYELOCYTES NFR BLD MANUAL: 5 % (ref 0–0)
NEUTROPHILS # BLD AUTO: 34.08 10*3/MM3 (ref 1.7–7)
NEUTROPHILS NFR BLD MANUAL: 53 % (ref 42.7–76)
NEUTS BAND NFR BLD MANUAL: 28 % (ref 0–5)
PHOSPHATE SERPL-MCNC: 2.1 MG/DL (ref 2.5–4.5)
PLATELET # BLD AUTO: 127 10*3/MM3 (ref 140–450)
PMV BLD AUTO: 10.9 FL (ref 6–12)
POTASSIUM SERPL-SCNC: 3.7 MMOL/L (ref 3.5–5.2)
POTASSIUM SERPL-SCNC: 4.2 MMOL/L (ref 3.5–5.2)
PROMYELOCYTES NFR BLD MANUAL: 1 % (ref 0–0)
PROT SERPL-MCNC: 4.7 G/DL (ref 6–8.5)
PROTHROMBIN TIME: 19.3 SECONDS (ref 9.6–11.7)
RBC # BLD AUTO: 2.36 10*6/MM3 (ref 4.14–5.8)
SCAN SLIDE: NORMAL
SMALL PLATELETS BLD QL SMEAR: ABNORMAL
SODIUM SERPL-SCNC: 136 MMOL/L (ref 136–145)
TARGETS BLD QL SMEAR: ABNORMAL
TOXIC GRANULATION: ABNORMAL
VARIANT LYMPHS NFR BLD MANUAL: 6 % (ref 19.6–45.3)
WBC NRBC COR # BLD AUTO: 42.08 10*3/MM3 (ref 3.4–10.8)

## 2024-08-01 PROCEDURE — 85610 PROTHROMBIN TIME: CPT | Performed by: NURSE PRACTITIONER

## 2024-08-01 PROCEDURE — 85025 COMPLETE CBC W/AUTO DIFF WBC: CPT | Performed by: NURSE PRACTITIONER

## 2024-08-01 PROCEDURE — 80053 COMPREHEN METABOLIC PANEL: CPT | Performed by: INTERNAL MEDICINE

## 2024-08-01 PROCEDURE — 85007 BL SMEAR W/DIFF WBC COUNT: CPT | Performed by: NURSE PRACTITIONER

## 2024-08-01 PROCEDURE — 99232 SBSQ HOSP IP/OBS MODERATE 35: CPT | Performed by: NURSE PRACTITIONER

## 2024-08-01 PROCEDURE — 84132 ASSAY OF SERUM POTASSIUM: CPT | Performed by: INTERNAL MEDICINE

## 2024-08-01 PROCEDURE — 97530 THERAPEUTIC ACTIVITIES: CPT

## 2024-08-01 PROCEDURE — 84100 ASSAY OF PHOSPHORUS: CPT | Performed by: INTERNAL MEDICINE

## 2024-08-01 RX ADMIN — HYDROCORTISONE 1 APPLICATION: 1 CREAM TOPICAL at 14:14

## 2024-08-01 RX ADMIN — FAMOTIDINE 40 MG: 20 TABLET, FILM COATED ORAL at 21:50

## 2024-08-01 RX ADMIN — Medication 220 MG: at 08:10

## 2024-08-01 RX ADMIN — SUCRALFATE 1 G: 1 TABLET ORAL at 21:49

## 2024-08-01 RX ADMIN — BACITRACIN ZINC, NEOMYCIN, POLYMYXIN B SULFAT 1 APPLICATION: 5000; 3.5; 4 OINTMENT TOPICAL at 08:10

## 2024-08-01 RX ADMIN — METOPROLOL SUCCINATE 25 MG: 25 TABLET, EXTENDED RELEASE ORAL at 08:10

## 2024-08-01 RX ADMIN — GUAIFENESIN 600 MG: 600 TABLET, EXTENDED RELEASE ORAL at 08:10

## 2024-08-01 RX ADMIN — FUROSEMIDE 40 MG: 40 TABLET ORAL at 08:10

## 2024-08-01 RX ADMIN — Medication 1 TABLET: at 08:10

## 2024-08-01 RX ADMIN — GUAIFENESIN 600 MG: 600 TABLET, EXTENDED RELEASE ORAL at 21:49

## 2024-08-01 RX ADMIN — POTASSIUM CHLORIDE 20 MEQ: 1500 TABLET, EXTENDED RELEASE ORAL at 08:10

## 2024-08-01 RX ADMIN — PANTOPRAZOLE SODIUM 40 MG: 40 TABLET, DELAYED RELEASE ORAL at 16:48

## 2024-08-01 RX ADMIN — Medication 10 ML: at 08:11

## 2024-08-01 RX ADMIN — SUCRALFATE 1 G: 1 TABLET ORAL at 16:48

## 2024-08-01 RX ADMIN — PANTOPRAZOLE SODIUM 40 MG: 40 TABLET, DELAYED RELEASE ORAL at 08:10

## 2024-08-01 RX ADMIN — SUCRALFATE 1 G: 1 TABLET ORAL at 08:10

## 2024-08-01 RX ADMIN — Medication 10 ML: at 21:51

## 2024-08-01 RX ADMIN — SUCRALFATE 1 G: 1 TABLET ORAL at 14:14

## 2024-08-01 RX ADMIN — HYDROCORTISONE 1 APPLICATION: 1 CREAM TOPICAL at 03:35

## 2024-08-01 RX ADMIN — Medication 100 MG: at 08:10

## 2024-08-01 RX ADMIN — FOLIC ACID 1 MG: 1 TABLET ORAL at 08:10

## 2024-08-01 RX ADMIN — POTASSIUM CHLORIDE 40 MEQ: 1500 TABLET, EXTENDED RELEASE ORAL at 03:32

## 2024-08-01 NOTE — THERAPY DISCHARGE NOTE
Acute Care - Occupational Therapy Discharge   [place]    Patient Name: Yonas Charles  : 1995    MRN: [id number]                              Today's Date: 2024       Admit Date: 2024    Visit Dx:     ICD-10-CM ICD-9-CM   1. Acute hypokalemia  E87.6 276.8   2. Alcohol withdrawal syndrome with complication  F10.939 291.81   3. Jaundice  R17 782.4     Patient Active Problem List   Diagnosis    Alcohol withdrawal     History reviewed. No pertinent past medical history.  History reviewed. No pertinent surgical history.   General Information    No documentation.                  Mobility/ADL's    No documentation.                  Obj/Interventions    No documentation.                  Goals/Plan    No documentation.                  Clinical Impression    No documentation.                  Outcome Measures       Row Name 24 0810          How much help from another person do you currently need...    Turning from your back to your side while in flat bed without using bedrails? 4  -SS     Moving from lying on back to sitting on the side of a flat bed without bedrails? 4  -SS     Moving to and from a bed to a chair (including a wheelchair)? 4  -SS     Standing up from a chair using your arms (e.g., wheelchair, bedside chair)? 4  -SS     Climbing 3-5 steps with a railing? 3  -SS     To walk in hospital room? 3  -SS     AM-PAC 6 Clicks Score (PT) 22  -SS     Highest Level of Mobility Goal 7 --> Walk 25 feet or more  -SS               User Key  (r) = Recorded By, (t) = Taken By, (c) = Cosigned By      Initials Name Provider Type    Sweta Wiseman RN Registered Nurse                  Occupational Therapy Education       Title: PT OT SLP Therapies (Done)       Topic: Occupational Therapy (Done)       Point: ADL training (Done)       Description:   Instruct learner(s) on proper safety adaptation and remediation techniques during self care or transfers.   Instruct in proper use of assistive devices.                   Learning Progress Summary             Patient Acceptance, E,TB, VU by SS at 7/31/2024 1643    Acceptance, E,TB, VU,DU,NR by  at 7/31/2024 0547    Acceptance, E,TB, VU by  at 7/30/2024 1643    Acceptance, E,TB,D,H, VU,DU,NR by  at 7/30/2024 0421    Acceptance, E,TB, VU by [facility] at 7/29/2024 1508    Acceptance, E,TB,D, VU,DU,NR by  at 7/29/2024 0142    Acceptance, E,TB, VU by [facility] at 7/28/2024 1652    Acceptance, E,TB,D, VU,DU,NR by  at 7/27/2024 0305    Acceptance, E, VU by [facility] at 7/22/2024 1624    Acceptance, E,TB, VU by  at 7/21/2024 1042    Comment: Role of OT, goals & POC; safety awareness                         Point: Home exercise program (Done)       Description:   Instruct learner(s) on appropriate technique for monitoring, assisting and/or progressing therapeutic exercises/activities.                  Learning Progress Summary             Patient Acceptance, E,TB, VU by  at 7/31/2024 1643    Acceptance, E,TB, VU,DU,NR by  at 7/31/2024 0547    Acceptance, E,TB, VU by  at 7/30/2024 1643    Acceptance, E,TB,D,H, VU,DU,NR by  at 7/30/2024 0421    Acceptance, E,TB, VU by [facility] at 7/29/2024 1508    Acceptance, E,TB,D, VU,DU,NR by  at 7/29/2024 0142    Acceptance, E,TB, VU by [facility] at 7/28/2024 1652    Acceptance, E,TB,D, VU,DU,NR by  at 7/27/2024 0305    Acceptance, E, VU by [facility] at 7/22/2024 1624    Acceptance, E,TB, VU by DT at 7/21/2024 1042    Comment: Role of OT, goals & POC; safety awareness                         Point: Precautions (Done)       Description:   Instruct learner(s) on prescribed precautions during self-care and functional transfers.                  Learning Progress Summary             Patient Acceptance, E,TB, VU by  at 7/31/2024 1643    Acceptance, E,TB, VU,DU,NR by  at 7/31/2024 0547    Acceptance, E,TB, VU by  at 7/30/2024 1643    Acceptance, E,TB,D,H, VU,DU,NR by  at 7/30/2024 0421    Acceptance, E,TB, VU by [facility] at 7/29/2024 1508    Acceptance,  E,TB,D, VU,DU,NR by  at 7/29/2024 0142    Acceptance, E,TB, VU by [facility] at 7/28/2024 1652    Acceptance, E,TB,D, VU,DU,NR by  at 7/27/2024 0305    Acceptance, E, VU by [facility] at 7/22/2024 1624    Acceptance, E,TB, VU by  at 7/21/2024 1042    Comment: Role of OT, goals & POC; safety awareness                         Point: Body mechanics (Done)       Description:   Instruct learner(s) on proper positioning and spine alignment during self-care, functional mobility activities and/or exercises.                  Learning Progress Summary             Patient Acceptance, E,TB, VU by  at 7/31/2024 1643    Acceptance, E,TB, VU,DU,NR by  at 7/31/2024 0547    Acceptance, E,TB, VU by  at 7/30/2024 1643    Acceptance, E,TB,D,H, VU,DU,NR by  at 7/30/2024 0421    Acceptance, E,TB, VU by [facility] at 7/29/2024 1508    Acceptance, E,TB,D, VU,DU,NR by  at 7/29/2024 0142    Acceptance, E,TB, VU by [facility] at 7/28/2024 1652    Acceptance, E,TB,D, VU,DU,NR by  at 7/27/2024 0305    Acceptance, E, VU by [facility] at 7/22/2024 1624    Acceptance, E,TB, VU by  at 7/21/2024 1042    Comment: Role of OT, goals & POC; safety awareness                                         User Key       Initials Effective Dates Name Provider Type Discipline     06/16/21 -  Carlos Garcia LPN Licensed Nurse Nurse    [facility] 06/16/21 -  Bria Saucedo LPN Licensed Nurse Nurse     07/11/23 -  Jackie Moon, OT Occupational Therapist OT     08/31/21 -  Kimmie Winters LPN Licensed Nurse Nurse    [facility] 12/04/23 -  Paige Melvin, PT Physical Therapist PT     02/16/24 -  Sweta Nash, RN Registered Nurse Nurse                  OT Recommendation and Plan      Pt. Demonstrates progress w/ functional mobility this date, ambulates to and from bathroom unassisted and completes all ADLs independently. Pt. Appears to be at baseline functional independence, albeit remains falls risk due to fall a few days ago while getting dressed. Dynamic  standing balance appears intact. Pt. Does not require further skilled OT at this time.         Time Calculation:         Time Calculation- OT       Row Name 08/01/24 1405             Time Calculation- OT    OT Start Time 1100  -MP      OT Stop Time 1110  -MP      OT Time Calculation (min) 10 min  -      Total Timed Code Minutes- OT 10 minute(s)  -MP      OT Received On 08/01/24  -                User Key  (r) = Recorded By, (t) = Taken By, (c) = Cosigned By      Initials Name Provider Type     Bradly Rhoades OT Occupational Therapist                  Therapy Charges for Today       Code Description Service Date Service Provider Modifiers Qty    [id number]  OT THERAPEUTIC ACT EA 15 MIN 8/1/2024 Bradly Rhoades OT GO 1                    Bradly Rhoades OT  8/1/2024

## 2024-08-01 NOTE — PROGRESS NOTES
" LOS: 14 days   Patient Care Team:  Provider, No Known as PCP - General      Subjective \"I am doing better\"    Interval History:     Subjective: No nausea or vomiting, tolerating diet.  Having bowel movements.  Minimal abdominal discomfort.  No further bleeding from scab.    ROS:   No chest pain, shortness of breath, or cough.      Medication Review:     Current Facility-Administered Medications:     acetaminophen (TYLENOL) tablet 650 mg, 650 mg, Oral, Q4H PRN **OR** acetaminophen (TYLENOL) 160 MG/5ML oral solution 650 mg, 650 mg, Oral, Q4H PRN **OR** acetaminophen (TYLENOL) suppository 650 mg, 650 mg, Rectal, Q4H PRN, Mari Bustos MD    sennosides-docusate (PERICOLACE) 8.6-50 MG per tablet 2 tablet, 2 tablet, Oral, BID PRN **AND** polyethylene glycol (MIRALAX) packet 17 g, 17 g, Oral, Daily PRN **AND** bisacodyl (DULCOLAX) EC tablet 5 mg, 5 mg, Oral, Daily PRN **AND** bisacodyl (DULCOLAX) suppository 10 mg, 10 mg, Rectal, Daily PRN, Mari Bustos MD    Calcium Replacement - Follow Nurse / BPA Driven Protocol, , Does not apply, PRN, Mari Bustos MD    famotidine (PEPCID) tablet 40 mg, 40 mg, Oral, Nightly, Waleska Chery, APRN, 40 mg at 07/31/24 2025    folic acid (FOLVITE) tablet 1 mg, 1 mg, Oral, Daily, Mari Bustos MD, 1 mg at 08/01/24 0810    furosemide (LASIX) tablet 40 mg, 40 mg, Oral, Daily, Quang Butts MD, 40 mg at 08/01/24 0810    guaiFENesin (MUCINEX) 12 hr tablet 600 mg, 600 mg, Oral, Q12H, Hayley Rodriguez DO, 600 mg at 08/01/24 0810    guaiFENesin-codeine (GUAIFENESIN AC) 100-10 MG/5ML liquid 5 mL, 5 mL, Oral, Q4H PRN, Frank Wild MD, 5 mL at 07/24/24 0318    hydrocortisone 1 % cream 1 Application, 1 Application, Topical, Q8H, Hayley Rodriguez DO, 1 Application at 08/01/24 0335    hydrOXYzine (ATARAX) tablet 25 mg, 25 mg, Oral, Q8H PRN, Frank Wild MD, 25 mg at 07/30/24 2120    lactulose (CHRONULAC) 10 GM/15ML solution 10 g, 10 g, Oral, Daily, Kaci Polo, APRN, " 10 g at 07/28/24 0834    Magnesium Standard Dose Replacement - Follow Nurse / BPA Driven Protocol, , Does not apply, PRCOREY, Mari Bustos MD    metoprolol succinate XL (TOPROL-XL) 24 hr tablet 25 mg, 25 mg, Oral, Q24H, Frank Wild MD, 25 mg at 08/01/24 0810    multivitamin with minerals 1 tablet, 1 tablet, Oral, Daily, Mari Bustos MD, 1 tablet at 08/01/24 0810    neomycin-bacitracin-polymyxin b (NEOSPORIN) ointment 1 Application, 1 Application, Topical, Daily, Frank Wild MD, 1 Application at 08/01/24 0810    ondansetron ODT (ZOFRAN-ODT) disintegrating tablet 4 mg, 4 mg, Oral, Q6H PRN **OR** ondansetron (ZOFRAN) injection 4 mg, 4 mg, Intravenous, Q6H PRN, Mari Bustos MD, 4 mg at 07/18/24 1330    pantoprazole (PROTONIX) EC tablet 40 mg, 40 mg, Oral, BID AC, Kaci Polo, APRN, 40 mg at 08/01/24 0810    Phosphorus Replacement - Follow Nurse / BPA Driven Protocol, , Does not apply, NAEEM, Mari Bustos MD    potassium chloride (KLOR-CON M20) CR tablet 20 mEq, 20 mEq, Oral, Daily, Quang Butts MD, 20 mEq at 08/01/24 0810    Potassium Replacement - Follow Nurse / BPA Driven Protocol, , Does not apply, NAEEM, Mari Bustos MD    [Held by provider] sertraline (ZOLOFT) tablet 50 mg, 50 mg, Oral, Daily, Mari Bustos MD, 50 mg at 07/18/24 1258    silver nitrate 75-25 % applicator 1 Application, 1 Application, Topical, Once, Frank Wild MD    sodium chloride 0.9 % flush 10 mL, 10 mL, Intravenous, PRN, Mari Bustos MD    sodium chloride 0.9 % flush 10 mL, 10 mL, Intravenous, Q12H, Mari Bustos MD, 10 mL at 08/01/24 0811    sodium chloride 0.9 % flush 10 mL, 10 mL, Intravenous, PRN, Mari Bustos MD    sodium chloride 0.9 % infusion 40 mL, 40 mL, Intravenous, PRN, Mari Bustos MD    sucralfate (CARAFATE) tablet 1 g, 1 g, Oral, 4x Daily AC & at Bedtime, Kaci Polo APRN, 1 g at 08/01/24 0810    [COMPLETED] thiamine (B-1) injection 200 mg, 200 mg,  Intravenous, Q8H, 200 mg at 07/23/24 1437 **FOLLOWED BY** thiamine (VITAMIN B-1) tablet 100 mg, 100 mg, Oral, Daily, Mari Bustos MD, 100 mg at 08/01/24 0810    zinc sulfate (ZINCATE) capsule 220 mg, 220 mg, Oral, Daily, Waleska Chery APRN, 220 mg at 08/01/24 0810      Objective resting in bed, no acute distress    Vital Signs  Vitals:    07/31/24 1930 07/31/24 2335 08/01/24 0318 08/01/24 0809   BP: 95/51 117/68 114/70 119/76   BP Location: Left arm Left arm Left arm Left arm   Patient Position: Lying Lying Lying Sitting   Pulse: 85 102 98 97   Resp: 20 21 18 16   Temp: 97.6 °F (36.4 °C) 98 °F (36.7 °C) 97.9 °F (36.6 °C) 97.6 °F (36.4 °C)   TempSrc: Oral Oral Oral Tympanic   SpO2: 96% 96% 96% 98%   Weight:       Height:           Physical Exam:     General Appearance:    Awake and alert, in no acute distress   Head:    Normocephalic, without obvious abnormality   Eyes:          Conjunctivae normal, icteric sclera   Throat:   No oral lesions, no thrush, oral mucosa moist   Neck:   No adenopathy, supple, no JVD   Lungs:     respirations regular, even and unlabored   Abdomen:     Soft, mildly distended   Rectal:     Deferred   Extremities:   No edema, no cyanosis   Skin:   No bruising or rash,  jaundice        Results Review:    CBC    Results from last 7 days   Lab Units 08/01/24  0332 07/31/24  0235 07/30/24  0518 07/29/24  0900 07/28/24  0234 07/27/24  0414 07/26/24  0108   WBC 10*3/mm3 42.08* 42.36* 46.33* 39.35* 40.75* 34.41* 25.79*   HEMOGLOBIN g/dL 7.8* 7.0* 8.5* 9.2* 8.9* 8.9* 8.5*   PLATELETS 10*3/mm3 127* 142 145 159 161 172 161     CMP   Results from last 7 days   Lab Units 08/01/24  0819 08/01/24  0332 07/31/24  1300 07/31/24  0235 07/30/24  1001 07/30/24  0518 07/29/24 2012 07/29/24  0502 07/28/24  1907 07/28/24  0234 07/27/24  2119 07/27/24  0414 07/26/24  1325 07/26/24  0108   SODIUM mmol/L  --  136  --  134*  --  133*  --  131*  --  131*  --  135*  --  134*   POTASSIUM mmol/L 4.2 3.7 2.8*  "3.2* 3.8 3.6 3.0* 3.0*   < > 3.3*   < > 3.4*   < > 3.4*   CHLORIDE mmol/L  --  104  --  100  --  100  --  96*  --  97*  --  98  --  98   CO2 mmol/L  --  22.5  --  23.9  --  22.8  --  22.2  --  22.2  --  21.9*  --  25.0   BUN mg/dL  --  6  --  4*  --  3*  --  3*  --  4*  --  5*  --  6   CREATININE mg/dL  --  0.36*  --  0.27*  --  0.43*  --  0.47*  --  0.52*  --  0.39*  --  0.50*   GLUCOSE mg/dL  --  83  --  82  --  90  --  98  --  83  --  111*  --  87   ALBUMIN g/dL  --  2.5*  --  2.6*  --  2.9*  --  2.9*  3.0*  --  3.0*  --  3.0*  --  2.9*   BILIRUBIN mg/dL  --  18.9*  --  19.0*  --  21.2*  --  21.4*  --  20.8*  --  20.4*  --  17.8*   ALK PHOS U/L  --  287*  --  515*  --  370*  --  346*  --  294*  --  307*  --  255*   AST (SGOT) U/L  --  119*  --  138*  --  143*  --  159*  --  163*  --  176*  --  143*   ALT (SGPT) U/L  --  47*  --  51*  --  60*  --  66*  --  63*  --  62*  --  49*   MAGNESIUM mg/dL  --   --   --   --   --  2.2  --  1.2*  --   --   --   --   --   --    PHOSPHORUS mg/dL  --  2.1*  --  2.3*  --  1.5*  --  2.3*  --  2.3*  --  2.5  --  3.0    < > = values in this interval not displayed.     Cr Clearance Estimated Creatinine Clearance: 238.5 mL/min (A) (by C-G formula based on SCr of 0.36 mg/dL (L)).  Coag   Results from last 7 days   Lab Units 08/01/24  0332 07/31/24  0235 07/30/24  0518 07/29/24  0900 07/28/24  0234 07/27/24  0414 07/26/24  1325   INR  1.85* 1.88* 2.03* 2.25* 2.04* 1.85* 1.97*     HbA1C No results found for: \"HGBA1C\"      Infection     UA      Microbiology Results (last 10 days)       ** No results found for the last 240 hours. **          Imaging Results (Last 72 Hours)       ** No results found for the last 72 hours. **            Assessment & Plan   Alcohol hepatitis - improved  Anemia - acute - stable  Alcohol withdrawal - resolved  Leukocytosis - improved  Electrolyte abnormalities  Elevated INR - improved     Plan:  Patient is a 28-year-old male with a history of EtOH abuse who " presented to the ER on 7/18.  At that time patient was unarousable and withdrawing from alcohol.       LFTs continue to improve.  Total bilirubin 18.9, alk phos 287, , ALT 47, INR 1.85  WBC 42.08, hemoglobin 7.8, platelets 127    Leukocytosis likely secondary to Neupogen.  Hemoglobin stable.  Continue thiamine, zinc, PPI, multivitamin, lactulose, folic acid and diuretics.  He has finished acetylcysteine her protocol.  Complete alcohol cessation encouraged.  He has been referred for outpatient evaluation at [facility] with liver transplant team.  Good nutrition encouraged.  Okay to discharge home today or tomorrow from GI standpoint if otherwise medically stable.  We will see inpatient as needed, call with questions or concerns.  We will monitor as outpatient.    Electronically signed by MARI Verdin, 08/01/24, 10:00 AM EDT.

## 2024-08-01 NOTE — PROGRESS NOTES
[facility] MEDICINE SERVICE  DAILY PROGRESS NOTE    NAME: Yonas Charles  : 1995  MRN: [id number]      LOS: 14 days     PROVIDER OF SERVICE: Medhat Burch MD    Chief Complaint: Alcohol withdrawal    Subjective:     Interval History:  History taken from: patient  Patient Complaints: Feeling better  Patient Denies: Nausea or vomiting    Review of Systems:   Review of Systems  14 point review of system unremarkable except mentioned above  Objective:     Vital Signs  Temp:  [97.2 °F (36.2 °C)-98.1 °F (36.7 °C)] 97.2 °F (36.2 °C)  Heart Rate:  [] 89  Resp:  [15-22] 16  BP: ()/(51-76) 117/74   Body mass index is 23.46 kg/m².    Physical Exam  Physical Exam  HENT:      Head: Atraumatic.      Mouth/Throat:      Mouth: Mucous membranes are moist.   Eyes:      Pupils: Pupils are equal, round, and reactive to light.   Cardiovascular:      Rate and Rhythm: Normal rate and regular rhythm.   Pulmonary:      Effort: Pulmonary effort is normal.      Breath sounds: Normal breath sounds.   Abdominal:      General: Bowel sounds are normal.      Palpations: Abdomen is soft.   Musculoskeletal:         General: Normal range of motion.      Cervical back: Neck supple.   Skin:     General: Skin is warm.   Neurological:      General: No focal deficit present.      Mental Status: He is alert and oriented to person, place, and time.   Psychiatric:         Mood and Affect: Mood normal.         Scheduled Meds   famotidine, 40 mg, Oral, Nightly  folic acid, 1 mg, Oral, Daily  furosemide, 40 mg, Oral, Daily  guaiFENesin, 600 mg, Oral, Q12H  hydrocortisone, 1 Application, Topical, Q8H  lactulose, 10 g, Oral, Daily  metoprolol succinate XL, 25 mg, Oral, Q24H  multivitamin with minerals, 1 tablet, Oral, Daily  neomycin-bacitracin-polymyxin b, 1 Application, Topical, Daily  pantoprazole, 40 mg, Oral, BID AC  potassium chloride, 20 mEq, Oral, Daily  [Held by provider] sertraline, 50 mg, Oral, Daily  silver nitrate, 1  Application, Topical, Once  sodium chloride, 10 mL, Intravenous, Q12H  sucralfate, 1 g, Oral, 4x Daily AC & at Bedtime  thiamine, 100 mg, Oral, Daily  zinc sulfate, 220 mg, Oral, Daily       PRN Meds     acetaminophen **OR** acetaminophen **OR** acetaminophen    senna-docusate sodium **AND** polyethylene glycol **AND** bisacodyl **AND** bisacodyl    Calcium Replacement - Follow Nurse / BPA Driven Protocol    guaiFENesin-codeine    hydrOXYzine    Magnesium Standard Dose Replacement - Follow Nurse / BPA Driven Protocol    ondansetron ODT **OR** ondansetron    Phosphorus Replacement - Follow Nurse / BPA Driven Protocol    Potassium Replacement - Follow Nurse / BPA Driven Protocol    sodium chloride    sodium chloride    sodium chloride   Infusions         Diagnostic Data    Results from last 7 days   Lab Units 08/01/24  0819 08/01/24  0332   WBC 10*3/mm3  --  42.08*   HEMOGLOBIN g/dL  --  7.8*   HEMATOCRIT %  --  22.4*   PLATELETS 10*3/mm3  --  127*   GLUCOSE mg/dL  --  83   CREATININE mg/dL  --  0.36*   BUN mg/dL  --  6   SODIUM mmol/L  --  136   POTASSIUM mmol/L 4.2 3.7   AST (SGOT) U/L  --  119*   ALT (SGPT) U/L  --  47*   ALK PHOS U/L  --  287*   BILIRUBIN mg/dL  --  18.9*   ANION GAP mmol/L  --  9.5       No radiology results for the last day      I reviewed the patient's new clinical results.    Assessment/Plan:     Active and Resolved Problems    Acute hypokalemi  Acute  hypomagnesemia  Acute hypocalcemia  -Secondary to malnutrition in the setting of excessive alcohol use  -Replete as needed and monitor    Acute alcoholic hepatitis with alcoholic cirrhosis  -Secondary to excessive alcohol use  -Initiated on prednisolone given elevated discriminant function score on admission  -T. bili and LFTs still remain fairly elevated although slowly improving  -INR improving as well  -Prednisolone was discontinued after 7 days per GI given no significant improvement and Neupogen started on 7/25  -Monitor LFTs  -Withdrawal  symptoms improved and stable now  -Guarded prognosis overall; GI recommended continued observation for now as there is no indication for emergent transplant surgery or referral  -Abdomen ultrasound with only mild right upper quadrant ascites noted     Leukocytosis  -Reactive from Neupogen  -Remains afebrile, no signs of any acute infection at this time  -Continue to monitor     Coagulopathy of liver disease   -Related to liver disease as above  -Monitor INR  -Continued improvement in INR today, although patient was given a dose of vitamin K on 7/29  -vitamin K if needed     Hyponatremia 2/2 beer potomania  -Improved sodium level with fluid restriction  -Off IV fluids  -Nephrology following         VTE Prophylaxis:  Mechanical VTE prophylaxis orders are present.         Code status is   Code Status and Medical Interventions:   Ordered at: 07/18/24 1214     Code Status (Patient has no pulse and is not breathing):    CPR (Attempt to Resuscitate)     Medical Interventions (Patient has pulse or is breathing):    Full Support       Plan for disposition: rehab pending arrangements     Time: 35 minutes    Signature: Electronically signed by Medhat Burch MD, 08/01/24, 14:33 EDT.  [facility] Hospitalist Team

## 2024-08-01 NOTE — PLAN OF CARE
Goal Outcome Evaluation:         Pt. Demonstrates progress w/ functional mobility this date, ambulates to and from bathroom unassisted and completes all ADLs independently. Pt. Appears to be at baseline functional independence, albeit remains falls risk due to fall a few days ago while getting dressed. Dynamic standing balance appears intact. Pt. Does not require further skilled OT at this time.

## 2024-08-01 NOTE — PLAN OF CARE
Goal Outcome Evaluation:      Pt condition improving. Pt waiting on a detox center to accept. Pt safety and fall precautions maintained.

## 2024-08-01 NOTE — DISCHARGE PLACEMENT REQUEST
"Trenton Charles (28 y.o. Male)       Date of Birth   1995    Social Security Number       Address   [address] [place] [address] [place] [facility]    Home Phone   [phone]    MRN   [id number]       Holiness   None    Marital Status   Single                            Admission Date   24    Admission Type   Emergency    Admitting Provider   Raquel Puri MD    Attending Provider   Medhat Burch MD    Department, Room/Bed   [facility] 2D, 264/1       Discharge Date       Discharge Disposition       Discharge Destination                                 Attending Provider: Medhat Burch MD    Allergies: Reglan [Metoclopramide]    Isolation: None   Infection: None   Code Status: CPR    Ht: 165.1 cm (65\")   Wt: 55.7 kg (122 lb 12.7 oz)    Admission Cmt: None   Principal Problem: Alcohol withdrawal [F10.939]                   Active Insurance as of 2024       Primary Coverage       Payor Plan Insurance Group Employer/Plan Group    [place]        Payor Plan Address Payor Plan Phone Number Payor Plan Fax Number Effective Dates    [address] [address]   2024 - None Entered    [place] [id number]         Subscriber Name Subscriber Birth Date Member ID       TRENTON CHARLES 1995 [id number]                     Emergency Contacts        (Rel.) Home Phone Work Phone Mobile Phone    Wilbur Charles (Father) [phone] -- --                 History & Physical        Mari Bustos MD at [address] [place] [address] Medicine Services  History & Physical    Patient Name: Trenton Charles  : 1995  MRN: [id number]  Primary Care Physician:  Provider, No Known  Date of admission: 2024  Date and Time of Service: 2024    Subjective      Chief Complaint:  alcohol withdrawal    History of Present Illness:    This is a 28-year-old male who is alcoholic and also has history of acid reflux came to the hospital for " alcohol detox.  Patient is also having abdominal pain and is jaundiced.  Patient is also going through personal issues.  Patient also has severe anxiety depression and has suicidal thoughts.  Patient started drinking 2 days ago and patient has been drinking alcohol since past 9 years.  Patient drinks almost 1 L hard liquor on a daily basis.  Patient has generalized weakness abdominal pain with episodes of nausea vomiting.  Patient has tremors and lightheadedness.      Lab work showed sodium of 122 with potassium 1.9 along with metabolic acidosis.  Patient has elevated AST and total bili is 15.8 and hemoglobin is 8.9.  In ER patient was tachycardic.  CT scan of abdomen showed enlarged liver along with portal hypertension and intra-abdominal varices along with gallbladder wall edema and chronic and rectal inflammatory changes    Review of Systems   Constitutional:  Negative for chills, diaphoresis and fever.   HENT:  Negative for dental problem, ear discharge, hearing loss, nosebleeds, rhinorrhea and sneezing.    Eyes:  Negative for photophobia, redness and itching.   Respiratory:  Negative for cough, chest tightness and stridor.    Cardiovascular:  Negative for leg swelling.   Gastrointestinal:  as above  Endocrine: Negative for heat intolerance.   Genitourinary: Negative for flank pain, frequency and hematuria.   Musculoskeletal:  Negative for back pain, joint swelling, and gait problem.   Skin:  Negative for color change.   Neurological:   speech difficulty, numbness and headaches.   Psychiatric/Behavioral:   as above    Personal History     History reviewed. No pertinent past medical history.    History reviewed. No pertinent surgical history.    Family History: family history is not on file. Otherwise pertinent FHx was reviewed and not pertinent to current issue.    Social History:      Home Medications:  Prior to Admission Medications       None              Allergies:  Allergies   Allergen Reactions    Reglan  [Metoclopramide] Mental Status Change       Objective      Vitals:   Temp:  [98.4 °F (36.9 °C)] 98.4 °F (36.9 °C)  Heart Rate:  [114-132] 121  Resp:  [21] 21  BP: (119-158)/(61-91) 141/83  Body mass index is 20.43 kg/m².  Physical Exam:    Constitutional: Patient appears well-developed and well-nourished and in no acute distress   HEENT:   Head: Normocephalic and atraumatic.   Eyes:  Pupils are equal, round, and reactive to light. EOM are intact. Sclera are anicteric and non-injected.  Mouth and Throat: Patient has moist mucous membranes.      Neck: Neck supple.  No thyromegaly present. No lymphadenopathy present. No  masses.     Cardiovascular: Inspection: No JVD present. Palpation:bilaterally. No leg edema. Auscultation: Regular rate, regular rhythm, S1 normal and S2 normal. reveals no gallop and no friction rub. No Carotid bruit bilaterally.    Pulmonary/Chest: Inspection: No distress, no use of accessory muscles. Lungs are clear to auscultation bilaterally. No respiratory distress. No wheezes. No rhonchi. No rales.     Abdomen /Gastrointestinal: Inspection: no distension. Palpation: no masses, no organomegaly. Soft. There is no tenderness. Bowel sounds are normal.   Extremities no cyanosis clubbing or edema    Neurological: Patient is alert and oriented to person, place, and time. Cranial nerves II-XII are grossly intact with no focal deficits. Sensori-motor exam is normal. No cerebellar signs.    Skin: Skin is warm. No rash noted. Nails show no clubbing.  No cyanosis or erythema. No bruising.      Diagnostic Data:  Results from last 7 days   Lab Units 07/18/24  1015   WBC 10*3/mm3 9.86   HEMOGLOBIN g/dL 8.9*   HEMATOCRIT % 26.4*   PLATELETS 10*3/mm3 94*   GLUCOSE mg/dL 93   CREATININE mg/dL 0.40*   BUN mg/dL 6   SODIUM mmol/L 122*   POTASSIUM mmol/L 1.9*   AST (SGOT) U/L 186*   ALT (SGPT) U/L 33   ALK PHOS U/L 380*   BILIRUBIN mg/dL 15.8*   ANION GAP mmol/L 16.3*       CT Abdomen Pelvis With Contrast    Result  Date: 7/18/2024  1. Enlarged heterogeneous and markedly steatotic liver, which could reflect sequelae of alcoholic steatohepatitis. 2. Sequelae of portal hypertension noted including distal paraesophageal and intra-abdominal varices as well as trace ascites. Normal splenic size. Portal vasculature patent. 3. Gallbladder wall edema without other findings of acute cholecystitis likely secondary related to underlying hepatocellular dysfunction. 4. Colonic and rectal inflammatory changes which could reflect sequelae of portal colopathy. Differential would include a nonspecific infectious/inflammatory proctocolitis. No bowel obstruction.      Electronically Signed By-Armani Small MD On:7/18/2024 10:46 AM       No results found for this or any previous visit.    I have personally reviewed the patient's new results.       Assessment & Plan        Active and Resolved Problems    Alcohol withdrawal with delirium tremens  Normocytic anemia  Jaundice with elevated bilirubin level  Nausea vomiting  Severe hypokalemia  Hyponatremia   Metabolic acidosis  Esophagitis  Hepatomegaly with portal hypertension  Esophageal varices  Suicide ideation  Anxiety depression  Social issues    Suggestion    At this time will admit this patient in hospital  I will ask GI to see this patient   Replace electrolyte and check a magnesium level  Psychiatry consult   IV hydration      VTE Prophylaxis:  Mechanical VTE prophylaxis orders are signed & held.          The patient desires to be as follows:    CODE STATUS:    Code Status (Patient has no pulse and is not breathing): CPR (Attempt to Resuscitate)  Medical Interventions (Patient has pulse or is breathing): Full Support        Admission Status:      Expected Length of Stay:  4-5 days    PDMP and Medication Dispenses via Sidebar reviewed and consistent with patient reported medications.        Signature:     This document has been electronically signed by Mari Bustos MD on July 18,  2024 12:16 EDT   [facility]    Electronically signed by Mari Bustos MD at 07/18/24 1729       Vital Signs (last 2 days)       Date/Time Temp Temp src Pulse Resp BP Patient Position SpO2    08/01/24 1214 97.2 (36.2) Oral 89 16 117/74 Sitting 99    08/01/24 0809 97.6 (36.4) Tympanic 97 16 119/76 Sitting 98    08/01/24 0318 97.9 (36.6) Oral 98 18 114/70 Lying 96    07/31/24 2335 98 (36.7) Oral 102 21 117/68 Lying 96    07/31/24 1930 97.6 (36.4) Oral 85 20 95/51 Lying 96    07/31/24 1830 98.1 (36.7) Oral 94 22 107/60 -- 97    07/31/24 1800 -- -- 94 -- -- -- 96    07/31/24 1730 -- -- 87 -- -- -- 95    07/31/24 1700 -- -- 88 -- -- -- 97    07/31/24 1631 98.1 (36.7) Oral 86 20 108/64 -- 98    07/31/24 1630 -- -- 91 -- 108/64 -- 98    07/31/24 1600 97.8 (36.6) Tympanic 99 15 105/55 Lying 96    07/31/24 1503 97.8 (36.6) Oral 95 16 111/54 Lying 98    07/31/24 1049 98.3 (36.8) Oral 110 16 131/59 Lying 93    07/31/24 0811 -- -- 109 -- -- -- 94    07/31/24 0810 98.6 (37) Tympanic 121 21 122/70 Sitting 98    07/31/24 0407 97.8 (36.6) Oral 108 14 114/60 Sitting 98    07/30/24 2311 97.8 (36.6) Oral 108 20 120/63 Lying 98    07/30/24 2011 98.4 (36.9) Axillary 109 14 120/73 Lying 95    07/30/24 1513 98.1 (36.7) Oral 111 26 112/54 Lying 97    07/30/24 1120 97.7 (36.5) Oral -- 16 -- Lying --    07/30/24 0733 98.1 (36.7) Oral 99 20 119/69 Lying --    07/30/24 0413 98.3 (36.8) Oral 92 20 112/67 Lying 96    07/30/24 0005 98.1 (36.7) Oral 115 25 114/57 Lying 96          Current Facility-Administered Medications   Medication Dose Route Frequency Provider Last Rate Last Admin    acetaminophen (TYLENOL) tablet 650 mg  650 mg Oral Q4H PRN Mari Bustos MD        Or    acetaminophen (TYLENOL) 160 MG/5ML oral solution 650 mg  650 mg Oral Q4H PRN Mari Bustos MD        Or    acetaminophen (TYLENOL) suppository 650 mg  650 mg Rectal Q4H PRN Mari Bustos MD        sennosides-docusate (PERICOLACE)  8.6-50 MG per tablet 2 tablet  2 tablet Oral BID PRN Mari Bustos MD        And    polyethylene glycol (MIRALAX) packet 17 g  17 g Oral Daily PRN Mari Bustos MD        And    bisacodyl (DULCOLAX) EC tablet 5 mg  5 mg Oral Daily PRN Mari Bustos MD        And    bisacodyl (DULCOLAX) suppository 10 mg  10 mg Rectal Daily PRN Mari Bustos MD        Calcium Replacement - Follow Nurse / BPA Driven Protocol   Does not apply PRN Mari Bustos MD        famotidine (PEPCID) tablet 40 mg  40 mg Oral Nightly Waleska Chery APRN   40 mg at 07/31/24 2025    folic acid (FOLVITE) tablet 1 mg  1 mg Oral Daily Mari Bustos MD   1 mg at 08/01/24 0810    furosemide (LASIX) tablet 40 mg  40 mg Oral Daily Quang Butts MD   40 mg at 08/01/24 0810    guaiFENesin (MUCINEX) 12 hr tablet 600 mg  600 mg Oral Q12H Hayley Rodriguez DO   600 mg at 08/01/24 0810    guaiFENesin-codeine (GUAIFENESIN AC) 100-10 MG/5ML liquid 5 mL  5 mL Oral Q4H PRN Frank Wild MD   5 mL at 07/24/24 0318    hydrocortisone 1 % cream 1 Application  1 Application Topical Q8H Hayley Rodriguez DO   1 Application at 08/01/24 0335    hydrOXYzine (ATARAX) tablet 25 mg  25 mg Oral Q8H PRN Frank Wild MD   25 mg at 07/30/24 2120    lactulose (CHRONULAC) 10 GM/15ML solution 10 g  10 g Oral Daily Kaci Polo APRN   10 g at 07/28/24 0834    Magnesium Standard Dose Replacement - Follow Nurse / BPA Driven Protocol   Does not apply Mari Marquez MD        metoprolol succinate XL (TOPROL-XL) 24 hr tablet 25 mg  25 mg Oral Q24H Frank Wild MD   25 mg at 08/01/24 0810    multivitamin with minerals 1 tablet  1 tablet Oral Daily Mari Bustos MD   1 tablet at 08/01/24 0810    neomycin-bacitracin-polymyxin b (NEOSPORIN) ointment 1 Application  1 Application Topical Daily Frank Wild MD   1 Application at 08/01/24 0810    ondansetron ODT (ZOFRAN-ODT) disintegrating tablet 4 mg  4 mg Oral Q6H PRN Mari Bsutos  MD Nadira        Or    ondansetron (ZOFRAN) injection 4 mg  4 mg Intravenous Q6H PRN Mari Bustos MD   4 mg at 07/18/24 1330    pantoprazole (PROTONIX) EC tablet 40 mg  40 mg Oral BID AC Kaci Polo APRN   40 mg at 08/01/24 0810    Phosphorus Replacement - Follow Nurse / BPA Driven Protocol   Does not apply PRN Mari Bustos MD        potassium chloride (KLOR-CON M20) CR tablet 20 mEq  20 mEq Oral Daily Quang Butts MD   20 mEq at 08/01/24 0810    Potassium Replacement - Follow Nurse / BPA Driven Protocol   Does not apply PRN Mari Bustos MD        [Held by provider] sertraline (ZOLOFT) tablet 50 mg  50 mg Oral Daily Mari Bustos MD   50 mg at 07/18/24 1258    silver nitrate 75-25 % applicator 1 Application  1 Application Topical Once Frank Wild MD        sodium chloride 0.9 % flush 10 mL  10 mL Intravenous PRN Mari Bustos MD        sodium chloride 0.9 % flush 10 mL  10 mL Intravenous Q12H Mari Bustos MD   10 mL at 08/01/24 0811    sodium chloride 0.9 % flush 10 mL  10 mL Intravenous PRN Mari Bustos MD        sodium chloride 0.9 % infusion 40 mL  40 mL Intravenous PRN Mari Bustos MD        sucralfate (CARAFATE) tablet 1 g  1 g Oral 4x Daily AC & at Bedtime Kaci Polo APRN   1 g at 08/01/24 0810    thiamine (VITAMIN B-1) tablet 100 mg  100 mg Oral Daily Mari Bustos MD   100 mg at 08/01/24 0810    zinc sulfate (ZINCATE) capsule 220 mg  220 mg Oral Daily aWleska Chery APRN   220 mg at 08/01/24 0810     Lab Results (last 24 hours)       Procedure Component Value Units Date/Time    Potassium [[id number]]  (Normal) Collected: 08/01/24 0819    Specimen: Blood Updated: 08/01/24 0900     Potassium 4.2 mmol/L      Comment: Specimen hemolyzed.  Result may be falsely elevated.       CBC & Differential [[id number]]  (Abnormal) Collected: 08/01/24 0332    Specimen: Blood Updated: 08/01/24 0441    Narrative:      The following orders were created  for panel order CBC & Differential.  Procedure                               Abnormality         Status                     ---------                               -----------         ------                     CBC Auto Differential[[id number]]        Abnormal            Final result               Scan Slide[[id number]]                                       Final result                 Please view results for these tests on the individual orders.    CBC Auto Differential [[id number]]  (Abnormal) Collected: 08/01/24 0332    Specimen: Blood Updated: 08/01/24 0441     WBC 42.08 10*3/mm3      RBC 2.36 10*6/mm3      Hemoglobin 7.8 g/dL      Hematocrit 22.4 %      MCV 94.9 fL      MCH 33.1 pg      MCHC 34.8 g/dL      RDW 20.4 %      RDW-SD 67.9 fl      MPV 10.9 fL      Platelets 127 10*3/mm3     Narrative:      The previously reported component NRBC is no longer being reported. Previous result was 0.2 /100 WBC (Reference Range: 0.0-0.2 /100 WBC) on 8/1/2024 at 0419 EDT.    Scan Slide [[id number]] Collected: 08/01/24 0332    Specimen: Blood Updated: 08/01/24 0441     Scan Slide --     Comment: See Manual Differential Results       Manual Differential [[id number]]  (Abnormal) Collected: 08/01/24 0332    Specimen: Blood Updated: 08/01/24 0441     Neutrophil % 53.0 %      Lymphocyte % 6.0 %      Monocyte % 6.0 %      Bands %  28.0 %      Metamyelocyte % 1.0 %      Myelocyte % 5.0 %      Promyelocyte % 1.0 %      Neutrophils Absolute 34.08 10*3/mm3      Lymphocytes Absolute 2.52 10*3/mm3      Monocytes Absolute 2.52 10*3/mm3      Anisocytosis Mod/2+     Target Cells Mod/2+     Toxic Granulation Slight/1+     Platelet Estimate Decreased    Narrative:      Reviewed by Pathologist within the past 30 days on 7/31/24 .      Comprehensive Metabolic Panel [[id number]]  (Abnormal) Collected: 08/01/24 0332    Specimen: Blood Updated: 08/01/24 0436     Glucose 83 mg/dL      BUN 6 mg/dL      Creatinine 0.36 mg/dL      Sodium 136 mmol/L       "Potassium 3.7 mmol/L      Chloride 104 mmol/L      CO2 22.5 mmol/L      Calcium 7.5 mg/dL      Total Protein 4.7 g/dL      Albumin 2.5 g/dL      ALT (SGPT) 47 U/L      AST (SGOT) 119 U/L      Alkaline Phosphatase 287 U/L      Total Bilirubin 18.9 mg/dL      Globulin 2.2 gm/dL      A/G Ratio 1.1 g/dL      BUN/Creatinine Ratio 16.7     Anion Gap 9.5 mmol/L      eGFR 156.4 mL/min/1.73     Narrative:      GFR Normal >60  Chronic Kidney Disease <60  Kidney Failure <15      Phosphorus [[id number]]  (Abnormal) Collected: 08/01/24 0332    Specimen: Blood Updated: 08/01/24 0434     Phosphorus 2.1 mg/dL     Protime-INR [[id number]]  (Abnormal) Collected: 08/01/24 0332    Specimen: Blood Updated: 08/01/24 0424     Protime 19.3 Seconds      INR 1.85    Potassium [[id number]]  (Abnormal) Collected: 07/31/24 1300    Specimen: Blood Updated: 07/31/24 1425     Potassium 2.8 mmol/L           Imaging Results (Last 24 Hours)       ** No results found for the last 24 hours. **             Physician Progress Notes (last 24 hours)        Rohini Haq, APRN at 08/01/24 1000           LOS: 14 days   Patient Care Team:  Provider, No Known as PCP - General      Subjective \"I am doing better\"    Interval History:     Subjective: No nausea or vomiting, tolerating diet.  Having bowel movements.  Minimal abdominal discomfort.  No further bleeding from scab.    ROS:   No chest pain, shortness of breath, or cough.      Medication Review:     Current Facility-Administered Medications:     acetaminophen (TYLENOL) tablet 650 mg, 650 mg, Oral, Q4H PRN **OR** acetaminophen (TYLENOL) 160 MG/5ML oral solution 650 mg, 650 mg, Oral, Q4H PRN **OR** acetaminophen (TYLENOL) suppository 650 mg, 650 mg, Rectal, Q4H PRN, Mari Bustos MD    sennosides-docusate (PERICOLACE) 8.6-50 MG per tablet 2 tablet, 2 tablet, Oral, BID PRN **AND** polyethylene glycol (MIRALAX) packet 17 g, 17 g, Oral, Daily PRN **AND** bisacodyl (DULCOLAX) EC tablet 5 mg, 5 mg, Oral, " Daily PRN **AND** bisacodyl (DULCOLAX) suppository 10 mg, 10 mg, Rectal, Daily PRN, Mari Bustos MD    Calcium Replacement - Follow Nurse / BPA Driven Protocol, , Does not apply, PRN, Mari Bustos MD    famotidine (PEPCID) tablet 40 mg, 40 mg, Oral, Nightly, Waleska Chery APRN, 40 mg at 07/31/24 2025    folic acid (FOLVITE) tablet 1 mg, 1 mg, Oral, Daily, Mari Bustos MD, 1 mg at 08/01/24 0810    furosemide (LASIX) tablet 40 mg, 40 mg, Oral, Daily, Quang Butts MD, 40 mg at 08/01/24 0810    guaiFENesin (MUCINEX) 12 hr tablet 600 mg, 600 mg, Oral, Q12H, Hayley Rodriguez DO, 600 mg at 08/01/24 0810    guaiFENesin-codeine (GUAIFENESIN AC) 100-10 MG/5ML liquid 5 mL, 5 mL, Oral, Q4H PRN, Frank Wild MD, 5 mL at 07/24/24 0318    hydrocortisone 1 % cream 1 Application, 1 Application, Topical, Q8H, Hayley Rodriguez DO, 1 Application at 08/01/24 0335    hydrOXYzine (ATARAX) tablet 25 mg, 25 mg, Oral, Q8H PRN, Frank Wild MD, 25 mg at 07/30/24 2120    lactulose (CHRONULAC) 10 GM/15ML solution 10 g, 10 g, Oral, Daily, Kaci Polo APRN, 10 g at 07/28/24 0834    Magnesium Standard Dose Replacement - Follow Nurse / BPA Driven Protocol, , Does not apply, PRN, Mari Bustos MD    metoprolol succinate XL (TOPROL-XL) 24 hr tablet 25 mg, 25 mg, Oral, Q24H, Frank Wild MD, 25 mg at 08/01/24 0810    multivitamin with minerals 1 tablet, 1 tablet, Oral, Daily, Mari Bustos MD, 1 tablet at 08/01/24 0810    neomycin-bacitracin-polymyxin b (NEOSPORIN) ointment 1 Application, 1 Application, Topical, Daily, Frank Wild MD, 1 Application at 08/01/24 0810    ondansetron ODT (ZOFRAN-ODT) disintegrating tablet 4 mg, 4 mg, Oral, Q6H PRN **OR** ondansetron (ZOFRAN) injection 4 mg, 4 mg, Intravenous, Q6H PRN, Mari Bustos MD, 4 mg at 07/18/24 1330    pantoprazole (PROTONIX) EC tablet 40 mg, 40 mg, Oral, BID AC, Kaci Polo APRN, 40 mg at 08/01/24 0810    Phosphorus Replacement -  Follow Nurse / BPA Driven Protocol, , Does not apply, PRN, Mari Bustos MD    potassium chloride (KLOR-CON M20) CR tablet 20 mEq, 20 mEq, Oral, Daily, Quang Butts MD, 20 mEq at 08/01/24 0810    Potassium Replacement - Follow Nurse / BPA Driven Protocol, , Does not apply, Juli HARTLEY Abbas Ali, MD    [Held by provider] sertraline (ZOLOFT) tablet 50 mg, 50 mg, Oral, Daily, Mari Bustos MD, 50 mg at 07/18/24 1258    silver nitrate 75-25 % applicator 1 Application, 1 Application, Topical, Once, Frank Wild MD    sodium chloride 0.9 % flush 10 mL, 10 mL, Intravenous, PRN, Mari Bustos MD    sodium chloride 0.9 % flush 10 mL, 10 mL, Intravenous, Q12H, Mari Bustos MD, 10 mL at 08/01/24 0811    sodium chloride 0.9 % flush 10 mL, 10 mL, Intravenous, PRN, Mari Bustos MD    sodium chloride 0.9 % infusion 40 mL, 40 mL, Intravenous, PRN, Mari Bustos MD    sucralfate (CARAFATE) tablet 1 g, 1 g, Oral, 4x Daily AC & at Bedtime, Kaci Polo APRN, 1 g at 08/01/24 0810    [COMPLETED] thiamine (B-1) injection 200 mg, 200 mg, Intravenous, Q8H, 200 mg at 07/23/24 1437 **FOLLOWED BY** thiamine (VITAMIN B-1) tablet 100 mg, 100 mg, Oral, Daily, Mari Bustos MD, 100 mg at 08/01/24 0810    zinc sulfate (ZINCATE) capsule 220 mg, 220 mg, Oral, Daily, Waleska Chery APRN, 220 mg at 08/01/24 0810      Objective resting in bed, no acute distress    Vital Signs  Vitals:    07/31/24 1930 07/31/24 2335 08/01/24 0318 08/01/24 0809   BP: 95/51 117/68 114/70 119/76   BP Location: Left arm Left arm Left arm Left arm   Patient Position: Lying Lying Lying Sitting   Pulse: 85 102 98 97   Resp: 20 21 18 16   Temp: 97.6 °F (36.4 °C) 98 °F (36.7 °C) 97.9 °F (36.6 °C) 97.6 °F (36.4 °C)   TempSrc: Oral Oral Oral Tympanic   SpO2: 96% 96% 96% 98%   Weight:       Height:           Physical Exam:     General Appearance:    Awake and alert, in no acute distress   Head:    Normocephalic, without  obvious abnormality   Eyes:          Conjunctivae normal, icteric sclera   Throat:   No oral lesions, no thrush, oral mucosa moist   Neck:   No adenopathy, supple, no JVD   Lungs:     respirations regular, even and unlabored   Abdomen:     Soft, mildly distended   Rectal:     Deferred   Extremities:   No edema, no cyanosis   Skin:   No bruising or rash,  jaundice        Results Review:    CBC    Results from last 7 days   Lab Units 08/01/24  0332 07/31/24  0235 07/30/24  0518 07/29/24  0900 07/28/24  0234 07/27/24  0414 07/26/24  0108   WBC 10*3/mm3 42.08* 42.36* 46.33* 39.35* 40.75* 34.41* 25.79*   HEMOGLOBIN g/dL 7.8* 7.0* 8.5* 9.2* 8.9* 8.9* 8.5*   PLATELETS 10*3/mm3 127* 142 145 159 161 172 161     CMP   Results from last 7 days   Lab Units 08/01/24  0819 08/01/24  0332 07/31/24  1300 07/31/24  0235 07/30/24  1001 07/30/24  0518 07/29/24  2012 07/29/24  0502 07/28/24  1907 07/28/24  0234 07/27/24  2119 07/27/24  0414 07/26/24  1325 07/26/24  0108   SODIUM mmol/L  --  136  --  134*  --  133*  --  131*  --  131*  --  135*  --  134*   POTASSIUM mmol/L 4.2 3.7 2.8* 3.2* 3.8 3.6 3.0* 3.0*   < > 3.3*   < > 3.4*   < > 3.4*   CHLORIDE mmol/L  --  104  --  100  --  100  --  96*  --  97*  --  98  --  98   CO2 mmol/L  --  22.5  --  23.9  --  22.8  --  22.2  --  22.2  --  21.9*  --  25.0   BUN mg/dL  --  6  --  4*  --  3*  --  3*  --  4*  --  5*  --  6   CREATININE mg/dL  --  0.36*  --  0.27*  --  0.43*  --  0.47*  --  0.52*  --  0.39*  --  0.50*   GLUCOSE mg/dL  --  83  --  82  --  90  --  98  --  83  --  111*  --  87   ALBUMIN g/dL  --  2.5*  --  2.6*  --  2.9*  --  2.9*  3.0*  --  3.0*  --  3.0*  --  2.9*   BILIRUBIN mg/dL  --  18.9*  --  19.0*  --  21.2*  --  21.4*  --  20.8*  --  20.4*  --  17.8*   ALK PHOS U/L  --  287*  --  515*  --  370*  --  346*  --  294*  --  307*  --  255*   AST (SGOT) U/L  --  119*  --  138*  --  143*  --  159*  --  163*  --  176*  --  143*   ALT (SGPT) U/L  --  47*  --  51*  --  60*  --  66*   "--  63*  --  62*  --  49*   MAGNESIUM mg/dL  --   --   --   --   --  2.2  --  1.2*  --   --   --   --   --   --    PHOSPHORUS mg/dL  --  2.1*  --  2.3*  --  1.5*  --  2.3*  --  2.3*  --  2.5  --  3.0    < > = values in this interval not displayed.     Cr Clearance Estimated Creatinine Clearance: 238.5 mL/min (A) (by C-G formula based on SCr of 0.36 mg/dL (L)).  Coag   Results from last 7 days   Lab Units 08/01/24  0332 07/31/24  0235 07/30/24  0518 07/29/24  0900 07/28/24  0234 07/27/24  0414 07/26/24  1325   INR  1.85* 1.88* 2.03* 2.25* 2.04* 1.85* 1.97*     HbA1C No results found for: \"HGBA1C\"      Infection     UA      Microbiology Results (last 10 days)       ** No results found for the last 240 hours. **          Imaging Results (Last 72 Hours)       ** No results found for the last 72 hours. **            Assessment & Plan   Alcohol hepatitis - improved  Anemia - acute - stable  Alcohol withdrawal - resolved  Leukocytosis - improved  Electrolyte abnormalities  Elevated INR - improved     Plan:  Patient is a 28-year-old male with a history of EtOH abuse who presented to the ER on 7/18.  At that time patient was unarousable and withdrawing from alcohol.       LFTs continue to improve.  Total bilirubin 18.9, alk phos 287, , ALT 47, INR 1.85  WBC 42.08, hemoglobin 7.8, platelets 127    Leukocytosis likely secondary to Neupogen.  Hemoglobin stable.  Continue thiamine, zinc, PPI, multivitamin, lactulose, folic acid and diuretics.  He has finished acetylcysteine her protocol.  Complete alcohol cessation encouraged.  He has been referred for outpatient evaluation at [facility] with liver transplant team.  Good nutrition encouraged.  Okay to discharge home today or tomorrow from GI standpoint if otherwise medically stable.  We will see inpatient as needed, call with questions or concerns.  We will monitor as outpatient.    Electronically signed by MARI Verdin, 08/01/24, 10:00 AM EDT.        " "    Electronically signed by Rohini Haq APRN at 08/01/24 1002       Consult Notes (last 24 hours)  Notes from 07/31/24 1319 through 08/01/24 1319   No notes of this type exist for this encounter.          Nutrition Notes (last 24 hours)        Joyce Gardiner RD at 08/01/24 0821          Nutrition Services    Patient Name: Yonas Charles  YOB: 1995  MRN: [id number]  Admission date: 7/18/2024    Comment:  -- Continue current diet and encourage good PO intakes    -- Continue Boost Plus TID (provides 1080 kcals, 42 g protein if consumed)     -- Yogurt daily - patient lactose intolerant but willing to eat one per day      CLINICAL NUTRITION ASSESSMENT      Reason for Assessment 8/1: Follow up protocol   7/25: LOS x 7 days  7/24: MD consult for \"extra nutrition\"  7/22: 2 gram Na+ diet education      H&P      History reviewed. No pertinent past medical history.    History reviewed. No pertinent surgical history.     Current Problems   Acute alcoholic hepatitis with alcoholic cirrhosis   Alcohol withdrawal with delirium tremens  Jaundice with elevated bilirubin level    Normocytic anemia    Nausea/vomiting  - GI following    Severe hypokalemia  Hyponatremia (potomania)  - Nephrology following    Metabolic acidosis    Esophagitis    Hepatomegaly with portal hypertension    Esophageal varices    Suicide ideation  Anxiety depression  Social issues  - psych following       Encounter Information        Trending Narrative     8/1: Since last RD review, patient continues admission per GI observation.  RD visited patient at bedside.  Patient reports he was just informed he was being discharged today.  Patient declined yogurt offered by RD, willing to eat one per day but unable to do three due to lactose intolerance.  Patient reports drinking three Boosts per day and will continue to do so.  RD provided three Boosts for patient to take with him.  Patient very appreciative of RD help and of kitchen for " "working with is high calorie needs during admission.      7/25: Admitted for alcohol withdrawal and detox.  Psych following for SI/depression.  RD previously visited patient at bedside 7/22 for low Na+ diet education.  See narrative below.  RD also previously consulted for \"extra nutrition\" per GI, see 7/24 documentation below.  GI considering NG tube for feedings if needed but patient agreed to try and eat more and has been successful.  MD notes mention possible transfer to transplant center.      7/24: RD received consult that Dr. Miller want patient to get \"extra nutrition\".  Per clarification with the patients RN, MD would like patient to have food intake every 1-2 hours.  RD to communicate with the kitchen that this patient is allowed to order more than 3 meals per day.       This RD previously provided low Na+ diet education to patient 7/22 and had lengthy discussed about his eating habits and changes necessary to limit Na+.  Noted patient lives at home and eats what his mother cooks and states his mother is on board with helping him make these changes.       Boost Plus already ordered TID for patient.  Also noted per kitchen that a request has been made for this patient to get yogurt between meals for snacks.  The kitchen will send up 3 yogurts per day and keep in the supplement fridge on the unit.       Per RD review of patient recent meals, patient ordering full meals with adequate protein sources such as eggs and fish.  Noted from previous discussion with patient that he doesn't eat a lot of meat.       Noted per GI note 7/23, possible need for NJ tube placement if patient unable to eat well.  Patient does not want this and will continue to try and eat on his own.  Recent intakes documented at %.  RD to continue to monitor as needed.      7/22: RD consulted for diet education.  RD visited patient at bedside.  Patient accepting of diet education at time of RD visit. Patient reports eating most meals " "at home that his mother cooks. Discussed changes such as discontinue using the salt shaker at the table, buying no salt added canned vegetables, limited processed meats. Patient appreciative of information and reports his mother is on board to help him with these changes. RD provided low Na+ handout and RD contact information.        Anthropometrics        Current Height, Weight Height: 165.1 cm (65\")  Weight: 55.7 kg (122 lb 12.7 oz) (07/25/24 0305)       Usual Body Weight (UBW) Unable to obtain from patient        Trending Weight Hx     This admission: 8/1: No new weight, RD ordered one to be obtained   7/25: 122#             PTA: No history to note     Wt Readings from Last 30 Encounters:   07/25/24 0305 55.7 kg (122 lb 12.7 oz)   07/18/24 0855 55.7 kg (122 lb 12.7 oz)      BMI kg/m2 Body mass index is 20.43 kg/m².       Labs        Pertinent Labs    Results from last 7 days   Lab Units 08/01/24  0332 07/31/24  1300 07/31/24  0235 07/30/24  1001 07/30/24  0518   SODIUM mmol/L 136  --  134*  --  133*   POTASSIUM mmol/L 3.7 2.8* 3.2*   < > 3.6   CHLORIDE mmol/L 104  --  100  --  100   CO2 mmol/L 22.5  --  23.9  --  22.8   BUN mg/dL 6  --  4*  --  3*   CREATININE mg/dL 0.36*  --  0.27*  --  0.43*   CALCIUM mg/dL 7.5*  --  7.6*  --  7.7*   BILIRUBIN mg/dL 18.9*  --  19.0*  --  21.2*   ALK PHOS U/L 287*  --  515*  --  370*   ALT (SGPT) U/L 47*  --  51*  --  60*   AST (SGOT) U/L 119*  --  138*  --  143*   GLUCOSE mg/dL 83  --  82  --  90    < > = values in this interval not displayed.     Results from last 7 days   Lab Units 08/01/24  0332 07/31/24  0235 07/30/24  0518 07/29/24  0900 07/29/24  0502   MAGNESIUM mg/dL  --   --  2.2  --  1.2*   PHOSPHORUS mg/dL 2.1*   < > 1.5*  --  2.3*   HEMOGLOBIN g/dL 7.8*   < > 8.5*   < >  --    HEMATOCRIT % 22.4*   < > 23.5*   < >  --     < > = values in this interval not displayed.     No results found for: \"HGBA1C\"     Medications    Scheduled Medications famotidine, 40 mg, Oral, " Nightly  folic acid, 1 mg, Oral, Daily  furosemide, 40 mg, Oral, Daily  guaiFENesin, 600 mg, Oral, Q12H  hydrocortisone, 1 Application, Topical, Q8H  lactulose, 10 g, Oral, Daily  metoprolol succinate XL, 25 mg, Oral, Q24H  multivitamin with minerals, 1 tablet, Oral, Daily  neomycin-bacitracin-polymyxin b, 1 Application, Topical, Daily  pantoprazole, 40 mg, Oral, BID AC  potassium chloride, 20 mEq, Oral, Daily  [Held by provider] sertraline, 50 mg, Oral, Daily  silver nitrate, 1 Application, Topical, Once  sodium chloride, 10 mL, Intravenous, Q12H  sucralfate, 1 g, Oral, 4x Daily AC & at Bedtime  thiamine, 100 mg, Oral, Daily  zinc sulfate, 220 mg, Oral, Daily        Infusions      PRN Medications   acetaminophen **OR** acetaminophen **OR** acetaminophen    senna-docusate sodium **AND** polyethylene glycol **AND** bisacodyl **AND** bisacodyl    Calcium Replacement - Follow Nurse / BPA Driven Protocol    guaiFENesin-codeine    hydrOXYzine    Magnesium Standard Dose Replacement - Follow Nurse / BPA Driven Protocol    ondansetron ODT **OR** ondansetron    Phosphorus Replacement - Follow Nurse / BPA Driven Protocol    Potassium Replacement - Follow Nurse / BPA Driven Protocol    sodium chloride    sodium chloride    sodium chloride     Physical Findings        Trending Physical   Appearance, NFPE 8/1: NFPE completed and not consistent with nutrition diagnosis of malnutrition at this time using AND/ASPEN criteria     7/25: NFPE completed and not consistent with nutrition diagnosis of malnutrition at this time using AND/ASPEN criteria      --  Edema  1+ feet      Bowel Function Last documented BM 7/31 (yesterday)     Tubes No feeding tube      Chewing/Swallowing No known issues      Skin Left cervical spine abrasion      --  Current Nutrition Orders & Evaluation of Intake       Oral Nutrition     Food Allergies NKFA   Current PO Diet Diet: Cardiac, Fluid Restriction (240 mL/tray); Low Sodium (2g); 1500 mL/day; Texture:  Soft to Chew (NDD 3); Soft to Chew: Chopped Meat; Fluid Consistency: Thin (IDDSI 0)   Supplement None ordered    PO Evaluation     Trending % PO Intake 8/1: 77% average PO intakes x 15 documented meals since last RD review     7/25: 70% average PO intakes x 11 documented meals since admission    --  Nutritional Risk Screening        NRS-2002 Score          Nutrition Diagnosis         Nutrition Dx Problem 1 Excessive alcohol intake related to intake greater than recommendations as evidenced by greater than 2 drinks per day.        Nutrition Dx Problem 2        Intervention Goal         Intervention Goal(s) Accept ONS  No weight loss  PO intakes at least 75%     Nutrition Intervention        RD Action Diet education given 7/22  Continue ONS     Nutrition Prescription          Diet Prescription Low Na+, Soft to Chew, Chopped Meats    Supplement Prescription Boost Plus TID  Yogurt TID   --  Monitor/Evaluation        Monitor Per protocol, I&O, PO intake, Supplement intake, Pertinent labs, Weight, Skin status, GI status, Symptoms, POC/GOC       Electronically signed by:  Joyce Gardiner RD  08/01/24 08:21 EDT      Electronically signed by Joyce Gardiner RD at 08/01/24 1036       Physical Therapy Notes (last 24 hours)  Notes from 07/31/24 1320 through 08/01/24 1320   No notes exist for this encounter.

## 2024-08-01 NOTE — PROGRESS NOTES
"NEPHROLOGY PROGRESS NOTE------[facility]    [facility]  [phone]  Quang Butts MD      Patient Care Team:  Provider, No Known as PCP - General      Provider:  Quang Butts MD  Patient Name: Yonas Charles  :  1995    SUBJECTIVE:    F/U ELECTROLYTE ABNORMALITIES    No chest pain or SOA      Medication:  famotidine, 40 mg, Oral, Nightly  folic acid, 1 mg, Oral, Daily  furosemide, 40 mg, Oral, Daily  guaiFENesin, 600 mg, Oral, Q12H  hydrocortisone, 1 Application, Topical, Q8H  lactulose, 10 g, Oral, Daily  metoprolol succinate XL, 25 mg, Oral, Q24H  multivitamin with minerals, 1 tablet, Oral, Daily  neomycin-bacitracin-polymyxin b, 1 Application, Topical, Daily  pantoprazole, 40 mg, Oral, BID AC  potassium chloride, 20 mEq, Oral, Daily  [Held by provider] sertraline, 50 mg, Oral, Daily  silver nitrate, 1 Application, Topical, Once  sodium chloride, 10 mL, Intravenous, Q12H  sucralfate, 1 g, Oral, 4x Daily AC & at Bedtime  thiamine, 100 mg, Oral, Daily  zinc sulfate, 220 mg, Oral, Daily                 OBJECTIVE    Vital Sign Min/Max for last 24 hours  Temp  Min: 97.6 °F (36.4 °C)  Max: 98.1 °F (36.7 °C)   BP  Min: 95/51  Max: 119/76   Pulse  Min: 85  Max: 102   Resp  Min: 15  Max: 22   SpO2  Min: 95 %  Max: 98 %   No data recorded   No data recorded     Flowsheet Rows      Flowsheet Row First Filed Value   Admission Height 165.1 cm (65\") Documented at 2024 0855   Admission Weight 55.7 kg (122 lb 12.7 oz) Documented at 2024 0855            No intake/output data recorded.  I/O last 3 completed shifts:  In: 1260 [P.O.:960; Blood:300]  Out: -     Physical Exam:  General Appearance: NAD.   Head: normocephalic, without obvious abnormality and atraumatic    Eyes: conjunctivae and +ICTERIC SCLERAE  Neck: supple and no JVD  Lungs: CTA bilaterally  Heart: regular rhythm & normal rate and normal S1, S2   Chest Wall: no abnormalities " "observed  Abdomen: normal bowel sounds.  Distended  Extremities: moves extremities well, trace edema, no cyanosis  Skin: +JAUNDICE  Neurologic: A AND O X 4 WITHOUT FOCAL DEFICIT    Labs:    WBC WBC   Date Value Ref Range Status   08/01/2024 42.08 (C) 3.40 - 10.80 10*3/mm3 Final   07/31/2024 42.36 (C) 3.40 - 10.80 10*3/mm3 Final   07/30/2024 46.33 (C) 3.40 - 10.80 10*3/mm3 Final      HGB Hemoglobin   Date Value Ref Range Status   08/01/2024 7.8 (L) 13.0 - 17.7 g/dL Final   07/31/2024 7.0 (L) 13.0 - 17.7 g/dL Final   07/30/2024 8.5 (L) 13.0 - 17.7 g/dL Final      HCT Hematocrit   Date Value Ref Range Status   08/01/2024 22.4 (L) 37.5 - 51.0 % Final   07/31/2024 19.6 (C) 37.5 - 51.0 % Final   07/30/2024 23.5 (L) 37.5 - 51.0 % Final      Platelets No results found for: \"LABPLAT\"   MCV MCV   Date Value Ref Range Status   08/01/2024 94.9 79.0 - 97.0 fL Final   07/31/2024 96.6 79.0 - 97.0 fL Final   07/30/2024 95.1 79.0 - 97.0 fL Final          Sodium Sodium   Date Value Ref Range Status   08/01/2024 136 136 - 145 mmol/L Final   07/31/2024 134 (L) 136 - 145 mmol/L Final   07/30/2024 133 (L) 136 - 145 mmol/L Final      Potassium Potassium   Date Value Ref Range Status   08/01/2024 4.2 3.5 - 5.2 mmol/L Final     Comment:     Specimen hemolyzed.  Result may be falsely elevated.   08/01/2024 3.7 3.5 - 5.2 mmol/L Final   07/31/2024 2.8 (L) 3.5 - 5.2 mmol/L Final   07/31/2024 3.2 (L) 3.5 - 5.2 mmol/L Final   07/30/2024 3.8 3.5 - 5.2 mmol/L Final     Comment:     Specimen hemolyzed.  Result may be falsely elevated.   07/30/2024 3.6 3.5 - 5.2 mmol/L Final   07/29/2024 3.0 (L) 3.5 - 5.2 mmol/L Final      Chloride Chloride   Date Value Ref Range Status   08/01/2024 104 98 - 107 mmol/L Final   07/31/2024 100 98 - 107 mmol/L Final   07/30/2024 100 98 - 107 mmol/L Final      CO2 CO2   Date Value Ref Range Status   08/01/2024 22.5 22.0 - 29.0 mmol/L Final   07/31/2024 23.9 22.0 - 29.0 mmol/L Final   07/30/2024 22.8 22.0 - 29.0 mmol/L " "Final      BUN BUN   Date Value Ref Range Status   08/01/2024 6 6 - 20 mg/dL Final   07/31/2024 4 (L) 6 - 20 mg/dL Final   07/30/2024 3 (L) 6 - 20 mg/dL Final      Creatinine Creatinine   Date Value Ref Range Status   08/01/2024 0.36 (L) 0.76 - 1.27 mg/dL Final   07/31/2024 0.27 (L) 0.76 - 1.27 mg/dL Final   07/30/2024 0.43 (L) 0.76 - 1.27 mg/dL Final      Calcium Calcium   Date Value Ref Range Status   08/01/2024 7.5 (L) 8.6 - 10.5 mg/dL Final   07/31/2024 7.6 (L) 8.6 - 10.5 mg/dL Final   07/30/2024 7.7 (L) 8.6 - 10.5 mg/dL Final      PO4 No components found for: \"PO4\"   Albumin Albumin   Date Value Ref Range Status   08/01/2024 2.5 (L) 3.5 - 5.2 g/dL Final   07/31/2024 2.6 (L) 3.5 - 5.2 g/dL Final   07/30/2024 2.9 (L) 3.5 - 5.2 g/dL Final      Magnesium Magnesium   Date Value Ref Range Status   07/30/2024 2.2 1.6 - 2.6 mg/dL Final        Uric Acid No components found for: \"URIC ACID\"     Imaging Results (Last 72 Hours)       ** No results found for the last 72 hours. **            Results for orders placed during the hospital encounter of 07/18/24    XR Chest 1 View    Narrative  XR CHEST 1 VW    Date of Exam: 7/27/2024 10:42 PM EDT    Indication: S/P fall, chest pain    Comparison: 7/24/2024.    Findings:  There is persistent mixed interstitial and airspace disease in the mid and lower lungs. No pneumothorax or pleural effusion. Heart size is unchanged. Pulmonary vasculature is indistinct. No acute osseous abnormality.    Impression  Impression:  Persistent mixed interstitial and airspace disease in the mid and lower lungs.        Electronically Signed: Juno Ernst MD  7/27/2024 11:12 PM EDT  Workstation ID: [id number]      XR Chest 1 View    Narrative  XR CHEST 1 VW    Date of Exam: 7/24/2024 1:25 PM EDT    Indication: coughing, SOB    Comparison: 7/18/2024    Findings:  There are lower lung volumes. Cardiac size appears increased. There is airspace disease in both lung bases with small bilateral pleural " effusions. Pulmonary vascular markings have increased compared with the last study.    Impression  Impression:    1. Increased cardiac size with evidence of mild passive congestion.  2. Bibasilar airspace disease with bilateral pleural effusions.      Electronically Signed: Vitor Chin MD  7/24/2024 1:31 PM EDT  Workstation ID: [id number]      XR Chest PA & Lateral    Narrative  DATE OF EXAM:  7/18/2024 12:36 PM    PROCEDURE:  XR CHEST PA AND LATERAL-    INDICATIONS:  rule out aspiration pneumonia; E87.6-Hypokalemia; F10.939-Alcohol use,  unspecified with withdrawal, unspecified; R17-Unspecified jaundice    COMPARISON:  No Comparisons Available    TECHNIQUE:  Two radiologic views of the chest , PA and lateral were obtained.    FINDINGS:  Heart size normal. Negative for lobar consolidation, edema, effusion or  pneumothorax. Osseous structures unremarkable.    Impression  No active pulmonary process.      Electronically Signed By-Armani Small MD On:7/18/2024 1:00 PM            ASSESSMENT / PLAN      Alcohol withdrawal    HYPONATREMIA------stable. Likely related to underlying liver disease. Fluid restrict     2. HYPOKALEMIA-------Resolved.       3. HYPOMAGNESEMIA-------Replaced     4. HYPOCALCEMIA------Replace IV     5. ALCOHOL ABUSE/CIRRHOSIS/JAUNDICE------Thiamine, Folate, IVFs, prn IV Ativan. Withdrawal prcautions     6. MIXED METABOLIC ALKALOSIS/METABOLIC ACIDOSIS-----Better     7. ANEMIA------Normocytic with normal RDW. Follow for PRBC need     8. DEPRESSION------Suicide precautions. Hold Zoloft given severe hyponatremia     9. HYPOALBUMINEMIA-----IV Albumin to temporize and po supplements     10. ELEVATED INR------Secondary to liver disease     11. THROMBOCYTOPENIA------No heparin. Secondary to liver disease     12. PUD PROPHYLAXIS-----IV PPI     13. DVT PROPHYLAXIS-----SCDs    14. HYPOPHOSPHATEMIA------Replaced    Sodium stable  Continue oral Lasix  Replace potassium  Keep on fluid restriction 1500 mL p.o.  daily  Labs in a lisa Butts MD  [facility]  [phone]  08/01/24  10:51 EDT

## 2024-08-01 NOTE — CONSULTS
"Nutrition Services    Patient Name: Yonas Charles  YOB: 1995  MRN: [id number]  Admission date: 7/18/2024    Comment:  -- Continue current diet and encourage good PO intakes    -- Continue Boost Plus TID (provides 1080 kcals, 42 g protein if consumed)     -- Yogurt daily - patient lactose intolerant but willing to eat one per day      CLINICAL NUTRITION ASSESSMENT      Reason for Assessment 8/1: Follow up protocol   7/25: LOS x 7 days  7/24: MD consult for \"extra nutrition\"  7/22: 2 gram Na+ diet education      H&P      History reviewed. No pertinent past medical history.    History reviewed. No pertinent surgical history.     Current Problems   Acute alcoholic hepatitis with alcoholic cirrhosis   Alcohol withdrawal with delirium tremens  Jaundice with elevated bilirubin level    Normocytic anemia    Nausea/vomiting  - GI following    Severe hypokalemia  Hyponatremia (potomania)  - Nephrology following    Metabolic acidosis    Esophagitis    Hepatomegaly with portal hypertension    Esophageal varices    Suicide ideation  Anxiety depression  Social issues  - psych following       Encounter Information        Trending Narrative     8/1: Since last RD review, patient continues admission per GI observation.  RD visited patient at bedside.  Patient reports he was just informed he was being discharged today.  Patient declined yogurt offered by RD, willing to eat one per day but unable to do three due to lactose intolerance.  Patient reports drinking three Boosts per day and will continue to do so.  RD provided three Boosts for patient to take with him.  Patient very appreciative of RD help and of kitchen for working with is high calorie needs during admission.      7/25: Admitted for alcohol withdrawal and detox.  Psych following for SI/depression.  RD previously visited patient at bedside 7/22 for low Na+ diet education.  See narrative below.  RD also previously consulted for \"extra nutrition\" per GI, see " "7/24 documentation below.  GI considering NG tube for feedings if needed but patient agreed to try and eat more and has been successful.  MD notes mention possible transfer to transplant center.      7/24: RD received consult that Dr. Miller want patient to get \"extra nutrition\".  Per clarification with the patients RN, MD would like patient to have food intake every 1-2 hours.  RD to communicate with the kitchen that this patient is allowed to order more than 3 meals per day.       This RD previously provided low Na+ diet education to patient 7/22 and had lengthy discussed about his eating habits and changes necessary to limit Na+.  Noted patient lives at home and eats what his mother cooks and states his mother is on board with helping him make these changes.       Boost Plus already ordered TID for patient.  Also noted per kitchen that a request has been made for this patient to get yogurt between meals for snacks.  The kitchen will send up 3 yogurts per day and keep in the supplement fridge on the unit.       Per RD review of patient recent meals, patient ordering full meals with adequate protein sources such as eggs and fish.  Noted from previous discussion with patient that he doesn't eat a lot of meat.       Noted per GI note 7/23, possible need for NJ tube placement if patient unable to eat well.  Patient does not want this and will continue to try and eat on his own.  Recent intakes documented at %.  RD to continue to monitor as needed.      7/22: RD consulted for diet education.  RD visited patient at bedside.  Patient accepting of diet education at time of RD visit. Patient reports eating most meals at home that his mother cooks. Discussed changes such as discontinue using the salt shaker at the table, buying no salt added canned vegetables, limited processed meats. Patient appreciative of information and reports his mother is on board to help him with these changes. RD provided low Na+ handout and " "RD contact information.        Anthropometrics        Current Height, Weight Height: 165.1 cm (65\")  Weight: 55.7 kg (122 lb 12.7 oz) (07/25/24 0305)       Usual Body Weight (UBW) Unable to obtain from patient        Trending Weight Hx     This admission: 8/1: No new weight, RD ordered one to be obtained   7/25: 122#             PTA: No history to note     Wt Readings from Last 30 Encounters:   07/25/24 0305 55.7 kg (122 lb 12.7 oz)   07/18/24 0855 55.7 kg (122 lb 12.7 oz)      BMI kg/m2 Body mass index is 20.43 kg/m².       Labs        Pertinent Labs    Results from last 7 days   Lab Units 08/01/24  0332 07/31/24  1300 07/31/24  0235 07/30/24  1001 07/30/24  0518   SODIUM mmol/L 136  --  134*  --  133*   POTASSIUM mmol/L 3.7 2.8* 3.2*   < > 3.6   CHLORIDE mmol/L 104  --  100  --  100   CO2 mmol/L 22.5  --  23.9  --  22.8   BUN mg/dL 6  --  4*  --  3*   CREATININE mg/dL 0.36*  --  0.27*  --  0.43*   CALCIUM mg/dL 7.5*  --  7.6*  --  7.7*   BILIRUBIN mg/dL 18.9*  --  19.0*  --  21.2*   ALK PHOS U/L 287*  --  515*  --  370*   ALT (SGPT) U/L 47*  --  51*  --  60*   AST (SGOT) U/L 119*  --  138*  --  143*   GLUCOSE mg/dL 83  --  82  --  90    < > = values in this interval not displayed.     Results from last 7 days   Lab Units 08/01/24  0332 07/31/24  0235 07/30/24  0518 07/29/24  0900 07/29/24  0502   MAGNESIUM mg/dL  --   --  2.2  --  1.2*   PHOSPHORUS mg/dL 2.1*   < > 1.5*  --  2.3*   HEMOGLOBIN g/dL 7.8*   < > 8.5*   < >  --    HEMATOCRIT % 22.4*   < > 23.5*   < >  --     < > = values in this interval not displayed.     No results found for: \"HGBA1C\"     Medications    Scheduled Medications famotidine, 40 mg, Oral, Nightly  folic acid, 1 mg, Oral, Daily  furosemide, 40 mg, Oral, Daily  guaiFENesin, 600 mg, Oral, Q12H  hydrocortisone, 1 Application, Topical, Q8H  lactulose, 10 g, Oral, Daily  metoprolol succinate XL, 25 mg, Oral, Q24H  multivitamin with minerals, 1 tablet, Oral, Daily  neomycin-bacitracin-polymyxin " b, 1 Application, Topical, Daily  pantoprazole, 40 mg, Oral, BID AC  potassium chloride, 20 mEq, Oral, Daily  [Held by provider] sertraline, 50 mg, Oral, Daily  silver nitrate, 1 Application, Topical, Once  sodium chloride, 10 mL, Intravenous, Q12H  sucralfate, 1 g, Oral, 4x Daily AC & at Bedtime  thiamine, 100 mg, Oral, Daily  zinc sulfate, 220 mg, Oral, Daily        Infusions      PRN Medications   acetaminophen **OR** acetaminophen **OR** acetaminophen    senna-docusate sodium **AND** polyethylene glycol **AND** bisacodyl **AND** bisacodyl    Calcium Replacement - Follow Nurse / BPA Driven Protocol    guaiFENesin-codeine    hydrOXYzine    Magnesium Standard Dose Replacement - Follow Nurse / BPA Driven Protocol    ondansetron ODT **OR** ondansetron    Phosphorus Replacement - Follow Nurse / BPA Driven Protocol    Potassium Replacement - Follow Nurse / BPA Driven Protocol    sodium chloride    sodium chloride    sodium chloride     Physical Findings        Trending Physical   Appearance, NFPE 8/1: NFPE completed and not consistent with nutrition diagnosis of malnutrition at this time using AND/ASPEN criteria     7/25: NFPE completed and not consistent with nutrition diagnosis of malnutrition at this time using AND/ASPEN criteria      --  Edema  1+ feet      Bowel Function Last documented BM 7/31 (yesterday)     Tubes No feeding tube      Chewing/Swallowing No known issues      Skin Left cervical spine abrasion      --  Current Nutrition Orders & Evaluation of Intake       Oral Nutrition     Food Allergies NKFA   Current PO Diet Diet: Cardiac, Fluid Restriction (240 mL/tray); Low Sodium (2g); 1500 mL/day; Texture: Soft to Chew (NDD 3); Soft to Chew: Chopped Meat; Fluid Consistency: Thin (IDDSI 0)   Supplement None ordered    PO Evaluation     Trending % PO Intake 8/1: 77% average PO intakes x 15 documented meals since last RD review     7/25: 70% average PO intakes x 11 documented meals since admission     --  Nutritional Risk Screening        NRS-2002 Score          Nutrition Diagnosis         Nutrition Dx Problem 1 Excessive alcohol intake related to intake greater than recommendations as evidenced by greater than 2 drinks per day.        Nutrition Dx Problem 2        Intervention Goal         Intervention Goal(s) Accept ONS  No weight loss  PO intakes at least 75%     Nutrition Intervention        RD Action Diet education given 7/22  Continue ONS     Nutrition Prescription          Diet Prescription Low Na+, Soft to Chew, Chopped Meats    Supplement Prescription Boost Plus TID  Yogurt TID   --  Monitor/Evaluation        Monitor Per protocol, I&O, PO intake, Supplement intake, Pertinent labs, Weight, Skin status, GI status, Symptoms, POC/GOC       Electronically signed by:  Joyce Gardiner, SAEED  08/01/24 08:21 EDT

## 2024-08-01 NOTE — PLAN OF CARE
Goal Outcome Evaluation:  Plan of Care Reviewed With: patient        Progress: no change  Outcome Evaluation: Slept at short intervals. A&O. Up to BR with stand-by assist. Potassium replacement in progress. Possible discharge today pending labs. Will continue to monitor.

## 2024-08-02 ENCOUNTER — READMISSION MANAGEMENT (OUTPATIENT)
Dept: CALL CENTER | Facility: HOSPITAL | Age: 29
End: 2024-08-02
Payer: MEDICAID

## 2024-08-02 VITALS
HEART RATE: 93 BPM | HEIGHT: 65 IN | BODY MASS INDEX: 23.49 KG/M2 | DIASTOLIC BLOOD PRESSURE: 73 MMHG | SYSTOLIC BLOOD PRESSURE: 127 MMHG | RESPIRATION RATE: 16 BRPM | WEIGHT: 141 LBS | OXYGEN SATURATION: 97 % | TEMPERATURE: 98 F

## 2024-08-02 PROBLEM — F10.939 ALCOHOL WITHDRAWAL: Status: RESOLVED | Noted: 2024-07-18 | Resolved: 2024-08-02

## 2024-08-02 LAB
ALBUMIN SERPL-MCNC: 2.5 G/DL (ref 3.5–5.2)
ALBUMIN/GLOB SERPL: 1 G/DL
ALP SERPL-CCNC: 274 U/L (ref 39–117)
ALT SERPL W P-5'-P-CCNC: 49 U/L (ref 1–41)
ANION GAP SERPL CALCULATED.3IONS-SCNC: 10.7 MMOL/L (ref 5–15)
AST SERPL-CCNC: 129 U/L (ref 1–40)
BH BB BLOOD EXPIRATION DATE: NORMAL
BH BB BLOOD TYPE BARCODE: 5100
BH BB DISPENSE STATUS: NORMAL
BH BB PRODUCT CODE: NORMAL
BH BB UNIT NUMBER: NORMAL
BILIRUB SERPL-MCNC: 20.1 MG/DL (ref 0–1.2)
BUN SERPL-MCNC: 6 MG/DL (ref 6–20)
BUN/CREAT SERPL: 11.1 (ref 7–25)
CALCIUM SPEC-SCNC: 7.7 MG/DL (ref 8.6–10.5)
CHLORIDE SERPL-SCNC: 102 MMOL/L (ref 98–107)
CO2 SERPL-SCNC: 22.3 MMOL/L (ref 22–29)
CREAT SERPL-MCNC: 0.54 MG/DL (ref 0.76–1.27)
CROSSMATCH INTERPRETATION: NORMAL
DEPRECATED RDW RBC AUTO: 68.6 FL (ref 37–54)
EGFRCR SERPLBLD CKD-EPI 2021: 138.3 ML/MIN/1.73
ERYTHROCYTE [DISTWIDTH] IN BLOOD BY AUTOMATED COUNT: 20.6 % (ref 12.3–15.4)
GLOBULIN UR ELPH-MCNC: 2.6 GM/DL
GLUCOSE SERPL-MCNC: 87 MG/DL (ref 65–99)
HCT VFR BLD AUTO: 22.9 % (ref 37.5–51)
HGB BLD-MCNC: 8 G/DL (ref 13–17.7)
MCH RBC QN AUTO: 33.1 PG (ref 26.6–33)
MCHC RBC AUTO-ENTMCNC: 34.9 G/DL (ref 31.5–35.7)
MCV RBC AUTO: 94.6 FL (ref 79–97)
PHOSPHATE SERPL-MCNC: 2.7 MG/DL (ref 2.5–4.5)
PLATELET # BLD AUTO: 140 10*3/MM3 (ref 140–450)
PMV BLD AUTO: 10.8 FL (ref 6–12)
POTASSIUM SERPL-SCNC: 3.1 MMOL/L (ref 3.5–5.2)
PROT SERPL-MCNC: 5.1 G/DL (ref 6–8.5)
RBC # BLD AUTO: 2.42 10*6/MM3 (ref 4.14–5.8)
SODIUM SERPL-SCNC: 135 MMOL/L (ref 136–145)
UNIT  ABO: NORMAL
UNIT  RH: NORMAL
WBC NRBC COR # BLD AUTO: 50.54 10*3/MM3 (ref 3.4–10.8)

## 2024-08-02 PROCEDURE — 85027 COMPLETE CBC AUTOMATED: CPT | Performed by: INTERNAL MEDICINE

## 2024-08-02 PROCEDURE — 80053 COMPREHEN METABOLIC PANEL: CPT | Performed by: INTERNAL MEDICINE

## 2024-08-02 PROCEDURE — 84100 ASSAY OF PHOSPHORUS: CPT | Performed by: INTERNAL MEDICINE

## 2024-08-02 RX ORDER — SPIRONOLACTONE 25 MG/1
25 TABLET ORAL DAILY
Qty: 30 TABLET | Refills: 0 | Status: SHIPPED | OUTPATIENT
Start: 2024-08-02 | End: 2024-09-01

## 2024-08-02 RX ORDER — FOLIC ACID 1 MG/1
1 TABLET ORAL DAILY
Qty: 30 TABLET | Refills: 0 | Status: SHIPPED | OUTPATIENT
Start: 2024-08-03 | End: 2024-08-02

## 2024-08-02 RX ORDER — GAUZE BANDAGE 2" X 2"
100 BANDAGE TOPICAL DAILY
Qty: 30 TABLET | Refills: 0 | Status: SHIPPED | OUTPATIENT
Start: 2024-08-03 | End: 2024-09-02

## 2024-08-02 RX ORDER — SPIRONOLACTONE 25 MG/1
25 TABLET ORAL DAILY
Qty: 30 TABLET | Refills: 0 | Status: SHIPPED | OUTPATIENT
Start: 2024-08-02 | End: 2024-08-02

## 2024-08-02 RX ORDER — METOPROLOL SUCCINATE 25 MG/1
25 TABLET, EXTENDED RELEASE ORAL
Qty: 30 TABLET | Refills: 0 | Status: SHIPPED | OUTPATIENT
Start: 2024-08-03 | End: 2024-08-02

## 2024-08-02 RX ORDER — MULTIPLE VITAMINS W/ MINERALS TAB 9MG-400MCG
1 TAB ORAL DAILY
Qty: 30 TABLET | Refills: 0 | Status: SHIPPED | OUTPATIENT
Start: 2024-08-03 | End: 2024-08-02

## 2024-08-02 RX ORDER — POTASSIUM CHLORIDE 20 MEQ/1
40 TABLET, EXTENDED RELEASE ORAL ONCE
Status: DISCONTINUED | OUTPATIENT
Start: 2024-08-02 | End: 2024-08-02

## 2024-08-02 RX ORDER — LANOLIN ALCOHOL/MO/W.PET/CERES
100 CREAM (GRAM) TOPICAL DAILY
Qty: 30 TABLET | Refills: 0 | Status: SHIPPED | OUTPATIENT
Start: 2024-08-03 | End: 2024-08-02

## 2024-08-02 RX ORDER — LACTULOSE 10 G/15ML
10 SOLUTION ORAL 3 TIMES DAILY
Qty: 1350 ML | Refills: 0 | Status: SHIPPED | OUTPATIENT
Start: 2024-08-02 | End: 2024-08-02

## 2024-08-02 RX ORDER — HYDROXYZINE HYDROCHLORIDE 25 MG/1
25 TABLET, FILM COATED ORAL EVERY 8 HOURS PRN
Qty: 90 TABLET | Refills: 0 | Status: SHIPPED | OUTPATIENT
Start: 2024-08-02 | End: 2024-09-01

## 2024-08-02 RX ORDER — HYDROXYZINE HYDROCHLORIDE 25 MG/1
25 TABLET, FILM COATED ORAL EVERY 8 HOURS PRN
Qty: 90 TABLET | Refills: 0 | Status: SHIPPED | OUTPATIENT
Start: 2024-08-02 | End: 2024-08-02

## 2024-08-02 RX ORDER — FAMOTIDINE 40 MG/1
40 TABLET, FILM COATED ORAL NIGHTLY
Qty: 30 TABLET | Refills: 0 | Status: SHIPPED | OUTPATIENT
Start: 2024-08-02 | End: 2024-09-01

## 2024-08-02 RX ORDER — FOLIC ACID 1 MG/1
1 TABLET ORAL DAILY
Qty: 30 TABLET | Refills: 0 | Status: SHIPPED | OUTPATIENT
Start: 2024-08-03 | End: 2024-09-02

## 2024-08-02 RX ORDER — MULTIPLE VITAMINS W/ MINERALS TAB 9MG-400MCG
1 TAB ORAL DAILY
Qty: 30 TABLET | Refills: 0 | Status: SHIPPED | OUTPATIENT
Start: 2024-08-03 | End: 2024-09-02

## 2024-08-02 RX ORDER — SUCRALFATE 1 G/1
1 TABLET ORAL
Qty: 56 TABLET | Refills: 0 | Status: SHIPPED | OUTPATIENT
Start: 2024-08-02 | End: 2024-08-02

## 2024-08-02 RX ORDER — SUCRALFATE 1 G/1
1 TABLET ORAL
Qty: 56 TABLET | Refills: 0 | Status: SHIPPED | OUTPATIENT
Start: 2024-08-02 | End: 2024-08-16

## 2024-08-02 RX ORDER — POTASSIUM CHLORIDE 20 MEQ/1
20 TABLET, EXTENDED RELEASE ORAL DAILY
Qty: 2 TABLET | Refills: 0 | Status: SHIPPED | OUTPATIENT
Start: 2024-08-03 | End: 2024-08-05

## 2024-08-02 RX ORDER — LACTULOSE 10 G/15ML
10 SOLUTION ORAL 3 TIMES DAILY
Qty: 1350 ML | Refills: 0 | Status: SHIPPED | OUTPATIENT
Start: 2024-08-02 | End: 2024-09-01

## 2024-08-02 RX ORDER — POTASSIUM CHLORIDE 20 MEQ/1
20 TABLET, EXTENDED RELEASE ORAL DAILY
Qty: 2 TABLET | Refills: 0 | Status: SHIPPED | OUTPATIENT
Start: 2024-08-03 | End: 2024-08-02

## 2024-08-02 RX ORDER — POTASSIUM CHLORIDE 20 MEQ/1
40 TABLET, EXTENDED RELEASE ORAL EVERY 4 HOURS
Status: DISCONTINUED | OUTPATIENT
Start: 2024-08-02 | End: 2024-08-02 | Stop reason: HOSPADM

## 2024-08-02 RX ORDER — ZINC SULFATE 50(220)MG
220 CAPSULE ORAL DAILY
Qty: 30 CAPSULE | Refills: 0 | Status: SHIPPED | OUTPATIENT
Start: 2024-08-03 | End: 2024-09-02

## 2024-08-02 RX ORDER — METOPROLOL SUCCINATE 25 MG/1
25 TABLET, EXTENDED RELEASE ORAL
Qty: 30 TABLET | Refills: 0 | Status: SHIPPED | OUTPATIENT
Start: 2024-08-03 | End: 2024-09-02

## 2024-08-02 RX ORDER — ZINC SULFATE 50(220)MG
220 CAPSULE ORAL DAILY
Qty: 30 CAPSULE | Refills: 0 | Status: SHIPPED | OUTPATIENT
Start: 2024-08-03 | End: 2024-08-02

## 2024-08-02 RX ORDER — FAMOTIDINE 40 MG/1
40 TABLET, FILM COATED ORAL NIGHTLY
Qty: 30 TABLET | Refills: 0 | Status: SHIPPED | OUTPATIENT
Start: 2024-08-02 | End: 2024-08-02

## 2024-08-02 RX ADMIN — Medication 220 MG: at 08:07

## 2024-08-02 RX ADMIN — Medication 100 MG: at 08:07

## 2024-08-02 RX ADMIN — PANTOPRAZOLE SODIUM 40 MG: 40 TABLET, DELAYED RELEASE ORAL at 08:07

## 2024-08-02 RX ADMIN — FUROSEMIDE 40 MG: 40 TABLET ORAL at 08:07

## 2024-08-02 RX ADMIN — SUCRALFATE 1 G: 1 TABLET ORAL at 11:04

## 2024-08-02 RX ADMIN — POTASSIUM CHLORIDE 40 MEQ: 1500 TABLET, EXTENDED RELEASE ORAL at 11:03

## 2024-08-02 RX ADMIN — FOLIC ACID 1 MG: 1 TABLET ORAL at 08:07

## 2024-08-02 RX ADMIN — Medication 10 ML: at 08:08

## 2024-08-02 RX ADMIN — METOPROLOL SUCCINATE 25 MG: 25 TABLET, EXTENDED RELEASE ORAL at 08:07

## 2024-08-02 RX ADMIN — GUAIFENESIN 600 MG: 600 TABLET, EXTENDED RELEASE ORAL at 08:07

## 2024-08-02 RX ADMIN — POTASSIUM CHLORIDE 40 MEQ: 1500 TABLET, EXTENDED RELEASE ORAL at 06:29

## 2024-08-02 RX ADMIN — SUCRALFATE 1 G: 1 TABLET ORAL at 08:07

## 2024-08-02 RX ADMIN — Medication 1 TABLET: at 08:07

## 2024-08-02 NOTE — PLAN OF CARE
Problem: Adult Inpatient Plan of Care  Goal: Absence of Hospital-Acquired Illness or Injury  Intervention: Identify and Manage Fall Risk  Description: Perform standard risk assessment on admission using a validated tool or comprehensive approach appropriate to the patient; reassess fall risk frequently, with change in status or transfer to another level of care.  Communicate fall injury risk to interprofessional healthcare team.  Determine need for increased observation, equipment and environmental modification, such as low bed, signage and supportive, nonskid footwear.  Adjust safety measures to individual developmental age, stage and identified risk factors.  Reinforce the importance of safety and physical activity with patient and family.  Perform regular intentional rounding to assess need for position change, pain assessment and personal needs, including assistance with toileting.  Recent Flowsheet Documentation  Taken 8/2/2024 0400 by Carlos Garcia LPN  Safety Promotion/Fall Prevention:   safety round/check completed   room organization consistent   nonskid shoes/slippers when out of bed   muscle strengthening facilitated   mobility aid in reach   lighting adjusted   fall prevention program maintained   clutter free environment maintained   activity supervised   assistive device/personal items within reach  Taken 8/2/2024 0200 by Carlos Garcia LPN  Safety Promotion/Fall Prevention:   safety round/check completed   room organization consistent   nonskid shoes/slippers when out of bed   muscle strengthening facilitated   mobility aid in reach   lighting adjusted   fall prevention program maintained   clutter free environment maintained   assistive device/personal items within reach  Taken 8/2/2024 0000 by Carlos Garcia LPN  Safety Promotion/Fall Prevention:   safety round/check completed   room organization consistent   muscle strengthening facilitated   nonskid shoes/slippers when out of  bed   mobility aid in reach   lighting adjusted   fall prevention program maintained   clutter free environment maintained   assistive device/personal items within reach   activity supervised  Taken 8/1/2024 2200 by Carlos Garcia LPN  Safety Promotion/Fall Prevention:   safety round/check completed   room organization consistent   nonskid shoes/slippers when out of bed   muscle strengthening facilitated   mobility aid in reach   lighting adjusted   fall prevention program maintained   clutter free environment maintained   assistive device/personal items within reach   activity supervised  Taken 8/1/2024 2000 by Carlos Garcia LPN  Safety Promotion/Fall Prevention:   safety round/check completed   room organization consistent   nonskid shoes/slippers when out of bed   muscle strengthening facilitated   mobility aid in reach   lighting adjusted   fall prevention program maintained   assistive device/personal items within reach   clutter free environment maintained   activity supervised  Intervention: Prevent Skin Injury  Description: Perform a screening for skin injury risk, such as pressure or moisture associated skin damage on admission and at regular intervals throughout hospital stay.  Keep all areas of skin (especially folds) clean and dry.  Maintain adequate skin hydration.  Relieve and redistribute pressure and protect bony prominences; implement measures based on patient-specific risk factors.  Match turning and repositioning schedule to clinical condition.  Encourage weight shift frequently; assist with reposition if unable to complete independently.  Float heels off bed; avoid pressure on the Achilles tendon.  Keep skin free from extended contact with medical devices.  Encourage functional activity and mobility, as early as tolerated.  Use aids (e.g., slide boards, mechanical lift) during transfer.  Recent Flowsheet Documentation  Taken 8/2/2024 0400 by Carlos Garcia LPN  Body Position:  position changed independently  Taken 8/2/2024 0200 by Carlos Garcia LPN  Body Position: position changed independently  Taken 8/2/2024 0000 by Carlos Garcia LPN  Body Position:   weight shifting   position changed independently  Skin Protection: adhesive use limited  Taken 8/1/2024 2200 by Carlos Garcia LPN  Body Position: weight shifting  Taken 8/1/2024 2000 by Carlos Garcia LPN  Body Position: weight shifting  Skin Protection: adhesive use limited  Intervention: Prevent and Manage VTE (Venous Thromboembolism) Risk  Description: Assess for VTE (venous thromboembolism) risk.  Encourage and assist with early ambulation.  Initiate and maintain compression or other therapy, as indicated, based on identified risk in accordance with organizational protocol and provider order.  Encourage both active and passive leg exercises while in bed, if unable to ambulate.  Recent Flowsheet Documentation  Taken 8/2/2024 0400 by Carlos Garcia LPN  Activity Management:   ambulated to bathroom   activity encouraged  Taken 8/2/2024 0200 by Carlos Garcia LPN  Activity Management: ambulated to bathroom  Taken 8/2/2024 0000 by Carlos Garcia LPN  Activity Management:   ambulated to bathroom   back to bed   activity encouraged  VTE Prevention/Management: patient refused intervention  Taken 8/1/2024 2200 by Carlos Garcia LPN  Activity Management:   ambulated to bathroom   activity encouraged  Taken 8/1/2024 2000 by Carlos Garcia LPN  Activity Management:   activity encouraged   ambulated to bathroom  Range of Motion: active ROM (range of motion) encouraged  Intervention: Prevent Infection  Description: Maintain skin and mucous membrane integrity; promote hand, oral and pulmonary hygiene.  Optimize fluid balance, nutrition, sleep and glycemic control to maximize infection resistance.  Identify potential sources of infection early to prevent or mitigate progression of infection  (e.g., wound, lines, devices).  Evaluate ongoing need for invasive devices; remove promptly when no longer indicated.  Recent Flowsheet Documentation  Taken 8/2/2024 0400 by Carlos Garcia LPN  Infection Prevention:   visitors restricted/screened   single patient room provided   rest/sleep promoted   personal protective equipment utilized   hand hygiene promoted  Taken 8/2/2024 0200 by Carlos Garcia LPN  Infection Prevention:   visitors restricted/screened   single patient room provided   rest/sleep promoted   personal protective equipment utilized   hand hygiene promoted  Taken 8/2/2024 0000 by Carlos Garcia LPN  Infection Prevention:   visitors restricted/screened   single patient room provided   rest/sleep promoted  Taken 8/1/2024 2200 by Carlos Garcia LPN  Infection Prevention:   visitors restricted/screened   single patient room provided   rest/sleep promoted   personal protective equipment utilized   hand hygiene promoted  Taken 8/1/2024 2000 by Carlos Garcia LPN  Infection Prevention:   visitors restricted/screened   single patient room provided   rest/sleep promoted   personal protective equipment utilized   hand hygiene promoted  Goal: Optimal Comfort and Wellbeing  Intervention: Monitor Pain and Promote Comfort  Description: Assess pain level, treatment efficacy and patient response at regular intervals using a consistent pain scale.  Consider the presence and impact of preexisting chronic pain.  Encourage patient and caregiver involvement in pain assessment, interventions and safety measures.  Recent Flowsheet Documentation  Taken 8/2/2024 0000 by Carlos Garcia LPN  Pain Management Interventions:   see MAR   quiet environment facilitated   pain management plan reviewed with patient/caregiver   position adjusted   medication offered but refused  Taken 8/1/2024 2000 by Carlos Garcia LPN  Pain Management Interventions:   see MAR   quiet environment  facilitated   pillow support provided   position adjusted   pain management plan reviewed with patient/caregiver   medication offered but refused  Intervention: Provide Person-Centered Care  Description: Use a family-focused approach to care.  Develop trust and rapport by proactively providing information, encouraging questions, addressing concerns and offering reassurance.  Acknowledge emotional response to hospitalization.  Recognize and utilize personal coping strategies.  Honor spiritual and cultural preferences.  Recent Flowsheet Documentation  Taken 8/2/2024 0000 by Carlos Garcia LPN  Trust Relationship/Rapport: care explained  Taken 8/1/2024 2000 by Carlos Garcia LPN  Trust Relationship/Rapport:   care explained   thoughts/feelings acknowledged     Problem: Pain Acute  Goal: Acceptable Pain Control and Functional Ability  Intervention: Prevent or Manage Pain  Description: Evaluate pain level, effect of treatment and patient response at regular intervals.  Minimize painful stimuli; coordinate care and adjust environment (e.g., light, noise, unnecessary movement); promote sleep/rest.  Match pharmacologic analgesia to severity and type of pain mechanism (e.g., neuropathic, muscle, inflammatory); consider multimodal approach (e.g., nonopioid, opioid, adjuvant).  Provide medication at regular intervals; titrate to patient response; premedicate for painful procedures.  Manage breakthrough pain with additional doses; consider rotation or switching medication.  Monitor for signs of substance tolerance (increased dose to reach desired effect, decreased effect with same dose).  Manage medication-induced effects, such as constipation, nausea, pruritus, urinary retention, somnolence and dizziness.  Provide multimodal interventions, such as as physical activity, therapeutic exercise, yoga, TENS (transcutaneous electrical nerve stimulation) and manual therapy.  Train in functional activity modifications, such  as body mechanics, posture, ergonomics, energy conservation and activity pacing.  Consider addition of complementary or alternative therapy, such as acupuncture, hypnosis or therapeutic touch.  Recent Flowsheet Documentation  Taken 8/2/2024 0400 by Carlos Garcia LPN  Sensory Stimulation Regulation: care clustered  Medication Review/Management: medications reviewed  Taken 8/2/2024 0200 by Carlos Garcia LPN  Medication Review/Management: medications reviewed  Taken 8/2/2024 0000 by Carlos Garica LPN  Sensory Stimulation Regulation: care clustered  Sleep/Rest Enhancement: awakenings minimized  Medication Review/Management: medications reviewed  Taken 8/1/2024 2200 by Carlos Garcia LPN  Medication Review/Management: medications reviewed  Taken 8/1/2024 2000 by Carlos Garcia LPN  Sensory Stimulation Regulation: care clustered  Sleep/Rest Enhancement:   awakenings minimized   regular sleep/rest pattern promoted   noise level reduced  Medication Review/Management: medications reviewed  Intervention: Develop Pain Management Plan  Description: Acknowledge patient as the expert in pain self-management.  Use a consistent, validated tool for pain assessment; include function and quality of life.  Evaluate risk for opioid use and dependence.  Set pain management goals; determine acceptable level of discomfort to allow for maximal functioning.  Determine [id number] pain management plan, including both pharmacologic and nonpharmacologic measures; integrate management of chronic (persistent) pain.  Identify and integrate past successful treatment measures, if able.  Encourage patient and caregiver involvement in pain assessment, interventions and safety measures.  Re-evaluate plan regularly.  Recent Flowsheet Documentation  Taken 8/2/2024 0000 by Carlos Garcia LPN  Pain Management Interventions:   see MAR   quiet environment facilitated   pain management plan reviewed with  patient/caregiver   position adjusted   medication offered but refused  Taken 8/1/2024 2000 by Carlos Garcia LPN  Pain Management Interventions:   see MAR   quiet environment facilitated   pillow support provided   position adjusted   pain management plan reviewed with patient/caregiver   medication offered but refused  Intervention: Optimize Psychosocial Wellbeing  Description: Facilitate patient’s self-control over pain by providing pain information and allowing choices in treatment.  Consider and address emotional response to pain.  Explore and promote use of coping strategies; address barriers to successful coping.  Evaluate and assist with psychosocial, cultural and spiritual factors impacting pain.  Modify pain perception using techniques, such as distraction, mindfulness, guided imagery, meditation or music.  Assess for risk factors for developing chronic pain, such as depression, fear, pain avoidance and pain catastrophizing.  Consider referral for ongoing coping support, such as education, relaxation training and role of thoughts.  Recent Flowsheet Documentation  Taken 8/2/2024 0000 by Carlos Garcia LPN  Supportive Measures: verbalization of feelings encouraged  Diversional Activities:   television   smartphone  Taken 8/1/2024 2000 by Carlos Garcia LPN  Supportive Measures: verbalization of feelings encouraged     Problem: Skin Injury Risk Increased  Goal: Skin Health and Integrity  Intervention: Optimize Skin Protection  Description: Perform a full pressure injury risk assessment, as indicated by screening, upon admission to care unit.  Reassess skin (injury risk, full inspection) frequently (e.g., scheduled interval, with change in condition) to provide optimal early detection and prevention.  Maintain adequate tissue perfusion (e.g., encourage fluid balance; avoid crossing legs, constrictive clothing or devices) to promote tissue oxygenation.  Maintain head of bed at lowest degree of  elevation tolerated, considering medical condition and other restrictions.  Avoid positioning onto an area that remains reddened.  Minimize incontinence and moisture (e.g., toileting schedule; moisture-wicking pad, diaper or incontinence collection device; skin moisture barrier).  Cleanse skin promptly and gently when soiled utilizing a pH-balanced cleanser.  Relieve and redistribute pressure (e.g., scheduled position changes, weight shifts, use of support surface, medical device repositioning, protective dressing application, use of positioning device, microclimate control, use of pressure-injury-monitor  Encourage increased activity, such as sitting in a chair at the bedside or early mobilization, when able to tolerate.  Recent Flowsheet Documentation  Taken 8/2/2024 0400 by Carlos Garcia LPN  Head of Bed (HOB) Positioning: HOB at 20-30 degrees  Taken 8/2/2024 0200 by Carlos Garcia LPN  Head of Bed (HOB) Positioning: HOB at 20-30 degrees  Taken 8/2/2024 0000 by Carlos Garcia LPN  Pressure Reduction Techniques: frequent weight shift encouraged  Head of Bed (HOB) Positioning: HOB lowered  Pressure Reduction Devices: pressure-redistributing mattress utilized  Skin Protection: adhesive use limited  Taken 8/1/2024 2200 by aCrlos Garcia LPN  Head of Bed (HOB) Positioning: HOB at 30-45 degrees  Taken 8/1/2024 2000 by Carlos Garcia LPN  Pressure Reduction Techniques: frequent weight shift encouraged  Head of Bed (HOB) Positioning: HOB at 30-45 degrees  Pressure Reduction Devices: pressure-redistributing mattress utilized  Skin Protection: adhesive use limited     Problem: Fall Injury Risk  Goal: Absence of Fall and Fall-Related Injury  Intervention: Identify and Manage Contributors  Description: Develop a fall prevention plan with the patient and caregiver/family.  Provide reorientation, appropriate sensory stimulation and routines with changes in mental status to decrease risk of  fall.  Promote use of personal vision and auditory aids.  Assess assistance level required for safe and effective self-care; provide support as needed, such as toileting, mobilization. For age 65 and older, implement timed toileting with assistance.  Encourage physical activity, such as performance of mobility and self-care at highest level of patient ability, multicomponent exercise program and provision of appropriate assistive devices.  If fall occurs, assess the severity of injury; implement fall injury protocol. Determine the cause and revise fall injury prevention plan.  Regularly review medication contribution to fall risk; adjust medication administration times to minimize risk of falling.  Consider risk related to polypharmacy and age.  Balance adequate pain management with potential for oversedation.  Recent Flowsheet Documentation  Taken 8/2/2024 0400 by Carlos Garcia LPN  Medication Review/Management: medications reviewed  Taken 8/2/2024 0200 by Carlos Garcia LPN  Medication Review/Management: medications reviewed  Taken 8/2/2024 0000 by Carlos Garcia LPN  Medication Review/Management: medications reviewed  Taken 8/1/2024 2200 by Carlos Garcia LPN  Medication Review/Management: medications reviewed  Taken 8/1/2024 2000 by Carlos Garcia LPN  Medication Review/Management: medications reviewed  Intervention: Promote Injury-Free Environment  Description: Provide a safe, barrier-free environment that encourages independent activity.  Keep care area uncluttered and well-lighted.  Determine need for increased observation or monitoring.  Avoid use of devices that minimize mobility, such as restraints or indwelling urinary catheter.  Recent Flowsheet Documentation  Taken 8/2/2024 0400 by Carlos Garcia LPN  Safety Promotion/Fall Prevention:   safety round/check completed   room organization consistent   nonskid shoes/slippers when out of bed   muscle strengthening  facilitated   mobility aid in reach   lighting adjusted   fall prevention program maintained   clutter free environment maintained   activity supervised   assistive device/personal items within reach  Taken 8/2/2024 0200 by Carlos Garcia LPN  Safety Promotion/Fall Prevention:   safety round/check completed   room organization consistent   nonskid shoes/slippers when out of bed   muscle strengthening facilitated   mobility aid in reach   lighting adjusted   fall prevention program maintained   clutter free environment maintained   assistive device/personal items within reach  Taken 8/2/2024 0000 by Carlos Garcia LPN  Safety Promotion/Fall Prevention:   safety round/check completed   room organization consistent   muscle strengthening facilitated   nonskid shoes/slippers when out of bed   mobility aid in reach   lighting adjusted   fall prevention program maintained   clutter free environment maintained   assistive device/personal items within reach   activity supervised  Taken 8/1/2024 2200 by Carlos Garcia LPN  Safety Promotion/Fall Prevention:   safety round/check completed   room organization consistent   nonskid shoes/slippers when out of bed   muscle strengthening facilitated   mobility aid in reach   lighting adjusted   fall prevention program maintained   clutter free environment maintained   assistive device/personal items within reach   activity supervised  Taken 8/1/2024 2000 by Carlos Garcia LPN  Safety Promotion/Fall Prevention:   safety round/check completed   room organization consistent   nonskid shoes/slippers when out of bed   muscle strengthening facilitated   mobility aid in reach   lighting adjusted   fall prevention program maintained   assistive device/personal items within reach   clutter free environment maintained   activity supervised     Problem: Alcohol Withdrawal  Goal: Alcohol Withdrawal Symptom Control  Intervention: Minimize or Manage Alcohol Withdrawal  Symptoms  Description: Assess and monitor withdrawal symptom severity with a validated alcohol withdrawal tool.  Verify last intake of alcohol and consumption habits; recognize that time since last alcohol use and average daily amount consumed may not predict the severity of alcohol withdrawal.  Assess physiologic status frequently, including neurologic, hemodynamic and cardiac condition. Be prepared to implement emergency measures if symptoms progress or condition deteriorates.  Assess and monitor airway, breathing and circulation; maintain close surveillance for deterioration.  Maintain patent airway with use of positioning, airway adjuncts and secretion clearance.  Provide oxygen therapy judiciously, if hypoxemia present; use lung-protective measures, if positive-pressure ventilation needed.  Screen for malnutrition risk, such as unintentional weight loss and poor appetite; if vitamin and mineral deficiency suspected, such as thiamine, anticipate providing supplementation.  Provide intravenous fluids; monitor and address fluid and electrolyte imbalances; reintroduce oral fluids when safe to swallow.  Anticipate administering pharmacologic treatment using a loading dose strategy, such as benzodiazepine, a predetermined fixed medication tapering schedule with additional dosing as needed or a symptom-triggered approach.  Provide a safe environment (e.g., seizure precautions, fall risk). Adjust environment to minimize stimulation.  Recent Flowsheet Documentation  Taken 8/2/2024 0400 by Carlos Garcia LPN  Sensory Stimulation Regulation: care clustered  Taken 8/2/2024 0000 by Carlos Garcia LPN  Sensory Stimulation Regulation: care clustered  Taken 8/1/2024 2000 by Carlos Garcia LPN  Sensory Stimulation Regulation: care clustered     Problem: Acute Neurologic Deterioration (Alcohol Withdrawal)  Goal: Optimal Neurologic Function  Intervention: Minimize or Manage Acute Neurologic  Symptoms  Description: Maintain close observation. Assess neurologic status frequently for progression of symptoms. Monitor electrocardiogram closely; address changes in rate and rhythm.  Elevate head of bed; place head in midline position, as tolerated, to enhance venous outflow and maintain cerebral perfusion.  Minimize stimulation and activity that increases intrathoracic or intra-abdominal pressure, such as hip flexion, Valsalva maneuver, coughing or vomiting. Provide a quiet and calm environment.  Anticipate fluid and pharmacologic therapy to maintain blood pressure and cerebral perfusion; titrate to targeted threshold and patient response.  Administer pharmacologic therapy as prescribed (e.g., benzodiazepine, sedative, anticonvulsant, beta-blocker, thiamine).  Avoid hypoglycemia; maintain glycemic control.  Manage fever to preserve cerebral metabolism; use rapid cooling measures, such as external-cooling device, sponge or tub bath, internal-cooling method.  Anticipate the need for pharmacologic therapy for refractory delirium tremens.  Anticipate diagnostic testing, such as EEG (electroencephalogram), MRI (magnetic resonance imaging) or CT (computed tomography) for new onset seizures or unexplained neurologic symptoms.  Recent Flowsheet Documentation  Taken 8/2/2024 0400 by Carlos Garcia LPN  Sensory Stimulation Regulation: care clustered  Taken 8/2/2024 0000 by Carlos Garcia LPN  Sensory Stimulation Regulation: care clustered  Taken 8/1/2024 2000 by Carlos Garcia LPN  Sensory Stimulation Regulation: care clustered     Problem: Substance Misuse (Alcohol Withdrawal)  Goal: Readiness for Change Identified  Intervention: Partner to Facilitate Behavior Change  Description: Assess pattern of substance use from multiple information sources.  Provide education about risks of current use; develop a mutual treatment plan.  Assess readiness to change; identify stage of change and current level of  insight.  Explore motivations for change, affirming change-related statements; identify and develop recovery goals.  Elicit recognition of gap between current behavior and desired life goals.  Explore ambivalence associated with commitment to behavior change; support steps toward change, however small.  Identify internal and external triggers and methods to cope.  Assist with transition to next level of care; establish or re-establish connections with community resources; identify and address safety risk.  Identify and address factors that impact overall treatment adherence (e.g., childcare, transportation, appointment times, cost).  Recent Flowsheet Documentation  Taken 8/2/2024 0000 by Carlos Garcia LPN  Supportive Measures: verbalization of feelings encouraged  Taken 8/1/2024 2000 by Carlos Garcia LPN  Supportive Measures: verbalization of feelings encouraged  Intervention: Promote Psychosocial Wellbeing  Description: Use a nonjudgmental approach to establish a therapeutic alliance and trust; normalize and validate the patient and family/caregiver’s experience.  Include family/caregiver in assessment and planning, if supportive; honor confidentiality.  Encourage verbalization of feelings and concerns; partner to identify and utilize patient and family/caregiver strengths and coping strategies.  Emphasize importance of a strong support system; encourage support of family and friends.  Screen and monitor for signs and symptoms of co-occurring mental illness.  Recent Flowsheet Documentation  Taken 8/2/2024 0000 by Carlos Garcia LPN  Family/Support System Care: self-care encouraged     Problem: Adjustment to Illness (Sepsis/Septic Shock)  Goal: Optimal Coping  Intervention: Optimize Psychosocial Adjustment to Illness  Description: Acknowledge, normalize, validate intensity and complexity of patient and support system response to situation.  Provide opportunity for expression of thoughts, feelings  and concerns; respond with compassion and reassurance.  Decrease stress and anxiety by providing information about patient’s status and treatment.  Facilitate support system presence and participation in care; consider providing a diary in intensive care situation.  Support coping by recognizing current coping strategies; provide aid in developing new strategies.  Acknowledge and normalize difficulty in managing life-long lifestyle changes and expectations.  Assess and monitor for signs and symptoms of psychologic distress, anxiety and depression.  Consider palliative care consult for goals of care conversation, if the condition is worsening despite treatment.  Recent Flowsheet Documentation  Taken 8/2/2024 0000 by Carlos Garcia LPN  Supportive Measures: verbalization of feelings encouraged  Family/Support System Care: self-care encouraged  Taken 8/1/2024 2000 by Carlos Garcia LPN  Supportive Measures: verbalization of feelings encouraged     Problem: Infection Progression (Sepsis/Septic Shock)  Goal: Absence of Infection Signs and Symptoms  Intervention: Initiate Sepsis Management  Description: Provide fluid therapy, such as crystalloid or albumin, to increase intravascular volume, organ perfusion and oxygen delivery.  Provide respiratory support, such as oxygen therapy, noninvasive or invasive positive pressure ventilation, to achieve oxygenation and ventilation goal; avoid hyperoxemia.  Obtain cultures prior to initiating antimicrobial therapy when possible. Do not delay for laboratory results in the presence of high suspicion or clinical indicators.  Administer intravenous broad-spectrum antimicrobial therapy promptly.  Implement hemodynamic monitoring to guide intravascular support based on individual targeted parameters.  Determine and address underlying source of infection aggressively; implement transmission-based precautions and isolation, as indicated.  Recent Flowsheet Documentation  Taken  8/2/2024 0400 by Carlos Garcia LPN  Infection Prevention:   visitors restricted/screened   single patient room provided   rest/sleep promoted   personal protective equipment utilized   hand hygiene promoted  Taken 8/2/2024 0200 by Carlos Garcia LPN  Infection Prevention:   visitors restricted/screened   single patient room provided   rest/sleep promoted   personal protective equipment utilized   hand hygiene promoted  Taken 8/2/2024 0000 by Carlos Garcia LPN  Infection Prevention:   visitors restricted/screened   single patient room provided   rest/sleep promoted  Taken 8/1/2024 2200 by Carlos Garcia LPN  Infection Prevention:   visitors restricted/screened   single patient room provided   rest/sleep promoted   personal protective equipment utilized   hand hygiene promoted  Taken 8/1/2024 2000 by Carlos Garcia LPN  Infection Prevention:   visitors restricted/screened   single patient room provided   rest/sleep promoted   personal protective equipment utilized   hand hygiene promoted  Intervention: Promote Recovery  Description: Encourage pulmonary hygiene, such as cough-enhancement and airway-clearance techniques, that may include use of incentive spirometry, deep breathing and cough.  Encourage early rehabilitation and physical activity to optimize functional ability and activity tolerance, as well as minimize delirium.  Promote energy conservation; minimize oxygen demand and consumption by adjusting environment, decreasing stimulation, maintaining normothermia and treating pain.  Optimize fluid balance, nutrition intake, sleep and glycemic control to maintain tissue perfusion and enhance immune response.  Recent Flowsheet Documentation  Taken 8/2/2024 0400 by Carlos Garcia LPN  Activity Management:   ambulated to bathroom   activity encouraged  Taken 8/2/2024 0200 by Carlos Garcia LPN  Activity Management: ambulated to bathroom  Taken 8/2/2024 0000 by Radha  Carlos DONALDSON LPN  Activity Management:   ambulated to bathroom   back to bed   activity encouraged  Sleep/Rest Enhancement: awakenings minimized  Taken 8/1/2024 2200 by Carlos Garcia LPN  Activity Management:   ambulated to bathroom   activity encouraged  Taken 8/1/2024 2000 by Carlos Garcia LPN  Activity Management:   activity encouraged   ambulated to bathroom  Sleep/Rest Enhancement:   awakenings minimized   regular sleep/rest pattern promoted   noise level reduced  Intervention: Promote Stabilization  Description: Monitor for signs of fluid responsiveness and overload; consider fluid adjustment and diuretic therapy.  Anticipate use of vasoactive agent to support microperfusion and oxygen delivery; titrate to response.  Monitor laboratory value, diagnostic test and clinical status trends for signs of infection progression and multiple organ failure.  Assess effectiveness of and potential for de-escalation of the antimicrobial regimen daily.  Provide fever-reduction and comfort measures.  Monitor and manage electrolyte imbalance, such as hypocalcemia.  Use lung protective ventilation measures, such as low volume, inspiratory pressure, optimal positive end-expiratory pressure, to minimize the risk of ventilator-induced lung injury; ensure minute volume demands.  Prepare for supportive therapy, such as corticosteroid therapy, coagulopathy management, CRRT (continuous renal replacement therapy), hemofiltration and cardiac-assist device.  Recent Flowsheet Documentation  Taken 8/2/2024 0000 by Carlos Garcia LPN  Fluid/Electrolyte Management: fluids restricted  Taken 8/1/2024 2000 by Carlos Garcia LPN  Fluid/Electrolyte Management: fluids restricted   Goal Outcome Evaluation: on going awaiting to hear approval from a rehab facility if accept, GI consult UL transfer out to follow up for possible transplant , will cont to monitor pt progress

## 2024-08-02 NOTE — CASE MANAGEMENT/SOCIAL WORK
Social Work Assessment  [facility]     Patient Name: Yonas Charles  MRN: [id number]  Today's Date: 8/2/2024    Admit Date: 7/18/2024      Patient was NOT agreeable to go to these facilities as he wanted [facility] but is now agreeable to [facility] or [facility].     Discharge Plan       Row Name 08/02/24 1540       Plan    Plan Comments At approximately 3pm, patient's Mother/Sabrina called to say she had talked further with pt and they all have decided patient can be referred to [facility] and  [facility] since they accept [place] Medicaid.  SW spoke with pt again who earlier refused  sending these referrals.  He is not agreeable to go to either program.  Left VM with [facility].  Sw called [facility] who did say they had male beds and would need to do a phone interview with pt.  Records faxed.  Acceptance is pending.  MD, Floor nurse/Candida, and CM/RN notified.      Row Name 08/02/24 9724       Plan    Plan Comments LIZABETH and RN/CM learned patient has Presumptive VIDA which prevents him from going to his first preference for SA Treatment.([place]).  Patient's second choice is to go to [facility] and they have no male beds available today.  Patient has d/c orders.  He plans to return to his parents home until a bed opens at [facility].  Patient was given the option of Presumptive Eligible facilities and he declined but information was given to him.  He plans to call the Medicaid Office today to determine where his application is in the process.  Father or Brother will transport patient home today. Left a VM for Awilda at [facility] to call patient when bed is available.  MD, RN/CM and Floor Nurse all aware of above plan.               MICHELE Jones MSW    Phone: [phone]  Cell: [phone]  Fax: [phone]  [email or web address]

## 2024-08-02 NOTE — PROGRESS NOTES
"NEPHROLOGY PROGRESS NOTE------[facility]    [facility]  [phone]  Quang Butts MD      Patient Care Team:  Provider, No Known as PCP - General      Provider:  Quang Butts MD  Patient Name: Yonas Charles  :  1995    SUBJECTIVE:    F/U ELECTROLYTE ABNORMALITIES    No chest pain or SOA      Medication:  famotidine, 40 mg, Oral, Nightly  folic acid, 1 mg, Oral, Daily  furosemide, 40 mg, Oral, Daily  guaiFENesin, 600 mg, Oral, Q12H  hydrocortisone, 1 Application, Topical, Q8H  lactulose, 10 g, Oral, Daily  metoprolol succinate XL, 25 mg, Oral, Q24H  multivitamin with minerals, 1 tablet, Oral, Daily  neomycin-bacitracin-polymyxin b, 1 Application, Topical, Daily  pantoprazole, 40 mg, Oral, BID AC  potassium chloride, 20 mEq, Oral, Daily  potassium chloride ER, 40 mEq, Oral, Q4H  potassium chloride, 40 mEq, Oral, Once  [Held by provider] sertraline, 50 mg, Oral, Daily  silver nitrate, 1 Application, Topical, Once  sodium chloride, 10 mL, Intravenous, Q12H  sucralfate, 1 g, Oral, 4x Daily AC & at Bedtime  thiamine, 100 mg, Oral, Daily  zinc sulfate, 220 mg, Oral, Daily                 OBJECTIVE    Vital Sign Min/Max for last 24 hours  Temp  Min: 97.2 °F (36.2 °C)  Max: 98.3 °F (36.8 °C)   BP  Min: 114/62  Max: 121/72   Pulse  Min: 89  Max: 106   Resp  Min: 16  Max: 21   SpO2  Min: 95 %  Max: 99 %   No data recorded   No data recorded     Flowsheet Rows      Flowsheet Row First Filed Value   Admission Height 165.1 cm (65\") Documented at 2024 0855   Admission Weight 55.7 kg (122 lb 12.7 oz) Documented at 2024 0855            No intake/output data recorded.  I/O last 3 completed shifts:  In: 1200 [P.O.:1200]  Out: 0     Physical Exam:  General Appearance: NAD.   Head: normocephalic, without obvious abnormality and atraumatic    Eyes: conjunctivae and +ICTERIC SCLERAE  Neck: supple and no JVD  Lungs: CTA bilaterally  Heart: regular rhythm " "& normal rate and normal S1, S2   Chest Wall: no abnormalities observed  Abdomen: normal bowel sounds.  Distended  Extremities: moves extremities well, trace edema, no cyanosis  Skin: +JAUNDICE  Neurologic: A AND O X 4 WITHOUT FOCAL DEFICIT    Labs:    WBC WBC   Date Value Ref Range Status   08/02/2024 50.54 (C) 3.40 - 10.80 10*3/mm3 Final   08/01/2024 42.08 (C) 3.40 - 10.80 10*3/mm3 Final   07/31/2024 42.36 (C) 3.40 - 10.80 10*3/mm3 Final      HGB Hemoglobin   Date Value Ref Range Status   08/02/2024 8.0 (L) 13.0 - 17.7 g/dL Final   08/01/2024 7.8 (L) 13.0 - 17.7 g/dL Final   07/31/2024 7.0 (L) 13.0 - 17.7 g/dL Final      HCT Hematocrit   Date Value Ref Range Status   08/02/2024 22.9 (L) 37.5 - 51.0 % Final   08/01/2024 22.4 (L) 37.5 - 51.0 % Final   07/31/2024 19.6 (C) 37.5 - 51.0 % Final      Platelets No results found for: \"LABPLAT\"   MCV MCV   Date Value Ref Range Status   08/02/2024 94.6 79.0 - 97.0 fL Final   08/01/2024 94.9 79.0 - 97.0 fL Final   07/31/2024 96.6 79.0 - 97.0 fL Final          Sodium Sodium   Date Value Ref Range Status   08/02/2024 135 (L) 136 - 145 mmol/L Final   08/01/2024 136 136 - 145 mmol/L Final   07/31/2024 134 (L) 136 - 145 mmol/L Final      Potassium Potassium   Date Value Ref Range Status   08/02/2024 3.1 (L) 3.5 - 5.2 mmol/L Final     Comment:     Result checked     08/01/2024 4.2 3.5 - 5.2 mmol/L Final     Comment:     Specimen hemolyzed.  Result may be falsely elevated.   08/01/2024 3.7 3.5 - 5.2 mmol/L Final   07/31/2024 2.8 (L) 3.5 - 5.2 mmol/L Final   07/31/2024 3.2 (L) 3.5 - 5.2 mmol/L Final      Chloride Chloride   Date Value Ref Range Status   08/02/2024 102 98 - 107 mmol/L Final   08/01/2024 104 98 - 107 mmol/L Final   07/31/2024 100 98 - 107 mmol/L Final      CO2 CO2   Date Value Ref Range Status   08/02/2024 22.3 22.0 - 29.0 mmol/L Final   08/01/2024 22.5 22.0 - 29.0 mmol/L Final   07/31/2024 23.9 22.0 - 29.0 mmol/L Final      BUN BUN   Date Value Ref Range Status " "  08/02/2024 6 6 - 20 mg/dL Final   08/01/2024 6 6 - 20 mg/dL Final   07/31/2024 4 (L) 6 - 20 mg/dL Final      Creatinine Creatinine   Date Value Ref Range Status   08/02/2024 0.54 (L) 0.76 - 1.27 mg/dL Final   08/01/2024 0.36 (L) 0.76 - 1.27 mg/dL Final   07/31/2024 0.27 (L) 0.76 - 1.27 mg/dL Final      Calcium Calcium   Date Value Ref Range Status   08/02/2024 7.7 (L) 8.6 - 10.5 mg/dL Final   08/01/2024 7.5 (L) 8.6 - 10.5 mg/dL Final   07/31/2024 7.6 (L) 8.6 - 10.5 mg/dL Final      PO4 No components found for: \"PO4\"   Albumin Albumin   Date Value Ref Range Status   08/02/2024 2.5 (L) 3.5 - 5.2 g/dL Final   08/01/2024 2.5 (L) 3.5 - 5.2 g/dL Final   07/31/2024 2.6 (L) 3.5 - 5.2 g/dL Final      Magnesium No results found for: \"MG\"       Uric Acid No components found for: \"URIC ACID\"     Imaging Results (Last 72 Hours)       ** No results found for the last 72 hours. **            Results for orders placed during the hospital encounter of 07/18/24    XR Chest 1 View    Narrative  XR CHEST 1 VW    Date of Exam: 7/27/2024 10:42 PM EDT    Indication: S/P fall, chest pain    Comparison: 7/24/2024.    Findings:  There is persistent mixed interstitial and airspace disease in the mid and lower lungs. No pneumothorax or pleural effusion. Heart size is unchanged. Pulmonary vasculature is indistinct. No acute osseous abnormality.    Impression  Impression:  Persistent mixed interstitial and airspace disease in the mid and lower lungs.        Electronically Signed: Juno Ernst MD  7/27/2024 11:12 PM EDT  Workstation ID: [id number]      XR Chest 1 View    Narrative  XR CHEST 1 VW    Date of Exam: 7/24/2024 1:25 PM EDT    Indication: coughing, SOB    Comparison: 7/18/2024    Findings:  There are lower lung volumes. Cardiac size appears increased. There is airspace disease in both lung bases with small bilateral pleural effusions. Pulmonary vascular markings have increased compared with the last " study.    Impression  Impression:    1. Increased cardiac size with evidence of mild passive congestion.  2. Bibasilar airspace disease with bilateral pleural effusions.      Electronically Signed: Vitor Chin MD  7/24/2024 1:31 PM EDT  Workstation ID: [id number]      XR Chest PA & Lateral    Narrative  DATE OF EXAM:  7/18/2024 12:36 PM    PROCEDURE:  XR CHEST PA AND LATERAL-    INDICATIONS:  rule out aspiration pneumonia; E87.6-Hypokalemia; F10.939-Alcohol use,  unspecified with withdrawal, unspecified; R17-Unspecified jaundice    COMPARISON:  No Comparisons Available    TECHNIQUE:  Two radiologic views of the chest , PA and lateral were obtained.    FINDINGS:  Heart size normal. Negative for lobar consolidation, edema, effusion or  pneumothorax. Osseous structures unremarkable.    Impression  No active pulmonary process.      Electronically Signed By-Armani Small MD On:7/18/2024 1:00 PM            ASSESSMENT / PLAN      Alcohol withdrawal    HYPONATREMIA------stable. Likely related to underlying liver disease. Fluid restrict     2. HYPOKALEMIA-------replace.       3. HYPOMAGNESEMIA-------Replaced     4. HYPOCALCEMIA------Replace IV     5. ALCOHOL ABUSE/CIRRHOSIS/JAUNDICE------Thiamine, Folate, IVFs, prn IV Ativan. Withdrawal prcautions     6. MIXED METABOLIC ALKALOSIS/METABOLIC ACIDOSIS-----Better     7. ANEMIA------Normocytic with normal RDW. Follow for PRBC need     8. DEPRESSION------Suicide precautions. Hold Zoloft given severe hyponatremia     9. HYPOALBUMINEMIA-----IV Albumin to temporize and po supplements     10. ELEVATED INR------Secondary to liver disease     11. THROMBOCYTOPENIA------No heparin. Secondary to liver disease     12. PUD PROPHYLAXIS-----IV PPI     13. DVT PROPHYLAXIS-----SCDs    14. HYPOPHOSPHATEMIA------Replaced    Sodium stable  Continue oral Lasix  K 3.2, Replace potassium  Keep on fluid restriction 1500 mL p.o. daily  Awaits rehab  Labs in [facility]        Quang Butts MD  [facility]  [phone]  08/02/24  10:24 EDT

## 2024-08-02 NOTE — CASE MANAGEMENT/SOCIAL WORK
Social Work Assessment  [facility]     Patient Name: Yonas Charles  MRN: [id number]  Today's Date: 8/2/2024    Admit Date: 7/18/2024     Discharge Plan       Row Name 08/02/24 7425       Plan    Plan Comments LIZABETH and RN/CM learned patient has Presumptive VIDA which prevents him from going to his first preference for SA Treatment.([place]).  Patient's second choice is to go to [facility] and they have no male beds available today.  Patient has d/c orders.  He plans to return to his parents home until a bed opens at [facility].  Patient was given the option of Presumptive Eligible facilities and he declined but information was given to him.  He plans to call the Medicaid Office today to determine where his application is in the process.  Father or Brother will transport patient home today. Left a VM for Awilda at [facility] to call patient when bed is available.  MD, RN/CM and Floor Nurse all aware of above plan.             MICHELE Jones MSHONG    Phone: [phone]  Cell: [phone]  Fax: [phone]  [email or web address]           5

## 2024-08-02 NOTE — CASE MANAGEMENT/SOCIAL WORK
Case Management Discharge Note      Final Note: home, with plans for IP ETOH treamtent         Transportation Services  Private: Car    Final Discharge Disposition Code: 01 - home or self-care

## 2024-08-02 NOTE — DISCHARGE PLACEMENT REQUEST
"Trenton Charles (28 y.o. Male)       Date of Birth   1995    Social Security Number       Address   [address] [place] [address] [place] [facility]    Home Phone   [phone]    MRN   [id number]       Evangelical   None    Marital Status   Single                            Admission Date   24    Admission Type   Emergency    Admitting Provider   Raquel Puri MD    Attending Provider   Medhat Burch MD    Department, Room/Bed   [facility] 2D, 264/1       Discharge Date       Discharge Disposition   Home or Self Care    Discharge Destination                                 Attending Provider: Medhat Burch MD    Allergies: Reglan [Metoclopramide]    Isolation: None   Infection: None   Code Status: CPR    Ht: 165.1 cm (65\")   Wt: 64 kg (141 lb)    Admission Cmt: None   Principal Problem: Alcohol withdrawal [F10.939]                   Active Insurance as of 2024       Primary Coverage       Payor Plan Insurance Group Employer/Plan Group    [place]        Payor Plan Address Payor Plan Phone Number Payor Plan Fax Number Effective Dates    [address] [address]   2024 - None Entered    [place] [address]         Subscriber Name Subscriber Birth Date Member ID       TRENTON CHARLES 1995 [id number]                     Emergency Contacts        (Rel.) Home Phone Work Phone Mobile Phone    Wilbur Charles (Father) [phone] -- --                 History & Physical        Mari Bustos MD at [address] [place] [address] Medicine Services  History & Physical    Patient Name: Trenton Charles  : 1995  MRN: [id number]  Primary Care Physician:  Provider, No Known  Date of admission: 2024  Date and Time of Service: 2024    Subjective      Chief Complaint:  alcohol withdrawal    History of Present Illness:    This is a 28-year-old male who is alcoholic and also has history of acid reflux came to the hospital " for alcohol detox.  Patient is also having abdominal pain and is jaundiced.  Patient is also going through personal issues.  Patient also has severe anxiety depression and has suicidal thoughts.  Patient started drinking 2 days ago and patient has been drinking alcohol since past 9 years.  Patient drinks almost 1 L hard liquor on a daily basis.  Patient has generalized weakness abdominal pain with episodes of nausea vomiting.  Patient has tremors and lightheadedness.      Lab work showed sodium of 122 with potassium 1.9 along with metabolic acidosis.  Patient has elevated AST and total bili is 15.8 and hemoglobin is 8.9.  In ER patient was tachycardic.  CT scan of abdomen showed enlarged liver along with portal hypertension and intra-abdominal varices along with gallbladder wall edema and chronic and rectal inflammatory changes    Review of Systems   Constitutional:  Negative for chills, diaphoresis and fever.   HENT:  Negative for dental problem, ear discharge, hearing loss, nosebleeds, rhinorrhea and sneezing.    Eyes:  Negative for photophobia, redness and itching.   Respiratory:  Negative for cough, chest tightness and stridor.    Cardiovascular:  Negative for leg swelling.   Gastrointestinal:  as above  Endocrine: Negative for heat intolerance.   Genitourinary: Negative for flank pain, frequency and hematuria.   Musculoskeletal:  Negative for back pain, joint swelling, and gait problem.   Skin:  Negative for color change.   Neurological:   speech difficulty, numbness and headaches.   Psychiatric/Behavioral:   as above    Personal History     History reviewed. No pertinent past medical history.    History reviewed. No pertinent surgical history.    Family History: family history is not on file. Otherwise pertinent FHx was reviewed and not pertinent to current issue.    Social History:      Home Medications:  Prior to Admission Medications       None              Allergies:  Allergies   Allergen Reactions     Reglan [Metoclopramide] Mental Status Change       Objective      Vitals:   Temp:  [98.4 °F (36.9 °C)] 98.4 °F (36.9 °C)  Heart Rate:  [114-132] 121  Resp:  [21] 21  BP: (119-158)/(61-91) 141/83  Body mass index is 20.43 kg/m².  Physical Exam:    Constitutional: Patient appears well-developed and well-nourished and in no acute distress   HEENT:   Head: Normocephalic and atraumatic.   Eyes:  Pupils are equal, round, and reactive to light. EOM are intact. Sclera are anicteric and non-injected.  Mouth and Throat: Patient has moist mucous membranes.      Neck: Neck supple.  No thyromegaly present. No lymphadenopathy present. No  masses.     Cardiovascular: Inspection: No JVD present. Palpation:bilaterally. No leg edema. Auscultation: Regular rate, regular rhythm, S1 normal and S2 normal. reveals no gallop and no friction rub. No Carotid bruit bilaterally.    Pulmonary/Chest: Inspection: No distress, no use of accessory muscles. Lungs are clear to auscultation bilaterally. No respiratory distress. No wheezes. No rhonchi. No rales.     Abdomen /Gastrointestinal: Inspection: no distension. Palpation: no masses, no organomegaly. Soft. There is no tenderness. Bowel sounds are normal.   Extremities no cyanosis clubbing or edema    Neurological: Patient is alert and oriented to person, place, and time. Cranial nerves II-XII are grossly intact with no focal deficits. Sensori-motor exam is normal. No cerebellar signs.    Skin: Skin is warm. No rash noted. Nails show no clubbing.  No cyanosis or erythema. No bruising.      Diagnostic Data:  Results from last 7 days   Lab Units 07/18/24  1015   WBC 10*3/mm3 9.86   HEMOGLOBIN g/dL 8.9*   HEMATOCRIT % 26.4*   PLATELETS 10*3/mm3 94*   GLUCOSE mg/dL 93   CREATININE mg/dL 0.40*   BUN mg/dL 6   SODIUM mmol/L 122*   POTASSIUM mmol/L 1.9*   AST (SGOT) U/L 186*   ALT (SGPT) U/L 33   ALK PHOS U/L 380*   BILIRUBIN mg/dL 15.8*   ANION GAP mmol/L 16.3*       CT Abdomen Pelvis With  Contrast    Result Date: 7/18/2024  1. Enlarged heterogeneous and markedly steatotic liver, which could reflect sequelae of alcoholic steatohepatitis. 2. Sequelae of portal hypertension noted including distal paraesophageal and intra-abdominal varices as well as trace ascites. Normal splenic size. Portal vasculature patent. 3. Gallbladder wall edema without other findings of acute cholecystitis likely secondary related to underlying hepatocellular dysfunction. 4. Colonic and rectal inflammatory changes which could reflect sequelae of portal colopathy. Differential would include a nonspecific infectious/inflammatory proctocolitis. No bowel obstruction.      Electronically Signed By-Armani Small MD On:7/18/2024 10:46 AM       No results found for this or any previous visit.    I have personally reviewed the patient's new results.       Assessment & Plan        Active and Resolved Problems    Alcohol withdrawal with delirium tremens  Normocytic anemia  Jaundice with elevated bilirubin level  Nausea vomiting  Severe hypokalemia  Hyponatremia   Metabolic acidosis  Esophagitis  Hepatomegaly with portal hypertension  Esophageal varices  Suicide ideation  Anxiety depression  Social issues    Suggestion    At this time will admit this patient in hospital  I will ask GI to see this patient   Replace electrolyte and check a magnesium level  Psychiatry consult   IV hydration      VTE Prophylaxis:  Mechanical VTE prophylaxis orders are signed & held.          The patient desires to be as follows:    CODE STATUS:    Code Status (Patient has no pulse and is not breathing): CPR (Attempt to Resuscitate)  Medical Interventions (Patient has pulse or is breathing): Full Support        Admission Status:      Expected Length of Stay:  4-5 days    PDMP and Medication Dispenses via Sidebar reviewed and consistent with patient reported medications.        Signature:     This document has been electronically signed by Mari Bustos  MD on July 18, 2024 12:16 EDT   [facility]    Electronically signed by Mari Bustos MD at 07/18/24 1729       Vital Signs (last day)       Date/Time Temp Temp src Pulse Resp BP Patient Position SpO2    08/02/24 1225 98 (36.7) Oral 93 16 127/73 Lying 97    08/02/24 0810 98 (36.7) Oral 93 16 121/72 Sitting 97    08/02/24 0407 98.3 (36.8) Oral 97 16 117/67 Sitting 97    08/02/24 0000 98 (36.7) Oral 106 21 116/65 Sitting 95    08/01/24 1957 97.9 (36.6) Oral 92 16 114/62 Lying 97    08/01/24 1501 97.5 (36.4) Oral 94 17 120/55 Sitting 98    08/01/24 1214 97.2 (36.2) Oral 89 16 117/74 Sitting 99    08/01/24 0809 97.6 (36.4) Tympanic 97 16 119/76 Sitting 98    08/01/24 0318 97.9 (36.6) Oral 98 18 114/70 Lying 96          Current Facility-Administered Medications   Medication Dose Route Frequency Provider Last Rate Last Admin    acetaminophen (TYLENOL) tablet 650 mg  650 mg Oral Q4H PRN Mari Bustos MD        Or    acetaminophen (TYLENOL) 160 MG/5ML oral solution 650 mg  650 mg Oral Q4H PRN Mari Bustos MD        Or    acetaminophen (TYLENOL) suppository 650 mg  650 mg Rectal Q4H PRN Mari Bustos MD        sennosides-docusate (PERICOLACE) 8.6-50 MG per tablet 2 tablet  2 tablet Oral BID PRN Mari Bustos MD        And    polyethylene glycol (MIRALAX) packet 17 g  17 g Oral Daily PRN Mari Bustos MD        And    bisacodyl (DULCOLAX) EC tablet 5 mg  5 mg Oral Daily PRN Mari Bustos MD        And    bisacodyl (DULCOLAX) suppository 10 mg  10 mg Rectal Daily PRN Mari Bustos MD        Calcium Replacement - Follow Nurse / BPA Driven Protocol   Does not apply Mari Marquez MD        famotidine (PEPCID) tablet 40 mg  40 mg Oral Nightly Waleska Chery APRN   40 mg at 08/01/24 2150    folic acid (FOLVITE) tablet 1 mg  1 mg Oral Daily Mari Bustos MD   1 mg at 08/02/24 0807    furosemide (LASIX) tablet 40 mg  40 mg Oral Daily Quang Butts  MD MARTHA   40 mg at 08/02/24 0807    guaiFENesin (MUCINEX) 12 hr tablet 600 mg  600 mg Oral Q12H Hayley Rodriguez DO   600 mg at 08/02/24 0807    guaiFENesin-codeine (GUAIFENESIN AC) 100-10 MG/5ML liquid 5 mL  5 mL Oral Q4H PRN Frank Wild MD   5 mL at 07/24/24 0318    hydrocortisone 1 % cream 1 Application  1 Application Topical Q8H Hayley Rodriguez DO   1 Application at 08/01/24 1414    hydrOXYzine (ATARAX) tablet 25 mg  25 mg Oral Q8H PRN Frank Wild MD   25 mg at 07/30/24 2120    lactulose (CHRONULAC) 10 GM/15ML solution 10 g  10 g Oral Daily Kaci Polo APRN   10 g at 07/28/24 0834    Magnesium Standard Dose Replacement - Follow Nurse / BPA Driven Protocol   Does not apply PRN Mari Bustos MD        metoprolol succinate XL (TOPROL-XL) 24 hr tablet 25 mg  25 mg Oral Q24H Frank Wild MD   25 mg at 08/02/24 0807    multivitamin with minerals 1 tablet  1 tablet Oral Daily Mari Bustos MD   1 tablet at 08/02/24 0807    neomycin-bacitracin-polymyxin b (NEOSPORIN) ointment 1 Application  1 Application Topical Daily Frank Wild MD   1 Application at 08/01/24 0810    ondansetron ODT (ZOFRAN-ODT) disintegrating tablet 4 mg  4 mg Oral Q6H PRN Mari Bustos MD        Or    ondansetron (ZOFRAN) injection 4 mg  4 mg Intravenous Q6H PRN Mari Bustos MD   4 mg at 07/18/24 1330    pantoprazole (PROTONIX) EC tablet 40 mg  40 mg Oral BID AC Kaci Polo APRN   40 mg at 08/02/24 0807    Phosphorus Replacement - Follow Nurse / BPA Driven Protocol   Does not apply PRN Mari Bustos MD        potassium chloride (KLOR-CON M20) CR tablet 20 mEq  20 mEq Oral Daily Quang Butts MD   20 mEq at 08/01/24 0810    potassium chloride (KLOR-CON M20) CR tablet 40 mEq  40 mEq Oral Q4H Medhat Burch MD   40 mEq at 08/02/24 1103    Potassium Replacement - Follow Nurse / BPA Driven Protocol   Does not apply Mari Marquez MD        [Held by provider] sertraline (ZOLOFT) tablet 50 mg  50  mg Oral Daily Mari Bustos MD   50 mg at 07/18/24 1258    silver nitrate 75-25 % applicator 1 Application  1 Application Topical Once Frank Wild MD        sodium chloride 0.9 % flush 10 mL  10 mL Intravenous PRN Mari Bustos MD        sodium chloride 0.9 % flush 10 mL  10 mL Intravenous Q12H Mari Bustos MD   10 mL at 08/02/24 0808    sodium chloride 0.9 % flush 10 mL  10 mL Intravenous PRN Mari Bustos MD        sodium chloride 0.9 % infusion 40 mL  40 mL Intravenous PRN Mari Bustos MD        sucralfate (CARAFATE) tablet 1 g  1 g Oral 4x Daily AC & at Bedtime Kaci Polo APRN   1 g at 08/02/24 1104    thiamine (VITAMIN B-1) tablet 100 mg  100 mg Oral Daily Mari Bustos MD   100 mg at 08/02/24 0807    zinc sulfate (ZINCATE) capsule 220 mg  220 mg Oral Daily Waleska Chery APRN   220 mg at 08/02/24 0807     Lab Results (last 24 hours)       Procedure Component Value Units Date/Time    Phosphorus [[id number]]  (Normal) Collected: 08/02/24 0414    Specimen: Blood Updated: 08/02/24 0545     Phosphorus 2.7 mg/dL     Comprehensive Metabolic Panel [[id number]]  (Abnormal) Collected: 08/02/24 0414    Specimen: Blood Updated: 08/02/24 0544     Glucose 87 mg/dL      BUN 6 mg/dL      Creatinine 0.54 mg/dL      Sodium 135 mmol/L      Potassium 3.1 mmol/L      Comment: Result checked          Chloride 102 mmol/L      CO2 22.3 mmol/L      Calcium 7.7 mg/dL      Total Protein 5.1 g/dL      Albumin 2.5 g/dL      ALT (SGPT) 49 U/L      AST (SGOT) 129 U/L      Alkaline Phosphatase 274 U/L      Total Bilirubin 20.1 mg/dL      Globulin 2.6 gm/dL      A/G Ratio 1.0 g/dL      BUN/Creatinine Ratio 11.1     Anion Gap 10.7 mmol/L      eGFR 138.3 mL/min/1.73     Narrative:      GFR Normal >60  Chronic Kidney Disease <60  Kidney Failure <15      CBC (No Diff) [[id number]]  (Abnormal) Collected: 08/02/24 0414    Specimen: Blood Updated: 08/02/24 0539     WBC 50.54 10*3/mm3      RBC 2.42  "10*6/mm3      Hemoglobin 8.0 g/dL      Hematocrit 22.9 %      MCV 94.6 fL      MCH 33.1 pg      MCHC 34.9 g/dL      RDW 20.6 %      RDW-SD 68.6 fl      MPV 10.8 fL      Platelets 140 10*3/mm3      Comment: Result checked                  Physician Progress Notes (last 24 hours)        Quang Butts MD at 24 1023          NEPHROLOGY PROGRESS NOTE------[facility]    [facility]  [phone]  Quang Butts MD      Patient Care Team:  Provider, No Known as PCP - General      Provider:  Quang Butts MD  Patient Name: Yonas Charles  :  1995    SUBJECTIVE:    F/U ELECTROLYTE ABNORMALITIES    No chest pain or SOA      Medication:  famotidine, 40 mg, Oral, Nightly  folic acid, 1 mg, Oral, Daily  furosemide, 40 mg, Oral, Daily  guaiFENesin, 600 mg, Oral, Q12H  hydrocortisone, 1 Application, Topical, Q8H  lactulose, 10 g, Oral, Daily  metoprolol succinate XL, 25 mg, Oral, Q24H  multivitamin with minerals, 1 tablet, Oral, Daily  neomycin-bacitracin-polymyxin b, 1 Application, Topical, Daily  pantoprazole, 40 mg, Oral, BID AC  potassium chloride, 20 mEq, Oral, Daily  potassium chloride ER, 40 mEq, Oral, Q4H  potassium chloride, 40 mEq, Oral, Once  [Held by provider] sertraline, 50 mg, Oral, Daily  silver nitrate, 1 Application, Topical, Once  sodium chloride, 10 mL, Intravenous, Q12H  sucralfate, 1 g, Oral, 4x Daily AC & at Bedtime  thiamine, 100 mg, Oral, Daily  zinc sulfate, 220 mg, Oral, Daily                 OBJECTIVE    Vital Sign Min/Max for last 24 hours  Temp  Min: 97.2 °F (36.2 °C)  Max: 98.3 °F (36.8 °C)   BP  Min: 114/62  Max: 121/72   Pulse  Min: 89  Max: 106   Resp  Min: 16  Max: 21   SpO2  Min: 95 %  Max: 99 %   No data recorded   No data recorded     Flowsheet Rows      Flowsheet Row First Filed Value   Admission Height 165.1 cm (65\") Documented at 2024 0855   Admission Weight 55.7 kg (122 lb 12.7 oz) Documented at 2024 " "0855            No intake/output data recorded.  I/O last 3 completed shifts:  In: 1200 [P.O.:1200]  Out: 0     Physical Exam:  General Appearance: NAD.   Head: normocephalic, without obvious abnormality and atraumatic    Eyes: conjunctivae and +ICTERIC SCLERAE  Neck: supple and no JVD  Lungs: CTA bilaterally  Heart: regular rhythm & normal rate and normal S1, S2   Chest Wall: no abnormalities observed  Abdomen: normal bowel sounds.  Distended  Extremities: moves extremities well, trace edema, no cyanosis  Skin: +JAUNDICE  Neurologic: A AND O X 4 WITHOUT FOCAL DEFICIT    Labs:    WBC WBC   Date Value Ref Range Status   08/02/2024 50.54 (C) 3.40 - 10.80 10*3/mm3 Final   08/01/2024 42.08 (C) 3.40 - 10.80 10*3/mm3 Final   07/31/2024 42.36 (C) 3.40 - 10.80 10*3/mm3 Final      HGB Hemoglobin   Date Value Ref Range Status   08/02/2024 8.0 (L) 13.0 - 17.7 g/dL Final   08/01/2024 7.8 (L) 13.0 - 17.7 g/dL Final   07/31/2024 7.0 (L) 13.0 - 17.7 g/dL Final      HCT Hematocrit   Date Value Ref Range Status   08/02/2024 22.9 (L) 37.5 - 51.0 % Final   08/01/2024 22.4 (L) 37.5 - 51.0 % Final   07/31/2024 19.6 (C) 37.5 - 51.0 % Final      Platelets No results found for: \"LABPLAT\"   MCV MCV   Date Value Ref Range Status   08/02/2024 94.6 79.0 - 97.0 fL Final   08/01/2024 94.9 79.0 - 97.0 fL Final   07/31/2024 96.6 79.0 - 97.0 fL Final          Sodium Sodium   Date Value Ref Range Status   08/02/2024 135 (L) 136 - 145 mmol/L Final   08/01/2024 136 136 - 145 mmol/L Final   07/31/2024 134 (L) 136 - 145 mmol/L Final      Potassium Potassium   Date Value Ref Range Status   08/02/2024 3.1 (L) 3.5 - 5.2 mmol/L Final     Comment:     Result checked     08/01/2024 4.2 3.5 - 5.2 mmol/L Final     Comment:     Specimen hemolyzed.  Result may be falsely elevated.   08/01/2024 3.7 3.5 - 5.2 mmol/L Final   07/31/2024 2.8 (L) 3.5 - 5.2 mmol/L Final   07/31/2024 3.2 (L) 3.5 - 5.2 mmol/L Final      Chloride Chloride   Date Value Ref Range Status " "  08/02/2024 102 98 - 107 mmol/L Final   08/01/2024 104 98 - 107 mmol/L Final   07/31/2024 100 98 - 107 mmol/L Final      CO2 CO2   Date Value Ref Range Status   08/02/2024 22.3 22.0 - 29.0 mmol/L Final   08/01/2024 22.5 22.0 - 29.0 mmol/L Final   07/31/2024 23.9 22.0 - 29.0 mmol/L Final      BUN BUN   Date Value Ref Range Status   08/02/2024 6 6 - 20 mg/dL Final   08/01/2024 6 6 - 20 mg/dL Final   07/31/2024 4 (L) 6 - 20 mg/dL Final      Creatinine Creatinine   Date Value Ref Range Status   08/02/2024 0.54 (L) 0.76 - 1.27 mg/dL Final   08/01/2024 0.36 (L) 0.76 - 1.27 mg/dL Final   07/31/2024 0.27 (L) 0.76 - 1.27 mg/dL Final      Calcium Calcium   Date Value Ref Range Status   08/02/2024 7.7 (L) 8.6 - 10.5 mg/dL Final   08/01/2024 7.5 (L) 8.6 - 10.5 mg/dL Final   07/31/2024 7.6 (L) 8.6 - 10.5 mg/dL Final      PO4 No components found for: \"PO4\"   Albumin Albumin   Date Value Ref Range Status   08/02/2024 2.5 (L) 3.5 - 5.2 g/dL Final   08/01/2024 2.5 (L) 3.5 - 5.2 g/dL Final   07/31/2024 2.6 (L) 3.5 - 5.2 g/dL Final      Magnesium No results found for: \"MG\"       Uric Acid No components found for: \"URIC ACID\"     Imaging Results (Last 72 Hours)       ** No results found for the last 72 hours. **            Results for orders placed during the hospital encounter of 07/18/24    XR Chest 1 View    Narrative  XR CHEST 1 VW    Date of Exam: 7/27/2024 10:42 PM EDT    Indication: S/P fall, chest pain    Comparison: 7/24/2024.    Findings:  There is persistent mixed interstitial and airspace disease in the mid and lower lungs. No pneumothorax or pleural effusion. Heart size is unchanged. Pulmonary vasculature is indistinct. No acute osseous abnormality.    Impression  Impression:  Persistent mixed interstitial and airspace disease in the mid and lower lungs.        Electronically Signed: Juno Ernst MD  7/27/2024 11:12 PM EDT  Workstation ID: [id number]      XR Chest 1 View    Narrative  XR CHEST 1 VW    Date of Exam: " 7/24/2024 1:25 PM EDT    Indication: coughing, SOB    Comparison: 7/18/2024    Findings:  There are lower lung volumes. Cardiac size appears increased. There is airspace disease in both lung bases with small bilateral pleural effusions. Pulmonary vascular markings have increased compared with the last study.    Impression  Impression:    1. Increased cardiac size with evidence of mild passive congestion.  2. Bibasilar airspace disease with bilateral pleural effusions.      Electronically Signed: Vitor Chin MD  7/24/2024 1:31 PM EDT  Workstation ID: [id number]      XR Chest PA & Lateral    Narrative  DATE OF EXAM:  7/18/2024 12:36 PM    PROCEDURE:  XR CHEST PA AND LATERAL-    INDICATIONS:  rule out aspiration pneumonia; E87.6-Hypokalemia; F10.939-Alcohol use,  unspecified with withdrawal, unspecified; R17-Unspecified jaundice    COMPARISON:  No Comparisons Available    TECHNIQUE:  Two radiologic views of the chest , PA and lateral were obtained.    FINDINGS:  Heart size normal. Negative for lobar consolidation, edema, effusion or  pneumothorax. Osseous structures unremarkable.    Impression  No active pulmonary process.      Electronically Signed By-Armani Small MD On:7/18/2024 1:00 PM            ASSESSMENT / PLAN      Alcohol withdrawal    HYPONATREMIA------stable. Likely related to underlying liver disease. Fluid restrict     2. HYPOKALEMIA-------replace.       3. HYPOMAGNESEMIA-------Replaced     4. HYPOCALCEMIA------Replace IV     5. ALCOHOL ABUSE/CIRRHOSIS/JAUNDICE------Thiamine, Folate, IVFs, prn IV Ativan. Withdrawal prcautions     6. MIXED METABOLIC ALKALOSIS/METABOLIC ACIDOSIS-----Better     7. ANEMIA------Normocytic with normal RDW. Follow for PRBC need     8. DEPRESSION------Suicide precautions. Hold Zoloft given severe hyponatremia     9. HYPOALBUMINEMIA-----IV Albumin to temporize and po supplements     10. ELEVATED INR------Secondary to liver disease     11. THROMBOCYTOPENIA------No heparin.  Secondary to liver disease     12. PUD PROPHYLAXIS-----IV PPI     13. DVT PROPHYLAXIS-----SCDs    14. HYPOPHOSPHATEMIA------Replaced    Sodium stable  Continue oral Lasix  K 3.2, Replace potassium  Keep on fluid restriction 1500 mL p.o. daily  Awaits rehab  Labs in a         Quang THOMAS. MD Benjamín  [facility]  [phone]  08/02/24  10:24 EDT      Electronically signed by Quang Butts MD at 08/02/24 1319       Consult Notes (last 24 hours)  Notes from 08/01/24 1513 through 08/02/24 1513   No notes of this type exist for this encounter.

## 2024-08-02 NOTE — DISCHARGE SUMMARY
[facility] Medicine Services  Discharge Summary    Date of Service: 24    Patient Name: Yonas Charles  : 1995  MRN: [id number]    Date of Admission: 2024  Discharge Diagnosis: Acute alcoholic hepatitis with alcoholic cirrhosis   Date of Discharge:  24    Primary Care Physician: Provider, No Known      Presenting Problem:   Alcohol withdrawal [F10.939]  Acute hypokalemia [E87.6]  Jaundice [R17]  Electrolyte abnormality [E87.8]  Alcohol withdrawal syndrome with complication [F10.939]    Active and Resolved Hospital Problems:  Acute hypokalemi  Acute  hypomagnesemia  Acute hypocalcemia   Acute alcoholic hepatitis with alcoholic cirrhosis   Leukocytosis  Coagulopathy of liver disease   Hyponatremia 2/ [facility] Hospital Course     HPI:  Per the H&P  see HPI      Hospital Course:  This is a 28-year-old male who is alcoholic and also has history of acid reflux came to the hospital for alcohol detox. Patient is also having abdominal pain and is jaundiced. Patient is also going through personal issues. Patient also has severe anxiety depression and has suicidal thoughts. Patient started drinking 2 days ago and patient has been drinking alcohol since past 9 years. Patient drinks almost 1 L hard liquor on a daily basis. Patient has generalized weakness abdominal pain with episodes of nausea vomiting. Patient has tremors and lightheadedness.   Patient had prolonged course in the hospital was started on steroids advertent improvement was given Epogen was monitored for withdrawal patient general condition improved and discharged with a plan to follow-up with GI as outpatient cellulitis enrollment to rehab        DISCHARGE Follow Up Recommendations for labs and diagnostics:   PCP, GI as well  Patient EtOH rehabilitation    Reasons For Change In Medications and Indications for New Medications:      Day of Discharge     Vital Signs:  Temp:  [97.5 °F (36.4 °C)-98.3 °F (36.8 °C)]  98 °F (36.7 °C)  Heart Rate:  [] 93  Resp:  [16-21] 16  BP: (114-127)/(55-73) 127/73  Flow (L/min):  [0] 0    Physical Exam:  Physical Exam   HENT:      Head: Atraumatic.      Mouth/Throat:      Mouth: Mucous membranes are moist.   Eyes:      Pupils: Pupils are equal, round, and reactive to light.   Cardiovascular:      Rate and Rhythm: Normal rate and regular rhythm.   Pulmonary:      Effort: Pulmonary effort is normal.      Breath sounds: Normal breath sounds.   Abdominal:      General: Bowel sounds are normal.      Palpations: Abdomen is soft.   Musculoskeletal:         General: Normal range of motion.      Cervical back: Neck supple.   Skin:     General: Skin is warm.   Neurological:      General: No focal deficit present.      Mental Status: He is alert and oriented to person, place, and time.   Psychiatric:         Mood and Affect: Mood normal.     Pertinent  and/or Most Recent Results     LAB RESULTS:      Lab 08/02/24 0414 08/01/24  0332 07/31/24  0235 07/30/24  0518 07/29/24  0900 07/28/24  0234 07/27/24  0414   WBC 50.54* 42.08* 42.36* 46.33* 39.35* 40.75* 34.41*   HEMOGLOBIN 8.0* 7.8* 7.0* 8.5* 9.2* 8.9* 8.9*   HEMATOCRIT 22.9* 22.4* 19.6* 23.5* 25.2* 24.6* 24.9*   PLATELETS 140 127* 142 145 159 161 172   NEUTROS ABS  --  34.08* 27.96* 33.82* 31.09*  --  27.87*   MCV 94.6 94.9 96.6 95.1 94.7 95.0 97.3*   PROTIME  --  19.3* 19.6* 21.0* 23.1* 21.1* 19.3*         Lab 08/02/24 0414 08/01/24  0819 08/01/24 0332 07/31/24  1300 07/31/24  0235 07/30/24  1001 07/30/24  0518 07/29/24 2012 07/29/24  0502   SODIUM 135*  --  136  --  134*  --  133*  --  131*   POTASSIUM 3.1* 4.2 3.7 2.8* 3.2*   < > 3.6   < > 3.0*   CHLORIDE 102  --  104  --  100  --  100  --  96*   CO2 22.3  --  22.5  --  23.9  --  22.8  --  22.2   ANION GAP 10.7  --  9.5  --  10.1  --  10.2  --  12.8   BUN 6  --  6  --  4*  --  3*  --  3*   CREATININE 0.54*  --  0.36*  --  0.27*  --  0.43*  --  0.47*   EGFR 138.3  --  156.4  --  170.6  --   148.2  --  144.3   GLUCOSE 87  --  83  --  82  --  90  --  98   CALCIUM 7.7*  --  7.5*  --  7.6*  --  7.7*  --  8.0*   MAGNESIUM  --   --   --   --   --   --  2.2  --  1.2*   PHOSPHORUS 2.7  --  2.1*  --  2.3*  --  1.5*  --  2.3*    < > = values in this interval not displayed.         Lab 08/02/24 0414 08/01/24 0332 07/31/24 0235 07/30/24  0518 07/29/24  0502 07/28/24  0234   TOTAL PROTEIN 5.1* 4.7* 4.7* 5.4* 5.6* 5.5*   ALBUMIN 2.5* 2.5* 2.6* 2.9* 2.9*  3.0* 3.0*   GLOBULIN 2.6 2.2 2.1 2.5  --  2.5   ALT (SGPT) 49* 47* 51* 60* 66* 63*   AST (SGOT) 129* 119* 138* 143* 159* 163*   BILIRUBIN 20.1* 18.9* 19.0* 21.2* 21.4* 20.8*   INDIRECT BILIRUBIN  --   --   --   --  5.8  --    BILIRUBIN DIRECT  --   --   --   --  15.6*  --    ALK PHOS 274* 287* 515* 370* 346* 294*         Lab 08/01/24 0332 07/31/24 0235 07/30/24 0518 07/29/24  0900 07/28/24  0234   PROTIME 19.3* 19.6* 21.0* 23.1* 21.1*   INR 1.85* 1.88* 2.03* 2.25* 2.04*             Lab 07/31/24  1337   ABO TYPING O   RH TYPING Positive   ANTIBODY SCREEN Negative         Brief Urine Lab Results  (Last result in the past 365 days)        Color   Clarity   Blood   Leuk Est   Nitrite   Protein   CREAT   Urine HCG        07/18/24 1319 Penny   Hazy   Negative   Small (1+)   Positive   Trace                 Microbiology Results (last 10 days)       ** No results found for the last 240 hours. **            US Abdomen Limited    Result Date: 7/28/2024  Impression: Impression: Small right upper quadrant ascites. Electronically Signed: Jaxon Gallagher MD  7/28/2024 1:40 PM EDT  Workstation ID: [id number]    CT Cervical Spine Without Contrast    Result Date: 7/27/2024  Impression: Impression: No acute osseous abnormality. Electronically Signed: Juno Ernst MD  7/27/2024 11:32 PM EDT  Workstation ID: [id number]    CT Head Without Contrast    Result Date: 7/27/2024  Impression: Impression: 1.No acute intracranial abnormality. 2.Mild chronic small vessel ischemic change.  Electronically Signed: Juno Ernst MD  7/27/2024 11:31 PM EDT  Workstation ID: [id number]    XR Chest 1 View    Result Date: 7/27/2024  Impression: Impression: Persistent mixed interstitial and airspace disease in the mid and lower lungs. Electronically Signed: Juno Ernst MD  7/27/2024 11:12 PM EDT  Workstation ID: [id number]    XR Chest 1 View    Result Date: 7/24/2024  Impression: Impression: 1. Increased cardiac size with evidence of mild passive congestion. 2. Bibasilar airspace disease with bilateral pleural effusions. Electronically Signed: Vitor Chin MD  7/24/2024 1:31 PM EDT  Workstation ID: [id number]    CT Head Without Contrast    Result Date: 7/19/2024  Impression: Impression: No acute intracranial process. Electronically Signed: Ghazala Ordonez MD  7/19/2024 6:10 AM EDT  Workstation ID: [id number]    XR Chest PA & Lateral    Result Date: 7/18/2024  Impression: No active pulmonary process.   Electronically Signed ByYuriy Small MD On:7/18/2024 1:00 PM      CT Abdomen Pelvis With Contrast    Result Date: 7/18/2024  Impression: 1. Enlarged heterogeneous and markedly steatotic liver, which could reflect sequelae of alcoholic steatohepatitis. 2. Sequelae of portal hypertension noted including distal paraesophageal and intra-abdominal varices as well as trace ascites. Normal splenic size. Portal vasculature patent. 3. Gallbladder wall edema without other findings of acute cholecystitis likely secondary related to underlying hepatocellular dysfunction. 4. Colonic and rectal inflammatory changes which could reflect sequelae of portal colopathy. Differential would include a nonspecific infectious/inflammatory proctocolitis. No bowel obstruction.      Electronically Signed ByYuriy Small MD On:7/18/2024 10:46 AM                   Labs Pending at Discharge:      Procedures Performed           Consults:   Consults       Date and Time Order Name Status Description    7/20/2024  9:58 AM Inpatient  Gastroenterology Consult      7/20/2024  9:57 AM Inpatient Psychiatrist Consult      7/18/2024 12:17 PM Inpatient Psychiatrist Consult      7/18/2024 12:11 PM Inpatient Nephrology Consult Completed     7/18/2024 11:55 AM Hospitalist (on-call MD unless specified)      7/18/2024 11:54 AM Inpatient Gastroenterology Consult                Discharge Details        Discharge Medications        New Medications        Instructions Start Date   famotidine 40 MG tablet  Commonly known as: PEPCID   40 mg, Oral, Nightly      folic acid 1 MG tablet  Commonly known as: FOLVITE   1 mg, Oral, Daily   Start Date: August 3, 2024     hydrOXYzine 25 MG tablet  Commonly known as: ATARAX   25 mg, Oral, Every 8 Hours PRN      lactulose 10 GM/15ML solution  Commonly known as: CHRONULAC   10 g, Oral, 3 Times Daily, Hold if more than 3 bowl movements/24 hrs      metoprolol succinate XL 25 MG 24 hr tablet  Commonly known as: TOPROL-XL   25 mg, Oral, Every 24 Hours Scheduled   Start Date: August 3, 2024     multivitamin with minerals tablet tablet   1 tablet, Oral, Daily   Start Date: August 3, 2024     potassium chloride 20 MEQ CR tablet  Commonly known as: KLOR-CON M20   20 mEq, Oral, Daily   Start Date: August 3, 2024     spironolactone 25 MG tablet  Commonly known as: Aldactone   25 mg, Oral, Daily      sucralfate 1 g tablet  Commonly known as: CARAFATE   1 g, Oral, 4 Times Daily Before Meals & Nightly      thiamine 100 MG tablet  Commonly known as: VITAMIN B1   100 mg, Oral, Daily   Start Date: August 3, 2024     zinc sulfate 220 (50 Zn) MG capsule  Commonly known as: ZINCATE   220 mg, Oral, Daily   Start Date: August 3, 2024              Allergies   Allergen Reactions    Reglan [Metoclopramide] Mental Status Change         Discharge Disposition:   Home or Self Care    Diet:  Hospital:  Diet Order   Procedures    Diet: Cardiac, Fluid Restriction (240 mL/tray); Low Sodium (2g); 1500 mL/day; Texture: Soft to Chew (NDD 3); Soft to Chew:  Chopped Meat; Fluid Consistency: Thin (IDDSI 0)         Discharge Activity:   as tolerated       CODE STATUS:  Code Status and Medical Interventions:   Ordered at: 07/18/24 1214     Code Status (Patient has no pulse and is not breathing):    CPR (Attempt to Resuscitate)     Medical Interventions (Patient has pulse or is breathing):    Full Support         No future appointments.        Time spent on Discharge including face to face service:  >30 minutes    Signature: Electronically signed by Medhat Burch MD, 08/02/24, 13:37 EDT.  [facility] Hospitalist Team

## 2024-08-03 NOTE — OUTREACH NOTE
Prep Survey      Flowsheet Row Responses   [facility]iness facility patient discharged from? [facility]   Is LACE score < 7 ? No   Eligibility Not Eligible   What are the reasons patient is not eligible? Behavioral Health  [ETOH withdrawal]   Does the patient have one of the following disease processes/diagnoses(primary or secondary)? Other   Prep survey completed? Yes            Marichuy CHAPMAN - Registered Nurse

## 2024-08-08 DIAGNOSIS — R18.8 OTHER ASCITES: Primary | ICD-10-CM

## 2024-08-08 RX ORDER — ALBUMIN (HUMAN) 12.5 G/50ML
12.5 SOLUTION INTRAVENOUS DAILY PRN
Status: CANCELLED | OUTPATIENT
Start: 2024-08-08

## 2024-08-08 RX ORDER — ALBUMIN (HUMAN) 12.5 G/50ML
25 SOLUTION INTRAVENOUS DAILY PRN
Status: CANCELLED | OUTPATIENT
Start: 2024-08-08

## 2024-08-08 RX ORDER — LIDOCAINE HYDROCHLORIDE 10 MG/ML
10 INJECTION, SOLUTION INFILTRATION; PERINEURAL AS NEEDED
Status: CANCELLED | OUTPATIENT
Start: 2024-08-08

## 2024-08-09 ENCOUNTER — HOSPITAL ENCOUNTER (OUTPATIENT)
Dept: INFUSION THERAPY | Facility: HOSPITAL | Age: 29
Discharge: HOME | End: 2024-08-09
Payer: MEDICAID

## 2024-08-09 VITALS
RESPIRATION RATE: 16 BRPM | OXYGEN SATURATION: 100 % | TEMPERATURE: 97 F | HEART RATE: 91 BPM | DIASTOLIC BLOOD PRESSURE: 66 MMHG | SYSTOLIC BLOOD PRESSURE: 108 MMHG

## 2024-08-09 DIAGNOSIS — R18.8 OTHER ASCITES: ICD-10-CM

## 2024-08-09 LAB
ALBUMIN FLD-MCNC: <0.2 G/DL
APPEARANCE FLD: CLEAR
COLOR FLD: YELLOW
EOSINOPHIL NFR FLD MANUAL: 2 %
LYMPHOCYTES NFR FLD MANUAL: 17 %
MACROPHAGE FLUID: 14 %
MESOTHL CELL NFR FLD MANUAL: 48 %
METHOD: NORMAL
MONOCYTES NFR FLD: 12 %
NEUTROPHILS NFR FLD MANUAL: 7 %
NUC CELL # FLD: 58 /MM3
PROT FLD-MCNC: <1 G/DL
RBC # FLD AUTO: 0 /MM3

## 2024-08-09 PROCEDURE — 76700 US EXAM ABDOM COMPLETE: CPT

## 2024-08-09 PROCEDURE — 87205 SMEAR GRAM STAIN: CPT | Performed by: INTERNAL MEDICINE

## 2024-08-09 PROCEDURE — 82042 OTHER SOURCE ALBUMIN QUAN EA: CPT | Performed by: INTERNAL MEDICINE

## 2024-08-09 PROCEDURE — 89051 BODY FLUID CELL COUNT: CPT | Performed by: INTERNAL MEDICINE

## 2024-08-09 PROCEDURE — 25010000002 LIDOCAINE 1 % SOLUTION: Performed by: INTERNAL MEDICINE

## 2024-08-09 PROCEDURE — 87070 CULTURE OTHR SPECIMN AEROBIC: CPT | Performed by: INTERNAL MEDICINE

## 2024-08-09 PROCEDURE — 76942 ECHO GUIDE FOR BIOPSY: CPT

## 2024-08-09 PROCEDURE — 84157 ASSAY OF PROTEIN OTHER: CPT | Performed by: INTERNAL MEDICINE

## 2024-08-09 PROCEDURE — 36415 COLL VENOUS BLD VENIPUNCTURE: CPT

## 2024-08-09 PROCEDURE — 88108 CYTOPATH CONCENTRATE TECH: CPT | Performed by: INTERNAL MEDICINE

## 2024-08-09 PROCEDURE — 83690 ASSAY OF LIPASE: CPT | Performed by: INTERNAL MEDICINE

## 2024-08-09 RX ORDER — ALBUMIN (HUMAN) 12.5 G/50ML
25 SOLUTION INTRAVENOUS DAILY PRN
Status: DISCONTINUED | OUTPATIENT
Start: 2024-08-09 | End: 2024-08-11 | Stop reason: HOSPADM

## 2024-08-09 RX ORDER — LIDOCAINE HYDROCHLORIDE 10 MG/ML
10 INJECTION, SOLUTION INFILTRATION; PERINEURAL AS NEEDED
Status: DISCONTINUED | OUTPATIENT
Start: 2024-08-09 | End: 2024-08-11 | Stop reason: HOSPADM

## 2024-08-09 RX ORDER — ALBUMIN (HUMAN) 12.5 G/50ML
12.5 SOLUTION INTRAVENOUS DAILY PRN
Status: DISCONTINUED | OUTPATIENT
Start: 2024-08-09 | End: 2024-08-11 | Stop reason: HOSPADM

## 2024-08-09 RX ADMIN — LIDOCAINE HYDROCHLORIDE 10 ML: 10 INJECTION, SOLUTION INFILTRATION; PERINEURAL at 08:22

## 2024-08-12 LAB
LAB AP CASE REPORT: NORMAL
PATH REPORT.FINAL DX SPEC: NORMAL
PATH REPORT.GROSS SPEC: NORMAL

## 2024-08-13 LAB — LIPASE FLD-CCNC: 153 U/L

## 2024-08-14 LAB
BACTERIA FLD CULT: NORMAL
GRAM STN SPEC: NORMAL

## 2024-08-30 ENCOUNTER — OFFICE (OUTPATIENT)
Age: 29
End: 2024-08-30

## 2024-08-30 ENCOUNTER — OFFICE (OUTPATIENT)
Dept: URBAN - METROPOLITAN AREA CLINIC 64 | Facility: CLINIC | Age: 29
End: 2024-08-30

## 2024-08-30 VITALS
HEIGHT: 66 IN | HEART RATE: 79 BPM | SYSTOLIC BLOOD PRESSURE: 122 MMHG | SYSTOLIC BLOOD PRESSURE: 122 MMHG | WEIGHT: 124 LBS | WEIGHT: 124 LBS | SYSTOLIC BLOOD PRESSURE: 122 MMHG | HEIGHT: 66 IN | SYSTOLIC BLOOD PRESSURE: 122 MMHG | HEART RATE: 79 BPM | DIASTOLIC BLOOD PRESSURE: 81 MMHG | HEIGHT: 66 IN | HEART RATE: 79 BPM | HEIGHT: 66 IN | WEIGHT: 124 LBS | HEIGHT: 66 IN | HEIGHT: 66 IN | SYSTOLIC BLOOD PRESSURE: 122 MMHG | DIASTOLIC BLOOD PRESSURE: 81 MMHG | DIASTOLIC BLOOD PRESSURE: 81 MMHG | HEART RATE: 79 BPM | DIASTOLIC BLOOD PRESSURE: 81 MMHG | SYSTOLIC BLOOD PRESSURE: 122 MMHG | WEIGHT: 124 LBS | DIASTOLIC BLOOD PRESSURE: 81 MMHG | HEIGHT: 66 IN | DIASTOLIC BLOOD PRESSURE: 81 MMHG | WEIGHT: 124 LBS | SYSTOLIC BLOOD PRESSURE: 122 MMHG | HEART RATE: 79 BPM | HEART RATE: 79 BPM | HEART RATE: 79 BPM | WEIGHT: 124 LBS | WEIGHT: 124 LBS | DIASTOLIC BLOOD PRESSURE: 81 MMHG

## 2024-08-30 DIAGNOSIS — K70.11 ALCOHOLIC HEPATITIS WITH ASCITES: ICD-10-CM

## 2024-08-30 DIAGNOSIS — K76.6 PORTAL HYPERTENSION: ICD-10-CM

## 2024-08-30 DIAGNOSIS — R25.1 TREMOR, UNSPECIFIED: ICD-10-CM

## 2024-08-30 DIAGNOSIS — K92.0 HEMATEMESIS: ICD-10-CM

## 2024-08-30 PROCEDURE — 99214 OFFICE O/P EST MOD 30 MIN: CPT | Performed by: INTERNAL MEDICINE

## 2024-08-30 RX ORDER — SPIRONOLACTONE 25 MG/1
TABLET, FILM COATED ORAL
Qty: 90 | Refills: 3 | Status: ACTIVE

## 2024-08-30 RX ORDER — NADOLOL 20 MG/1
20 TABLET ORAL
Qty: 30 | Refills: 6 | Status: ACTIVE
Start: 2024-08-30

## 2024-08-30 RX ORDER — ZINC SULFATE TAB 220 MG (50 MG ZINC EQUIVALENT) 220 (50 ZN) MG
TAB ORAL
Qty: 90 | Refills: 3 | Status: ACTIVE

## 2024-08-30 RX ORDER — THIAMINE HCL 100 MG
TABLET ORAL
Qty: 0 | Refills: 0 | Status: COMPLETED
End: 2024-08-30

## 2024-08-30 RX ORDER — FAMOTIDINE 40 MG/1
TABLET, FILM COATED ORAL
Qty: 0 | Refills: 0 | Status: COMPLETED
End: 2024-08-30

## 2024-08-30 RX ORDER — METOPROLOL SUCCINATE 25 MG/1
TABLET, FILM COATED, EXTENDED RELEASE ORAL
Qty: 0 | Refills: 0 | Status: COMPLETED
End: 2024-08-30

## 2024-08-30 RX ORDER — FOLIC ACID 1 MG
1 TABLET ORAL
Qty: 0 | Refills: 0 | Status: COMPLETED
End: 2024-08-30

## 2025-03-18 ENCOUNTER — APPOINTMENT (OUTPATIENT)
Dept: CT IMAGING | Facility: HOSPITAL | Age: 30
End: 2025-03-18
Payer: MEDICAID

## 2025-03-18 ENCOUNTER — HOSPITAL ENCOUNTER (INPATIENT)
Facility: HOSPITAL | Age: 30
LOS: 5 days | Discharge: HOME | End: 2025-03-23
Attending: FAMILY MEDICINE | Admitting: EMERGENCY MEDICINE
Payer: MEDICAID

## 2025-03-18 ENCOUNTER — INPATIENT HOSPITAL (OUTPATIENT)
Dept: URBAN - METROPOLITAN AREA HOSPITAL 84 | Facility: HOSPITAL | Age: 30
End: 2025-03-18
Payer: COMMERCIAL

## 2025-03-18 ENCOUNTER — APPOINTMENT (OUTPATIENT)
Dept: ULTRASOUND IMAGING | Facility: HOSPITAL | Age: 30
End: 2025-03-18
Payer: MEDICAID

## 2025-03-18 DIAGNOSIS — D72.829 LEUKOCYTOSIS, UNSPECIFIED TYPE: ICD-10-CM

## 2025-03-18 DIAGNOSIS — K92.2 GASTROINTESTINAL HEMORRHAGE, UNSPECIFIED GASTROINTESTINAL HEMORRHAGE TYPE: ICD-10-CM

## 2025-03-18 DIAGNOSIS — E87.8 OTHER DISORDERS OF ELECTROLYTE AND FLUID BALANCE, NOT ELSEWH: ICD-10-CM

## 2025-03-18 DIAGNOSIS — E87.6 HYPOKALEMIA: ICD-10-CM

## 2025-03-18 DIAGNOSIS — K70.31 ALCOHOLIC CIRRHOSIS OF LIVER WITH ASCITES: ICD-10-CM

## 2025-03-18 DIAGNOSIS — K92.0 HEMATEMESIS, UNSPECIFIED WHETHER NAUSEA PRESENT: ICD-10-CM

## 2025-03-18 DIAGNOSIS — R31.9 URINARY TRACT INFECTION WITH HEMATURIA, SITE UNSPECIFIED: ICD-10-CM

## 2025-03-18 DIAGNOSIS — R00.0 TACHYCARDIA: ICD-10-CM

## 2025-03-18 DIAGNOSIS — N39.0 URINARY TRACT INFECTION WITH HEMATURIA, SITE UNSPECIFIED: ICD-10-CM

## 2025-03-18 DIAGNOSIS — N17.9 AKI (ACUTE KIDNEY INJURY): Primary | ICD-10-CM

## 2025-03-18 LAB
ABO GROUP BLD: NORMAL
ALBUMIN SERPL-MCNC: 3.9 G/DL (ref 3.5–5.2)
ALBUMIN/GLOB SERPL: 1.3 G/DL
ALP SERPL-CCNC: 89 U/L (ref 39–117)
ALT SERPL W P-5'-P-CCNC: 55 U/L (ref 1–41)
AMPHET+METHAMPHET UR QL: NEGATIVE
AMPHETAMINES UR QL: NEGATIVE
ANION GAP SERPL CALCULATED.3IONS-SCNC: 18.4 MMOL/L (ref 5–15)
APAP SERPL-MCNC: <5 MCG/ML (ref 0–30)
AST SERPL-CCNC: 156 U/L (ref 1–40)
B PARAPERT DNA SPEC QL NAA+PROBE: NOT DETECTED
B PERT DNA SPEC QL NAA+PROBE: NOT DETECTED
BACTERIA UR QL AUTO: ABNORMAL /HPF
BARBITURATES UR QL SCN: NEGATIVE
BASOPHILS # BLD AUTO: 0.05 10*3/MM3 (ref 0–0.2)
BASOPHILS NFR BLD AUTO: 0.2 % (ref 0–1.5)
BENZODIAZ UR QL SCN: NEGATIVE
BILIRUB SERPL-MCNC: 4.9 MG/DL (ref 0–1.2)
BILIRUB UR QL STRIP: ABNORMAL
BLD GP AB SCN SERPL QL: NEGATIVE
BUN SERPL-MCNC: 57 MG/DL (ref 6–20)
BUN/CREAT SERPL: 44.5 (ref 7–25)
BUPRENORPHINE SERPL-MCNC: NEGATIVE NG/ML
C PNEUM DNA NPH QL NAA+NON-PROBE: NOT DETECTED
CALCIUM SPEC-SCNC: 8.4 MG/DL (ref 8.6–10.5)
CANNABINOIDS SERPL QL: POSITIVE
CHLORIDE SERPL-SCNC: 80 MMOL/L (ref 98–107)
CLARITY UR: ABNORMAL
CO2 SERPL-SCNC: 33.6 MMOL/L (ref 22–29)
COCAINE UR QL: NEGATIVE
COLOR UR: ABNORMAL
CREAT SERPL-MCNC: 1.28 MG/DL (ref 0.76–1.27)
D-LACTATE SERPL-SCNC: 2 MMOL/L (ref 0.5–2)
D-LACTATE SERPL-SCNC: 2.8 MMOL/L (ref 0.3–2)
DEPRECATED RDW RBC AUTO: 47.3 FL (ref 37–54)
EGFRCR SERPLBLD CKD-EPI 2021: 77.2 ML/MIN/1.73
EOSINOPHIL # BLD AUTO: 0.84 10*3/MM3 (ref 0–0.4)
EOSINOPHIL NFR BLD AUTO: 3.2 % (ref 0.3–6.2)
ERYTHROCYTE [DISTWIDTH] IN BLOOD BY AUTOMATED COUNT: 14.1 % (ref 12.3–15.4)
ETHANOL UR QL: <0.01 %
FLUAV SUBTYP SPEC NAA+PROBE: NOT DETECTED
FLUBV RNA ISLT QL NAA+PROBE: NOT DETECTED
GLOBULIN UR ELPH-MCNC: 3.1 GM/DL
GLUCOSE BLDC GLUCOMTR-MCNC: 145 MG/DL (ref 70–105)
GLUCOSE SERPL-MCNC: 141 MG/DL (ref 65–99)
GLUCOSE UR STRIP-MCNC: NEGATIVE MG/DL
HADV DNA SPEC NAA+PROBE: NOT DETECTED
HCOV 229E RNA SPEC QL NAA+PROBE: NOT DETECTED
HCOV HKU1 RNA SPEC QL NAA+PROBE: NOT DETECTED
HCOV NL63 RNA SPEC QL NAA+PROBE: NOT DETECTED
HCOV OC43 RNA SPEC QL NAA+PROBE: NOT DETECTED
HCT VFR BLD AUTO: 17.9 % (ref 37.5–51)
HCT VFR BLD AUTO: 19.3 % (ref 37.5–51)
HCT VFR BLD AUTO: 21.4 % (ref 37.5–51)
HCT VFR BLD AUTO: 22.7 % (ref 37.5–51)
HGB BLD-MCNC: 6.1 G/DL (ref 13–17.7)
HGB BLD-MCNC: 6.7 G/DL (ref 13–17.7)
HGB BLD-MCNC: 7.4 G/DL (ref 13–17.7)
HGB BLD-MCNC: 7.9 G/DL (ref 13–17.7)
HGB UR QL STRIP.AUTO: ABNORMAL
HMPV RNA NPH QL NAA+NON-PROBE: NOT DETECTED
HOLD SPECIMEN: NORMAL
HOLD SPECIMEN: NORMAL
HPIV1 RNA ISLT QL NAA+PROBE: NOT DETECTED
HPIV2 RNA SPEC QL NAA+PROBE: NOT DETECTED
HPIV3 RNA NPH QL NAA+PROBE: NOT DETECTED
HPIV4 P GENE NPH QL NAA+PROBE: NOT DETECTED
HYALINE CASTS UR QL AUTO: ABNORMAL /LPF
IMM GRANULOCYTES # BLD AUTO: 0.2 10*3/MM3 (ref 0–0.05)
IMM GRANULOCYTES NFR BLD AUTO: 0.8 % (ref 0–0.5)
INR PPP: 2.33 (ref 0.9–1.1)
KETONES UR QL STRIP: ABNORMAL
LEUKOCYTE ESTERASE UR QL STRIP.AUTO: ABNORMAL
LIPASE SERPL-CCNC: 14 U/L (ref 13–60)
LYMPHOCYTES # BLD AUTO: 2.81 10*3/MM3 (ref 0.7–3.1)
LYMPHOCYTES NFR BLD AUTO: 10.6 % (ref 19.6–45.3)
M PNEUMO IGG SER IA-ACNC: NOT DETECTED
MAGNESIUM SERPL-MCNC: 0.9 MG/DL (ref 1.6–2.6)
MCH RBC QN AUTO: 32.4 PG (ref 26.6–33)
MCHC RBC AUTO-ENTMCNC: 34.8 G/DL (ref 31.5–35.7)
MCV RBC AUTO: 93 FL (ref 79–97)
METHADONE UR QL SCN: NEGATIVE
MONOCYTES # BLD AUTO: 2.52 10*3/MM3 (ref 0.1–0.9)
MONOCYTES NFR BLD AUTO: 9.5 % (ref 5–12)
MRSA DNA SPEC QL NAA+PROBE: NORMAL
NEUTROPHILS NFR BLD AUTO: 20.14 10*3/MM3 (ref 1.7–7)
NEUTROPHILS NFR BLD AUTO: 75.7 % (ref 42.7–76)
NITRITE UR QL STRIP: POSITIVE
NRBC BLD AUTO-RTO: 0.1 /100 WBC (ref 0–0.2)
OPIATES UR QL: NEGATIVE
OXYCODONE UR QL SCN: NEGATIVE
PCP UR QL SCN: NEGATIVE
PH UR STRIP.AUTO: <=5 [PH] (ref 5–8)
PLAT MORPH BLD: NORMAL
PLATELET # BLD AUTO: 132 10*3/MM3 (ref 140–450)
PMV BLD AUTO: 11.8 FL (ref 6–12)
POTASSIUM SERPL-SCNC: 2.9 MMOL/L (ref 3.5–5.2)
PROCALCITONIN SERPL-MCNC: 0.67 NG/ML (ref 0–0.25)
PROT SERPL-MCNC: 7 G/DL (ref 6–8.5)
PROT UR QL STRIP: ABNORMAL
PROTHROMBIN TIME: 25.8 SECONDS (ref 11.7–14.2)
RBC # BLD AUTO: 2.44 10*6/MM3 (ref 4.14–5.8)
RBC # UR STRIP: ABNORMAL /HPF
RBC MORPH BLD: NORMAL
REF LAB TEST METHOD: ABNORMAL
RH BLD: POSITIVE
RHINOVIRUS RNA SPEC NAA+PROBE: NOT DETECTED
RSV RNA NPH QL NAA+NON-PROBE: NOT DETECTED
SALICYLATES SERPL-MCNC: <0.5 MG/DL
SARS-COV-2 RNA RESP QL NAA+PROBE: NOT DETECTED
SODIUM SERPL-SCNC: 132 MMOL/L (ref 136–145)
SP GR UR STRIP: 1.02 (ref 1–1.03)
SQUAMOUS #/AREA URNS HPF: ABNORMAL /HPF
T&S EXPIRATION DATE: NORMAL
TRICYCLICS UR QL SCN: NEGATIVE
UROBILINOGEN UR QL STRIP: ABNORMAL
WBC # UR STRIP: ABNORMAL /HPF
WBC MORPH BLD: NORMAL
WBC NRBC COR # BLD AUTO: 26.56 10*3/MM3 (ref 3.4–10.8)
WHOLE BLOOD HOLD COAG: NORMAL
WHOLE BLOOD HOLD SPECIMEN: NORMAL

## 2025-03-18 PROCEDURE — 25010000002 PHYTONADIONE 10 MG/ML SOLUTION 1 ML AMPULE

## 2025-03-18 PROCEDURE — 99285 EMERGENCY DEPT VISIT HI MDM: CPT

## 2025-03-18 PROCEDURE — 85007 BL SMEAR W/DIFF WBC COUNT: CPT

## 2025-03-18 PROCEDURE — 25810000003 LACTATED RINGERS SOLUTION

## 2025-03-18 PROCEDURE — 87040 BLOOD CULTURE FOR BACTERIA: CPT

## 2025-03-18 PROCEDURE — 70450 CT HEAD/BRAIN W/O DYE: CPT

## 2025-03-18 PROCEDURE — 87641 MR-STAPH DNA AMP PROBE: CPT | Performed by: NURSE PRACTITIONER

## 2025-03-18 PROCEDURE — 93005 ELECTROCARDIOGRAM TRACING: CPT | Performed by: FAMILY MEDICINE

## 2025-03-18 PROCEDURE — 85610 PROTHROMBIN TIME: CPT

## 2025-03-18 PROCEDURE — 25010000002 THIAMINE PER 100 MG: Performed by: NURSE PRACTITIONER

## 2025-03-18 PROCEDURE — 83605 ASSAY OF LACTIC ACID: CPT

## 2025-03-18 PROCEDURE — 85025 COMPLETE CBC W/AUTO DIFF WBC: CPT

## 2025-03-18 PROCEDURE — 84145 PROCALCITONIN (PCT): CPT | Performed by: NURSE PRACTITIONER

## 2025-03-18 PROCEDURE — 80053 COMPREHEN METABOLIC PANEL: CPT

## 2025-03-18 PROCEDURE — 93005 ELECTROCARDIOGRAM TRACING: CPT | Performed by: EMERGENCY MEDICINE

## 2025-03-18 PROCEDURE — 86900 BLOOD TYPING SEROLOGIC ABO: CPT

## 2025-03-18 PROCEDURE — 25010000002 POTASSIUM CHLORIDE 10 MEQ/100ML SOLUTION: Performed by: NURSE PRACTITIONER

## 2025-03-18 PROCEDURE — 82948 REAGENT STRIP/BLOOD GLUCOSE: CPT

## 2025-03-18 PROCEDURE — 81001 URINALYSIS AUTO W/SCOPE: CPT

## 2025-03-18 PROCEDURE — 80306 DRUG TEST PRSMV INSTRMNT: CPT

## 2025-03-18 PROCEDURE — 85014 HEMATOCRIT: CPT

## 2025-03-18 PROCEDURE — 25010000002 MAGNESIUM SULFATE 4 GM/100ML SOLUTION: Performed by: NURSE PRACTITIONER

## 2025-03-18 PROCEDURE — 36415 COLL VENOUS BLD VENIPUNCTURE: CPT

## 2025-03-18 PROCEDURE — 86923 COMPATIBILITY TEST ELECTRIC: CPT

## 2025-03-18 PROCEDURE — 80179 DRUG ASSAY SALICYLATE: CPT

## 2025-03-18 PROCEDURE — 83735 ASSAY OF MAGNESIUM: CPT

## 2025-03-18 PROCEDURE — 86850 RBC ANTIBODY SCREEN: CPT

## 2025-03-18 PROCEDURE — 85018 HEMOGLOBIN: CPT

## 2025-03-18 PROCEDURE — 82077 ASSAY SPEC XCP UR&BREATH IA: CPT

## 2025-03-18 PROCEDURE — P9016 RBC LEUKOCYTES REDUCED: HCPCS

## 2025-03-18 PROCEDURE — 36430 TRANSFUSION BLD/BLD COMPNT: CPT

## 2025-03-18 PROCEDURE — 99223 1ST HOSP IP/OBS HIGH 75: CPT

## 2025-03-18 PROCEDURE — 80143 DRUG ASSAY ACETAMINOPHEN: CPT

## 2025-03-18 PROCEDURE — 25010000002 OCTREOTIDE PER 25 MCG

## 2025-03-18 PROCEDURE — 86901 BLOOD TYPING SEROLOGIC RH(D): CPT

## 2025-03-18 PROCEDURE — 83690 ASSAY OF LIPASE: CPT

## 2025-03-18 PROCEDURE — 25010000002 PHENOBARBITAL PER 120 MG

## 2025-03-18 PROCEDURE — 0202U NFCT DS 22 TRGT SARS-COV-2: CPT | Performed by: NURSE PRACTITIONER

## 2025-03-18 PROCEDURE — 76705 ECHO EXAM OF ABDOMEN: CPT

## 2025-03-18 RX ORDER — LORAZEPAM 2 MG/ML
2 INJECTION INTRAMUSCULAR
Status: DISCONTINUED | OUTPATIENT
Start: 2025-03-18 | End: 2025-03-18

## 2025-03-18 RX ORDER — BISACODYL 10 MG
10 SUPPOSITORY, RECTAL RECTAL DAILY PRN
Status: DISCONTINUED | OUTPATIENT
Start: 2025-03-18 | End: 2025-03-23 | Stop reason: HOSPADM

## 2025-03-18 RX ORDER — ONDANSETRON 4 MG/1
4 TABLET, ORALLY DISINTEGRATING ORAL EVERY 6 HOURS PRN
Status: DISCONTINUED | OUTPATIENT
Start: 2025-03-18 | End: 2025-03-23 | Stop reason: HOSPADM

## 2025-03-18 RX ORDER — THIAMINE HYDROCHLORIDE 100 MG/ML
200 INJECTION, SOLUTION INTRAMUSCULAR; INTRAVENOUS EVERY 8 HOURS SCHEDULED
Status: DISCONTINUED | OUTPATIENT
Start: 2025-03-18 | End: 2025-03-18

## 2025-03-18 RX ORDER — MAGNESIUM SULFATE HEPTAHYDRATE 40 MG/ML
4 INJECTION, SOLUTION INTRAVENOUS ONCE
Status: COMPLETED | OUTPATIENT
Start: 2025-03-18 | End: 2025-03-18

## 2025-03-18 RX ORDER — PANTOPRAZOLE SODIUM 40 MG/10ML
80 INJECTION, POWDER, LYOPHILIZED, FOR SOLUTION INTRAVENOUS ONCE
Status: COMPLETED | OUTPATIENT
Start: 2025-03-18 | End: 2025-03-18

## 2025-03-18 RX ORDER — SODIUM CHLORIDE 0.9 % (FLUSH) 0.9 %
10 SYRINGE (ML) INJECTION AS NEEDED
Status: DISCONTINUED | OUTPATIENT
Start: 2025-03-18 | End: 2025-03-23 | Stop reason: HOSPADM

## 2025-03-18 RX ORDER — THIAMINE HYDROCHLORIDE 100 MG/ML
200 INJECTION, SOLUTION INTRAMUSCULAR; INTRAVENOUS EVERY 8 HOURS SCHEDULED
Status: DISCONTINUED | OUTPATIENT
Start: 2025-03-18 | End: 2025-03-23 | Stop reason: HOSPADM

## 2025-03-18 RX ORDER — ONDANSETRON 2 MG/ML
4 INJECTION INTRAMUSCULAR; INTRAVENOUS EVERY 6 HOURS PRN
Status: DISCONTINUED | OUTPATIENT
Start: 2025-03-18 | End: 2025-03-23 | Stop reason: HOSPADM

## 2025-03-18 RX ORDER — POLYETHYLENE GLYCOL 3350 17 G/17G
17 POWDER, FOR SOLUTION ORAL DAILY PRN
Status: DISCONTINUED | OUTPATIENT
Start: 2025-03-18 | End: 2025-03-23 | Stop reason: HOSPADM

## 2025-03-18 RX ORDER — BISACODYL 5 MG/1
5 TABLET, DELAYED RELEASE ORAL DAILY PRN
Status: DISCONTINUED | OUTPATIENT
Start: 2025-03-18 | End: 2025-03-23 | Stop reason: HOSPADM

## 2025-03-18 RX ORDER — LORAZEPAM 1 MG/1
1 TABLET ORAL
Status: DISCONTINUED | OUTPATIENT
Start: 2025-03-18 | End: 2025-03-18

## 2025-03-18 RX ORDER — NADOLOL 20 MG/1
20 TABLET ORAL
Status: DISCONTINUED | OUTPATIENT
Start: 2025-03-18 | End: 2025-03-23 | Stop reason: HOSPADM

## 2025-03-18 RX ORDER — LORAZEPAM 2 MG/ML
1 INJECTION INTRAMUSCULAR
Status: DISCONTINUED | OUTPATIENT
Start: 2025-03-18 | End: 2025-03-18

## 2025-03-18 RX ORDER — NICOTINE POLACRILEX 4 MG
15 LOZENGE BUCCAL
Status: DISCONTINUED | OUTPATIENT
Start: 2025-03-18 | End: 2025-03-23 | Stop reason: HOSPADM

## 2025-03-18 RX ORDER — NITROGLYCERIN 0.4 MG/1
0.4 TABLET SUBLINGUAL
Status: DISCONTINUED | OUTPATIENT
Start: 2025-03-18 | End: 2025-03-23 | Stop reason: HOSPADM

## 2025-03-18 RX ORDER — LORAZEPAM 1 MG/1
1 TABLET ORAL EVERY 6 HOURS
Status: DISCONTINUED | OUTPATIENT
Start: 2025-03-19 | End: 2025-03-18

## 2025-03-18 RX ORDER — ALUMINA, MAGNESIA, AND SIMETHICONE 2400; 2400; 240 MG/30ML; MG/30ML; MG/30ML
15 SUSPENSION ORAL EVERY 6 HOURS PRN
Status: DISCONTINUED | OUTPATIENT
Start: 2025-03-18 | End: 2025-03-23 | Stop reason: HOSPADM

## 2025-03-18 RX ORDER — SODIUM CHLORIDE 9 MG/ML
40 INJECTION, SOLUTION INTRAVENOUS AS NEEDED
Status: DISCONTINUED | OUTPATIENT
Start: 2025-03-18 | End: 2025-03-23 | Stop reason: HOSPADM

## 2025-03-18 RX ORDER — LORAZEPAM 1 MG/1
1 TABLET ORAL EVERY 12 HOURS SCHEDULED
Status: DISCONTINUED | OUTPATIENT
Start: 2025-03-20 | End: 2025-03-18

## 2025-03-18 RX ORDER — IPRATROPIUM BROMIDE AND ALBUTEROL SULFATE 2.5; .5 MG/3ML; MG/3ML
3 SOLUTION RESPIRATORY (INHALATION) EVERY 6 HOURS PRN
Status: DISCONTINUED | OUTPATIENT
Start: 2025-03-18 | End: 2025-03-23 | Stop reason: HOSPADM

## 2025-03-18 RX ORDER — PANTOPRAZOLE SODIUM 40 MG/10ML
40 INJECTION, POWDER, LYOPHILIZED, FOR SOLUTION INTRAVENOUS
Status: DISCONTINUED | OUTPATIENT
Start: 2025-03-18 | End: 2025-03-22

## 2025-03-18 RX ORDER — AMOXICILLIN 250 MG
2 CAPSULE ORAL 2 TIMES DAILY PRN
Status: DISCONTINUED | OUTPATIENT
Start: 2025-03-18 | End: 2025-03-23 | Stop reason: HOSPADM

## 2025-03-18 RX ORDER — LORAZEPAM 2 MG/ML
2 INJECTION INTRAMUSCULAR
Status: DISCONTINUED | OUTPATIENT
Start: 2025-03-18 | End: 2025-03-23 | Stop reason: HOSPADM

## 2025-03-18 RX ORDER — DEXTROSE MONOHYDRATE 25 G/50ML
25 INJECTION, SOLUTION INTRAVENOUS
Status: DISCONTINUED | OUTPATIENT
Start: 2025-03-18 | End: 2025-03-23 | Stop reason: HOSPADM

## 2025-03-18 RX ORDER — ONDANSETRON 2 MG/ML
4 INJECTION INTRAMUSCULAR; INTRAVENOUS EVERY 6 HOURS PRN
Status: DISCONTINUED | OUTPATIENT
Start: 2025-03-18 | End: 2025-03-18

## 2025-03-18 RX ORDER — LORAZEPAM 1 MG/1
1 TABLET ORAL DAILY
Status: DISCONTINUED | OUTPATIENT
Start: 2025-03-21 | End: 2025-03-18

## 2025-03-18 RX ORDER — SPIRONOLACTONE 25 MG/1
25 TABLET ORAL DAILY
Status: DISCONTINUED | OUTPATIENT
Start: 2025-03-18 | End: 2025-03-23 | Stop reason: HOSPADM

## 2025-03-18 RX ORDER — ZINC SULFATE 50(220)MG
220 CAPSULE ORAL DAILY
Status: DISCONTINUED | OUTPATIENT
Start: 2025-03-18 | End: 2025-03-23 | Stop reason: HOSPADM

## 2025-03-18 RX ORDER — LORAZEPAM 1 MG/1
2 TABLET ORAL
Status: DISCONTINUED | OUTPATIENT
Start: 2025-03-18 | End: 2025-03-18

## 2025-03-18 RX ORDER — SODIUM CHLORIDE 0.9 % (FLUSH) 0.9 %
10 SYRINGE (ML) INJECTION EVERY 12 HOURS SCHEDULED
Status: DISCONTINUED | OUTPATIENT
Start: 2025-03-18 | End: 2025-03-23 | Stop reason: HOSPADM

## 2025-03-18 RX ORDER — POTASSIUM CHLORIDE 7.45 MG/ML
10 INJECTION INTRAVENOUS
Status: COMPLETED | OUTPATIENT
Start: 2025-03-18 | End: 2025-03-18

## 2025-03-18 RX ORDER — ONDANSETRON 4 MG/1
4 TABLET, ORALLY DISINTEGRATING ORAL EVERY 6 HOURS PRN
Status: DISCONTINUED | OUTPATIENT
Start: 2025-03-18 | End: 2025-03-18

## 2025-03-18 RX ORDER — LORAZEPAM 1 MG/1
2 TABLET ORAL EVERY 6 HOURS
Status: DISCONTINUED | OUTPATIENT
Start: 2025-03-18 | End: 2025-03-18

## 2025-03-18 RX ORDER — IBUPROFEN 600 MG/1
1 TABLET ORAL
Status: DISCONTINUED | OUTPATIENT
Start: 2025-03-18 | End: 2025-03-23 | Stop reason: HOSPADM

## 2025-03-18 RX ORDER — LORAZEPAM 2 MG/ML
1 INJECTION INTRAMUSCULAR
Status: DISCONTINUED | OUTPATIENT
Start: 2025-03-18 | End: 2025-03-23 | Stop reason: HOSPADM

## 2025-03-18 RX ADMIN — SODIUM CHLORIDE, SODIUM LACTATE, POTASSIUM CHLORIDE, CALCIUM CHLORIDE 1000 ML: 20; 30; 600; 310 INJECTION, SOLUTION INTRAVENOUS at 15:18

## 2025-03-18 RX ADMIN — PHENOBARBITAL SODIUM 195 MG: 130 INJECTION INTRAMUSCULAR; INTRAVENOUS at 21:34

## 2025-03-18 RX ADMIN — POTASSIUM CHLORIDE 10 MEQ: 7.46 INJECTION, SOLUTION INTRAVENOUS at 19:53

## 2025-03-18 RX ADMIN — PHYTONADIONE 5 MG: 10 INJECTION, EMULSION INTRAMUSCULAR; INTRAVENOUS; SUBCUTANEOUS at 17:38

## 2025-03-18 RX ADMIN — POTASSIUM CHLORIDE 10 MEQ: 7.46 INJECTION, SOLUTION INTRAVENOUS at 21:35

## 2025-03-18 RX ADMIN — POTASSIUM CHLORIDE 10 MEQ: 7.46 INJECTION, SOLUTION INTRAVENOUS at 16:13

## 2025-03-18 RX ADMIN — PHENOBARBITAL SODIUM 260 MG: 130 INJECTION INTRAMUSCULAR; INTRAVENOUS at 15:08

## 2025-03-18 RX ADMIN — Medication 10 ML: at 20:54

## 2025-03-18 RX ADMIN — POTASSIUM CHLORIDE 10 MEQ: 7.46 INJECTION, SOLUTION INTRAVENOUS at 20:55

## 2025-03-18 RX ADMIN — PANTOPRAZOLE SODIUM 40 MG: 40 INJECTION, POWDER, LYOPHILIZED, FOR SOLUTION INTRAVENOUS at 17:38

## 2025-03-18 RX ADMIN — POTASSIUM CHLORIDE 10 MEQ: 7.46 INJECTION, SOLUTION INTRAVENOUS at 17:38

## 2025-03-18 RX ADMIN — POTASSIUM CHLORIDE 10 MEQ: 7.46 INJECTION, SOLUTION INTRAVENOUS at 18:51

## 2025-03-18 RX ADMIN — MAGNESIUM SULFATE HEPTAHYDRATE 4 G: 40 INJECTION, SOLUTION INTRAVENOUS at 17:41

## 2025-03-18 RX ADMIN — PHENOBARBITAL SODIUM 195 MG: 130 INJECTION INTRAMUSCULAR; INTRAVENOUS at 18:00

## 2025-03-18 RX ADMIN — MUPIROCIN 1 APPLICATION: 20 OINTMENT TOPICAL at 20:54

## 2025-03-18 RX ADMIN — OCTREOTIDE ACETATE 25 MCG/HR: 500 INJECTION, SOLUTION INTRAVENOUS; SUBCUTANEOUS at 15:42

## 2025-03-18 RX ADMIN — FOLIC ACID 1 MG: 5 INJECTION, SOLUTION INTRAMUSCULAR; INTRAVENOUS; SUBCUTANEOUS at 20:52

## 2025-03-18 RX ADMIN — THIAMINE HYDROCHLORIDE 200 MG: 100 INJECTION, SOLUTION INTRAMUSCULAR; INTRAVENOUS at 21:34

## 2025-03-18 RX ADMIN — PANTOPRAZOLE SODIUM 80 MG: 40 INJECTION, POWDER, FOR SOLUTION INTRAVENOUS at 15:14

## 2025-03-18 NOTE — Clinical Note
Level of Care: Progressive Care [20]   Admitting Physician: DOMO MENG [[id number]]   Attending Physician: DOMO MENG [[id number]]   Bed Request Comments: U

## 2025-03-18 NOTE — ED PROVIDER NOTES
Subjective   History of Present Illness  Chief complaint: Seizure,      Context: Patient is a 29-year-old male with a history of hypertension who presents to the ER for complaint of seizure.  Patient was brought in by EMS states that he had a witnessed seizure at home lasting approximately 2 minutes.  Patient reports he was slightly confused after the seizure and was talking really fast.  Patient reports that on Friday or Saturday he relapsed and drank 1 bottle of vodka, patient reports the last time he relapsed was October 2024, after which he spent 12 days in rehab.  Patient denies any thoughts of self-harm or harm to others.  Patient denies any chest pain, shortness of air, fever, nausea/vomiting/diarrhea, or urinary symptoms.    PCP: None              Review of Systems   Constitutional:  Negative for fever.       Past Medical History:   Diagnosis Date    Ascites     Cirrhosis of liver        Allergies   Allergen Reactions    Reglan [Metoclopramide] Mental Status Change       Past Surgical History:   Procedure Laterality Date    HIATAL HERNIA REPAIR         No family history on file.    Social History     Socioeconomic History    Marital status: Single   Tobacco Use    Smoking status: Some Days     Current packs/day: 0.25     Average packs/day: 0.3 packs/day for 9.2 years (2.3 ttl pk-yrs)     Types: Cigarettes     Start date: 2016     Passive exposure: Current   Vaping Use    Vaping status: Never Used   Substance and Sexual Activity    Alcohol use: Not Currently     Comment: 1 gallon of vodka a day    Drug use: Not Currently    Sexual activity: Defer           Objective   Physical Exam  Vitals and nursing note reviewed.   Constitutional:       Appearance: Normal appearance.   HENT:      Head: Normocephalic.      Nose: Nose normal.      Mouth/Throat:      Mouth: Mucous membranes are moist.   Eyes:      General: Scleral icterus present.      Extraocular Movements: Extraocular movements intact.      Comments:  "Scleral icterus noted.   Cardiovascular:      Rate and Rhythm: Regular rhythm. Tachycardia present.      Pulses: Normal pulses.      Heart sounds: Normal heart sounds.   Pulmonary:      Effort: Pulmonary effort is normal.      Breath sounds: Normal breath sounds. No wheezing.   Abdominal:      General: Abdomen is flat.      Palpations: Abdomen is soft.      Tenderness: There is abdominal tenderness. There is no guarding or rebound.      Comments: Tenderness noted to abdomen on palpation.  No signs of peritonitis, negative Munson's, McBurney's and Rovsing's.   Musculoskeletal:         General: Normal range of motion.      Cervical back: Normal range of motion.   Skin:     General: Skin is warm and dry.      Capillary Refill: Capillary refill takes less than 2 seconds.   Neurological:      General: No focal deficit present.      Mental Status: He is alert and oriented to person, place, and time.   Psychiatric:         Mood and Affect: Mood normal.         Behavior: Behavior normal.         Procedures           ED Course  ED Course as of 03/18/25 1704   Tue Mar 18, 2025   1432 Consulted hospitalist. [LT]   1445 Spoke with MIRIAM Valente with hospitalist group who agreed to admit patient and assume care. [LT]      ED Course User Index  [LT] Cleo Menendez Ann, APRN          /64   Pulse (!) 138   Temp 98.2 °F (36.8 °C)   Resp 12   Ht 165 cm (64.96\")   Wt 55.8 kg (123 lb)   SpO2 94%   BMI 20.49 kg/m²   Labs Reviewed   COMPREHENSIVE METABOLIC PANEL - Abnormal; Notable for the following components:       Result Value    Glucose 141 (*)     BUN 57 (*)     Creatinine 1.28 (*)     Sodium 132 (*)     Potassium 2.9 (*)     Chloride 80 (*)     CO2 33.6 (*)     Calcium 8.4 (*)     ALT (SGPT) 55 (*)     AST (SGOT) 156 (*)     Total Bilirubin 4.9 (*)     BUN/Creatinine Ratio 44.5 (*)     Anion Gap 18.4 (*)     All other components within normal limits    Narrative:     GFR Categories in Chronic Kidney Disease (CKD)      GFR " Category          GFR (mL/min/1.73)    Interpretation  G1                     90 or greater         Normal or high (1)  G2                      60-89                Mild decrease (1)  G3a                   45-59                Mild to moderate decrease  G3b                   30-44                Moderate to severe decrease  G4                    15-29                Severe decrease  G5                    14 or less           Kidney failure          (1)In the absence of evidence of kidney disease, neither GFR category G1 or G2 fulfill the criteria for CKD.    eGFR calculation 2021 CKD-EPI creatinine equation, which does not include race as a factor   URINE DRUG SCREEN - Abnormal; Notable for the following components:    THC, Screen, Urine Positive (*)     All other components within normal limits    Narrative:     Cutoff For Drugs Screened:    Amphetamines               500 ng/ml  Barbiturates               200 ng/ml  Benzodiazepines            150 ng/ml  Cocaine                    150 ng/ml  Methadone                  200 ng/ml  Opiates                    100 ng/ml  Phencyclidine               25 ng/ml  THC                         50 ng/ml  Methamphetamine            500 ng/ml  Tricyclic Antidepressants  300 ng/ml  Oxycodone                  100 ng/ml  Buprenorphine               10 ng/ml    The normal value for all drugs tested is negative. This report includes unconfirmed screening results, with the cutoff values listed, to be used for medical treatment purposes only.  Unconfirmed results must not be used for non-medical purposes such as employment or legal testing.  Clinical consideration should be applied to any drug of abuse test, particularly when unconfirmed results are used.    All urine drugs of abuse requests without chain of custody are for medical screening purposes only.  False positives are possible.     CBC WITH AUTO DIFFERENTIAL - Abnormal; Notable for the following components:    WBC 26.56 (*)      RBC 2.44 (*)     Hemoglobin 7.9 (*)     Hematocrit 22.7 (*)     Platelets 132 (*)     Lymphocyte % 10.6 (*)     Immature Grans % 0.8 (*)     Neutrophils, Absolute 20.14 (*)     Monocytes, Absolute 2.52 (*)     Eosinophils, Absolute 0.84 (*)     Immature Grans, Absolute 0.20 (*)     All other components within normal limits    Narrative:     Appended report. These results have been appended to a previously verified report.   URINALYSIS W/ MICROSCOPIC IF INDICATED (NO CULTURE) - Abnormal; Notable for the following components:    Color, UA Dark Yellow (*)     Appearance, UA Cloudy (*)     Ketones, UA Trace (*)     Bilirubin, UA Small (1+) (*)     Blood, UA Small (1+) (*)     Protein, UA Trace (*)     Leuk Esterase, UA Trace (*)     Nitrite, UA Positive (*)     All other components within normal limits   MAGNESIUM - Abnormal; Notable for the following components:    Magnesium 0.9 (*)     All other components within normal limits   URINALYSIS, MICROSCOPIC ONLY - Abnormal; Notable for the following components:    Bacteria, UA 1+ (*)     All other components within normal limits   HEMOGLOBIN AND HEMATOCRIT, BLOOD - Abnormal; Notable for the following components:    Hemoglobin 7.4 (*)     Hematocrit 21.4 (*)     All other components within normal limits   PROTIME-INR - Abnormal; Notable for the following components:    Protime 25.8 (*)     INR 2.33 (*)     All other components within normal limits   POCT GLUCOSE FINGERSTICK - Abnormal; Notable for the following components:    Glucose 145 (*)     All other components within normal limits   POC LACTATE - Abnormal; Notable for the following components:    Lactate 2.8 (*)     All other components within normal limits   LIPASE - Normal   SCAN SLIDE - Normal    Narrative:     Slide Reviewed    ACETAMINOPHEN LEVEL - Normal    Narrative:     Acetaminophen Therapeutic Range  5-20 ug/mL      Hours after ingestion            Toxic Value    4 Hours                           150 ug/mL     8 Hours                            70 ug/mL   12 Hours                            40 ug/mL   16 Hours                            20 ug/mL    These values apply to a single ingestion only.    SALICYLATE LEVEL - Normal   BLOOD CULTURE   BLOOD CULTURE   ETHANOL    Narrative:     Plasma Ethanol Clinical Symptoms:    ETOH (%)               Clinical Symptom  .01-.05              No apparent influence  .03-.12              Euphoria, Diminished judgment and attention   .09-.25              Impaired comprehension, Muscle incoordination  .18-.30              Confusion, Staggered gait, Slurred speech  .25-.40              Markedly decreased response to stimuli, unable to stand or                        walk, vomitting, sleep or stupor  .35-.50              Comatose, Anesthesia, Subnormal body temperature       LACTIC ACID, REFLEX   HEMOGLOBIN AND HEMATOCRIT, BLOOD   HEMOGLOBIN AND HEMATOCRIT, BLOOD   TYPE AND SCREEN   PREPARE RBC   CBC AND DIFFERENTIAL    Narrative:     The following orders were created for panel order CBC & Differential.  Procedure                               Abnormality         Status                     ---------                               -----------         ------                     CBC Auto Differential[[id number]]        Abnormal            Final result               Scan Slide[[id number]]                   Normal              Final result                 Please view results for these tests on the individual orders.   EXTRA TUBES    Narrative:     The following orders were created for panel order Extra Tubes.  Procedure                               Abnormality         Status                     ---------                               -----------         ------                     Gold Top - SST[[id number]]                                   Final result               Light Blue Top[[id number]]                                   Final result                 Please view results for these tests on the  individual orders.   GOLD TOP - SST   LIGHT BLUE TOP   EXTRA TUBES    Narrative:     The following orders were created for panel order Extra Tubes.  Procedure                               Abnormality         Status                     ---------                               -----------         ------                     Lavender Top[[id number]]                                     Final result               Gold Top - SST[[id number]]                                                              Green Top (Gel)[[id number]]                                  Final result               Light Blue Top[[id number]]                                                                Please view results for these tests on the individual orders.   LAVENDER TOP   GREEN TOP     Medications   sodium chloride 0.9 % flush 10 mL (has no administration in time range)   PHENobarbital 260 mg in sodium chloride 0.9 % 100 mL IVPB (0 mg Intravenous Stopped 3/18/25 1531)     Followed by   PHENobarbital 195 mg in sodium chloride 0.9 % 100 mL IVPB (has no administration in time range)     Followed by   PHENobarbital 195 mg in sodium chloride 0.9 % 100 mL IVPB (has no administration in time range)   octreotide (SANDOSTATIN) bolus from bag 5 mcg/mL solution 50 mcg (50 mcg Intravenous Bolus from Bag 3/18/25 1546)     Followed by   octreotide (sandoSTATIN) 500 mcg in sodium chloride 0.9 % 100 mL (5 mcg/mL) infusion (25 mcg/hr Intravenous New Bag 3/18/25 1542)   Potassium Replacement - Follow Nurse / BPA Driven Protocol (has no administration in time range)   sodium chloride 0.9 % flush 10 mL (has no administration in time range)   sodium chloride 0.9 % infusion 40 mL (has no administration in time range)   nitroglycerin (NITROSTAT) SL tablet 0.4 mg (has no administration in time range)   Magnesium Standard Dose Replacement - Follow Nurse / BPA Driven Protocol (has no administration in time range)   Phosphorus Replacement - Follow Nurse / BPA Driven  Protocol (has no administration in time range)   Calcium Replacement - Follow Nurse / BPA Driven Protocol (has no administration in time range)   ondansetron ODT (ZOFRAN-ODT) disintegrating tablet 4 mg (has no administration in time range)     Or   ondansetron (ZOFRAN) injection 4 mg (has no administration in time range)   cefTRIAXone (ROCEPHIN) 2,000 mg in sodium chloride 0.9 % 100 mL MBP (2,000 mg Intravenous Not Given 3/18/25 1528)   dextrose (GLUTOSE) oral gel 15 g (has no administration in time range)   dextrose (D50W) (25 g/50 mL) IV injection 25 g (has no administration in time range)   glucagon (GLUCAGEN) injection 1 mg (has no administration in time range)   potassium chloride 10 mEq in 100 mL IVPB (10 mEq Intravenous New Bag 3/18/25 1613)   magnesium sulfate 4g/100mL (PREMIX) infusion (has no administration in time range)   pantoprazole (PROTONIX) injection 40 mg (has no administration in time range)   phytonadione (AQUA-MEPHYTON, VITAMIN K) 5 mg in sodium chloride 0.9 % 50 mL IVPB (has no administration in time range)   lactated ringers bolus 1,000 mL (1,000 mL Intravenous New Bag 3/18/25 1518)   pantoprazole (PROTONIX) injection 80 mg (80 mg Intravenous Given 3/18/25 1514)     No radiology results for the last day                                               Medical Decision Making  Radiology interpretation: CT head reviewed and interpreted by Ayaan:  Further interpretation by radiologist as above    Lab interpretation:  Labs all viewed by me and significant for: White count 26.56, hemoglobin 7.9, BUN 57, creatinine 1.28, sodium 132, potassium 2.9, chloride 80, CO2 33.6, calcium 8.4, ALT 55, , mag 0.9, lipase 14.  Ethanol less than 0.010, urine drug screen positive for THC.  POC lactic 2.8, blood cultures pending.  UA-appearance cloudy, trace ketones, small 1+ bili, small 1+ blood, trace protein, trace leuks, positive nitrite, bacteria 1+.  Urine culture pending.  Type and screen O+.    EKG  viewed and interpreted by Dr. Kuo: Sinus tachycardia, probable left atrial enlargement, prolonged QT interval, rate 152, QTc 509, no acute ST elevation noted.  comparison: Dated 7/18/2024.  Sinus tachycardia, prolonged QT interval, rate 116, QTc 533.    IV was established and labs and scans were obtained to evaluate for infection, electrolyte abnormalities, anemia, ICH, alcohol intoxication, drug use, liver function test, kidney function test, hypo-/hyperkalemia, hypo-/hypermagnesemia.  Patient is a 29-year-old male who presents to the ER for above complaint.  On initial examination patient is resting in the bed, nontoxic in appearance with no signs and symptoms of distress.  Physical examination revealed tenderness to abdomen on palpation, yellowing of the sclera, lungs clear bilaterally on auscultation, tachycardia with a regular rhythm, no swelling to bilateral lower extremities noted. Patient given IV phenobarbital.  Dr. Kuo accompanied me to evaluate the patient at which time family at bedside stated that patient did have a fall today and hit his head, and has been vomiting dark vomitus.  Patient failed to relay this information on initial examination.  CT head ordered.  POC lactic, blood cultures and type and screen ordered.  Patient's labs were indicative of elevated white blood cell count, anemia, hypokalemia, worsening kidney function, hypomagnesemia and elevated liver enzymes.  Elevated white count, anemia, hypokalemia, and elevated liver enzymes seem to be baseline for patient.  Patient was given IV Rocephin, IV Protonix, and IV octreotide, and IVF.  Rectal exam was performed with URSULA Francis at bedside.  Patient was Hemoccult positive. Consulted hospitalist.  Spoke with MIRIAM Ryagoza with hospitalist group who agreed to admit patient and assume care with GI consult.  Although patient's labs are significant nature, they seem to be along patient's baseline other than his elevated kidney function.  UA was  indicative of UTI with trace ketones, small 1+ bili, small 1+ blood, trace proteins trace leuks, positive nitrite, and 1+ bacteria.  Patient also was THC positive on the urine drug screen.  POC lactic was 2.8, and mag was 0.9.  Message Idalmis, with hospitalist group about the lactic and mag values.  On reexamination patient was resting comfortably in the bed, ill-appearing but nontoxic in appearance with no signs and symptoms of distress.  Discussed findings and decision to admit patient.  Answered all patient and family questions at bedside.  Patient was agreeable to admission and plan of care.     Appropriate PPE worn during exam.  Discussed care with: Idalmis Lane, MIRIAM with hospitalist group.     Based on the clinical findings at this time I anticipate the patient will require a 2 midnight stay    Problems Addressed:  HANNA (acute kidney injury): complicated acute illness or injury  Gastrointestinal hemorrhage, unspecified gastrointestinal hemorrhage type: complicated acute illness or injury  Hypokalemia: complicated acute illness or injury  Leukocytosis, unspecified type: complicated acute illness or injury  Tachycardia: complicated acute illness or injury  Urinary tract infection with hematuria, site unspecified: complicated acute illness or injury    Amount and/or Complexity of Data Reviewed  Labs: ordered.  Radiology: ordered.    Risk  OTC drugs.  Prescription drug management.  Decision regarding hospitalization.        Final diagnoses:   HANNA (acute kidney injury)   Leukocytosis, unspecified type   Hypokalemia   Tachycardia   Urinary tract infection with hematuria, site unspecified   Gastrointestinal hemorrhage, unspecified gastrointestinal hemorrhage type       ED Disposition  ED Disposition       ED Disposition   Decision to Admit    Condition   --    Comment   Level of Care: Critical Care [6]   Diagnosis: Acute GI bleeding [[id number]]   Admitting Physician: RIRI RIZZO [[id number]]   Attending Physician:  RIRI RIZZO [[id number]]   Certification: I Certify That Inpatient Hospital Services Are Medically Necessary For Greater Than 2 Midnights                 No follow-up provider specified.       Medication List      No changes were made to your prescriptions during this visit.            Cleo Menendez, APRN  03/18/25 1701

## 2025-03-18 NOTE — CONSULTS
GI CONSULT  NOTE:    Referring Provider:  Dr. Solitario    Chief complaint: GI Bleed    Subjective .     History of present illness: Yonas Charles is a 29 y.o. male PMH acute liver failure secondary to severe alcohol hepatitis that was nonresponsive to prednisone.  He presented to the ER due to hematemesis and weakness, which is why GI was consulted.    Patient has abstained from alcohol since October but had a couple of beers Friday night.  He has been feeling weak this week and has had multiple episodes of large-volume hematemesis.  At times this is filling the bowl.  He is not sure about melena or BRBPR.  He last vomited about 1230 this afternoon.  He describes some tightening in his abdomen but no carlos abdominal pain.      Endo History:  No previous endoscopy    Past Medical History:  Past Medical History:   Diagnosis Date    Ascites     Cirrhosis of liver        Past Surgical History:  Past Surgical History:   Procedure Laterality Date    HIATAL HERNIA REPAIR         Social History:  Social History     Tobacco Use    Smoking status: Some Days     Current packs/day: 0.25     Average packs/day: 0.3 packs/day for 9.2 years (2.3 ttl pk-yrs)     Types: Cigarettes     Start date: 2016     Passive exposure: Current   Vaping Use    Vaping status: Never Used   Substance Use Topics    Alcohol use: Not Currently     Comment: 1 gallon of vodka a day    Drug use: Not Currently       Family History:  No family history on file.    Medications:  (Not in a hospital admission)      Scheduled Meds:cefTRIAXone, 2,000 mg, Intravenous, Q24H  magnesium sulfate, 4 g, Intravenous, Once  PHENobarbital, 3 mg/kg (Ideal), Intravenous, Once   Followed by  PHENobarbital, 3 mg/kg (Ideal), Intravenous, Once  potassium chloride, 10 mEq, Intravenous, Q1H  sodium chloride, 10 mL, Intravenous, Q12H      Continuous Infusions:octreotide (SandoSTATIN) infusion, 25 mcg/hr, Last Rate: 25 mcg/hr (03/18/25 9422)      PRN Meds:.  Calcium Replacement -  Follow Nurse / BPA Driven Protocol    dextrose    dextrose    glucagon (human recombinant)    Magnesium Standard Dose Replacement - Follow Nurse / BPA Driven Protocol    nitroglycerin    ondansetron ODT **OR** ondansetron    Phosphorus Replacement - Follow Nurse / BPA Driven Protocol    Potassium Replacement - Follow Nurse / BPA Driven Protocol    sodium chloride    sodium chloride    ALLERGIES:  Reglan [metoclopramide]    ROS:  The following systems were reviewed and negative;   Constitution:  No fevers, chills, no unintentional weight loss  Skin: no rash, no jaundice  Eyes:  No blurry vision, no eye pain  HENT:  No change in hearing or smell  Resp:  No dyspnea or cough  CV:  No chest pain or palpitations  :  No dysuria, hematuria  Musculoskeletal:  No leg cramps or arthralgias  Neuro:  No tremor, no numbness  Psych:  No depression or confusion    Objective     Vital Signs:   Vitals:    03/18/25 1502 03/18/25 1516 03/18/25 1531 03/18/25 1546   BP: 109/58 116/63 124/63 117/64   Pulse: (!) 150 (!) 143 (!) 143 (!) 138   Resp:       Temp:       SpO2: 98% 100% 94%    Weight:       Height:           Physical Exam:       General Appearance:    Awake and alert, in no acute distress   Head:    Normocephalic, without obvious abnormality, atraumatic   Throat:   No oral lesions, no thrush, oral mucosa moist   Lungs:     Respirations regular, even and unlabored   Chest Wall:    No abnormalities observed   Abdomen:     Soft, non-tender, no rebound or guarding, non-distended   Rectal:     Deferred   Extremities:   Moves all extremities, no edema, no cyanosis   Pulses:   Pulses palpable and equal bilaterally   Skin:   No rash, no jaundice, normal palpation               Results Review:   I reviewed the patient's labs and imaging.  Results from last 7 days   Lab Units 03/18/25  1339   WBC 10*3/mm3 26.56*   HEMOGLOBIN g/dL 7.9*   PLATELETS 10*3/mm3 132*     Results from last 7 days   Lab Units 03/18/25  1339   SODIUM mmol/L 132*  "  POTASSIUM mmol/L 2.9*   CHLORIDE mmol/L 80*   CO2 mmol/L 33.6*   BUN mg/dL 57*   CREATININE mg/dL 1.28*   GLUCOSE mg/dL 141*   ALBUMIN g/dL 3.9   BILIRUBIN mg/dL 4.9*   ALK PHOS U/L 89   AST (SGOT) U/L 156*   ALT (SGPT) U/L 55*   INR  2.33*   MAGNESIUM mg/dL 0.9*   LIPASE U/L 14     Estimated Creatinine Clearance: 66.6 mL/min (A) (by C-G formula based on SCr of 1.28 mg/dL (H)).  No results found for: \"HGBA1C\"      Infection     UA    Results from last 7 days   Lab Units 03/18/25  1434   NITRITE UA  Positive*   WBC UA /HPF 0-2   BACTERIA UA /HPF 1+*   SQUAM EPITHEL UA /HPF 0-2     Microbiology Results (last 10 days)       ** No results found for the last 240 hours. **          Imaging Results (Last 72 Hours)       ** No results found for the last 72 hours. **            ASSESSMENT AND PLAN:  29 y.o. male PMH acute liver failure secondary to severe alcohol hepatitis that was nonresponsive to prednisone.  He presented to the ER due to hematemesis and weakness, which is why GI was consulted.    Labs:   Hgb 7.9, HCT 22.7, WBC 26.56, , INR 2.33,  ALT 55 alk phos 89 total bili 4.9 Na 132 K 2.9 BUN 57 Cr 1.28    Problem List:  Alcohol cirrhosis - patient has been seen at [facility] liver [facility] team but patient did not keep his follow-ups.  History of ascites - last paracentesis 8/9/2024, undetectable total protein and albumin, negative for SBP  Electrolyte abnormalities      Plan:  -IV Octreotide  -PPI BID  -Dose vitamin K  -MELD 3.0 is 28  -Continue monitor H/H and transfuse if less than 7.  1 unit of blood has been ordered this admission.  -Plan EGD in the morning to assess for varices.  -Electrolyte management per primary team      I discussed the patients findings and my recommendations with the patient.    We appreciate the referral    Electronically signed by Mj Richardson PA-C, 03/18/25, 4:08 PM EDT.  "

## 2025-03-18 NOTE — SIGNIFICANT NOTE
I initially excepted this patient from the emergency department.  Patient with history of alcohol abuse and liver cirrhosis who presented to the emergency department after having a witnessed seizure at home.  Patient reportedly relapsed and drank a bottle of vodka 3 or 4 days ago.  After further review of current conditions, patient seems most appropriate for ICU level care.  Active problems include acute GI bleed on octreotide drip, need for Protonix drip in addition.  Hemoglobin currently 7.9, 1 unit PRBC ordered for hold.  Patient with suspected hepatorenal syndrome.  Patient with tachycardia, heart rate in 140s to 170s. PAWSS score is 6. Patient received 260 mg of phenobarbital IV in emergency department, heart rate remains in 140s.  Patient is hemodynamically unstable. Patient with leukocytosis >26K, empiric antibiotics ordered. Patient with hypokalemia and hypomagnesemia requiring replacement. ICU intensivist, Felix GUERRA, consulted and agreed to accept patient to ICU service.  I am very appreciative to the ICU service for promptly assuming care of this critically ill patient.

## 2025-03-18 NOTE — H&P
Critical Care History and Physical     Yonas Charles : 1995 MRN:[id number] LOS:0 ROOM:      Reason for admission: Acute GI bleeding     Assessment / Plan     ABLA / Acute GI Bleed / Coagulopathy  Patient had episode of bright red emesis, suspect erosive esophagitis in the setting of portal HTN  Hgb 7.9 on admission, INR 2.33  GI consulted for possible EGD, keep NPO  Continue Protonix IV BID.  Octreotide gtt started by GI.  Vit K ordered per GI, will recheck INR tonight  Hemoglobin & hematocrit every 4 hours, transfuse PRBC for Hgb <7    Delirium Tremens / New Onset Seizure  Likely due to alcohol withdrawal  Initiate CIWA protocol, monitor for signs of withdrawal  Loaded with phenobarbital, on taper.  Will start thiamine and folate replacement  CT Head pending  Continue seizure precautions    Acute on chronic alcoholic cirrhosis with Ascites / History of portal hypertension and intra-abdominal varices  Monitor and trend LFTs.    Last paracentesis noted in 2024, however patient reports he is attempting to follow a low sodium diet  Liver US pending  Holding nadolol & spironolactone due to NPO status.     Metabolic Acidosis with Elevated Anion Gap  Secondary to electrolyte imbalance from nausea and vomiting  Anion gap 18.4, HCO3 33.6, Na 132  Received IVF bolus and electrolyte repletion ordered.  Monitor and trend labs    Acute hypokalemia, hypomagnesemia, hypocalcemia  Secondary to malnutrition in the setting of excessive alcohol use  Replete as needed and monitor, per protocol.    Acute Kidney Injury  Remains nonoliguric. Baseline creatinine 0.4-0.5.  Likely prerenal secondary to dehydration  C/w IV fluids as ordered.  Monitor Input/Output very closely.   Avoids NSAIDs, nephrotoxic medications, and hypotension.    Possible Severe Sepsis  Possible UTI, though he is with no symptoms of UTI  Sepsis indicators could be confounded due to ABLA & reactive leukocytosis.   UA showed positive blood and  nitrites with +1 bacteria; follow urine culture.  Lactate 2.8, Received 2L of LR in ED  Remains afebrile  Blood cultures pending  Check MRSA & RVP  Check procalcitonin  Started on ceftriaxone, will continue.    Mood Disorder:   Was seen at [facility] ED on 3/14 at which time he admitted he was having thoughts of suicide however denies this admission.  Psychiatry consultation.    History of Hiatal Hernia: EGD in 2011    Nutrition:   NPO Diet NPO Type: Strict NPO     VTE Prophylaxis:  Mechanical VTE prophylaxis orders are present.         History of Present illness     Yonas Charles is a 29 y.o. male with PMH of alcohol abuse, cirrhosis, HTN and recent SI presented to the ED via EMS following a witnessed seizure lasting approximately 2 minutes. He has had multiple episodes of alcohol sobriety with intermittent periods of relapse. Patient reports he drank a bottle of vodka over the weekend but states this is his first relapse since October of 2024. Of note, he was seen at [facility] on 3/14 for alcohol intoxication with a BAL of 0.33 and admitted to thoughts of suicide following a recent breakup. Upon evaluation on this admission he denies thoughts of self harm, but endorses episodes of vomiting bright red blood this afternoon for which GI was consulted. He is being admitted with a principal diagnosis of Acute GI bleeding.     ED workup revealed significant labs of K 2.9, BUN 57, Cr 1.28, Mg 0.9, , ALT 55, Tbili 4.9, INR 2.33, Lactate 2.8, WBC 26.56, and Hgb 7.9. He was started on Octreotide and was admitted to ICU for further management.    ACP: In the event he is deemed unable to make his own medical decisions, his mother and next of kin will assume medical decision making.    Patient was seen and examined on 03/18/25 at 16:07 EDT .      Past Medical/Surgical/Social/Family History & Allergies     Past Medical History:   Diagnosis Date    Ascites     Cirrhosis of liver       Past Surgical History:    Procedure Laterality Date    HIATAL HERNIA REPAIR        Social History     Socioeconomic History    Marital status: Single   Tobacco Use    Smoking status: Some Days     Current packs/day: 0.25     Average packs/day: 0.3 packs/day for 9.2 years (2.3 ttl pk-yrs)     Types: Cigarettes     Start date: 2016     Passive exposure: Current   Vaping Use    Vaping status: Never Used   Substance and Sexual Activity    Alcohol use: Not Currently     Comment: 1 gallon of vodka a day    Drug use: Not Currently    Sexual activity: Defer      No family history on file.   Allergies   Allergen Reactions    Reglan [Metoclopramide] Mental Status Change      Social Drivers of Health     Tobacco Use: High Risk (8/9/2024)    Patient History     Smoking Tobacco Use: Some Days     Smokeless Tobacco Use: Unknown     Passive Exposure: Current   Alcohol Use: Alcohol Misuse (7/23/2024)    AUDIT-C     Frequency of Alcohol Consumption: 4 or more times a week     Average Number of Drinks: 10 or more     Frequency of Binge Drinking: Daily or almost daily   Financial Resource Strain: Medium Risk (7/23/2024)    Overall Financial Resource Strain (CARDIA)     Difficulty of Paying Living Expenses: Somewhat hard   Food Insecurity: Food Insecurity Present (7/23/2024)    Hunger Vital Sign     Worried About Running Out of Food in the Last Year: Sometimes true     Ran Out of Food in the Last Year: Sometimes true   Transportation Needs: No Transportation Needs (7/23/2024)    PRAPARE - Transportation     Lack of Transportation (Medical): No     Lack of Transportation (Non-Medical): No   Physical Activity: Sufficiently Active (7/23/2024)    Exercise Vital Sign     Days of Exercise per Week: 5 days     Minutes of Exercise per Session: 150+ min   Stress: Stress Concern Present (7/23/2024)    [employer] - Occupational Stress Questionnaire     Feeling of Stress : Rather much   Social Connections: Not At Risk (7/23/2024)    Family  and Community Support     Help with Day-to-Day Activities: I don't need any help     Lonely or Isolated: Never   Interpersonal Safety: Not At Risk (3/18/2025)    Abuse Screen     Unsafe at Home or Work/School: no     Feels Threatened by Someone?: no     Does Anyone Keep You from Contacting Others or Doint Things Outside the Home?: no     Physical Sign of Abuse Present: no   Depression: At risk (7/23/2024)    PHQ-2     PHQ-2 Score: 3   Housing Stability: Not At Risk (7/23/2024)    Housing Stability     Current Living Arrangements: home     Potentially Unsafe Housing Conditions: none   Utilities: Not At Risk (7/23/2024)    [facility] Utilities     Threatened with loss of utilities: No   Health Literacy: Not At Risk (7/23/2024)    Education     Help with school or training?: No     Preferred Language: English   Employment: Not At Risk (7/23/2024)    Employment     Do you want help finding or keeping work or a job?: I do not need or want help   Disabilities: Not At Risk (7/23/2024)    Disabilities     Concentrating, Remembering, or Making Decisions Difficulty: no     Doing Errands Independently Difficulty: no        Home Medications     Prior to Admission medications    Medication Sig Start Date End Date Taking? Authorizing Provider   nadolol (CORGARD) 20 MG tablet Take 1 tablet by mouth once a day as directed for 30 days 8/30/24      spironolactone (ALDACTONE) 25 MG tablet Take 1 tablet by mouth Daily. 8/30/24      Zinc Sulfate 220 (50 Zn) MG tablet Take 220 mg by mouth Daily. 8/30/24      metoprolol succinate XL (TOPROL-XL) 25 MG 24 hr tablet Take 1 tablet by mouth Daily for 30 days. 8/3/24 3/18/25  Medhat Burch MD        Objective / Physical Exam     Vital signs:  Temp: 98.2 °F (36.8 °C)  BP: 117/64  Heart Rate: (!) 138  Resp: 12  SpO2: 94 %  Weight: 55.8 kg (123 lb)    Admission Weight: Weight: 55.8 kg (123 lb)    Physical Exam  Constitutional:       General: He is not in acute distress.     Appearance: Normal  appearance. He is ill-appearing.   HENT:      Head: Normocephalic and atraumatic.      Nose: Nose normal.      Mouth/Throat:      Mouth: Mucous membranes are dry.      Pharynx: Oropharynx is clear.   Eyes:      Extraocular Movements: Extraocular movements intact.      Pupils: Pupils are equal, round, and reactive to light.   Cardiovascular:      Rate and Rhythm: Regular rhythm. Tachycardia present.      Pulses: Normal pulses.      Heart sounds: Normal heart sounds.   Pulmonary:      Effort: Pulmonary effort is normal. No respiratory distress.      Breath sounds: Normal breath sounds.      Comments: On Room Air  Abdominal:      General: Abdomen is flat. Bowel sounds are decreased. There is no distension.      Palpations: Abdomen is soft.      Tenderness: There is no abdominal tenderness. There is no guarding.   Musculoskeletal:      Cervical back: Normal range of motion. No edema.      Right lower leg: No edema.      Left lower leg: No edema.   Skin:     General: Skin is warm and dry.   Neurological:      General: No focal deficit present.      Mental Status: He is alert and oriented to person, place, and time.   Psychiatric:         Attention and Perception: Attention normal.         Mood and Affect: Mood normal.         Speech: Speech normal.         Behavior: Behavior normal.         Thought Content: Thought content normal.         Cognition and Memory: Cognition and memory normal.         Judgment: Judgment normal.          Labs     Results from last 7 days   Lab Units 03/18/25  1339   WBC 10*3/mm3 26.56*   HEMOGLOBIN g/dL 7.9*   HEMATOCRIT % 22.7*   PLATELETS 10*3/mm3 132*      Results from last 7 days   Lab Units 03/18/25  1339   SODIUM mmol/L 132*   POTASSIUM mmol/L 2.9*   CHLORIDE mmol/L 80*   CO2 mmol/L 33.6*   ANION GAP mmol/L 18.4*   BUN mg/dL 57*   CREATININE mg/dL 1.28*   GLUCOSE mg/dL 141*   MAGNESIUM mg/dL 0.9*   ALT (SGPT) U/L 55*   AST (SGOT) U/L 156*   ALK PHOS U/L 89            Imaging     EKG: My  independent evaluation showed sinus tachycardia with prolonged QT, no ST -T changes. QTc 509    Current Medications     Scheduled Meds:  cefTRIAXone, 2,000 mg, Intravenous, Q24H  folic acid 1 mg in sodium chloride 0.9 % 50 mL IVPB, 1 mg, Intravenous, Daily  mupirocin, 1 Application, Each Nare, BID  [Held by provider] nadolol, 20 mg, Oral, Q24H  pantoprazole, 40 mg, Intravenous, BID AC  PHENobarbital, 3 mg/kg (Ideal), Intravenous, Once   Followed by  PHENobarbital, 3 mg/kg (Ideal), Intravenous, Once  phytonadione (AQUA-MEPHYTON, VITAMIN K) 5 mg in sodium chloride 0.9 % 50 mL IVPB, 5 mg, Intravenous, Once  potassium chloride, 10 mEq, Intravenous, Q1H  sodium chloride, 10 mL, Intravenous, Q12H  sodium chloride, 10 mL, Intravenous, Q12H  [Held by provider] spironolactone, 25 mg, Oral, Daily  thiamine (B-1) IV, 200 mg, Intravenous, Q8H   Followed by  [START ON 3/24/2025] thiamine, 100 mg, Oral, Daily  [Held by provider] zinc sulfate, 220 mg, Oral, Daily         Continuous Infusions:  octreotide (SandoSTATIN) infusion, 25 mcg/hr, Last Rate: 25 mcg/hr (03/18/25 1542)         MARI Case Student   Critical Care  03/18/25   16:07 EDT     Total critical care time: Approximately 31 minutes    Due to a high probability of clinically significant, life threatening deterioration, the patient required my highest level of preparedness to intervene emergently and I personally spent this critical care time directly and personally managing the patient. This critical care time included obtaining a history; examining the patient; pulse oximetry; ordering and review of studies; arranging urgent treatment with development of a management plan; evaluation of patient's response to treatment; frequent reassessment; and, discussions with other providers.    This critical care time was performed to assess and manage the high probability of imminent, life-threatening deterioration that could result in multi-organ failure. It was exclusive  of separately billable procedures and treating other patients and teaching time.    I have reviewed this documentation and agree with NP student note, with corrections included within it.  Pt seen and examined by me personally.  I have personally reviewed all overnight events, vitals, labs, chart notes, and imaging.  The NP student and I have collaborated together with the attending critical care physician to design the stated plan.    MARI Wong  Critical Care  03/18/25  18:36 EDT

## 2025-03-19 ENCOUNTER — ANESTHESIA (OUTPATIENT)
Dept: GASTROENTEROLOGY | Facility: HOSPITAL | Age: 30
End: 2025-03-19
Payer: MEDICAID

## 2025-03-19 ENCOUNTER — INPATIENT HOSPITAL (OUTPATIENT)
Dept: URBAN - METROPOLITAN AREA HOSPITAL 84 | Facility: HOSPITAL | Age: 30
End: 2025-03-19
Payer: COMMERCIAL

## 2025-03-19 ENCOUNTER — ANESTHESIA EVENT (OUTPATIENT)
Dept: GASTROENTEROLOGY | Facility: HOSPITAL | Age: 30
End: 2025-03-19
Payer: MEDICAID

## 2025-03-19 DIAGNOSIS — K76.6 PORTAL HYPERTENSION: ICD-10-CM

## 2025-03-19 DIAGNOSIS — I85.00 ESOPHAGEAL VARICES WITHOUT BLEEDING: ICD-10-CM

## 2025-03-19 DIAGNOSIS — K31.89 OTHER DISEASES OF STOMACH AND DUODENUM: ICD-10-CM

## 2025-03-19 DIAGNOSIS — K92.0 HEMATEMESIS: ICD-10-CM

## 2025-03-19 LAB
ALBUMIN SERPL-MCNC: 3.4 G/DL (ref 3.5–5.2)
ALBUMIN/GLOB SERPL: 1.3 G/DL
ALP SERPL-CCNC: 78 U/L (ref 39–117)
ALT SERPL W P-5'-P-CCNC: 55 U/L (ref 1–41)
ANION GAP SERPL CALCULATED.3IONS-SCNC: 11.3 MMOL/L (ref 5–15)
ANION GAP SERPL CALCULATED.3IONS-SCNC: 12.9 MMOL/L (ref 5–15)
AST SERPL-CCNC: 161 U/L (ref 1–40)
BASOPHILS # BLD AUTO: 0.06 10*3/MM3 (ref 0–0.2)
BASOPHILS NFR BLD AUTO: 0.3 % (ref 0–1.5)
BH BB BLOOD EXPIRATION DATE: NORMAL
BH BB BLOOD TYPE BARCODE: 5100
BH BB DISPENSE STATUS: NORMAL
BH BB PRODUCT CODE: NORMAL
BH BB UNIT NUMBER: NORMAL
BILIRUB SERPL-MCNC: 3.9 MG/DL (ref 0–1.2)
BUN SERPL-MCNC: 55 MG/DL (ref 6–20)
BUN SERPL-MCNC: 58 MG/DL (ref 6–20)
BUN/CREAT SERPL: 43.7 (ref 7–25)
BUN/CREAT SERPL: 44.6 (ref 7–25)
CALCIUM SPEC-SCNC: 7.6 MG/DL (ref 8.6–10.5)
CALCIUM SPEC-SCNC: 7.6 MG/DL (ref 8.6–10.5)
CHLORIDE SERPL-SCNC: 88 MMOL/L (ref 98–107)
CHLORIDE SERPL-SCNC: 89 MMOL/L (ref 98–107)
CHOLEST SERPL-MCNC: 93 MG/DL (ref 0–200)
CO2 SERPL-SCNC: 32.1 MMOL/L (ref 22–29)
CO2 SERPL-SCNC: 32.7 MMOL/L (ref 22–29)
CREAT SERPL-MCNC: 1.26 MG/DL (ref 0.76–1.27)
CREAT SERPL-MCNC: 1.3 MG/DL (ref 0.76–1.27)
CROSSMATCH INTERPRETATION: NORMAL
DEPRECATED RDW RBC AUTO: 48 FL (ref 37–54)
EGFRCR SERPLBLD CKD-EPI 2021: 75.8 ML/MIN/1.73
EGFRCR SERPLBLD CKD-EPI 2021: 78.7 ML/MIN/1.73
EOSINOPHIL # BLD AUTO: 0.05 10*3/MM3 (ref 0–0.4)
EOSINOPHIL NFR BLD AUTO: 0.2 % (ref 0.3–6.2)
ERYTHROCYTE [DISTWIDTH] IN BLOOD BY AUTOMATED COUNT: 14.4 % (ref 12.3–15.4)
GLOBULIN UR ELPH-MCNC: 2.7 GM/DL
GLUCOSE BLDC GLUCOMTR-MCNC: 124 MG/DL (ref 70–105)
GLUCOSE SERPL-MCNC: 130 MG/DL (ref 65–99)
GLUCOSE SERPL-MCNC: 143 MG/DL (ref 65–99)
HCT VFR BLD AUTO: 19.7 % (ref 37.5–51)
HCT VFR BLD AUTO: 21.4 % (ref 37.5–51)
HCT VFR BLD AUTO: 22.1 % (ref 37.5–51)
HCT VFR BLD AUTO: 22.3 % (ref 37.5–51)
HCT VFR BLD AUTO: 23.3 % (ref 37.5–51)
HDLC SERPL-MCNC: 30 MG/DL (ref 40–60)
HGB BLD-MCNC: 6.5 G/DL (ref 13–17.7)
HGB BLD-MCNC: 7.1 G/DL (ref 13–17.7)
HGB BLD-MCNC: 7.6 G/DL (ref 13–17.7)
HGB BLD-MCNC: 7.6 G/DL (ref 13–17.7)
HGB BLD-MCNC: 7.9 G/DL (ref 13–17.7)
IMM GRANULOCYTES # BLD AUTO: 0.12 10*3/MM3 (ref 0–0.05)
IMM GRANULOCYTES NFR BLD AUTO: 0.5 % (ref 0–0.5)
INR PPP: 2.03 (ref 0.9–1.1)
INR PPP: 2.32 (ref 0.9–1.1)
LDLC SERPL CALC-MCNC: 41 MG/DL (ref 0–100)
LDLC/HDLC SERPL: 1.31 {RATIO}
LYMPHOCYTES # BLD AUTO: 3.2 10*3/MM3 (ref 0.7–3.1)
LYMPHOCYTES NFR BLD AUTO: 14.5 % (ref 19.6–45.3)
MAGNESIUM SERPL-MCNC: 2.7 MG/DL (ref 1.6–2.6)
MCH RBC QN AUTO: 31.7 PG (ref 26.6–33)
MCHC RBC AUTO-ENTMCNC: 33.9 G/DL (ref 31.5–35.7)
MCV RBC AUTO: 93.6 FL (ref 79–97)
MONOCYTES # BLD AUTO: 2.1 10*3/MM3 (ref 0.1–0.9)
MONOCYTES NFR BLD AUTO: 9.5 % (ref 5–12)
NEUTROPHILS NFR BLD AUTO: 16.52 10*3/MM3 (ref 1.7–7)
NEUTROPHILS NFR BLD AUTO: 75 % (ref 42.7–76)
NRBC BLD AUTO-RTO: 0 /100 WBC (ref 0–0.2)
PHOSPHATE SERPL-MCNC: 4.2 MG/DL (ref 2.5–4.5)
PLATELET # BLD AUTO: 103 10*3/MM3 (ref 140–450)
PMV BLD AUTO: 11.3 FL (ref 6–12)
POTASSIUM SERPL-SCNC: 3.3 MMOL/L (ref 3.5–5.2)
POTASSIUM SERPL-SCNC: 3.5 MMOL/L (ref 3.5–5.2)
POTASSIUM SERPL-SCNC: 3.7 MMOL/L (ref 3.5–5.2)
PROT SERPL-MCNC: 6.1 G/DL (ref 6–8.5)
PROTHROMBIN TIME: 23.1 SECONDS (ref 11.7–14.2)
PROTHROMBIN TIME: 25.7 SECONDS (ref 11.7–14.2)
QT INTERVAL: 320 MS
QTC INTERVAL: 509 MS
RBC # BLD AUTO: 2.49 10*6/MM3 (ref 4.14–5.8)
SODIUM SERPL-SCNC: 133 MMOL/L (ref 136–145)
SODIUM SERPL-SCNC: 133 MMOL/L (ref 136–145)
TRIGL SERPL-MCNC: 118 MG/DL (ref 0–150)
UNIT  ABO: NORMAL
UNIT  RH: NORMAL
VLDLC SERPL-MCNC: 22 MG/DL (ref 5–40)
WBC NRBC COR # BLD AUTO: 22.05 10*3/MM3 (ref 3.4–10.8)

## 2025-03-19 PROCEDURE — 85018 HEMOGLOBIN: CPT

## 2025-03-19 PROCEDURE — P9059 PLASMA, FRZ BETWEEN 8-24HOUR: HCPCS

## 2025-03-19 PROCEDURE — 86927 PLASMA FRESH FROZEN: CPT

## 2025-03-19 PROCEDURE — 25010000002 CEFTRIAXONE PER 250 MG

## 2025-03-19 PROCEDURE — 86900 BLOOD TYPING SEROLOGIC ABO: CPT

## 2025-03-19 PROCEDURE — 84100 ASSAY OF PHOSPHORUS: CPT | Performed by: NURSE PRACTITIONER

## 2025-03-19 PROCEDURE — 80061 LIPID PANEL: CPT | Performed by: NURSE PRACTITIONER

## 2025-03-19 PROCEDURE — 85014 HEMATOCRIT: CPT

## 2025-03-19 PROCEDURE — 25010000002 POTASSIUM CHLORIDE 10 MEQ/100ML SOLUTION: Performed by: FAMILY MEDICINE

## 2025-03-19 PROCEDURE — 36430 TRANSFUSION BLD/BLD COMPNT: CPT

## 2025-03-19 PROCEDURE — 85610 PROTHROMBIN TIME: CPT | Performed by: EMERGENCY MEDICINE

## 2025-03-19 PROCEDURE — 06L38CZ OCCLUSION OF ESOPHAGEAL VEIN WITH EXTRALUMINAL DEVICE, VIA NATURAL OR ARTIFICIAL OPENING ENDOSCOPIC: ICD-10-PCS | Performed by: INTERNAL MEDICINE

## 2025-03-19 PROCEDURE — 25010000002 OCTREOTIDE PER 25 MCG

## 2025-03-19 PROCEDURE — 84132 ASSAY OF SERUM POTASSIUM: CPT | Performed by: EMERGENCY MEDICINE

## 2025-03-19 PROCEDURE — 85610 PROTHROMBIN TIME: CPT | Performed by: NURSE PRACTITIONER

## 2025-03-19 PROCEDURE — P9016 RBC LEUKOCYTES REDUCED: HCPCS

## 2025-03-19 PROCEDURE — 25010000002 LIDOCAINE PF 2% 2 % SOLUTION: Performed by: NURSE ANESTHETIST, CERTIFIED REGISTERED

## 2025-03-19 PROCEDURE — 25010000002 LORAZEPAM PER 2 MG: Performed by: NURSE PRACTITIONER

## 2025-03-19 PROCEDURE — 25010000002 THIAMINE PER 100 MG: Performed by: NURSE PRACTITIONER

## 2025-03-19 PROCEDURE — 25010000002 ONDANSETRON PER 1 MG: Performed by: NURSE PRACTITIONER

## 2025-03-19 PROCEDURE — 80053 COMPREHEN METABOLIC PANEL: CPT | Performed by: NURSE PRACTITIONER

## 2025-03-19 PROCEDURE — 25010000002 THIAMINE HCL 200 MG/2ML SOLUTION: Performed by: NURSE PRACTITIONER

## 2025-03-19 PROCEDURE — 82948 REAGENT STRIP/BLOOD GLUCOSE: CPT

## 2025-03-19 PROCEDURE — 85025 COMPLETE CBC W/AUTO DIFF WBC: CPT | Performed by: NURSE PRACTITIONER

## 2025-03-19 PROCEDURE — 25010000002 POTASSIUM CHLORIDE 10 MEQ/100ML SOLUTION: Performed by: EMERGENCY MEDICINE

## 2025-03-19 PROCEDURE — 43244 EGD VARICES LIGATION: CPT | Performed by: INTERNAL MEDICINE

## 2025-03-19 PROCEDURE — 25010000002 PROPOFOL 10 MG/ML EMULSION: Performed by: NURSE ANESTHETIST, CERTIFIED REGISTERED

## 2025-03-19 PROCEDURE — 83735 ASSAY OF MAGNESIUM: CPT | Performed by: NURSE PRACTITIONER

## 2025-03-19 RX ORDER — ONDANSETRON 2 MG/ML
4 INJECTION INTRAMUSCULAR; INTRAVENOUS EVERY 6 HOURS PRN
Status: DISCONTINUED | OUTPATIENT
Start: 2025-03-19 | End: 2025-03-23 | Stop reason: HOSPADM

## 2025-03-19 RX ORDER — PHENYLEPHRINE HCL IN 0.9% NACL 1 MG/10 ML
SYRINGE (ML) INTRAVENOUS AS NEEDED
Status: DISCONTINUED | OUTPATIENT
Start: 2025-03-19 | End: 2025-03-19 | Stop reason: SURG

## 2025-03-19 RX ORDER — PROPOFOL 10 MG/ML
VIAL (ML) INTRAVENOUS AS NEEDED
Status: DISCONTINUED | OUTPATIENT
Start: 2025-03-19 | End: 2025-03-19 | Stop reason: SURG

## 2025-03-19 RX ORDER — EPHEDRINE SULFATE 5 MG/ML
5 INJECTION INTRAVENOUS ONCE AS NEEDED
Status: DISCONTINUED | OUTPATIENT
Start: 2025-03-19 | End: 2025-03-19 | Stop reason: HOSPADM

## 2025-03-19 RX ORDER — LIDOCAINE HYDROCHLORIDE 20 MG/ML
INJECTION, SOLUTION EPIDURAL; INFILTRATION; INTRACAUDAL; PERINEURAL AS NEEDED
Status: DISCONTINUED | OUTPATIENT
Start: 2025-03-19 | End: 2025-03-19 | Stop reason: SURG

## 2025-03-19 RX ORDER — POTASSIUM CHLORIDE 7.45 MG/ML
10 INJECTION INTRAVENOUS
Status: COMPLETED | OUTPATIENT
Start: 2025-03-19 | End: 2025-03-20

## 2025-03-19 RX ORDER — DIPHENHYDRAMINE HYDROCHLORIDE 50 MG/ML
12.5 INJECTION, SOLUTION INTRAMUSCULAR; INTRAVENOUS
Status: DISCONTINUED | OUTPATIENT
Start: 2025-03-19 | End: 2025-03-19 | Stop reason: HOSPADM

## 2025-03-19 RX ORDER — POTASSIUM CHLORIDE 7.45 MG/ML
10 INJECTION INTRAVENOUS
Status: COMPLETED | OUTPATIENT
Start: 2025-03-19 | End: 2025-03-19

## 2025-03-19 RX ORDER — ONDANSETRON 2 MG/ML
4 INJECTION INTRAMUSCULAR; INTRAVENOUS ONCE AS NEEDED
Status: DISCONTINUED | OUTPATIENT
Start: 2025-03-19 | End: 2025-03-19 | Stop reason: HOSPADM

## 2025-03-19 RX ORDER — MEPERIDINE HYDROCHLORIDE 25 MG/ML
12.5 INJECTION INTRAMUSCULAR; INTRAVENOUS; SUBCUTANEOUS
Status: DISCONTINUED | OUTPATIENT
Start: 2025-03-19 | End: 2025-03-19 | Stop reason: HOSPADM

## 2025-03-19 RX ORDER — HYDRALAZINE HYDROCHLORIDE 20 MG/ML
5 INJECTION INTRAMUSCULAR; INTRAVENOUS
Status: DISCONTINUED | OUTPATIENT
Start: 2025-03-19 | End: 2025-03-19 | Stop reason: HOSPADM

## 2025-03-19 RX ORDER — LABETALOL HYDROCHLORIDE 5 MG/ML
5 INJECTION, SOLUTION INTRAVENOUS
Status: DISCONTINUED | OUTPATIENT
Start: 2025-03-19 | End: 2025-03-19 | Stop reason: HOSPADM

## 2025-03-19 RX ORDER — IPRATROPIUM BROMIDE AND ALBUTEROL SULFATE 2.5; .5 MG/3ML; MG/3ML
3 SOLUTION RESPIRATORY (INHALATION) ONCE AS NEEDED
Status: DISCONTINUED | OUTPATIENT
Start: 2025-03-19 | End: 2025-03-19 | Stop reason: HOSPADM

## 2025-03-19 RX ORDER — ONDANSETRON 4 MG/1
4 TABLET, ORALLY DISINTEGRATING ORAL EVERY 6 HOURS PRN
Status: DISCONTINUED | OUTPATIENT
Start: 2025-03-19 | End: 2025-03-23 | Stop reason: HOSPADM

## 2025-03-19 RX ADMIN — FOLIC ACID 1 MG: 5 INJECTION, SOLUTION INTRAMUSCULAR; INTRAVENOUS; SUBCUTANEOUS at 09:35

## 2025-03-19 RX ADMIN — LORAZEPAM 2 MG: 2 INJECTION INTRAMUSCULAR; INTRAVENOUS at 20:35

## 2025-03-19 RX ADMIN — POTASSIUM CHLORIDE 10 MEQ: 7.46 INJECTION, SOLUTION INTRAVENOUS at 21:07

## 2025-03-19 RX ADMIN — PANTOPRAZOLE SODIUM 40 MG: 40 INJECTION, POWDER, LYOPHILIZED, FOR SOLUTION INTRAVENOUS at 16:53

## 2025-03-19 RX ADMIN — PROPOFOL 100 MG: 10 INJECTION, EMULSION INTRAVENOUS at 15:26

## 2025-03-19 RX ADMIN — THIAMINE HYDROCHLORIDE 200 MG: 100 INJECTION, SOLUTION INTRAMUSCULAR; INTRAVENOUS at 21:56

## 2025-03-19 RX ADMIN — POTASSIUM CHLORIDE 10 MEQ: 7.46 INJECTION, SOLUTION INTRAVENOUS at 22:18

## 2025-03-19 RX ADMIN — Medication 10 ML: at 21:56

## 2025-03-19 RX ADMIN — MUPIROCIN 1 APPLICATION: 20 OINTMENT TOPICAL at 21:07

## 2025-03-19 RX ADMIN — Medication 10 ML: at 09:53

## 2025-03-19 RX ADMIN — POTASSIUM CHLORIDE 10 MEQ: 7.46 INJECTION, SOLUTION INTRAVENOUS at 06:17

## 2025-03-19 RX ADMIN — SODIUM CHLORIDE 400 ML: 9 INJECTION, SOLUTION INTRAVENOUS at 15:40

## 2025-03-19 RX ADMIN — THIAMINE HYDROCHLORIDE 200 MG: 100 INJECTION, SOLUTION INTRAMUSCULAR; INTRAVENOUS at 16:53

## 2025-03-19 RX ADMIN — LORAZEPAM 2 MG: 2 INJECTION INTRAMUSCULAR; INTRAVENOUS at 08:00

## 2025-03-19 RX ADMIN — LIDOCAINE HYDROCHLORIDE 50 MG: 20 INJECTION, SOLUTION EPIDURAL; INFILTRATION; INTRACAUDAL; PERINEURAL at 15:26

## 2025-03-19 RX ADMIN — PANTOPRAZOLE SODIUM 40 MG: 40 INJECTION, POWDER, LYOPHILIZED, FOR SOLUTION INTRAVENOUS at 07:23

## 2025-03-19 RX ADMIN — POTASSIUM CHLORIDE 10 MEQ: 7.46 INJECTION, SOLUTION INTRAVENOUS at 07:23

## 2025-03-19 RX ADMIN — SODIUM CHLORIDE 100 ML/HR: 9 INJECTION, SOLUTION INTRAVENOUS at 15:26

## 2025-03-19 RX ADMIN — ONDANSETRON 4 MG: 2 INJECTION, SOLUTION INTRAMUSCULAR; INTRAVENOUS at 00:15

## 2025-03-19 RX ADMIN — POTASSIUM CHLORIDE 10 MEQ: 7.46 INJECTION, SOLUTION INTRAVENOUS at 09:35

## 2025-03-19 RX ADMIN — POTASSIUM CHLORIDE 10 MEQ: 7.46 INJECTION, SOLUTION INTRAVENOUS at 23:23

## 2025-03-19 RX ADMIN — LORAZEPAM 2 MG: 2 INJECTION, SOLUTION INTRAMUSCULAR; INTRAVENOUS at 16:52

## 2025-03-19 RX ADMIN — THIAMINE HYDROCHLORIDE 200 MG: 100 INJECTION, SOLUTION INTRAMUSCULAR; INTRAVENOUS at 05:33

## 2025-03-19 RX ADMIN — MUPIROCIN 1 APPLICATION: 20 OINTMENT TOPICAL at 09:35

## 2025-03-19 RX ADMIN — LORAZEPAM 2 MG: 2 INJECTION INTRAMUSCULAR; INTRAVENOUS at 00:30

## 2025-03-19 RX ADMIN — POTASSIUM CHLORIDE 10 MEQ: 7.46 INJECTION, SOLUTION INTRAVENOUS at 08:21

## 2025-03-19 RX ADMIN — LORAZEPAM 1 MG: 2 INJECTION INTRAMUSCULAR; INTRAVENOUS at 11:20

## 2025-03-19 RX ADMIN — Medication 100 MCG: at 15:33

## 2025-03-19 RX ADMIN — OCTREOTIDE ACETATE 25 MCG/HR: 500 INJECTION, SOLUTION INTRAVENOUS; SUBCUTANEOUS at 08:12

## 2025-03-19 RX ADMIN — CEFTRIAXONE 2000 MG: 2 INJECTION, POWDER, FOR SOLUTION INTRAMUSCULAR; INTRAVENOUS at 10:39

## 2025-03-19 NOTE — CASE MANAGEMENT/SOCIAL WORK
Discharge Planning Assessment   Tio     Patient Name: Yonas Charles  MRN: [id number]  Today's Date: 3/19/2025    Admit Date: 3/18/2025    Plan: From home with parents, possible Inpt Psych.   Discharge Needs Assessment       Row Name 03/19/25 0910       Living Environment    People in Home parent(s)    Name(s) of People in Home Wilbur/Isidoro - parents    Current Living Arrangements home    Potentially Unsafe Housing Conditions none    In the past 12 months has the electric, gas, oil, or water company threatened to shut off services in your home? No    Primary Care Provided by self    Provides Primary Care For no one    Family Caregiver if Needed parent(s)    Family Caregiver Names Wilbur/Isidoro - parents    Quality of Family Relationships helpful;involved;supportive    Able to Return to Prior Arrangements yes       Resource/Environmental Concerns    Resource/Environmental Concerns none    Transportation Concerns none       Transportation Needs    In the past 12 months, has lack of transportation kept you from medical appointments or from getting medications? no    In the past 12 months, has lack of transportation kept you from meetings, work, or from getting things needed for daily living? No       Food Insecurity    Within the past 12 months, you worried that your food would run out before you got the money to buy more. Never true    Within the past 12 months, the food you bought just didn't last and you didn't have money to get more. Never true       Transition Planning    Patient/Family Anticipates Transition to home with family    Patient/Family Anticipated Services at Transition none    Transportation Anticipated car, drives self;family or friend will provide       Discharge Needs Assessment    Readmission Within the Last 30 Days no previous admission in last 30 days    Equipment Currently Used at Home none    Concerns to be Addressed discharge planning    Anticipated Changes Related to Illness none     Equipment Needed After Discharge none                   Discharge Plan       Row Name 03/19/25 0911       Plan    Plan From home with parents, possible Inpt Psych.    Patient/Family in Agreement with Plan yes    Plan Comments CM met with patient at bedside. Patient is confused/sedated. CM spoke with father on phone. Patient lives at home with parents who will transport at discharge. Patient performs ADLs. PCP (has not seen one in years -will need new PCP appt at dc) and pharmacy confirmed. Agreeable to M2B.  Denies financial assistance needs for medication and/or food. Denies any current DME, HH, Caregiver, or rehab services. DC Barriers: CIWA, monitor H&H, 1u PRBCs, plan EGD 3/19, Psych/GI following.                       Demographic Summary       Row Name 03/19/25 0909       General Information    Admission Type inpatient    Arrived From emergency department    Referral Source admission list    Reason for Consult discharge planning    Preferred Language English                   Functional Status       Row Name 03/19/25 0910       Functional Status    Usual Activity Tolerance good    Current Activity Tolerance fair       Functional Status, IADL    Medications independent    Meal Preparation independent    Housekeeping independent    Laundry independent    Shopping independent       Mental Status    General Appearance WDL WDL       Mental Status Summary    Recent Changes in Mental Status/Cognitive Functioning no changes             SUE Faust RN  ICU/CVU   O: [phone]  C: [phone]  [email or web address]

## 2025-03-19 NOTE — PROGRESS NOTES
Critical Care Progress Note     Yonas Charles : 1995 MRN:[id number] LOS:1  Rm: 2314/1     Principal Problem: Acute GI bleeding     Reason for follow up: All the medical problems listed below    Summary     Yonas Charles is a 29 y.o. male with PMH of alcohol abuse, cirrhosis, HTN and recent SI presented to the ED via EMS following a witnessed seizure lasting approximately 2 minutes. He has had multiple episodes of alcohol sobriety with intermittent periods of relapse. Patient reports he drank a bottle of vodka over the weekend but states this is his first relapse since 2024. Of note, he was seen at [facility] on 3/14 for alcohol intoxication with a BAL of 0.33 and admitted to thoughts of suicide following a recent breakup. Upon evaluation on this admission he denies thoughts of self harm, but endorses episodes of vomiting bright red blood this afternoon for which GI was consulted. He is being admitted with a principal diagnosis of Acute GI bleeding.      ED workup revealed significant labs of K 2.9, BUN 57, Cr 1.28, Mg 0.9, , ALT 55, Tbili 4.9, INR 2.33, Lactate 2.8, WBC 26.56, and Hgb 7.9. He was started on Octreotide and was admitted to ICU for further management.     ACP: In the event he is deemed unable to make his own medical decisions, his mother and next of kin will assume medical decision making.    Patient is on Hospital Day: 2.    Significant Events / Subjective     25 : Received 1 unit PRBC and 2 units FFP.  More somnolent due to Ativan this AM.  EGD planned for later today.  Downgrade to PCU, hospitalist service consulted.    Assessment / Plan     ABLA / Acute GI Bleed / Coagulopathy  Patient had episode of bright red emesis, suspect erosive esophagitis in the setting of portal HTN  Hgb 7.9 on admission, INR 2.33  GI following, EGD planned for today.  Continue Protonix IV BID.  Octreotide gtt started by GI.  Vit K ordered per GI.  Hemoglobin & hematocrit every 4 hours,  transfuse PRBC for Hgb <7  Has received 1 unit PRBCs and 2 units FFP.     Delirium Tremens / New Onset Seizure  Likely due to alcohol withdrawal  Initiate CIWA protocol, monitor for signs of withdrawal  On phenobarb taper.  On thiamine and folate replacement  CT Head-negative  No seizures.  Continue seizure precautions     Acute on chronic alcoholic cirrhosis with Ascites / History of portal hypertension and intra-abdominal varices  Monitor and trend LFTs.    Last paracentesis noted in August of 2024, however patient reports he is attempting to follow a low sodium diet  Liver US pending  Holding nadolol & spironolactone due to NPO status.     Metabolic Acidosis with Elevated Anion Gap  Secondary to electrolyte imbalance from nausea and vomiting  Anion gap 18.4, HCO3 33.6, Na 132  Received IVF bolus and electrolyte repletion ordered.  Monitor and trend labs     Acute hypokalemia, hypomagnesemia, hypocalcemia  Secondary to malnutrition in the setting of excessive alcohol use  Replete as needed and monitor, per protocol.     Acute Kidney Injury  Remains nonoliguric. Baseline creatinine 0.4-0.5.  Likely prerenal secondary to dehydration  C/w IV fluids as ordered.  Monitor Input/Output very closely.   Avoids NSAIDs, nephrotoxic medications, and hypotension.     Possible Severe Sepsis  Possible UTI, though he is with no symptoms of UTI  Sepsis indicators could be confounded due to ABLA & reactive leukocytosis.   UA showed positive blood and nitrites with +1 bacteria; follow urine culture.  Lactate 2.8, Received 2L of LR in ED  Remains afebrile  Blood cultures pending  MRSA & RVP-negative.  Check procalcitonin  Started on ceftriaxone, will continue unless no varices noted, then discontinue.     Mood Disorder:   Was seen at [facility] ED on 3/14 at which time he admitted he was having thoughts of suicide however denies this admission.  Psychiatry consultation.     Marijuana use:  on cessation of illicit  substances.  History of Hiatal Hernia: EGD in 2011    Disposition:  Downgrade to PCU, hospitalist consulted.    Code status:   Code Status (Patient has no pulse and is not breathing): CPR (Attempt to Resuscitate)  Medical Interventions (Patient has pulse or is breathing): Full Support       Nutrition:   NPO Diet NPO Type: Strict NPO   Patient isn't on Tube Feeding     VTE Prophylaxis:  Mechanical VTE prophylaxis orders are present.       Objective / Physical Exam     Vital signs:  Temp: 98.4 °F (36.9 °C)  BP: 113/66  Heart Rate: (!) 121  Resp: 13  SpO2: 95 %  Weight: 58.3 kg (128 lb 8.5 oz)    Admission Weight: Weight: 55.8 kg (123 lb)  Current Weight: Weight: 58.3 kg (128 lb 8.5 oz)    Input/Output in last 24 hours:    Intake/Output Summary (Last 24 hours) at 3/19/2025 0725  Last data filed at 3/19/2025 0400  Gross per 24 hour   Intake 1750 ml   Output 500 ml   Net 1250 ml      Net IO Since Admission: 1,250 mL [03/19/25 0743]     Physical Exam  Vitals and nursing note reviewed.   Constitutional:       General: He is not in acute distress.     Appearance: He is not ill-appearing, toxic-appearing or diaphoretic.      Comments: Recently received Ativan.   HENT:      Head: Normocephalic and atraumatic.      Nose: Nose normal.      Mouth/Throat:      Mouth: Mucous membranes are moist.      Pharynx: Oropharynx is clear.   Eyes:      Conjunctiva/sclera: Conjunctivae normal.      Comments: Eyelids closed.   Cardiovascular:      Rate and Rhythm: Regular rhythm. Tachycardia present.      Pulses: Normal pulses.      Heart sounds: No murmur heard.  Pulmonary:      Effort: Pulmonary effort is normal. No respiratory distress.   Abdominal:      General: There is no distension.      Palpations: Abdomen is soft.      Tenderness: There is no abdominal tenderness.   Musculoskeletal:      Right lower leg: No edema.      Left lower leg: No edema.   Skin:     General: Skin is warm and dry.   Neurological:      General: No focal deficit  present.      Mental Status: He is lethargic.      Comments: Drowsly, orients easily.  No obvious focal deficits.   Psychiatric:      Comments: Drowsy, cooperative.          Radiology and Labs     Results from last 7 days   Lab Units 03/19/25  0426 03/19/25  0247 03/18/25  2244 03/18/25  2040 03/18/25  1614 03/18/25  1339   WBC 10*3/mm3 22.05*  --   --   --   --  26.56*   HEMOGLOBIN g/dL 7.9* 7.6* 6.1* 6.7* 7.4* 7.9*   HEMATOCRIT % 23.3* 22.3* 17.9* 19.3* 21.4* 22.7*   PLATELETS 10*3/mm3 103*  --   --   --   --  132*      Results from last 7 days   Lab Units 03/19/25  0047 03/18/25  1339   PROTIME Seconds 25.7* 25.8*   INR  2.32* 2.33*      Results from last 7 days   Lab Units 03/19/25 0426 03/19/25  0047 03/19/25  0025 03/18/25  1339   SODIUM mmol/L 133* 133*  --  132*   POTASSIUM mmol/L 3.5 3.7  --  2.9*   CHLORIDE mmol/L 89* 88*  --  80*   CO2 mmol/L 32.7* 32.1*  --  33.6*   ANION GAP mmol/L 11.3 12.9  --  18.4*   BUN mg/dL 55* 58*  --  57*   CREATININE mg/dL 1.26 1.30*  --  1.28*   GLUCOSE mg/dL 130* 143*  --  141*   PHOSPHORUS mg/dL 4.2  --   --   --    MAGNESIUM mg/dL  --   --  2.7* 0.9*   ALT (SGPT) U/L 55*  --   --  55*   AST (SGOT) U/L 161*  --   --  156*   ALK PHOS U/L 78  --   --  89      Results from last 7 days   Lab Units 03/19/25  0426 03/18/25  1339   ALT (SGPT) U/L 55* 55*   AST (SGOT) U/L 161* 156*   ALK PHOS U/L 78 89           US Liver  Result Date: 3/18/2025  Impression: 1.Diffusely increased hepatic echogenicity, consistent with hepatic steatosis. 2.Minimal flow shown in the main portal vein, which may be due to slow flow or thrombus. This can be further evaluated with contrast-enhanced CT abdomen/pelvis (portal venous phase). Electronically Signed: Emily Newsome  3/18/2025 7:12 PM EDT  Workstation ID: [id number]    CT Head Without Contrast  Result Date: 3/18/2025  Impression: No acute intracranial pathology. Electronically Signed: Rafiq Verma MD  3/18/2025 6:04 PM EDT  Workstation ID:  [id number]      Current medications     Scheduled Meds:   cefTRIAXone, 2,000 mg, Intravenous, Q24H  folic acid 1 mg in sodium chloride 0.9 % 50 mL IVPB, 1 mg, Intravenous, Daily  mupirocin, 1 Application, Each Nare, BID  [Held by provider] nadolol, 20 mg, Oral, Q24H  pantoprazole, 40 mg, Intravenous, BID AC  potassium chloride, 10 mEq, Intravenous, Q1H  sodium chloride, 10 mL, Intravenous, Q12H  sodium chloride, 10 mL, Intravenous, Q12H  [Held by provider] spironolactone, 25 mg, Oral, Daily  thiamine (B-1) IV, 200 mg, Intravenous, Q8H   Followed by  [START ON 3/24/2025] thiamine, 100 mg, Oral, Daily  [Held by provider] zinc sulfate, 220 mg, Oral, Daily        Continuous Infusions:   octreotide (SandoSTATIN) infusion, 25 mcg/hr, Last Rate: 25 mcg/hr (03/18/25 0142)          Plan discussed with RN. Reviewed all other data in the last 24 hours, including but not limited to vitals, labs, microbiology, imaging and pertinent notes from other providers.  Plan also discussed with patient at the bedside.      MARI Wong   Critical Care  03/19/25   07:43 EDT

## 2025-03-19 NOTE — SIGNIFICANT NOTE
03/19/25 1113   Living Situation   Current Living Arrangements home   Potentially Unsafe Housing Conditions none   Food Insecurity   Within the past 12 months, you worried that your food would run out before you got the money to buy more. Never true   Within the past 12 months, the food you bought just didn't last and you didn't have money to get more. Never true   Transportation Needs   In the past 12 months, has lack of transportation kept you from medical appointments or from getting medications? no   In the past 12 months, has lack of transportation kept you from meetings, work, or from getting things needed for daily living? No   Utilities   In the past 12 months has the electric, gas, oil, or water company threatened to shut off services in your home? No   Abuse Screen (yes response referral indicated)   Feels Unsafe at Home or Work/School no   Feels Threatened by Someone no   Does Anyone Try to Keep You From Having Contact with Others or Doing Things Outside Your Home? no   Physical Signs of Abuse Present no   Financial Resource Strain   How hard is it for you to pay for the very basics like food, housing, medical care, and heating? Not hard   Employment   Do you want help finding or keeping work or a job? I do not need or want help   Family and Community Support   If for any reason you need help with day-to-day activities such as bathing, preparing meals, shopping, managing finances, etc., do you get the help you need? I don't need any help   How often do you feel lonely or isolated from those around you? Sometimes   Education   Preferred Language English   Do you want help with school or training? For example, starting or completing job training or getting a high school diploma, GED or equivalent No   Physical Activity   On average, how many days per week do you engage in moderate to strenuous exercise (like a brisk walk)? 5 days   On average, how many minutes do you engage in exercise at this level? 60  min   Number of minutes of exercise per week 300   Alcohol Use   Q1: How often do you have a drink containing alcohol? 2-4 pr month   Q2: How many drinks containing alcohol do you have on a typical day when you are drinking? 10 or more   Q3: How often do you have six or more drinks on one occasion? Monthly   Stress   Do you feel stress - tense, restless, nervous, or anxious, or unable to sleep at night because your mind is troubled all the time - these days? Rather much   Mental Health   Little interest or pleasure in doing things Several days   Feeling down, depressed, or hopeless Several days   Disabilities   Difficulty Concentrating, Remembering or Making Decisions yes   Difficulty Managing Errands Independently no

## 2025-03-19 NOTE — PROGRESS NOTES
Called to see the pt as a consult , the pt was lethargic, did not open eyes when name was called,   Attempted to see the pt in the afternoon, he was in endoscopy

## 2025-03-19 NOTE — PLAN OF CARE
Goal Outcome Evaluation:                    Pt received 2 uints of FFB, one unit of PRBC. Pt is receiving PRN Ativan for withdraws. Pt went to endo for EGD, they cliped 5 esophageal varices. Pts VSS. Pt is resting comfortably.

## 2025-03-19 NOTE — PAYOR COMM NOTE
"CLINICALS FOR PENDING INPATIENT PRECERT:          TEMPORARY PENDING AUTH # [id number]  Trenton Charles (29 y.o. Male) 1995          AUTHORIZATION PENDING:   PLEASE CALL OR FAX DETERMINATION TO CONTACT BELOW. THANK YOU.        Dolores Lake, RN MSN  /UR  [facility]  [phone] office  [phone] fax  [email or web address]    [facility]  NPI: [id number]  Tax: [id number]            Trenton Charles (29 y.o. Male)       Date of Birth   1995    Social Security Number       Address   [address] [place] [address] [address] [place] [facility]    Home Phone   [phone]    MRN   [id number]       Yarsanism   None    Marital Status   Single                            Admission Date   3/18/2025    Admission Type   Emergency    Admitting Provider   Mickey Solitario MD    Attending Provider   Mickey Solitario MD    Department, Room/Bed   [facility] INTENSIVE CARE UNIT, 2314/1       Discharge Date       Discharge Disposition       Discharge Destination                                 Attending Provider: Mickey Solitario MD    Allergies: Reglan [Metoclopramide]    Isolation: None   Infection: None   Code Status: CPR    Ht: 165.1 cm (65\")   Wt: 58.3 kg (128 lb 8.5 oz)    Admission Cmt: None   Principal Problem: Acute GI bleeding [K92.2]                   Active Insurance as of 3/18/2025       Primary Coverage       Payor Plan Insurance Group Employer/Plan Group    [facility] [id number]        Payor Plan Address Payor Plan Phone Number Payor Plan Fax Number Effective Dates    [address]   8/1/2024 - None Entered    [facility] [id number]         Subscriber Name Subscriber Birth Date Member ID       TRENTON CHARLES 1995 [id number]                     Emergency Contacts        (Rel.) Home Phone Work Phone Mobile Phone    Wilbur Charles (Father) [phone] -- --          03/18/25 1508  Inpatient Admission  Once     Completed     Level " of Care: Progressive Care  Diagnosis: Acute GI bleeding [036767]  Admitting Physician: DOMO MENG [[id number]]  Certification: I Certify That Inpatient Hospital Services Are Medically Necessary For Greater Than 2 Midnights               History & Physical        Felix Osborne APRN at 25 1607       Attestation signed by Mickey Solitario MD at 25 1048    I have reviewed this documentation and agree with NP note, with any exceptions noted below.  Pt seen and examined by me personally.  I have personally reviewed all overnight events, vitals, labs, chart notes, and imaging.  The NP and I have collaborated to design the stated plan. Pt critically ill with high risk for decompensation and requires ICU level care.      CC time:      32 min   This does not include any procedure time.                      Critical Care History and Physical     Yonas Charles : 1995 MRN:[id number] LOS:0 ROOM:      Reason for admission: Acute GI bleeding     Assessment / Plan     ABLA / Acute GI Bleed / Coagulopathy  Patient had episode of bright red emesis, suspect erosive esophagitis in the setting of portal HTN  Hgb 7.9 on admission, INR 2.33  GI consulted for possible EGD, keep NPO  Continue Protonix IV BID.  Octreotide gtt started by GI.  Vit K ordered per GI, will recheck INR tonight  Hemoglobin & hematocrit every 4 hours, transfuse PRBC for Hgb <7    Delirium Tremens / New Onset Seizure  Likely due to alcohol withdrawal  Initiate CIWA protocol, monitor for signs of withdrawal  Loaded with phenobarbital, on taper.  Will start thiamine and folate replacement  CT Head pending  Continue seizure precautions    Acute on chronic alcoholic cirrhosis with Ascites / History of portal hypertension and intra-abdominal varices  Monitor and trend LFTs.    Last paracentesis noted in 2024, however patient reports he is attempting to follow a low sodium diet  Liver US pending  Holding nadolol & spironolactone  due to NPO status.     Metabolic Acidosis with Elevated Anion Gap  Secondary to electrolyte imbalance from nausea and vomiting  Anion gap 18.4, HCO3 33.6, Na 132  Received IVF bolus and electrolyte repletion ordered.  Monitor and trend labs    Acute hypokalemia, hypomagnesemia, hypocalcemia  Secondary to malnutrition in the setting of excessive alcohol use  Replete as needed and monitor, per protocol.    Acute Kidney Injury  Remains nonoliguric. Baseline creatinine 0.4-0.5.  Likely prerenal secondary to dehydration  C/w IV fluids as ordered.  Monitor Input/Output very closely.   Avoids NSAIDs, nephrotoxic medications, and hypotension.    Possible Severe Sepsis  Possible UTI, though he is with no symptoms of UTI  Sepsis indicators could be confounded due to ABLA & reactive leukocytosis.   UA showed positive blood and nitrites with +1 bacteria; follow urine culture.  Lactate 2.8, Received 2L of LR in ED  Remains afebrile  Blood cultures pending  Check MRSA & RVP  Check procalcitonin  Started on ceftriaxone, will continue.    Mood Disorder:   Was seen at [facility] ED on 3/14 at which time he admitted he was having thoughts of suicide however denies this admission.  Psychiatry consultation.    History of Hiatal Hernia: EGD in 2011    Nutrition:   NPO Diet NPO Type: Strict NPO     VTE Prophylaxis:  Mechanical VTE prophylaxis orders are present.         History of Present illness     Yonas Charles is a 29 y.o. male with PMH of alcohol abuse, cirrhosis, HTN and recent SI presented to the ED via EMS following a witnessed seizure lasting approximately 2 minutes. He has had multiple episodes of alcohol sobriety with intermittent periods of relapse. Patient reports he drank a bottle of vodka over the weekend but states this is his first relapse since October of 2024. Of note, he was seen at [facility] on 3/14 for alcohol intoxication with a BAL of 0.33 and admitted to thoughts of suicide following a recent breakup. Upon  evaluation on this admission he denies thoughts of self harm, but endorses episodes of vomiting bright red blood this afternoon for which GI was consulted. He is being admitted with a principal diagnosis of Acute GI bleeding.     ED workup revealed significant labs of K 2.9, BUN 57, Cr 1.28, Mg 0.9, , ALT 55, Tbili 4.9, INR 2.33, Lactate 2.8, WBC 26.56, and Hgb 7.9. He was started on Octreotide and was admitted to ICU for further management.    ACP: In the event he is deemed unable to make his own medical decisions, his mother and next of kin will assume medical decision making.    Patient was seen and examined on 03/18/25 at 16:07 EDT .      Past Medical/Surgical/Social/Family History & Allergies     Past Medical History:   Diagnosis Date    Ascites     Cirrhosis of liver       Past Surgical History:   Procedure Laterality Date    HIATAL HERNIA REPAIR        Social History     Socioeconomic History    Marital status: Single   Tobacco Use    Smoking status: Some Days     Current packs/day: 0.25     Average packs/day: 0.3 packs/day for 9.2 years (2.3 ttl pk-yrs)     Types: Cigarettes     Start date: 2016     Passive exposure: Current   Vaping Use    Vaping status: Never Used   Substance and Sexual Activity    Alcohol use: Not Currently     Comment: 1 gallon of vodka a day    Drug use: Not Currently    Sexual activity: Defer      No family history on file.   Allergies   Allergen Reactions    Reglan [Metoclopramide] Mental Status Change      Social Drivers of Health     Tobacco Use: High Risk (8/9/2024)    Patient History     Smoking Tobacco Use: Some Days     Smokeless Tobacco Use: Unknown     Passive Exposure: Current   Alcohol Use: Alcohol Misuse (7/23/2024)    AUDIT-C     Frequency of Alcohol Consumption: 4 or more times a week     Average Number of Drinks: 10 or more     Frequency of Binge Drinking: Daily or almost daily   Financial Resource Strain: Medium Risk (7/23/2024)    Overall Financial Resource  Strain (CARDIA)     Difficulty of Paying Living Expenses: Somewhat hard   Food Insecurity: Food Insecurity Present (7/23/2024)    Hunger Vital Sign     Worried About Running Out of Food in the Last Year: Sometimes true     Ran Out of Food in the Last Year: Sometimes true   Transportation Needs: No Transportation Needs (7/23/2024)    PRAPARE - Transportation     Lack of Transportation (Medical): No     Lack of Transportation (Non-Medical): No   Physical Activity: Sufficiently Active (7/23/2024)    Exercise Vital Sign     Days of Exercise per Week: 5 days     Minutes of Exercise per Session: 150+ min   Stress: Stress Concern Present (7/23/2024)    [employer] - Occupational Stress Questionnaire     Feeling of Stress : Rather much   Social Connections: Not At Risk (7/23/2024)    Family and Community Support     Help with Day-to-Day Activities: I don't need any help     Lonely or Isolated: Never   Interpersonal Safety: Not At Risk (3/18/2025)    Abuse Screen     Unsafe at Home or Work/School: no     Feels Threatened by Someone?: no     Does Anyone Keep You from Contacting Others or Doint Things Outside the Home?: no     Physical Sign of Abuse Present: no   Depression: At risk (7/23/2024)    PHQ-2     PHQ-2 Score: 3   Housing Stability: Not At Risk (7/23/2024)    Housing Stability     Current Living Arrangements: home     Potentially Unsafe Housing Conditions: none   Utilities: Not At Risk (7/23/2024)    [facility] Utilities     Threatened with loss of utilities: No   Health Literacy: Not At Risk (7/23/2024)    Education     Help with school or training?: No     Preferred Language: English   Employment: Not At Risk (7/23/2024)    Employment     Do you want help finding or keeping work or a job?: I do not need or want help   Disabilities: Not At Risk (7/23/2024)    Disabilities     Concentrating, Remembering, or Making Decisions Difficulty: no     Doing Errands Independently Difficulty: no         Home Medications     Prior to Admission medications    Medication Sig Start Date End Date Taking? Authorizing Provider   nadolol (CORGARD) 20 MG tablet Take 1 tablet by mouth once a day as directed for 30 days 8/30/24      spironolactone (ALDACTONE) 25 MG tablet Take 1 tablet by mouth Daily. 8/30/24      Zinc Sulfate 220 (50 Zn) MG tablet Take 220 mg by mouth Daily. 8/30/24      metoprolol succinate XL (TOPROL-XL) 25 MG 24 hr tablet Take 1 tablet by mouth Daily for 30 days. 8/3/24 3/18/25  Medhat Burch MD        Objective / Physical Exam     Vital signs:  Temp: 98.2 °F (36.8 °C)  BP: 117/64  Heart Rate: (!) 138  Resp: 12  SpO2: 94 %  Weight: 55.8 kg (123 lb)    Admission Weight: Weight: 55.8 kg (123 lb)    Physical Exam  Constitutional:       General: He is not in acute distress.     Appearance: Normal appearance. He is ill-appearing.   HENT:      Head: Normocephalic and atraumatic.      Nose: Nose normal.      Mouth/Throat:      Mouth: Mucous membranes are dry.      Pharynx: Oropharynx is clear.   Eyes:      Extraocular Movements: Extraocular movements intact.      Pupils: Pupils are equal, round, and reactive to light.   Cardiovascular:      Rate and Rhythm: Regular rhythm. Tachycardia present.      Pulses: Normal pulses.      Heart sounds: Normal heart sounds.   Pulmonary:      Effort: Pulmonary effort is normal. No respiratory distress.      Breath sounds: Normal breath sounds.      Comments: On Room Air  Abdominal:      General: Abdomen is flat. Bowel sounds are decreased. There is no distension.      Palpations: Abdomen is soft.      Tenderness: There is no abdominal tenderness. There is no guarding.   Musculoskeletal:      Cervical back: Normal range of motion. No edema.      Right lower leg: No edema.      Left lower leg: No edema.   Skin:     General: Skin is warm and dry.   Neurological:      General: No focal deficit present.      Mental Status: He is alert and oriented to person, place, and time.    Psychiatric:         Attention and Perception: Attention normal.         Mood and Affect: Mood normal.         Speech: Speech normal.         Behavior: Behavior normal.         Thought Content: Thought content normal.         Cognition and Memory: Cognition and memory normal.         Judgment: Judgment normal.          Labs     Results from last 7 days   Lab Units 03/18/25  1339   WBC 10*3/mm3 26.56*   HEMOGLOBIN g/dL 7.9*   HEMATOCRIT % 22.7*   PLATELETS 10*3/mm3 132*      Results from last 7 days   Lab Units 03/18/25  1339   SODIUM mmol/L 132*   POTASSIUM mmol/L 2.9*   CHLORIDE mmol/L 80*   CO2 mmol/L 33.6*   ANION GAP mmol/L 18.4*   BUN mg/dL 57*   CREATININE mg/dL 1.28*   GLUCOSE mg/dL 141*   MAGNESIUM mg/dL 0.9*   ALT (SGPT) U/L 55*   AST (SGOT) U/L 156*   ALK PHOS U/L 89            Imaging     EKG: My independent evaluation showed sinus tachycardia with prolonged QT, no ST -T changes. QTc 509    Current Medications     Scheduled Meds:  cefTRIAXone, 2,000 mg, Intravenous, Q24H  folic acid 1 mg in sodium chloride 0.9 % 50 mL IVPB, 1 mg, Intravenous, Daily  mupirocin, 1 Application, Each Nare, BID  [Held by provider] nadolol, 20 mg, Oral, Q24H  pantoprazole, 40 mg, Intravenous, BID AC  PHENobarbital, 3 mg/kg (Ideal), Intravenous, Once   Followed by  PHENobarbital, 3 mg/kg (Ideal), Intravenous, Once  phytonadione (AQUA-MEPHYTON, VITAMIN K) 5 mg in sodium chloride 0.9 % 50 mL IVPB, 5 mg, Intravenous, Once  potassium chloride, 10 mEq, Intravenous, Q1H  sodium chloride, 10 mL, Intravenous, Q12H  sodium chloride, 10 mL, Intravenous, Q12H  [Held by provider] spironolactone, 25 mg, Oral, Daily  thiamine (B-1) IV, 200 mg, Intravenous, Q8H   Followed by  [START ON 3/24/2025] thiamine, 100 mg, Oral, Daily  [Held by provider] zinc sulfate, 220 mg, Oral, Daily         Continuous Infusions:  octreotide (SandoSTATIN) infusion, 25 mcg/hr, Last Rate: 25 mcg/hr (03/18/25 1542)         MARI Case Student   Critical  Care  03/18/25   16:07 EDT     Total critical care time: Approximately 31 minutes    Due to a high probability of clinically significant, life threatening deterioration, the patient required my highest level of preparedness to intervene emergently and I personally spent this critical care time directly and personally managing the patient. This critical care time included obtaining a history; examining the patient; pulse oximetry; ordering and review of studies; arranging urgent treatment with development of a management plan; evaluation of patient's response to treatment; frequent reassessment; and, discussions with other providers.    This critical care time was performed to assess and manage the high probability of imminent, life-threatening deterioration that could result in multi-organ failure. It was exclusive of separately billable procedures and treating other patients and teaching time.    I have reviewed this documentation and agree with NP student note, with corrections included within it.  Pt seen and examined by me personally.  I have personally reviewed all overnight events, vitals, labs, chart notes, and imaging.  The NP student and I have collaborated together with the attending critical care physician to design the stated plan.    MARI Wong  Critical Care  03/18/25  18:36 EDT        Electronically signed by Mickey Solitario MD at 03/18/25 2116          Emergency Department Notes        Cleo Menendez APRN at 03/18/25 1325          Subjective   History of Present Illness  Chief complaint: Seizure,      Context: Patient is a 29-year-old male with a history of hypertension who presents to the ER for complaint of seizure.  Patient was brought in by EMS states that he had a witnessed seizure at home lasting approximately 2 minutes.  Patient reports he was slightly confused after the seizure and was talking really fast.  Patient reports that on Friday or Saturday he relapsed and drank 1  bottle of vodka, patient reports the last time he relapsed was October 2024, after which he spent 12 days in rehab.  Patient denies any thoughts of self-harm or harm to others.  Patient denies any chest pain, shortness of air, fever, nausea/vomiting/diarrhea, or urinary symptoms.    PCP: None              Review of Systems   Constitutional:  Negative for fever.       Past Medical History:   Diagnosis Date    Ascites     Cirrhosis of liver        Allergies   Allergen Reactions    Reglan [Metoclopramide] Mental Status Change       Past Surgical History:   Procedure Laterality Date    HIATAL HERNIA REPAIR         No family history on file.    Social History     Socioeconomic History    Marital status: Single   Tobacco Use    Smoking status: Some Days     Current packs/day: 0.25     Average packs/day: 0.3 packs/day for 9.2 years (2.3 ttl pk-yrs)     Types: Cigarettes     Start date: 2016     Passive exposure: Current   Vaping Use    Vaping status: Never Used   Substance and Sexual Activity    Alcohol use: Not Currently     Comment: 1 gallon of vodka a day    Drug use: Not Currently    Sexual activity: Defer           Objective   Physical Exam  Vitals and nursing note reviewed.   Constitutional:       Appearance: Normal appearance.   HENT:      Head: Normocephalic.      Nose: Nose normal.      Mouth/Throat:      Mouth: Mucous membranes are moist.   Eyes:      General: Scleral icterus present.      Extraocular Movements: Extraocular movements intact.      Comments: Scleral icterus noted.   Cardiovascular:      Rate and Rhythm: Regular rhythm. Tachycardia present.      Pulses: Normal pulses.      Heart sounds: Normal heart sounds.   Pulmonary:      Effort: Pulmonary effort is normal.      Breath sounds: Normal breath sounds. No wheezing.   Abdominal:      General: Abdomen is flat.      Palpations: Abdomen is soft.      Tenderness: There is abdominal tenderness. There is no guarding or rebound.      Comments: Tenderness  "noted to abdomen on palpation.  No signs of peritonitis, negative Munson's, McBurney's and Rovsing's.   Musculoskeletal:         General: Normal range of motion.      Cervical back: Normal range of motion.   Skin:     General: Skin is warm and dry.      Capillary Refill: Capillary refill takes less than 2 seconds.   Neurological:      General: No focal deficit present.      Mental Status: He is alert and oriented to person, place, and time.   Psychiatric:         Mood and Affect: Mood normal.         Behavior: Behavior normal.         Procedures          ED Course  ED Course as of 03/18/25 1704   Tue Mar 18, 2025   1432 Consulted hospitalist. [LT]   1445 Spoke with Sidra, MIRIAM with hospitalist group who agreed to admit patient and assume care. [LT]      ED Course User Index  [LT] Cleo Menendez, APRN          /64   Pulse (!) 138   Temp 98.2 °F (36.8 °C)   Resp 12   Ht 165 cm (64.96\")   Wt 55.8 kg (123 lb)   SpO2 94%   BMI 20.49 kg/m²   Labs Reviewed   COMPREHENSIVE METABOLIC PANEL - Abnormal; Notable for the following components:       Result Value    Glucose 141 (*)     BUN 57 (*)     Creatinine 1.28 (*)     Sodium 132 (*)     Potassium 2.9 (*)     Chloride 80 (*)     CO2 33.6 (*)     Calcium 8.4 (*)     ALT (SGPT) 55 (*)     AST (SGOT) 156 (*)     Total Bilirubin 4.9 (*)     BUN/Creatinine Ratio 44.5 (*)     Anion Gap 18.4 (*)     All other components within normal limits    Narrative:     GFR Categories in Chronic Kidney Disease (CKD)      GFR Category          GFR (mL/min/1.73)    Interpretation  G1                     90 or greater         Normal or high (1)  G2                      60-89                Mild decrease (1)  G3a                   45-59                Mild to moderate decrease  G3b                   30-44                Moderate to severe decrease  G4                    15-29                Severe decrease  G5                    14 or less           Kidney failure          (1)In the " absence of evidence of kidney disease, neither GFR category G1 or G2 fulfill the criteria for CKD.    eGFR calculation 2021 CKD-EPI creatinine equation, which does not include race as a factor   URINE DRUG SCREEN - Abnormal; Notable for the following components:    THC, Screen, Urine Positive (*)     All other components within normal limits    Narrative:     Cutoff For Drugs Screened:    Amphetamines               500 ng/ml  Barbiturates               200 ng/ml  Benzodiazepines            150 ng/ml  Cocaine                    150 ng/ml  Methadone                  200 ng/ml  Opiates                    100 ng/ml  Phencyclidine               25 ng/ml  THC                         50 ng/ml  Methamphetamine            500 ng/ml  Tricyclic Antidepressants  300 ng/ml  Oxycodone                  100 ng/ml  Buprenorphine               10 ng/ml    The normal value for all drugs tested is negative. This report includes unconfirmed screening results, with the cutoff values listed, to be used for medical treatment purposes only.  Unconfirmed results must not be used for non-medical purposes such as employment or legal testing.  Clinical consideration should be applied to any drug of abuse test, particularly when unconfirmed results are used.    All urine drugs of abuse requests without chain of custody are for medical screening purposes only.  False positives are possible.     CBC WITH AUTO DIFFERENTIAL - Abnormal; Notable for the following components:    WBC 26.56 (*)     RBC 2.44 (*)     Hemoglobin 7.9 (*)     Hematocrit 22.7 (*)     Platelets 132 (*)     Lymphocyte % 10.6 (*)     Immature Grans % 0.8 (*)     Neutrophils, Absolute 20.14 (*)     Monocytes, Absolute 2.52 (*)     Eosinophils, Absolute 0.84 (*)     Immature Grans, Absolute 0.20 (*)     All other components within normal limits    Narrative:     Appended report. These results have been appended to a previously verified report.   URINALYSIS W/ MICROSCOPIC IF  INDICATED (NO CULTURE) - Abnormal; Notable for the following components:    Color, UA Dark Yellow (*)     Appearance, UA Cloudy (*)     Ketones, UA Trace (*)     Bilirubin, UA Small (1+) (*)     Blood, UA Small (1+) (*)     Protein, UA Trace (*)     Leuk Esterase, UA Trace (*)     Nitrite, UA Positive (*)     All other components within normal limits   MAGNESIUM - Abnormal; Notable for the following components:    Magnesium 0.9 (*)     All other components within normal limits   URINALYSIS, MICROSCOPIC ONLY - Abnormal; Notable for the following components:    Bacteria, UA 1+ (*)     All other components within normal limits   HEMOGLOBIN AND HEMATOCRIT, BLOOD - Abnormal; Notable for the following components:    Hemoglobin 7.4 (*)     Hematocrit 21.4 (*)     All other components within normal limits   PROTIME-INR - Abnormal; Notable for the following components:    Protime 25.8 (*)     INR 2.33 (*)     All other components within normal limits   POCT GLUCOSE FINGERSTICK - Abnormal; Notable for the following components:    Glucose 145 (*)     All other components within normal limits   POC LACTATE - Abnormal; Notable for the following components:    Lactate 2.8 (*)     All other components within normal limits   LIPASE - Normal   SCAN SLIDE - Normal    Narrative:     Slide Reviewed    ACETAMINOPHEN LEVEL - Normal    Narrative:     Acetaminophen Therapeutic Range  5-20 ug/mL      Hours after ingestion            Toxic Value    4 Hours                           150 ug/mL    8 Hours                            70 ug/mL   12 Hours                            40 ug/mL   16 Hours                            20 ug/mL    These values apply to a single ingestion only.    SALICYLATE LEVEL - Normal   BLOOD CULTURE   BLOOD CULTURE   ETHANOL    Narrative:     Plasma Ethanol Clinical Symptoms:    ETOH (%)               Clinical Symptom  .01-.05              No apparent influence  .03-.12              Euphoria, Diminished judgment and  attention   .09-.25              Impaired comprehension, Muscle incoordination  .18-.30              Confusion, Staggered gait, Slurred speech  .25-.40              Markedly decreased response to stimuli, unable to stand or                        walk, vomitting, sleep or stupor  .35-.50              Comatose, Anesthesia, Subnormal body temperature       LACTIC ACID, REFLEX   HEMOGLOBIN AND HEMATOCRIT, BLOOD   HEMOGLOBIN AND HEMATOCRIT, BLOOD   TYPE AND SCREEN   PREPARE RBC   CBC AND DIFFERENTIAL    Narrative:     The following orders were created for panel order CBC & Differential.  Procedure                               Abnormality         Status                     ---------                               -----------         ------                     CBC Auto Differential[[id number]]        Abnormal            Final result               Scan Slide[[id number]]                   Normal              Final result                 Please view results for these tests on the individual orders.   EXTRA TUBES    Narrative:     The following orders were created for panel order Extra Tubes.  Procedure                               Abnormality         Status                     ---------                               -----------         ------                     Gold Top - SST[[id number]]                                   Final result               Light Blue Top[[id number]]                                   Final result                 Please view results for these tests on the individual orders.   GOLD TOP - SST   LIGHT BLUE TOP   EXTRA TUBES    Narrative:     The following orders were created for panel order Extra Tubes.  Procedure                               Abnormality         Status                     ---------                               -----------         ------                     Lavender Top[[id number]]                                     Final result               Gold Top - SST[[id number]]                                                               Green Top (Gel)[[id number]]                                  Final result               Light Blue Top[[id number]]                                                                Please view results for these tests on the individual orders.   LAVENDER TOP   GREEN TOP     Medications   sodium chloride 0.9 % flush 10 mL (has no administration in time range)   PHENobarbital 260 mg in sodium chloride 0.9 % 100 mL IVPB (0 mg Intravenous Stopped 3/18/25 1531)     Followed by   PHENobarbital 195 mg in sodium chloride 0.9 % 100 mL IVPB (has no administration in time range)     Followed by   PHENobarbital 195 mg in sodium chloride 0.9 % 100 mL IVPB (has no administration in time range)   octreotide (SANDOSTATIN) bolus from bag 5 mcg/mL solution 50 mcg (50 mcg Intravenous Bolus from Bag 3/18/25 1546)     Followed by   octreotide (sandoSTATIN) 500 mcg in sodium chloride 0.9 % 100 mL (5 mcg/mL) infusion (25 mcg/hr Intravenous New Bag 3/18/25 1542)   Potassium Replacement - Follow Nurse / BPA Driven Protocol (has no administration in time range)   sodium chloride 0.9 % flush 10 mL (has no administration in time range)   sodium chloride 0.9 % infusion 40 mL (has no administration in time range)   nitroglycerin (NITROSTAT) SL tablet 0.4 mg (has no administration in time range)   Magnesium Standard Dose Replacement - Follow Nurse / BPA Driven Protocol (has no administration in time range)   Phosphorus Replacement - Follow Nurse / BPA Driven Protocol (has no administration in time range)   Calcium Replacement - Follow Nurse / BPA Driven Protocol (has no administration in time range)   ondansetron ODT (ZOFRAN-ODT) disintegrating tablet 4 mg (has no administration in time range)     Or   ondansetron (ZOFRAN) injection 4 mg (has no administration in time range)   cefTRIAXone (ROCEPHIN) 2,000 mg in sodium chloride 0.9 % 100 mL MBP (2,000 mg Intravenous Not Given 3/18/25 1528)   dextrose (GLUTOSE) oral  gel 15 g (has no administration in time range)   dextrose (D50W) (25 g/50 mL) IV injection 25 g (has no administration in time range)   glucagon (GLUCAGEN) injection 1 mg (has no administration in time range)   potassium chloride 10 mEq in 100 mL IVPB (10 mEq Intravenous New Bag 3/18/25 1613)   magnesium sulfate 4g/100mL (PREMIX) infusion (has no administration in time range)   pantoprazole (PROTONIX) injection 40 mg (has no administration in time range)   phytonadione (AQUA-MEPHYTON, VITAMIN K) 5 mg in sodium chloride 0.9 % 50 mL IVPB (has no administration in time range)   lactated ringers bolus 1,000 mL (1,000 mL Intravenous New Bag 3/18/25 1518)   pantoprazole (PROTONIX) injection 80 mg (80 mg Intravenous Given 3/18/25 1514)     No radiology results for the last day                                               Medical Decision Making  Radiology interpretation: CT head reviewed and interpreted by Ayaan:  Further interpretation by radiologist as above    Lab interpretation:  Labs all viewed by me and significant for: White count 26.56, hemoglobin 7.9, BUN 57, creatinine 1.28, sodium 132, potassium 2.9, chloride 80, CO2 33.6, calcium 8.4, ALT 55, , mag 0.9, lipase 14.  Ethanol less than 0.010, urine drug screen positive for THC.  POC lactic 2.8, blood cultures pending.  UA-appearance cloudy, trace ketones, small 1+ bili, small 1+ blood, trace protein, trace leuks, positive nitrite, bacteria 1+.  Urine culture pending.  Type and screen O+.    EKG viewed and interpreted by Dr. Kuo: Sinus tachycardia, probable left atrial enlargement, prolonged QT interval, rate 152, QTc 509, no acute ST elevation noted.  comparison: Dated 7/18/2024.  Sinus tachycardia, prolonged QT interval, rate 116, QTc 533.    IV was established and labs and scans were obtained to evaluate for infection, electrolyte abnormalities, anemia, ICH, alcohol intoxication, drug use, liver function test, kidney function test,  hypo-/hyperkalemia, hypo-/hypermagnesemia.  Patient is a 29-year-old male who presents to the ER for above complaint.  On initial examination patient is resting in the bed, nontoxic in appearance with no signs and symptoms of distress.  Physical examination revealed tenderness to abdomen on palpation, yellowing of the sclera, lungs clear bilaterally on auscultation, tachycardia with a regular rhythm, no swelling to bilateral lower extremities noted. Patient given IV phenobarbital.  Dr. Kuo accompanied me to evaluate the patient at which time family at bedside stated that patient did have a fall today and hit his head, and has been vomiting dark vomitus.  Patient failed to relay this information on initial examination.  CT head ordered.  POC lactic, blood cultures and type and screen ordered.  Patient's labs were indicative of elevated white blood cell count, anemia, hypokalemia, worsening kidney function, hypomagnesemia and elevated liver enzymes.  Elevated white count, anemia, hypokalemia, and elevated liver enzymes seem to be baseline for patient.  Patient was given IV Rocephin, IV Protonix, and IV octreotide, and IVF.  Rectal exam was performed with URSULA Francis at bedside.  Patient was Hemoccult positive. Consulted hospitalist.  Spoke with MIRIAM Raygoza with hospitalist group who agreed to admit patient and assume care with GI consult.  Although patient's labs are significant nature, they seem to be along patient's baseline other than his elevated kidney function.  UA was indicative of UTI with trace ketones, small 1+ bili, small 1+ blood, trace proteins trace leuks, positive nitrite, and 1+ bacteria.  Patient also was THC positive on the urine drug screen.  POC lactic was 2.8, and mag was 0.9.  Message Idalmis, with hospitalist group about the lactic and mag values.  On reexamination patient was resting comfortably in the bed, ill-appearing but nontoxic in appearance with no signs and symptoms of distress.  Discussed  findings and decision to admit patient.  Answered all patient and family questions at bedside.  Patient was agreeable to admission and plan of care.     Appropriate PPE worn during exam.  Discussed care with: Idalmis Lane, MIRIAM with hospitalist group.     Based on the clinical findings at this time I anticipate the patient will require a 2 midnight stay    Problems Addressed:  HANNA (acute kidney injury): complicated acute illness or injury  Gastrointestinal hemorrhage, unspecified gastrointestinal hemorrhage type: complicated acute illness or injury  Hypokalemia: complicated acute illness or injury  Leukocytosis, unspecified type: complicated acute illness or injury  Tachycardia: complicated acute illness or injury  Urinary tract infection with hematuria, site unspecified: complicated acute illness or injury    Amount and/or Complexity of Data Reviewed  Labs: ordered.  Radiology: ordered.    Risk  OTC drugs.  Prescription drug management.  Decision regarding hospitalization.        Final diagnoses:   HANNA (acute kidney injury)   Leukocytosis, unspecified type   Hypokalemia   Tachycardia   Urinary tract infection with hematuria, site unspecified   Gastrointestinal hemorrhage, unspecified gastrointestinal hemorrhage type       ED Disposition  ED Disposition       ED Disposition   Decision to Admit    Condition   --    Comment   Level of Care: Critical Care [6]   Diagnosis: Acute GI bleeding [960205]   Admitting Physician: RIRI RIZZO [[id number]]   Attending Physician: RIRI RIZZO [[id number]]   Certification: I Certify That Inpatient Hospital Services Are Medically Necessary For Greater Than 2 Midnights                 No follow-up provider specified.       Medication List      No changes were made to your prescriptions during this visit.            Cleo Menendez APRN  03/18/25 170      Electronically signed by Cleo Menendez APRN at 03/18/25 1707       Operative/Procedure Notes (all)    No notes of this  type exist for this encounter.       Physician Progress Notes (all)    No notes of this type exist for this encounter.          Consult Notes (all)        Mj Richardson PA-C at 03/18/25 1608        Consult Orders    1. Inpatient Gastroenterology Consult [[id number]] ordered by Idalmis Anguiano APRN at 03/18/25 9977              Attestation signed by Cordell Miller MD at 03/18/25 2434 (Updated)    I have reviewed this documentation and agree.  I have personally seen the patient with nurse practitioner and agree with plan.  I have reviewed labs and imaging.  I also performed an appropriate and complete medical decision making component to the history and physical.  29 y.o. male PMH acute liver failure secondary to severe alcohol hepatitis that was nonresponsive to prednisone presented to the ER due to hematemesis and weakness.  He has been vomiting red blood for the past few days and it was dark earlier today.  He apparently had a seizure and nearly passed out.  He apparently was drinking alcohol over the weekend.  He does not think he has had any melena or blood in the stool.  Denies abdominal pain or dysphagia.  He was seen by GI in July and August of last year and referred to [facility] transplant center and he apparently made a few office visits but was feeling well and quit going.  His abdomen is soft with mild epigastric tenderness.  His white count 26 hemoglobin 7.9 platelets 132 BUN 57 creatinine 1.2 bilirubin 4.9  ALT 55 INR 2.3.  Potassium 2.9.  MELD 3.0 is 28.  Discriminant function 66.  Suspect patient has variceal bleed.  Agree with octreotide and pantoprazole.  Recommend serial H&H and transfuse if hemoglobin less than 7.  Will give vitamin K and check right upper quadrant ultrasound.  Plan EGD in the morning.                      GI CONSULT  NOTE:    Referring Provider:  Dr. Solitario    Chief complaint: GI Bleed    Subjective .     History of present illness: Yonas Charles is a 29 y.o. male PMH  acute liver failure secondary to severe alcohol hepatitis that was nonresponsive to prednisone.  He presented to the ER due to hematemesis and weakness, which is why GI was consulted.    Patient has abstained from alcohol since October but had a couple of beers Friday night.  He has been feeling weak this week and has had multiple episodes of large-volume hematemesis.  At times this is filling the bowl.  He is not sure about melena or BRBPR.  He last vomited about 1230 this afternoon.  He describes some tightening in his abdomen but no carlos abdominal pain.      Endo History:  No previous endoscopy    Past Medical History:  Past Medical History:   Diagnosis Date    Ascites     Cirrhosis of liver        Past Surgical History:  Past Surgical History:   Procedure Laterality Date    HIATAL HERNIA REPAIR         Social History:  Social History     Tobacco Use    Smoking status: Some Days     Current packs/day: 0.25     Average packs/day: 0.3 packs/day for 9.2 years (2.3 ttl pk-yrs)     Types: Cigarettes     Start date: 2016     Passive exposure: Current   Vaping Use    Vaping status: Never Used   Substance Use Topics    Alcohol use: Not Currently     Comment: 1 gallon of vodka a day    Drug use: Not Currently       Family History:  No family history on file.    Medications:  (Not in a hospital admission)      Scheduled Meds:cefTRIAXone, 2,000 mg, Intravenous, Q24H  magnesium sulfate, 4 g, Intravenous, Once  PHENobarbital, 3 mg/kg (Ideal), Intravenous, Once   Followed by  PHENobarbital, 3 mg/kg (Ideal), Intravenous, Once  potassium chloride, 10 mEq, Intravenous, Q1H  sodium chloride, 10 mL, Intravenous, Q12H      Continuous Infusions:octreotide (SandoSTATIN) infusion, 25 mcg/hr, Last Rate: 25 mcg/hr (03/18/25 1542)      PRN Meds:.  Calcium Replacement - Follow Nurse / BPA Driven Protocol    dextrose    dextrose    glucagon (human recombinant)    Magnesium Standard Dose Replacement - Follow Nurse / BPA Driven Protocol     nitroglycerin    ondansetron ODT **OR** ondansetron    Phosphorus Replacement - Follow Nurse / BPA Driven Protocol    Potassium Replacement - Follow Nurse / BPA Driven Protocol    sodium chloride    sodium chloride    ALLERGIES:  Reglan [metoclopramide]    ROS:  The following systems were reviewed and negative;   Constitution:  No fevers, chills, no unintentional weight loss  Skin: no rash, no jaundice  Eyes:  No blurry vision, no eye pain  HENT:  No change in hearing or smell  Resp:  No dyspnea or cough  CV:  No chest pain or palpitations  :  No dysuria, hematuria  Musculoskeletal:  No leg cramps or arthralgias  Neuro:  No tremor, no numbness  Psych:  No depression or confusion    Objective     Vital Signs:   Vitals:    03/18/25 1502 03/18/25 1516 03/18/25 1531 03/18/25 1546   BP: 109/58 116/63 124/63 117/64   Pulse: (!) 150 (!) 143 (!) 143 (!) 138   Resp:       Temp:       SpO2: 98% 100% 94%    Weight:       Height:           Physical Exam:       General Appearance:    Awake and alert, in no acute distress   Head:    Normocephalic, without obvious abnormality, atraumatic   Throat:   No oral lesions, no thrush, oral mucosa moist   Lungs:     Respirations regular, even and unlabored   Chest Wall:    No abnormalities observed   Abdomen:     Soft, non-tender, no rebound or guarding, non-distended   Rectal:     Deferred   Extremities:   Moves all extremities, no edema, no cyanosis   Pulses:   Pulses palpable and equal bilaterally   Skin:   No rash, no jaundice, normal palpation               Results Review:   I reviewed the patient's labs and imaging.  Results from last 7 days   Lab Units 03/18/25  1339   WBC 10*3/mm3 26.56*   HEMOGLOBIN g/dL 7.9*   PLATELETS 10*3/mm3 132*     Results from last 7 days   Lab Units 03/18/25  1339   SODIUM mmol/L 132*   POTASSIUM mmol/L 2.9*   CHLORIDE mmol/L 80*   CO2 mmol/L 33.6*   BUN mg/dL 57*   CREATININE mg/dL 1.28*   GLUCOSE mg/dL 141*   ALBUMIN g/dL 3.9   BILIRUBIN mg/dL 4.9*  "  ALK PHOS U/L 89   AST (SGOT) U/L 156*   ALT (SGPT) U/L 55*   INR  2.33*   MAGNESIUM mg/dL 0.9*   LIPASE U/L 14     Estimated Creatinine Clearance: 66.6 mL/min (A) (by C-G formula based on SCr of 1.28 mg/dL (H)).  No results found for: \"HGBA1C\"      Infection     UA    Results from last 7 days   Lab Units 03/18/25  1434   NITRITE UA  Positive*   WBC UA /HPF 0-2   BACTERIA UA /HPF 1+*   SQUAM EPITHEL UA /HPF 0-2     Microbiology Results (last 10 days)       ** No results found for the last 240 hours. **          Imaging Results (Last 72 Hours)       ** No results found for the last 72 hours. **            ASSESSMENT AND PLAN:  29 y.o. male PMH acute liver failure secondary to severe alcohol hepatitis that was nonresponsive to prednisone.  He presented to the ER due to hematemesis and weakness, which is why GI was consulted.    Labs:   Hgb 7.9, HCT 22.7, WBC 26.56, , INR 2.33,  ALT 55 alk phos 89 total bili 4.9 Na 132 K 2.9 BUN 57 Cr 1.28    Problem List:  Alcohol cirrhosis - patient has been seen at [facility] liver transplant team but patient did not keep his follow-ups.  History of ascites - last paracentesis 8/9/2024, undetectable total protein and albumin, negative for SBP  Electrolyte abnormalities      Plan:  -IV Octreotide  -PPI BID  -Dose vitamin K  -MELD 3.0 is 28  -Continue monitor H/H and transfuse if less than 7.  1 unit of blood has been ordered this admission.  -Plan EGD in the morning to assess for varices.  -Electrolyte management per primary team      I discussed the patients findings and my recommendations with the patient.    We appreciate the referral    Electronically signed by Mj Richardson PA-C, 03/18/25, 4:08 PM EDT.    Electronically signed by Cordell Miller MD at 03/18/25 1634       "

## 2025-03-19 NOTE — OP NOTE
ESOPHAGOGASTRODUODENOSCOPY Procedure Report    Patient Name:  Yonas Charles  YOB: 1995    Date of Surgery:  3/19/2025     Pre-Op Diagnosis:  Hematemesis, unspecified whether nausea present [K92.0]       Post-Op Diagnosis Codes:     * Hematemesis, unspecified whether nausea present [K92.0]  4 cords of grade 2 varices one had a red nellie sign with a total of 5 bands placed  No active bleeding was seen  Moderately severe portal hypertensive gastropathy  No obvious gastric varices    Procedure/CPT® Codes:  RI ESOPHAGOGASTRODUODENOSCOPY TRANSORAL DIAGNOSTIC [86738]    Procedure(s):  ESOPHAGOGASTRODUODENOSCOPY with banding of esophageal varices x 5    Staff:  Surgeon(s):  Cordell Miller MD      Anesthesia: Monitored Anesthesia Care    Description of Procedure:  A description of the procedure as well as risks, benefits and alternative methods were explained to the patient who voiced understanding and signed the corresponding consent form. A physical exam was performed and vital signs were monitored throughout the procedure.    An upper GI endoscope was placed into the mouth and proceeded through the esophagus, stomach and second portion of the duodenum without difficulty. The scope was then retroflexed and the fundus was visualized. The procedure was not difficult and there were no immediate complications.    Specimen:        See Below    Estimated blood loss: none    Complications:  None    Findings:  4 cords of grade 2 varices one had a red nellie sign with a total of 5 bands placed  No active bleeding was seen  Moderately severe portal hypertensive gastropathy  No obvious gastric varices    Impression:  Hematemesis likely from variceal bleeding status post banding    Recommendations:  Continue PPI and octreotide as well as serial H&H  Will follow      Cordell Miller MD     Date: 3/19/2025    Time: 15:45 EDT

## 2025-03-19 NOTE — PROGRESS NOTES
[facility] MEDICINE SERVICE  TRANSFER OF CARE/ACCEPTANCE NOTE    PATIENT NAME: Yonas Charles  : 1995  MRN: [id number]     Active Hospital Problems    Diagnosis  POA    **Acute GI bleeding [K92.2]  Yes    Hematemesis [K92.0]  Unknown      Resolved Hospital Problems   No resolved problems to display.       Interim History: Patient admitted to ICU yesterday with severe alcohol withdrawal, GI bleed, ABLA, decompensated cirrhosis, metabolic acidosis, electrolyte derangements, HANNA, possible sepsis.  He was initiated on CIWA protocol with phenobarbital.  He was also placed on PPI and octreotide. He underwent EGD this afternoon with findings of varices.  He did require red blood cell transfusion.   Patient reports no new sx    I have noted the following changes since admission: Stable for downgrade from ICU    I have reviewed the H&P, diagnostic data and plan of care for Yonas Charles.  Hospitalist team will be taking over care of this patient during the current hospitalization.        Signature: Electronically signed by Sidra Jiménez PA-C, 25, 16:02 EDT.  [facility] Hospitalist Team

## 2025-03-19 NOTE — PLAN OF CARE
Goal Outcome Evaluation:      Pt is A&O x4, tachycardic, room air. CIWA protocol. 1x ativan given for CIWA of 19 which included some auditory and visual disturbances. 1 unit of PBRCs given. Plan for EGD this AM, consent is signed and in the chart.

## 2025-03-19 NOTE — PAYOR COMM NOTE
"CLINICALS FOR PENDING INPATIENT PRECERT:  [id number]           TEMPORARY PENDING AUTH # [id number]  /  [id number]   Trenton Charles (29 y.o. Male) 1995          AUTHORIZATION PENDING:   PLEASE CALL OR FAX DETERMINATION TO CONTACT BELOW. THANK YOU.        Dolores Lake RN MSN  /UR  [facility]  [phone] office  [phone] fax  [email or web address]    [facility]  NPI: [id number]  Tax: [id number]            Trenton Charles (29 y.o. Male)       Date of Birth   1995    Social Security Number       Address   [address] [place] [address] [place] [facility]    Home Phone   [phone]    MRN   [id number]       Anabaptism   None    Marital Status   Single                            Admission Date   3/18/2025    Admission Type   Emergency    Admitting Provider   Mickey Solitario MD    Attending Provider   Mickey Solitario MD    Department, Room/Bed   [facility] INTENSIVE CARE UNIT, 2314/1       Discharge Date       Discharge Disposition       Discharge Destination                                 Attending Provider: Mickey Solitario MD    Allergies: Reglan [Metoclopramide]    Isolation: None   Infection: None   Code Status: CPR    Ht: 165.1 cm (65\")   Wt: 58.3 kg (128 lb 8.5 oz)    Admission Cmt: None   Principal Problem: Acute GI bleeding [K92.2]                   Active Insurance as of 3/18/2025       Primary Coverage       Payor Plan Insurance Group Employer/Plan Group    [facility] [id number]        Payor Plan Address Payor Plan Phone Number Payor Plan Fax Number Effective Dates    [address]   8/1/2024 - None Entered    [facility] [id number]         Subscriber Name Subscriber Birth Date Member ID       TRENTON CHARLES 1995 [id number]                     Emergency Contacts        (Rel.) Home Phone Work Phone Mobile Phone    Wilbur Charles (Father) [phone] -- --          03/18/25 1508  Inpatient " Admission  Once     Completed     Level of Care: Progressive Care  Diagnosis: Acute GI bleeding [650493]  Admitting Physician: DOMO MENG [[id number]]  Certification: I Certify That Inpatient Hospital Services Are Medically Necessary For Greater Than 2 Midnights               History & Physical        Felix Osborne APRN at 25 1607       Attestation signed by Mickey Solitario MD at 25 [phone]    I have reviewed this documentation and agree with NP note, with any exceptions noted below.  Pt seen and examined by me personally.  I have personally reviewed all overnight events, vitals, labs, chart notes, and imaging.  The NP and I have collaborated to design the stated plan. Pt critically ill with high risk for decompensation and requires ICU level care.      CC time:      32 min   This does not include any procedure time.                      Critical Care History and Physical     Yonas Charles : 1995 MRN:[id number] LOS:0 ROOM:      Reason for admission: Acute GI bleeding     Assessment / Plan     ABLA / Acute GI Bleed / Coagulopathy  Patient had episode of bright red emesis, suspect erosive esophagitis in the setting of portal HTN  Hgb 7.9 on admission, INR 2.33  GI consulted for possible EGD, keep NPO  Continue Protonix IV BID.  Octreotide gtt started by GI.  Vit K ordered per GI, will recheck INR tonight  Hemoglobin & hematocrit every 4 hours, transfuse PRBC for Hgb <7    Delirium Tremens / New Onset Seizure  Likely due to alcohol withdrawal  Initiate CIWA protocol, monitor for signs of withdrawal  Loaded with phenobarbital, on taper.  Will start thiamine and folate replacement  CT Head pending  Continue seizure precautions    Acute on chronic alcoholic cirrhosis with Ascites / History of portal hypertension and intra-abdominal varices  Monitor and trend LFTs.    Last paracentesis noted in 2024, however patient reports he is attempting to follow a low sodium diet  Liver US  pending  Holding nadolol & spironolactone due to NPO status.     Metabolic Acidosis with Elevated Anion Gap  Secondary to electrolyte imbalance from nausea and vomiting  Anion gap 18.4, HCO3 33.6, Na 132  Received IVF bolus and electrolyte repletion ordered.  Monitor and trend labs    Acute hypokalemia, hypomagnesemia, hypocalcemia  Secondary to malnutrition in the setting of excessive alcohol use  Replete as needed and monitor, per protocol.    Acute Kidney Injury  Remains nonoliguric. Baseline creatinine 0.4-0.5.  Likely prerenal secondary to dehydration  C/w IV fluids as ordered.  Monitor Input/Output very closely.   Avoids NSAIDs, nephrotoxic medications, and hypotension.    Possible Severe Sepsis  Possible UTI, though he is with no symptoms of UTI  Sepsis indicators could be confounded due to ABLA & reactive leukocytosis.   UA showed positive blood and nitrites with +1 bacteria; follow urine culture.  Lactate 2.8, Received 2L of LR in ED  Remains afebrile  Blood cultures pending  Check MRSA & RVP  Check procalcitonin  Started on ceftriaxone, will continue.    Mood Disorder:   Was seen at [facility] ED on 3/14 at which time he admitted he was having thoughts of suicide however denies this admission.  Psychiatry consultation.    History of Hiatal Hernia: EGD in 2011    Nutrition:   NPO Diet NPO Type: Strict NPO     VTE Prophylaxis:  Mechanical VTE prophylaxis orders are present.         History of Present illness     Yonas Charles is a 29 y.o. male with PMH of alcohol abuse, cirrhosis, HTN and recent SI presented to the ED via EMS following a witnessed seizure lasting approximately 2 minutes. He has had multiple episodes of alcohol sobriety with intermittent periods of relapse. Patient reports he drank a bottle of vodka over the weekend but states this is his first relapse since October of 2024. Of note, he was seen at [facility] on 3/14 for alcohol intoxication with a BAL of 0.33 and admitted to thoughts of  suicide following a recent breakup. Upon evaluation on this admission he denies thoughts of self harm, but endorses episodes of vomiting bright red blood this afternoon for which GI was consulted. He is being admitted with a principal diagnosis of Acute GI bleeding.     ED workup revealed significant labs of K 2.9, BUN 57, Cr 1.28, Mg 0.9, , ALT 55, Tbili 4.9, INR 2.33, Lactate 2.8, WBC 26.56, and Hgb 7.9. He was started on Octreotide and was admitted to ICU for further management.    ACP: In the event he is deemed unable to make his own medical decisions, his mother and next of kin will assume medical decision making.    Patient was seen and examined on 03/18/25 at 16:07 EDT .      Past Medical/Surgical/Social/Family History & Allergies     Past Medical History:   Diagnosis Date    Ascites     Cirrhosis of liver       Past Surgical History:   Procedure Laterality Date    HIATAL HERNIA REPAIR        Social History     Socioeconomic History    Marital status: Single   Tobacco Use    Smoking status: Some Days     Current packs/day: 0.25     Average packs/day: 0.3 packs/day for 9.2 years (2.3 ttl pk-yrs)     Types: Cigarettes     Start date: 2016     Passive exposure: Current   Vaping Use    Vaping status: Never Used   Substance and Sexual Activity    Alcohol use: Not Currently     Comment: 1 gallon of vodka a day    Drug use: Not Currently    Sexual activity: Defer      No family history on file.   Allergies   Allergen Reactions    Reglan [Metoclopramide] Mental Status Change      Social Drivers of Health     Tobacco Use: High Risk (8/9/2024)    Patient History     Smoking Tobacco Use: Some Days     Smokeless Tobacco Use: Unknown     Passive Exposure: Current   Alcohol Use: Alcohol Misuse (7/23/2024)    AUDIT-C     Frequency of Alcohol Consumption: 4 or more times a week     Average Number of Drinks: 10 or more     Frequency of Binge Drinking: Daily or almost daily   Financial Resource Strain: Medium Risk  (7/23/2024)    Overall Financial Resource Strain (CARDIA)     Difficulty of Paying Living Expenses: Somewhat hard   Food Insecurity: Food Insecurity Present (7/23/2024)    Hunger Vital Sign     Worried About Running Out of Food in the Last Year: Sometimes true     Ran Out of Food in the Last Year: Sometimes true   Transportation Needs: No Transportation Needs (7/23/2024)    PRAPARE - Transportation     Lack of Transportation (Medical): No     Lack of Transportation (Non-Medical): No   Physical Activity: Sufficiently Active (7/23/2024)    Exercise Vital Sign     Days of Exercise per Week: 5 days     Minutes of Exercise per Session: 150+ min   Stress: Stress Concern Present (7/23/2024)    [employer] [place] [employer] - Occupational Stress Questionnaire     Feeling of Stress : Rather much   Social Connections: Not At Risk (7/23/2024)    Family and Community Support     Help with Day-to-Day Activities: I don't need any help     Lonely or Isolated: Never   Interpersonal Safety: Not At Risk (3/18/2025)    Abuse Screen     Unsafe at Home or Work/School: no     Feels Threatened by Someone?: no     Does Anyone Keep You from Contacting Others or Doint Things Outside the Home?: no     Physical Sign of Abuse Present: no   Depression: At risk (7/23/2024)    PHQ-2     PHQ-2 Score: 3   Housing Stability: Not At Risk (7/23/2024)    Housing Stability     Current Living Arrangements: home     Potentially Unsafe Housing Conditions: none   Utilities: Not At Risk (7/23/2024)    AHC Utilities     Threatened with loss of utilities: No   Health Literacy: Not At Risk (7/23/2024)    Education     Help with school or training?: No     Preferred Language: English   Employment: Not At Risk (7/23/2024)    Employment     Do you want help finding or keeping work or a job?: I do not need or want help   Disabilities: Not At Risk (7/23/2024)    Disabilities     Concentrating, Remembering, or Making Decisions Difficulty: no     Doing  Errands Independently Difficulty: no        Home Medications     Prior to Admission medications    Medication Sig Start Date End Date Taking? Authorizing Provider   nadolol (CORGARD) 20 MG tablet Take 1 tablet by mouth once a day as directed for 30 days 8/30/24      spironolactone (ALDACTONE) 25 MG tablet Take 1 tablet by mouth Daily. 8/30/24      Zinc Sulfate 220 (50 Zn) MG tablet Take 220 mg by mouth Daily. 8/30/24      metoprolol succinate XL (TOPROL-XL) 25 MG 24 hr tablet Take 1 tablet by mouth Daily for 30 days. 8/3/24 3/18/25  Medhat Burch MD        Objective / Physical Exam     Vital signs:  Temp: 98.2 °F (36.8 °C)  BP: 117/64  Heart Rate: (!) 138  Resp: 12  SpO2: 94 %  Weight: 55.8 kg (123 lb)    Admission Weight: Weight: 55.8 kg (123 lb)    Physical Exam  Constitutional:       General: He is not in acute distress.     Appearance: Normal appearance. He is ill-appearing.   HENT:      Head: Normocephalic and atraumatic.      Nose: Nose normal.      Mouth/Throat:      Mouth: Mucous membranes are dry.      Pharynx: Oropharynx is clear.   Eyes:      Extraocular Movements: Extraocular movements intact.      Pupils: Pupils are equal, round, and reactive to light.   Cardiovascular:      Rate and Rhythm: Regular rhythm. Tachycardia present.      Pulses: Normal pulses.      Heart sounds: Normal heart sounds.   Pulmonary:      Effort: Pulmonary effort is normal. No respiratory distress.      Breath sounds: Normal breath sounds.      Comments: On Room Air  Abdominal:      General: Abdomen is flat. Bowel sounds are decreased. There is no distension.      Palpations: Abdomen is soft.      Tenderness: There is no abdominal tenderness. There is no guarding.   Musculoskeletal:      Cervical back: Normal range of motion. No edema.      Right lower leg: No edema.      Left lower leg: No edema.   Skin:     General: Skin is warm and dry.   Neurological:      General: No focal deficit present.      Mental Status: He is alert  and oriented to person, place, and time.   Psychiatric:         Attention and Perception: Attention normal.         Mood and Affect: Mood normal.         Speech: Speech normal.         Behavior: Behavior normal.         Thought Content: Thought content normal.         Cognition and Memory: Cognition and memory normal.         Judgment: Judgment normal.          Labs     Results from last 7 days   Lab Units 03/18/25  1339   WBC 10*3/mm3 26.56*   HEMOGLOBIN g/dL 7.9*   HEMATOCRIT % 22.7*   PLATELETS 10*3/mm3 132*      Results from last 7 days   Lab Units 03/18/25  1339   SODIUM mmol/L 132*   POTASSIUM mmol/L 2.9*   CHLORIDE mmol/L 80*   CO2 mmol/L 33.6*   ANION GAP mmol/L 18.4*   BUN mg/dL 57*   CREATININE mg/dL 1.28*   GLUCOSE mg/dL 141*   MAGNESIUM mg/dL 0.9*   ALT (SGPT) U/L 55*   AST (SGOT) U/L 156*   ALK PHOS U/L 89            Imaging     EKG: My independent evaluation showed sinus tachycardia with prolonged QT, no ST -T changes. QTc 509    Current Medications     Scheduled Meds:  cefTRIAXone, 2,000 mg, Intravenous, Q24H  folic acid 1 mg in sodium chloride 0.9 % 50 mL IVPB, 1 mg, Intravenous, Daily  mupirocin, 1 Application, Each Nare, BID  [Held by provider] nadolol, 20 mg, Oral, Q24H  pantoprazole, 40 mg, Intravenous, BID AC  PHENobarbital, 3 mg/kg (Ideal), Intravenous, Once   Followed by  PHENobarbital, 3 mg/kg (Ideal), Intravenous, Once  phytonadione (AQUA-MEPHYTON, VITAMIN K) 5 mg in sodium chloride 0.9 % 50 mL IVPB, 5 mg, Intravenous, Once  potassium chloride, 10 mEq, Intravenous, Q1H  sodium chloride, 10 mL, Intravenous, Q12H  sodium chloride, 10 mL, Intravenous, Q12H  [Held by provider] spironolactone, 25 mg, Oral, Daily  thiamine (B-1) IV, 200 mg, Intravenous, Q8H   Followed by  [START ON 3/24/2025] thiamine, 100 mg, Oral, Daily  [Held by provider] zinc sulfate, 220 mg, Oral, Daily         Continuous Infusions:  octreotide (SandoSTATIN) infusion, 25 mcg/hr, Last Rate: 25 mcg/hr (03/18/25 9992)          MARI Case Student   Critical Care  03/18/25   16:07 EDT     Total critical care time: Approximately 31 minutes    Due to a high probability of clinically significant, life threatening deterioration, the patient required my highest level of preparedness to intervene emergently and I personally spent this critical care time directly and personally managing the patient. This critical care time included obtaining a history; examining the patient; pulse oximetry; ordering and review of studies; arranging urgent treatment with development of a management plan; evaluation of patient's response to treatment; frequent reassessment; and, discussions with other providers.    This critical care time was performed to assess and manage the high probability of imminent, life-threatening deterioration that could result in multi-organ failure. It was exclusive of separately billable procedures and treating other patients and teaching time.    I have reviewed this documentation and agree with NP student note, with corrections included within it.  Pt seen and examined by me personally.  I have personally reviewed all overnight events, vitals, labs, chart notes, and imaging.  The NP student and I have collaborated together with the attending critical care physician to design the stated plan.    MARI Wong  Critical Care  03/18/25  18:36 EDT        Electronically signed by Mickey Solitario MD at 03/18/25 2116          Emergency Department Notes        Cleo Menendez APRN at 03/18/25 1325          Subjective   History of Present Illness  Chief complaint: Seizure,      Context: Patient is a 29-year-old male with a history of hypertension who presents to the ER for complaint of seizure.  Patient was brought in by EMS states that he had a witnessed seizure at home lasting approximately 2 minutes.  Patient reports he was slightly confused after the seizure and was talking really fast.  Patient reports that on Friday  or Saturday he relapsed and drank 1 bottle of vodka, patient reports the last time he relapsed was October 2024, after which he spent 12 days in rehab.  Patient denies any thoughts of self-harm or harm to others.  Patient denies any chest pain, shortness of air, fever, nausea/vomiting/diarrhea, or urinary symptoms.    PCP: None              Review of Systems   Constitutional:  Negative for fever.       Past Medical History:   Diagnosis Date    Ascites     Cirrhosis of liver        Allergies   Allergen Reactions    Reglan [Metoclopramide] Mental Status Change       Past Surgical History:   Procedure Laterality Date    HIATAL HERNIA REPAIR         No family history on file.    Social History     Socioeconomic History    Marital status: Single   Tobacco Use    Smoking status: Some Days     Current packs/day: 0.25     Average packs/day: 0.3 packs/day for 9.2 years (2.3 ttl pk-yrs)     Types: Cigarettes     Start date: 2016     Passive exposure: Current   Vaping Use    Vaping status: Never Used   Substance and Sexual Activity    Alcohol use: Not Currently     Comment: 1 gallon of vodka a day    Drug use: Not Currently    Sexual activity: Defer           Objective   Physical Exam  Vitals and nursing note reviewed.   Constitutional:       Appearance: Normal appearance.   HENT:      Head: Normocephalic.      Nose: Nose normal.      Mouth/Throat:      Mouth: Mucous membranes are moist.   Eyes:      General: Scleral icterus present.      Extraocular Movements: Extraocular movements intact.      Comments: Scleral icterus noted.   Cardiovascular:      Rate and Rhythm: Regular rhythm. Tachycardia present.      Pulses: Normal pulses.      Heart sounds: Normal heart sounds.   Pulmonary:      Effort: Pulmonary effort is normal.      Breath sounds: Normal breath sounds. No wheezing.   Abdominal:      General: Abdomen is flat.      Palpations: Abdomen is soft.      Tenderness: There is abdominal tenderness. There is no guarding or  "rebound.      Comments: Tenderness noted to abdomen on palpation.  No signs of peritonitis, negative Munson's, McBurney's and Rovsing's.   Musculoskeletal:         General: Normal range of motion.      Cervical back: Normal range of motion.   Skin:     General: Skin is warm and dry.      Capillary Refill: Capillary refill takes less than 2 seconds.   Neurological:      General: No focal deficit present.      Mental Status: He is alert and oriented to person, place, and time.   Psychiatric:         Mood and Affect: Mood normal.         Behavior: Behavior normal.         Procedures          ED Course  ED Course as of 03/18/25 1704   Tue Mar 18, 2025   1432 Consulted hospitalist. [LT]   1445 Spoke with MIRIAM Valente with hospitalist group who agreed to admit patient and assume care. [LT]      ED Course User Index  [LT] Cleo Menendez, APRN          /64   Pulse (!) 138   Temp 98.2 °F (36.8 °C)   Resp 12   Ht 165 cm (64.96\")   Wt 55.8 kg (123 lb)   SpO2 94%   BMI 20.49 kg/m²   Labs Reviewed   COMPREHENSIVE METABOLIC PANEL - Abnormal; Notable for the following components:       Result Value    Glucose 141 (*)     BUN 57 (*)     Creatinine 1.28 (*)     Sodium 132 (*)     Potassium 2.9 (*)     Chloride 80 (*)     CO2 33.6 (*)     Calcium 8.4 (*)     ALT (SGPT) 55 (*)     AST (SGOT) 156 (*)     Total Bilirubin 4.9 (*)     BUN/Creatinine Ratio 44.5 (*)     Anion Gap 18.4 (*)     All other components within normal limits    Narrative:     GFR Categories in Chronic Kidney Disease (CKD)      GFR Category          GFR (mL/min/1.73)    Interpretation  G1                     90 or greater         Normal or high (1)  G2                      60-89                Mild decrease (1)  G3a                   45-59                Mild to moderate decrease  G3b                   30-44                Moderate to severe decrease  G4                    15-29                Severe decrease  G5                    14 or less           " Kidney failure          (1)In the absence of evidence of kidney disease, neither GFR category G1 or G2 fulfill the criteria for CKD.    eGFR calculation 2021 CKD-EPI creatinine equation, which does not include race as a factor   URINE DRUG SCREEN - Abnormal; Notable for the following components:    THC, Screen, Urine Positive (*)     All other components within normal limits    Narrative:     Cutoff For Drugs Screened:    Amphetamines               500 ng/ml  Barbiturates               200 ng/ml  Benzodiazepines            150 ng/ml  Cocaine                    150 ng/ml  Methadone                  200 ng/ml  Opiates                    100 ng/ml  Phencyclidine               25 ng/ml  THC                         50 ng/ml  Methamphetamine            500 ng/ml  Tricyclic Antidepressants  300 ng/ml  Oxycodone                  100 ng/ml  Buprenorphine               10 ng/ml    The normal value for all drugs tested is negative. This report includes unconfirmed screening results, with the cutoff values listed, to be used for medical treatment purposes only.  Unconfirmed results must not be used for non-medical purposes such as employment or legal testing.  Clinical consideration should be applied to any drug of abuse test, particularly when unconfirmed results are used.    All urine drugs of abuse requests without chain of custody are for medical screening purposes only.  False positives are possible.     CBC WITH AUTO DIFFERENTIAL - Abnormal; Notable for the following components:    WBC 26.56 (*)     RBC 2.44 (*)     Hemoglobin 7.9 (*)     Hematocrit 22.7 (*)     Platelets 132 (*)     Lymphocyte % 10.6 (*)     Immature Grans % 0.8 (*)     Neutrophils, Absolute 20.14 (*)     Monocytes, Absolute 2.52 (*)     Eosinophils, Absolute 0.84 (*)     Immature Grans, Absolute 0.20 (*)     All other components within normal limits    Narrative:     Appended report. These results have been appended to a previously verified report.    URINALYSIS W/ MICROSCOPIC IF INDICATED (NO CULTURE) - Abnormal; Notable for the following components:    Color, UA Dark Yellow (*)     Appearance, UA Cloudy (*)     Ketones, UA Trace (*)     Bilirubin, UA Small (1+) (*)     Blood, UA Small (1+) (*)     Protein, UA Trace (*)     Leuk Esterase, UA Trace (*)     Nitrite, UA Positive (*)     All other components within normal limits   MAGNESIUM - Abnormal; Notable for the following components:    Magnesium 0.9 (*)     All other components within normal limits   URINALYSIS, MICROSCOPIC ONLY - Abnormal; Notable for the following components:    Bacteria, UA 1+ (*)     All other components within normal limits   HEMOGLOBIN AND HEMATOCRIT, BLOOD - Abnormal; Notable for the following components:    Hemoglobin 7.4 (*)     Hematocrit 21.4 (*)     All other components within normal limits   PROTIME-INR - Abnormal; Notable for the following components:    Protime 25.8 (*)     INR 2.33 (*)     All other components within normal limits   POCT GLUCOSE FINGERSTICK - Abnormal; Notable for the following components:    Glucose 145 (*)     All other components within normal limits   POC LACTATE - Abnormal; Notable for the following components:    Lactate 2.8 (*)     All other components within normal limits   LIPASE - Normal   SCAN SLIDE - Normal    Narrative:     Slide Reviewed    ACETAMINOPHEN LEVEL - Normal    Narrative:     Acetaminophen Therapeutic Range  5-20 ug/mL      Hours after ingestion            Toxic Value    4 Hours                           150 ug/mL    8 Hours                            70 ug/mL   12 Hours                            40 ug/mL   16 Hours                            20 ug/mL    These values apply to a single ingestion only.    SALICYLATE LEVEL - Normal   BLOOD CULTURE   BLOOD CULTURE   ETHANOL    Narrative:     Plasma Ethanol Clinical Symptoms:    ETOH (%)               Clinical Symptom  .01-.05              No apparent influence  .03-.12               Euphoria, Diminished judgment and attention   .09-.25              Impaired comprehension, Muscle incoordination  .18-.30              Confusion, Staggered gait, Slurred speech  .25-.40              Markedly decreased response to stimuli, unable to stand or                        walk, vomitting, sleep or stupor  .35-.50              Comatose, Anesthesia, Subnormal body temperature       LACTIC ACID, REFLEX   HEMOGLOBIN AND HEMATOCRIT, BLOOD   HEMOGLOBIN AND HEMATOCRIT, BLOOD   TYPE AND SCREEN   PREPARE RBC   CBC AND DIFFERENTIAL    Narrative:     The following orders were created for panel order CBC & Differential.  Procedure                               Abnormality         Status                     ---------                               -----------         ------                     CBC Auto Differential[[id number]]        Abnormal            Final result               Scan Slide[[id number]]                   Normal              Final result                 Please view results for these tests on the individual orders.   EXTRA TUBES    Narrative:     The following orders were created for panel order Extra Tubes.  Procedure                               Abnormality         Status                     ---------                               -----------         ------                     Gold Top - SST[[id number]]                                   Final result               Light Blue Top[[id number]]                                   Final result                 Please view results for these tests on the individual orders.   GOLD TOP - SST   LIGHT BLUE TOP   EXTRA TUBES    Narrative:     The following orders were created for panel order Extra Tubes.  Procedure                               Abnormality         Status                     ---------                               -----------         ------                     Lavender Top[[id number]]                                     Final result               Gold Top -  SST[[id number]]                                                              Green Top (Gel)[[id number]]                                  Final result               Light Blue Top[[id number]]                                                                Please view results for these tests on the individual orders.   LAVENDER TOP   GREEN TOP     Medications   sodium chloride 0.9 % flush 10 mL (has no administration in time range)   PHENobarbital 260 mg in sodium chloride 0.9 % 100 mL IVPB (0 mg Intravenous Stopped 3/18/25 1531)     Followed by   PHENobarbital 195 mg in sodium chloride 0.9 % 100 mL IVPB (has no administration in time range)     Followed by   PHENobarbital 195 mg in sodium chloride 0.9 % 100 mL IVPB (has no administration in time range)   octreotide (SANDOSTATIN) bolus from bag 5 mcg/mL solution 50 mcg (50 mcg Intravenous Bolus from Bag 3/18/25 1546)     Followed by   octreotide (sandoSTATIN) 500 mcg in sodium chloride 0.9 % 100 mL (5 mcg/mL) infusion (25 mcg/hr Intravenous New Bag 3/18/25 1542)   Potassium Replacement - Follow Nurse / BPA Driven Protocol (has no administration in time range)   sodium chloride 0.9 % flush 10 mL (has no administration in time range)   sodium chloride 0.9 % infusion 40 mL (has no administration in time range)   nitroglycerin (NITROSTAT) SL tablet 0.4 mg (has no administration in time range)   Magnesium Standard Dose Replacement - Follow Nurse / BPA Driven Protocol (has no administration in time range)   Phosphorus Replacement - Follow Nurse / BPA Driven Protocol (has no administration in time range)   Calcium Replacement - Follow Nurse / BPA Driven Protocol (has no administration in time range)   ondansetron ODT (ZOFRAN-ODT) disintegrating tablet 4 mg (has no administration in time range)     Or   ondansetron (ZOFRAN) injection 4 mg (has no administration in time range)   cefTRIAXone (ROCEPHIN) 2,000 mg in sodium chloride 0.9 % 100 mL MBP (2,000 mg Intravenous Not Given  3/18/25 1528)   dextrose (GLUTOSE) oral gel 15 g (has no administration in time range)   dextrose (D50W) (25 g/50 mL) IV injection 25 g (has no administration in time range)   glucagon (GLUCAGEN) injection 1 mg (has no administration in time range)   potassium chloride 10 mEq in 100 mL IVPB (10 mEq Intravenous New Bag 3/18/25 1613)   magnesium sulfate 4g/100mL (PREMIX) infusion (has no administration in time range)   pantoprazole (PROTONIX) injection 40 mg (has no administration in time range)   phytonadione (AQUA-MEPHYTON, VITAMIN K) 5 mg in sodium chloride 0.9 % 50 mL IVPB (has no administration in time range)   lactated ringers bolus 1,000 mL (1,000 mL Intravenous New Bag 3/18/25 1518)   pantoprazole (PROTONIX) injection 80 mg (80 mg Intravenous Given 3/18/25 1514)     No radiology results for the last day                                               Medical Decision Making  Radiology interpretation: CT head reviewed and interpreted by Ayaan:  Further interpretation by radiologist as above    Lab interpretation:  Labs all viewed by me and significant for: White count 26.56, hemoglobin 7.9, BUN 57, creatinine 1.28, sodium 132, potassium 2.9, chloride 80, CO2 33.6, calcium 8.4, ALT 55, , mag 0.9, lipase 14.  Ethanol less than 0.010, urine drug screen positive for THC.  POC lactic 2.8, blood cultures pending.  UA-appearance cloudy, trace ketones, small 1+ bili, small 1+ blood, trace protein, trace leuks, positive nitrite, bacteria 1+.  Urine culture pending.  Type and screen O+.    EKG viewed and interpreted by Dr. Kuo: Sinus tachycardia, probable left atrial enlargement, prolonged QT interval, rate 152, QTc 509, no acute ST elevation noted.  comparison: Dated 7/18/2024.  Sinus tachycardia, prolonged QT interval, rate 116, QTc 533.    IV was established and labs and scans were obtained to evaluate for infection, electrolyte abnormalities, anemia, ICH, alcohol intoxication, drug use, liver function test,  kidney function test, hypo-/hyperkalemia, hypo-/hypermagnesemia.  Patient is a 29-year-old male who presents to the ER for above complaint.  On initial examination patient is resting in the bed, nontoxic in appearance with no signs and symptoms of distress.  Physical examination revealed tenderness to abdomen on palpation, yellowing of the sclera, lungs clear bilaterally on auscultation, tachycardia with a regular rhythm, no swelling to bilateral lower extremities noted. Patient given IV phenobarbital.  Dr. Kuo accompanied me to evaluate the patient at which time family at bedside stated that patient did have a fall today and hit his head, and has been vomiting dark vomitus.  Patient failed to relay this information on initial examination.  CT head ordered.  POC lactic, blood cultures and type and screen ordered.  Patient's labs were indicative of elevated white blood cell count, anemia, hypokalemia, worsening kidney function, hypomagnesemia and elevated liver enzymes.  Elevated white count, anemia, hypokalemia, and elevated liver enzymes seem to be baseline for patient.  Patient was given IV Rocephin, IV Protonix, and IV octreotide, and IVF.  Rectal exam was performed with URSULA Francis at bedside.  Patient was Hemoccult positive. Consulted hospitalist.  Spoke with MIRIAM Raygoza with hospitalist group who agreed to admit patient and assume care with GI consult.  Although patient's labs are significant nature, they seem to be along patient's baseline other than his elevated kidney function.  UA was indicative of UTI with trace ketones, small 1+ bili, small 1+ blood, trace proteins trace leuks, positive nitrite, and 1+ bacteria.  Patient also was THC positive on the urine drug screen.  POC lactic was 2.8, and mag was 0.9.  Message Idalmis, with hospitalist group about the lactic and mag values.  On reexamination patient was resting comfortably in the bed, ill-appearing but nontoxic in appearance with no signs and symptoms of  distress.  Discussed findings and decision to admit patient.  Answered all patient and family questions at bedside.  Patient was agreeable to admission and plan of care.     Appropriate PPE worn during exam.  Discussed care with: Idalmis Lane, MIRIAM with hospitalist group.     Based on the clinical findings at this time I anticipate the patient will require a 2 midnight stay    Problems Addressed:  HANNA (acute kidney injury): complicated acute illness or injury  Gastrointestinal hemorrhage, unspecified gastrointestinal hemorrhage type: complicated acute illness or injury  Hypokalemia: complicated acute illness or injury  Leukocytosis, unspecified type: complicated acute illness or injury  Tachycardia: complicated acute illness or injury  Urinary tract infection with hematuria, site unspecified: complicated acute illness or injury    Amount and/or Complexity of Data Reviewed  Labs: ordered.  Radiology: ordered.    Risk  OTC drugs.  Prescription drug management.  Decision regarding hospitalization.        Final diagnoses:   HANNA (acute kidney injury)   Leukocytosis, unspecified type   Hypokalemia   Tachycardia   Urinary tract infection with hematuria, site unspecified   Gastrointestinal hemorrhage, unspecified gastrointestinal hemorrhage type       ED Disposition  ED Disposition       ED Disposition   Decision to Admit    Condition   --    Comment   Level of Care: Critical Care [6]   Diagnosis: Acute GI bleeding [[id number]]   Admitting Physician: RIRI RIZZO [[id number]]   Attending Physician: RIRI RIZZO [[id number]]   Certification: I Certify That Inpatient Hospital Services Are Medically Necessary For Greater Than 2 Midnights                 No follow-up provider specified.       Medication List      No changes were made to your prescriptions during this visit.            Cleo Menendez APRN  03/18/25 1704      Electronically signed by Cleo Menendez APRN at 03/18/25 1702       Operative/Procedure Notes (all)     No notes of this type exist for this encounter.       Physician Progress Notes (all)    No notes of this type exist for this encounter.          Consult Notes (all)        Mj Richardson PA-C at 03/18/25 1608        Consult Orders    1. Inpatient Gastroenterology Consult [[id number]] ordered by Idalmis Anguiano APRN at 03/18/25 1516              Attestation signed by Cordell Miller MD at 03/18/25 0654 (Updated)    I have reviewed this documentation and agree.  I have personally seen the patient with nurse practitioner and agree with plan.  I have reviewed labs and imaging.  I also performed an appropriate and complete medical decision making component to the history and physical.  29 y.o. male PMH acute liver failure secondary to severe alcohol hepatitis that was nonresponsive to prednisone presented to the ER due to hematemesis and weakness.  He has been vomiting red blood for the past few days and it was dark earlier today.  He apparently had a seizure and nearly passed out.  He apparently was drinking alcohol over the weekend.  He does not think he has had any melena or blood in the stool.  Denies abdominal pain or dysphagia.  He was seen by GI in July and August of last year and referred to [facility] transplant center and he apparently made a few office visits but was feeling well and quit going.  His abdomen is soft with mild epigastric tenderness.  His white count 26 hemoglobin 7.9 platelets 132 BUN 57 creatinine 1.2 bilirubin 4.9  ALT 55 INR 2.3.  Potassium 2.9.  MELD 3.0 is 28.  Discriminant function 66.  Suspect patient has variceal bleed.  Agree with octreotide and pantoprazole.  Recommend serial H&H and transfuse if hemoglobin less than 7.  Will give vitamin K and check right upper quadrant ultrasound.  Plan EGD in the morning.                      GI CONSULT  NOTE:    Referring Provider:  Dr. Solitario    Chief complaint: GI Bleed    Subjective .     History of present illness: Yonas Charles is  a 29 y.o. male PMH acute liver failure secondary to severe alcohol hepatitis that was nonresponsive to prednisone.  He presented to the ER due to hematemesis and weakness, which is why GI was consulted.    Patient has abstained from alcohol since October but had a couple of beers Friday night.  He has been feeling weak this week and has had multiple episodes of large-volume hematemesis.  At times this is filling the bowl.  He is not sure about melena or BRBPR.  He last vomited about 1230 this afternoon.  He describes some tightening in his abdomen but no carlos abdominal pain.      Endo History:  No previous endoscopy    Past Medical History:  Past Medical History:   Diagnosis Date    Ascites     Cirrhosis of liver        Past Surgical History:  Past Surgical History:   Procedure Laterality Date    HIATAL HERNIA REPAIR         Social History:  Social History     Tobacco Use    Smoking status: Some Days     Current packs/day: 0.25     Average packs/day: 0.3 packs/day for 9.2 years (2.3 ttl pk-yrs)     Types: Cigarettes     Start date: 2016     Passive exposure: Current   Vaping Use    Vaping status: Never Used   Substance Use Topics    Alcohol use: Not Currently     Comment: 1 gallon of vodka a day    Drug use: Not Currently       Family History:  No family history on file.    Medications:  (Not in a hospital admission)      Scheduled Meds:cefTRIAXone, 2,000 mg, Intravenous, Q24H  magnesium sulfate, 4 g, Intravenous, Once  PHENobarbital, 3 mg/kg (Ideal), Intravenous, Once   Followed by  PHENobarbital, 3 mg/kg (Ideal), Intravenous, Once  potassium chloride, 10 mEq, Intravenous, Q1H  sodium chloride, 10 mL, Intravenous, Q12H      Continuous Infusions:octreotide (SandoSTATIN) infusion, 25 mcg/hr, Last Rate: 25 mcg/hr (03/18/25 1542)      PRN Meds:.  Calcium Replacement - Follow Nurse / BPA Driven Protocol    dextrose    dextrose    glucagon (human recombinant)    Magnesium Standard Dose Replacement - Follow Nurse / BPA  Driven Protocol    nitroglycerin    ondansetron ODT **OR** ondansetron    Phosphorus Replacement - Follow Nurse / BPA Driven Protocol    Potassium Replacement - Follow Nurse / BPA Driven Protocol    sodium chloride    sodium chloride    ALLERGIES:  Reglan [metoclopramide]    ROS:  The following systems were reviewed and negative;   Constitution:  No fevers, chills, no unintentional weight loss  Skin: no rash, no jaundice  Eyes:  No blurry vision, no eye pain  HENT:  No change in hearing or smell  Resp:  No dyspnea or cough  CV:  No chest pain or palpitations  :  No dysuria, hematuria  Musculoskeletal:  No leg cramps or arthralgias  Neuro:  No tremor, no numbness  Psych:  No depression or confusion    Objective     Vital Signs:   Vitals:    03/18/25 1502 03/18/25 1516 03/18/25 1531 03/18/25 1546   BP: 109/58 116/63 124/63 117/64   Pulse: (!) 150 (!) 143 (!) 143 (!) 138   Resp:       Temp:       SpO2: 98% 100% 94%    Weight:       Height:           Physical Exam:       General Appearance:    Awake and alert, in no acute distress   Head:    Normocephalic, without obvious abnormality, atraumatic   Throat:   No oral lesions, no thrush, oral mucosa moist   Lungs:     Respirations regular, even and unlabored   Chest Wall:    No abnormalities observed   Abdomen:     Soft, non-tender, no rebound or guarding, non-distended   Rectal:     Deferred   Extremities:   Moves all extremities, no edema, no cyanosis   Pulses:   Pulses palpable and equal bilaterally   Skin:   No rash, no jaundice, normal palpation               Results Review:   I reviewed the patient's labs and imaging.  Results from last 7 days   Lab Units 03/18/25  1339   WBC 10*3/mm3 26.56*   HEMOGLOBIN g/dL 7.9*   PLATELETS 10*3/mm3 132*     Results from last 7 days   Lab Units 03/18/25  1339   SODIUM mmol/L 132*   POTASSIUM mmol/L 2.9*   CHLORIDE mmol/L 80*   CO2 mmol/L 33.6*   BUN mg/dL 57*   CREATININE mg/dL 1.28*   GLUCOSE mg/dL 141*   ALBUMIN g/dL 3.9  "  BILIRUBIN mg/dL 4.9*   ALK PHOS U/L 89   AST (SGOT) U/L 156*   ALT (SGPT) U/L 55*   INR  2.33*   MAGNESIUM mg/dL 0.9*   LIPASE U/L 14     Estimated Creatinine Clearance: 66.6 mL/min (A) (by C-G formula based on SCr of 1.28 mg/dL (H)).  No results found for: \"HGBA1C\"      Infection     UA    Results from last 7 days   Lab Units 03/18/25  1434   NITRITE UA  Positive*   WBC UA /HPF 0-2   BACTERIA UA /HPF 1+*   SQUAM EPITHEL UA /HPF 0-2     Microbiology Results (last 10 days)       ** No results found for the last 240 hours. **          Imaging Results (Last 72 Hours)       ** No results found for the last 72 hours. **            ASSESSMENT AND PLAN:  29 y.o. male PMH acute liver failure secondary to severe alcohol hepatitis that was nonresponsive to prednisone.  He presented to the ER due to hematemesis and weakness, which is why GI was consulted.    Labs:   Hgb 7.9, HCT 22.7, WBC 26.56, , INR 2.33,  ALT 55 alk phos 89 total bili 4.9 Na 132 K 2.9 BUN 57 Cr 1.28    Problem List:  Alcohol cirrhosis - patient has been seen at [facility] liver transplant team but patient did not keep his follow-ups.  History of ascites - last paracentesis 8/9/2024, undetectable total protein and albumin, negative for SBP  Electrolyte abnormalities      Plan:  -IV Octreotide  -PPI BID  -Dose vitamin K  -MELD 3.0 is 28  -Continue monitor H/H and transfuse if less than 7.  1 unit of blood has been ordered this admission.  -Plan EGD in the morning to assess for varices.  -Electrolyte management per primary team      I discussed the patients findings and my recommendations with the patient.    We appreciate the referral    Electronically signed by Mj Richardson PA-C, 03/18/25, 4:08 PM EDT.    Electronically signed by Cordell Miller MD at 03/18/25 1634       "

## 2025-03-19 NOTE — ANESTHESIA PREPROCEDURE EVALUATION
Anesthesia Evaluation     Patient summary reviewed   no history of anesthetic complications:   NPO Solid Status: > 8 hours             Airway   Dental      Pulmonary    (+) a smoker,  Cardiovascular     (-) past MI      Neuro/Psych  (+) seizures    ROS Comment: DTs  GI/Hepatic/Renal/Endo    (+) GI bleeding upper , liver disease cirrhosis    Musculoskeletal     Abdominal    Substance History   (+) alcohol use     OB/GYN          Other   blood dyscrasia anemia,     ROS/Med Hx Other: I have reviewed this documentation and agree.  I have personally seen the patient with nurse practitioner and agree with plan.  I have reviewed labs and imaging.  I also performed an appropriate and complete medical decision making component to the history and physical.  29 y.o. male PMH acute liver failure secondary to severe alcohol hepatitis that was nonresponsive to prednisone presented to the ER due to hematemesis and weakness.  He has been vomiting red blood for the past few days and it was dark earlier today.  He apparently had a seizure and nearly passed out.  He apparently was drinking alcohol over the weekend.  He does not think he has had any melena or blood in the stool.  Denies abdominal pain or dysphagia.  He was seen by GI in July and August of last year and referred to [facility] transplant center and he apparently made a few office visits but was feeling well and quit going.  His abdomen is soft with mild epigastric tenderness.  His white count 26 hemoglobin 7.9 platelets 132 BUN 57 creatinine 1.2 bilirubin 4.9  ALT 55 INR 2.3.  Potassium 2.9.  MELD 3.0 is 28.  Discriminant function 66.  Suspect patient has variceal bleed.  Agree with octreotide and pantoprazole.  Recommend serial H&H and transfuse if hemoglobin less than 7.  Will give vitamin K and check right upper quadrant ultrasound.  Plan EGD                 Anesthesia Plan    ASA 4     MAC   total IV anesthesia  intravenous induction     Anesthetic plan, risks,  benefits, and alternatives have been provided, discussed and informed consent has been obtained with: patient.    Plan discussed with CRNA.    CODE STATUS:    Code Status (Patient has no pulse and is not breathing): CPR (Attempt to Resuscitate)  Medical Interventions (Patient has pulse or is breathing): Full Support

## 2025-03-20 ENCOUNTER — INPATIENT HOSPITAL (OUTPATIENT)
Dept: URBAN - METROPOLITAN AREA HOSPITAL 84 | Facility: HOSPITAL | Age: 30
End: 2025-03-20
Payer: COMMERCIAL

## 2025-03-20 DIAGNOSIS — E87.8 OTHER DISORDERS OF ELECTROLYTE AND FLUID BALANCE, NOT ELSEWH: ICD-10-CM

## 2025-03-20 DIAGNOSIS — K76.6 PORTAL HYPERTENSION: ICD-10-CM

## 2025-03-20 DIAGNOSIS — K92.0 HEMATEMESIS: ICD-10-CM

## 2025-03-20 DIAGNOSIS — K31.89 OTHER DISEASES OF STOMACH AND DUODENUM: ICD-10-CM

## 2025-03-20 DIAGNOSIS — K70.30 ALCOHOLIC CIRRHOSIS OF LIVER WITHOUT ASCITES: ICD-10-CM

## 2025-03-20 DIAGNOSIS — I85.00 ESOPHAGEAL VARICES WITHOUT BLEEDING: ICD-10-CM

## 2025-03-20 LAB
ALBUMIN SERPL-MCNC: 3.1 G/DL (ref 3.5–5.2)
ALBUMIN/GLOB SERPL: 1.2 G/DL
ALP SERPL-CCNC: 66 U/L (ref 39–117)
ALT SERPL W P-5'-P-CCNC: 63 U/L (ref 1–41)
ANION GAP SERPL CALCULATED.3IONS-SCNC: 10.1 MMOL/L (ref 5–15)
AST SERPL-CCNC: 184 U/L (ref 1–40)
BASOPHILS # BLD AUTO: 0.04 10*3/MM3 (ref 0–0.2)
BASOPHILS NFR BLD AUTO: 0.6 % (ref 0–1.5)
BH BB BLOOD EXPIRATION DATE: NORMAL
BH BB BLOOD TYPE BARCODE: 5100
BH BB BLOOD TYPE BARCODE: 6200
BH BB BLOOD TYPE BARCODE: 6200
BH BB DISPENSE STATUS: NORMAL
BH BB PRODUCT CODE: NORMAL
BH BB UNIT NUMBER: NORMAL
BILIRUB SERPL-MCNC: 2.6 MG/DL (ref 0–1.2)
BUN SERPL-MCNC: 18 MG/DL (ref 6–20)
BUN/CREAT SERPL: 24.7 (ref 7–25)
CALCIUM SPEC-SCNC: 7.5 MG/DL (ref 8.6–10.5)
CHLORIDE SERPL-SCNC: 100 MMOL/L (ref 98–107)
CO2 SERPL-SCNC: 23.9 MMOL/L (ref 22–29)
CREAT SERPL-MCNC: 0.73 MG/DL (ref 0.76–1.27)
CROSSMATCH INTERPRETATION: NORMAL
DEPRECATED RDW RBC AUTO: 54.8 FL (ref 37–54)
EGFRCR SERPLBLD CKD-EPI 2021: 125.5 ML/MIN/1.73
EOSINOPHIL # BLD AUTO: 0.16 10*3/MM3 (ref 0–0.4)
EOSINOPHIL NFR BLD AUTO: 2.4 % (ref 0.3–6.2)
ERYTHROCYTE [DISTWIDTH] IN BLOOD BY AUTOMATED COUNT: 16 % (ref 12.3–15.4)
GLOBULIN UR ELPH-MCNC: 2.5 GM/DL
GLUCOSE SERPL-MCNC: 78 MG/DL (ref 65–99)
HCT VFR BLD AUTO: 19.9 % (ref 37.5–51)
HCT VFR BLD AUTO: 22.9 % (ref 37.5–51)
HGB BLD-MCNC: 6.6 G/DL (ref 13–17.7)
HGB BLD-MCNC: 7.5 G/DL (ref 13–17.7)
IMM GRANULOCYTES # BLD AUTO: 0.02 10*3/MM3 (ref 0–0.05)
IMM GRANULOCYTES NFR BLD AUTO: 0.3 % (ref 0–0.5)
INR PPP: 1.71 (ref 0.9–1.1)
LYMPHOCYTES # BLD AUTO: 1.98 10*3/MM3 (ref 0.7–3.1)
LYMPHOCYTES NFR BLD AUTO: 29.9 % (ref 19.6–45.3)
MAGNESIUM SERPL-MCNC: 2.1 MG/DL (ref 1.6–2.6)
MCH RBC QN AUTO: 31.4 PG (ref 26.6–33)
MCHC RBC AUTO-ENTMCNC: 33.2 G/DL (ref 31.5–35.7)
MCV RBC AUTO: 94.8 FL (ref 79–97)
MONOCYTES # BLD AUTO: 0.61 10*3/MM3 (ref 0.1–0.9)
MONOCYTES NFR BLD AUTO: 9.2 % (ref 5–12)
NEUTROPHILS NFR BLD AUTO: 3.82 10*3/MM3 (ref 1.7–7)
NEUTROPHILS NFR BLD AUTO: 57.6 % (ref 42.7–76)
NRBC BLD AUTO-RTO: 0 /100 WBC (ref 0–0.2)
PHOSPHATE SERPL-MCNC: 1.6 MG/DL (ref 2.5–4.5)
PHOSPHATE SERPL-MCNC: 1.6 MG/DL (ref 2.5–4.5)
PLATELET # BLD AUTO: 58 10*3/MM3 (ref 140–450)
PMV BLD AUTO: 10.4 FL (ref 6–12)
POTASSIUM SERPL-SCNC: 3.6 MMOL/L (ref 3.5–5.2)
POTASSIUM SERPL-SCNC: 3.8 MMOL/L (ref 3.5–5.2)
PROT SERPL-MCNC: 5.6 G/DL (ref 6–8.5)
PROTHROMBIN TIME: 20.1 SECONDS (ref 11.7–14.2)
RBC # BLD AUTO: 2.1 10*6/MM3 (ref 4.14–5.8)
SODIUM SERPL-SCNC: 134 MMOL/L (ref 136–145)
UNIT  ABO: NORMAL
UNIT  RH: NORMAL
WBC NRBC COR # BLD AUTO: 6.63 10*3/MM3 (ref 3.4–10.8)

## 2025-03-20 PROCEDURE — 99232 SBSQ HOSP IP/OBS MODERATE 35: CPT

## 2025-03-20 PROCEDURE — 84100 ASSAY OF PHOSPHORUS: CPT | Performed by: INTERNAL MEDICINE

## 2025-03-20 PROCEDURE — 36430 TRANSFUSION BLD/BLD COMPNT: CPT

## 2025-03-20 PROCEDURE — 85025 COMPLETE CBC W/AUTO DIFF WBC: CPT | Performed by: NURSE PRACTITIONER

## 2025-03-20 PROCEDURE — 99222 1ST HOSP IP/OBS MODERATE 55: CPT

## 2025-03-20 PROCEDURE — 85018 HEMOGLOBIN: CPT | Performed by: INTERNAL MEDICINE

## 2025-03-20 PROCEDURE — P9016 RBC LEUKOCYTES REDUCED: HCPCS

## 2025-03-20 PROCEDURE — 25010000002 OCTREOTIDE PER 25 MCG: Performed by: INTERNAL MEDICINE

## 2025-03-20 PROCEDURE — 83735 ASSAY OF MAGNESIUM: CPT | Performed by: INTERNAL MEDICINE

## 2025-03-20 PROCEDURE — 80053 COMPREHEN METABOLIC PANEL: CPT | Performed by: INTERNAL MEDICINE

## 2025-03-20 PROCEDURE — 25010000002 THIAMINE PER 100 MG: Performed by: NURSE PRACTITIONER

## 2025-03-20 PROCEDURE — 25010000002 THIAMINE PER 100 MG: Performed by: INTERNAL MEDICINE

## 2025-03-20 PROCEDURE — 25010000002 POTASSIUM CHLORIDE 10 MEQ/100ML SOLUTION: Performed by: FAMILY MEDICINE

## 2025-03-20 PROCEDURE — 85014 HEMATOCRIT: CPT | Performed by: INTERNAL MEDICINE

## 2025-03-20 PROCEDURE — 84132 ASSAY OF SERUM POTASSIUM: CPT | Performed by: INTERNAL MEDICINE

## 2025-03-20 PROCEDURE — 86900 BLOOD TYPING SEROLOGIC ABO: CPT

## 2025-03-20 PROCEDURE — 85610 PROTHROMBIN TIME: CPT

## 2025-03-20 PROCEDURE — 25010000002 CEFTRIAXONE PER 250 MG: Performed by: INTERNAL MEDICINE

## 2025-03-20 RX ORDER — POTASSIUM CHLORIDE 1500 MG/1
40 TABLET, EXTENDED RELEASE ORAL EVERY 4 HOURS
Status: COMPLETED | OUTPATIENT
Start: 2025-03-20 | End: 2025-03-20

## 2025-03-20 RX ADMIN — POTASSIUM CHLORIDE 10 MEQ: 7.46 INJECTION, SOLUTION INTRAVENOUS at 00:26

## 2025-03-20 RX ADMIN — Medication 10 ML: at 21:14

## 2025-03-20 RX ADMIN — PANTOPRAZOLE SODIUM 40 MG: 40 INJECTION, POWDER, LYOPHILIZED, FOR SOLUTION INTRAVENOUS at 17:31

## 2025-03-20 RX ADMIN — THIAMINE HYDROCHLORIDE 200 MG: 100 INJECTION, SOLUTION INTRAMUSCULAR; INTRAVENOUS at 21:13

## 2025-03-20 RX ADMIN — OCTREOTIDE ACETATE 25 MCG/HR: 500 INJECTION, SOLUTION INTRAVENOUS; SUBCUTANEOUS at 06:10

## 2025-03-20 RX ADMIN — POTASSIUM CHLORIDE 40 MEQ: 20 TABLET, EXTENDED RELEASE ORAL at 12:10

## 2025-03-20 RX ADMIN — THIAMINE HYDROCHLORIDE 200 MG: 100 INJECTION, SOLUTION INTRAMUSCULAR; INTRAVENOUS at 05:44

## 2025-03-20 RX ADMIN — POTASSIUM CHLORIDE 40 MEQ: 20 TABLET, EXTENDED RELEASE ORAL at 08:53

## 2025-03-20 RX ADMIN — Medication 10 ML: at 08:55

## 2025-03-20 RX ADMIN — MUPIROCIN 1 APPLICATION: 20 OINTMENT TOPICAL at 21:13

## 2025-03-20 RX ADMIN — Medication 2 PACKET: at 14:54

## 2025-03-20 RX ADMIN — CEFTRIAXONE 2000 MG: 2 INJECTION, POWDER, FOR SOLUTION INTRAMUSCULAR; INTRAVENOUS at 08:56

## 2025-03-20 RX ADMIN — Medication 2 PACKET: at 08:53

## 2025-03-20 RX ADMIN — THIAMINE HYDROCHLORIDE 200 MG: 100 INJECTION, SOLUTION INTRAMUSCULAR; INTRAVENOUS at 14:54

## 2025-03-20 RX ADMIN — FOLIC ACID 1 MG: 5 INJECTION, SOLUTION INTRAMUSCULAR; INTRAVENOUS; SUBCUTANEOUS at 09:49

## 2025-03-20 RX ADMIN — MUPIROCIN 1 APPLICATION: 20 OINTMENT TOPICAL at 08:53

## 2025-03-20 RX ADMIN — PANTOPRAZOLE SODIUM 40 MG: 40 INJECTION, POWDER, LYOPHILIZED, FOR SOLUTION INTRAVENOUS at 08:53

## 2025-03-20 NOTE — PLAN OF CARE
Goal Outcome Evaluation:Patient says feels better than yesterday. KATIE a 4. Urinated in urinal and tea colored. Per mother he has had blood in stool when he was home. Will monitor.

## 2025-03-20 NOTE — NURSING NOTE
Pt ICU downgrade, Alert to self right now, was recently given iv Ativan due to elevated CIWA score. No family at bedside, Octreotide and K+ currently running, skin assessment performed with 2nd floor nurse. No major skin issues noted. Side rails padded, bed lowered to lowest position, bed alarm set and call light within reach.

## 2025-03-20 NOTE — ANESTHESIA POSTPROCEDURE EVALUATION
Patient: Yonas Charles    Procedure Summary       Date: 03/19/25 Room / Location: [facility] ENDOSCOPY 2 / [facility] ENDOSCOPY    Anesthesia Start: 1515 Anesthesia Stop: 1540    Procedure: ESOPHAGOGASTRODUODENOSCOPY with banding of esophageal varices x 5 Diagnosis:       Hematemesis, unspecified whether nausea present      (Hematemesis, unspecified whether nausea present [K92.0])    Surgeons: Cordell Miller MD Provider: Cecy Bower MD    Anesthesia Type: MAC ASA Status: 4            Anesthesia Type: MAC    Vitals  Vitals Value Taken Time   /66 03/19/25 16:21   Temp     Pulse 89 03/19/25 16:21   Resp 14 03/19/25 16:10   SpO2 98 % 03/19/25 16:21   Vitals shown include unfiled device data.        Post Anesthesia Care and Evaluation    Patient location during evaluation: PACU  Patient participation: complete - patient participated  Level of consciousness: awake  Pain score: 0  Pain management: adequate  Anesthetic complications: No anesthetic complications  PONV Status: none  Cardiovascular status: acceptable  Respiratory status: acceptable  Hydration status: acceptable

## 2025-03-20 NOTE — PROGRESS NOTES
[facility] MEDICINE SERVICE  DAILY PROGRESS NOTE    NAME: Yonas Charles  : 1995  MRN: [id number]      LOS: 2 days     PROVIDER OF SERVICE: Gabi Garcia MD    Chief Complaint: Acute GI bleeding    Subjective:     Interval History:  History taken from: patient    No new complaint    Review of Systems:   Review of Systems   All other systems reviewed and are negative.      Objective:     Vital Signs  Temp:  [97.3 °F (36.3 °C)-98.5 °F (36.9 °C)] 97.3 °F (36.3 °C)  Heart Rate:  [] 94  Resp:  [10-16] 10  BP: ()/(36-82) 115/63   Body mass index is 21.87 kg/m².    Physical Exam  Physical Exam  Constitutional:       Appearance: Normal appearance.   HENT:      Head: Normocephalic and atraumatic.      Nose: Nose normal.      Mouth/Throat:      Mouth: Mucous membranes are moist.   Eyes:      Extraocular Movements: Extraocular movements intact.      Conjunctiva/sclera: Conjunctivae normal.      Pupils: Pupils are equal, round, and reactive to light.   Cardiovascular:      Rate and Rhythm: Normal rate and regular rhythm.      Pulses: Normal pulses.      Heart sounds: Normal heart sounds.   Pulmonary:      Effort: Pulmonary effort is normal.      Breath sounds: Normal breath sounds.   Abdominal:      General: Abdomen is flat. Bowel sounds are normal.      Palpations: Abdomen is soft.   Musculoskeletal:         General: Normal range of motion.      Cervical back: Normal range of motion and neck supple.   Skin:     General: Skin is warm and dry.      Capillary Refill: Capillary refill takes less than 2 seconds.   Neurological:      General: No focal deficit present.      Mental Status: He is alert and oriented to person, place, and time.   Psychiatric:         Mood and Affect: Mood normal.         Behavior: Behavior normal.         Thought Content: Thought content normal.         Judgment: Judgment normal.         Current Medications:  Scheduled Meds:cefTRIAXone, 2,000 mg, Intravenous,  Q24H  folic acid 1 mg in sodium chloride 0.9 % 50 mL IVPB, 1 mg, Intravenous, Daily  mupirocin, 1 Application, Each Nare, BID  [Held by provider] nadolol, 20 mg, Oral, Q24H  pantoprazole, 40 mg, Intravenous, BID AC  sodium chloride, 10 mL, Intravenous, Q12H  sodium chloride, 10 mL, Intravenous, Q12H  [Held by provider] spironolactone, 25 mg, Oral, Daily  thiamine (B-1) IV, 200 mg, Intravenous, Q8H   Followed by  [START ON 3/24/2025] thiamine, 100 mg, Oral, Daily  [Held by provider] zinc sulfate, 220 mg, Oral, Daily      Continuous Infusions:octreotide (SandoSTATIN) infusion, 25 mcg/hr, Last Rate: 25 mcg/hr (03/20/25 1152)      PRN Meds:.  aluminum-magnesium hydroxide-simethicone    senna-docusate sodium **AND** polyethylene glycol **AND** bisacodyl **AND** bisacodyl    Calcium Replacement - Follow Nurse / BPA Driven Protocol    dextrose    dextrose    glucagon (human recombinant)    ipratropium-albuterol    LORazepam **OR** LORazepam **OR** LORazepam    LORazepam    Magnesium Standard Dose Replacement - Follow Nurse / BPA Driven Protocol    nitroglycerin    ondansetron ODT **OR** ondansetron    ondansetron ODT **OR** ondansetron    Phosphorus Replacement - Follow Nurse / BPA Driven Protocol    Potassium Replacement - Follow Nurse / BPA Driven Protocol    sodium chloride    sodium chloride    sodium chloride    sodium chloride       Diagnostic Data    Results from last 7 days   Lab Units 03/20/25  1349 03/20/25  0547 03/20/25  0513   WBC 10*3/mm3  --  6.63  --    HEMOGLOBIN g/dL  --  6.6*  --    HEMATOCRIT %  --  19.9*  --    PLATELETS 10*3/mm3  --  58*  --    GLUCOSE mg/dL  --   --  78   CREATININE mg/dL  --   --  0.73*   BUN mg/dL  --   --  18   SODIUM mmol/L  --   --  134*   POTASSIUM mmol/L 3.8  --  3.6   AST (SGOT) U/L  --   --  184*   ALT (SGPT) U/L  --   --  63*   ALK PHOS U/L  --   --  66   BILIRUBIN mg/dL  --   --  2.6*   ANION GAP mmol/L  --   --  10.1       US Liver  Result Date: 3/18/2025  Impression:  1.Diffusely increased hepatic echogenicity, consistent with hepatic steatosis. 2.Minimal flow shown in the main portal vein, which may be due to slow flow or thrombus. This can be further evaluated with contrast-enhanced CT abdomen/pelvis (portal venous phase). Electronically Signed: Emily Newsome  3/18/2025 7:12 PM EDT  Workstation ID: [id number]    CT Head Without Contrast  Result Date: 3/18/2025  Impression: No acute intracranial pathology. Electronically Signed: Rafiq Verma MD  3/18/2025 6:04 PM EDT  Workstation ID: [id number]        I reviewed the patient's new clinical results.    Assessment/Plan:     Active and Resolved Problems  Active Hospital Problems    Diagnosis  POA    **Acute GI bleeding [K92.2]  Yes    Hematemesis [K92.0]  Unknown      Resolved Hospital Problems   No resolved problems to display.       Acute upper GI bleed/hematemesis with gastric varices  Acute blood loss anemia  Alcoholic liver cirrhosis  Alcohol abuse with withdrawal/delirium tremens with new onset seizure        Transferred out of the ICU overnight.  -Status post EGD which showed grade 2 esophageal varices with banding done by GI.  No active bleeding was seen.  It also showed moderately severe portal hypertensive gastropathy.  No gastric varices seen.  Currently on octreotide drip.  Continue Protonix.    - Received 2 units of packed red blood cells yesterday per nursing for significant anemia with a hemoglobin of 6.1.  Hemoglobin today is 6.6 with downward trend status post transfusion of packed red blood cells.  Transfuse 1 more unit of packed red blood cells.    -Continue CIWA protocol for alcohol abuse with withdrawal.  Continue thiamine and folic acid.  Alcohol counseling.  Continue as needed benzodiazepines.  Has completed phenobarb taper upon chart review per ICU.    -Was started on ceftriaxone in the ICU for possible UTI.      -Psychiatry following for depression.    - Replace phosphorus for hypophosphatemia monitor  electrolytes.    VTE Prophylaxis:  Mechanical VTE prophylaxis orders are present.      Disposition Planning:     Barriers to Discharge: Pending clinical improvement  Anticipated Date of Discharge: 03/25/2025  Place of Discharge: Home      Code Status and Medical Interventions: CPR (Attempt to Resuscitate); Full Support   Ordered at: 03/18/25 1756     Code Status (Patient has no pulse and is not breathing):    CPR (Attempt to Resuscitate)     Medical Interventions (Patient has pulse or is breathing):    Full Support       Signature: Electronically signed by Gabi Garcia MD, 03/20/25, 16:21 EDT.  [facility] Hospitalist Team

## 2025-03-20 NOTE — CONSULTS
"  Referring Provider:     Felix Osborne APRN     Reason for Consultation: \"Alcohol abuse, relapse, depression, recently ED presentation with SI; no current SI.\"    Chief complaint : \"I drink a lot of alcohol\"      Subjective .     History of present illness:  The patient is a 29 y.o. male who was admitted secondary to witnessed seizure.  PMH of alcohol use disorder, cirrhosis, HTN      Psychiatry consulted secondary to alcohol abuse and depression.   Patient has been previously seen by psychiatry during previous admission with similar presentation, at the time was experiencing passive SI without plan or intent.    Patient is alert upright and oriented x 4  Extensive history of alcohol abuse-reports drinking approximately 1 L of alcohol per day- Attempted to stop drinking on his own at home-experiencing seizure, and then admitted to the hospital.  Patient has had success with [facility] and able to maintain 4-month period of sobriety.  Patient expressed desire to stop drinking alcohol, acknowledging significant impact on his health and wellbeing. Established with psychotherapist outpatient-expressed interest in medication therapy for depression.  Endorsing symptoms of depression associated with ruminating feelings of inadequacy and guilt associated with alcohol use, poor motivation, persistent depressed mood.  Denied feeling hopeless or helpless.  Denies recent or current SI or HI.  Denied previous psychiatric hospitalizations, denied history of suicide attempts,   Denies perceptual disturbances  Endorsing mild to moderate symptoms of nausea-sleep bilateral hand tremor-mild headache.  Negative for agitation, aggressive behavior   Social support with family-currently living with his parents.    The following portions of the patient's history were reviewed and updated as appropriate: allergies, current medications, past family history, past medical history, past social history, past surgical history and " "problem list.    History    Objective     Vital Signs   /63   Pulse 94   Temp 97.3 °F (36.3 °C) (Axillary)   Resp 10   Ht 165.1 cm (65\")   Wt 59.6 kg (131 lb 6.3 oz)   SpO2 100%   BMI 21.87 kg/m²     MENTAL STATUS EXAM   General Appearance:  Cleanly groomed and dressed  Eye Contact:  Fair  Attitude:  Cooperative and polite  Motor Activity:  Slow  Speech:  Normal rate, tone, volume  Mood and affect:  Depressed  Hopelessness:  Denies  Loneliness: Denies  Thought Process:  Logical, goal-directed and linear  Associations/ Thought Content:  No delusions  Hallucinations:  None  Suicidal Ideations:  Not present  Homicidal Ideation:  Not present  Sensorium:  Clear and alert  Orientation:  Person, place, situation and time  Attention Span/ Concentration:  Good  Insight:  Fair  Judgement:  Limited  Reliability:  Fair  Impulse Control:  Fair         Assessment & Plan       Acute GI bleeding    Hematemesis       Assessment: Alcohol dependence, withdrawal, MDD    Treatment Plan: Patient presents with sxs of alcohol dependence and withdrawal.   Continued alcohol use has significantly impacted patient's health and current medical condition-limiting medication options secondary to liver enzymes,  electrolyte abnormalities, and decreased blood/plt count.  Patient expressed desire to stop drinking alcohol with some interest in inpatient alcohol treatment, intensive outpatient treatment. SW consult placed to offer resources for IP and IOP alcohol treatment-voluntary.   Will discuss anti-depressant medications when medical condition improves.  Cont CIWA per hospitalist   Will follow   Treatment Plan discussed with: Patient    I discussed the patients findings and my recommendations with patient and nursing staff    I have reviewed and approved the behavioral health treatment plans and problem list. Yes  Thank you for the consult   Referring MD has access to consult report and progress notes in EMR     Ines Almazan DNP, " MARI  03/20/25  15:05 EDT

## 2025-03-20 NOTE — PLAN OF CARE
Problem: Adult Inpatient Plan of Care  Goal: Absence of Hospital-Acquired Illness or Injury  Intervention: Identify and Manage Fall Risk  Recent Flowsheet Documentation  Taken 3/19/2025 2200 by Fay Logan LPN  Safety Promotion/Fall Prevention:   assistive device/personal items within reach   clutter free environment maintained   fall prevention program maintained   lighting adjusted   nonskid shoes/slippers when out of bed   room organization consistent   safety round/check completed  Taken 3/19/2025 2152 by Fay Logan LPN  Safety Promotion/Fall Prevention:   assistive device/personal items within reach   clutter free environment maintained   fall prevention program maintained   lighting adjusted   nonskid shoes/slippers when out of bed   room organization consistent   safety round/check completed  Intervention: Prevent Skin Injury  Recent Flowsheet Documentation  Taken 3/19/2025 2152 by Fay Logan LPN  Body Position: position changed independently  Skin Protection: incontinence pads utilized  Intervention: Prevent and Manage VTE (Venous Thromboembolism) Risk  Recent Flowsheet Documentation  Taken 3/19/2025 2152 by Fay Logan LPN  VTE Prevention/Management:   patient refused intervention   SCDs (sequential compression devices) off  Intervention: Prevent Infection  Recent Flowsheet Documentation  Taken 3/19/2025 2200 by Fay Logan LPN  Infection Prevention:   environmental surveillance performed   equipment surfaces disinfected   personal protective equipment utilized   hand hygiene promoted   rest/sleep promoted   single patient room provided  Taken 3/19/2025 2152 by Fay Logan LPN  Infection Prevention:   environmental surveillance performed   hand hygiene promoted   equipment surfaces disinfected   personal protective equipment utilized   rest/sleep promoted   single patient room provided   Goal Outcome Evaluation:

## 2025-03-20 NOTE — CASE MANAGEMENT/SOCIAL WORK
Continued Stay Note  [facility]     Patient Name: Yonas Charles  MRN: [id number]  Today's Date: 3/20/2025    Admit Date: 3/18/2025    Plan: Anticipate routine home with parents.   Discharge Plan       Row Name 03/20/25 1400       Plan    Plan Anticipate routine home with parents.    Plan Comments Barrier to D/C: IV abx, octreotide gtt (last day Saturday), monitoring hgb (hgb 6.6-1 unit PRBCs today), GI following, psych consult pending.                      Expected Discharge Date and Time       Expected Discharge Date Expected Discharge Time    Mar 24, 2025           Phone communication or documentation only - no physical contact with patient or family.     ROSETTA AmayaN, [place] [address]    Office: [phone]  Fax: [phone]

## 2025-03-20 NOTE — PROGRESS NOTES
" LOS: 2 days   Patient Care Team:  Provider, No Known as PCP - General      Subjective     Interval History:     Subjective: \"I am doing a little better\".  He may have had another episode of vomiting last night but without hematemesis.  He denies chest pain or abdominal pain.  He is tolerating clear liquid diet.      ROS:   No chest pain, shortness of breath, or cough.        Medication Review:     Current Facility-Administered Medications:     aluminum-magnesium hydroxide-simethicone (MAALOX MAX) 400-400-40 MG/5ML suspension 15 mL, 15 mL, Oral, Q6H PRN, Cordell Miller MD    sennosides-docusate (PERICOLACE) 8.6-50 MG per tablet 2 tablet, 2 tablet, Oral, BID PRN **AND** polyethylene glycol (MIRALAX) packet 17 g, 17 g, Oral, Daily PRN **AND** bisacodyl (DULCOLAX) EC tablet 5 mg, 5 mg, Oral, Daily PRN **AND** bisacodyl (DULCOLAX) suppository 10 mg, 10 mg, Rectal, Daily PRN, Cordell Miller MD    Calcium Replacement - Follow Nurse / BPA Driven Protocol, , Not Applicable, PRN, Cordell Miller MD    cefTRIAXone (ROCEPHIN) 2,000 mg in sodium chloride 0.9 % 100 mL MBP, 2,000 mg, Intravenous, Q24H, Cordell Miller MD, Last Rate: 200 mL/hr at 03/20/25 0856, 2,000 mg at 03/20/25 0856    dextrose (D50W) (25 g/50 mL) IV injection 25 g, 25 g, Intravenous, Q15 Min PRN, Cordell Miller MD    dextrose (GLUTOSE) oral gel 15 g, 15 g, Oral, Q15 Min PRN, Cordell Miller MD    folic acid 1 mg in sodium chloride 0.9 % 50 mL IVPB, 1 mg, Intravenous, Daily, Cordell Miller MD, Last Rate: 100 mL/hr at 03/20/25 0949, 1 mg at 03/20/25 0949    glucagon (GLUCAGEN) injection 1 mg, 1 mg, Subcutaneous, Q15 Min PRN, Cordell Miller MD    ipratropium-albuterol (DUO-NEB) nebulizer solution 3 mL, 3 mL, Nebulization, Q6H PRN, Cordell Miller MD    LORazepam (ATIVAN) injection 1 mg, 1 mg, Intravenous, Q1H PRN, 1 mg at 03/19/25 1120 **OR** LORazepam (ATIVAN) injection 2 mg, 2 mg, Intravenous, Q1H PRN, 2 mg at 03/19/25 1652 **OR** LORazepam " (ATIVAN) injection 2 mg, 2 mg, Intravenous, Q15 Min PRN, Cordell Miller MD, 2 mg at 03/19/25 2035    LORazepam (ATIVAN) injection 2 mg, 2 mg, Intravenous, Q5 Min PRN, Cordell Miller MD    Magnesium Standard Dose Replacement - Follow Nurse / BPA Driven Protocol, , Not Applicable, PRN, Cordell Miller MD    mupirocin (BACTROBAN) 2 % nasal ointment 1 Application, 1 Application, Each Nare, BID, Cordell Miller MD, 1 Application at 03/20/25 0853    [Held by provider] nadolol (CORGARD) tablet 20 mg, 20 mg, Oral, Q24H, Felix Osborne APRN    nitroglycerin (NITROSTAT) SL tablet 0.4 mg, 0.4 mg, Sublingual, Q5 Min PRN, Cordell Miller MD    [COMPLETED] octreotide (SANDOSTATIN) bolus from bag 5 mcg/mL solution 50 mcg, 50 mcg, Intravenous, Once, 50 mcg at 03/18/25 1546 **FOLLOWED BY** octreotide (sandoSTATIN) 500 mcg in sodium chloride 0.9 % 100 mL (5 mcg/mL) infusion, 25 mcg/hr, Intravenous, Continuous, Cordell Miller MD, Stopped at 03/20/25 1137    ondansetron ODT (ZOFRAN-ODT) disintegrating tablet 4 mg, 4 mg, Oral, Q6H PRN **OR** ondansetron (ZOFRAN) injection 4 mg, 4 mg, Intravenous, Q6H PRN, Cordell Miller MD, 4 mg at 03/19/25 0015    ondansetron ODT (ZOFRAN-ODT) disintegrating tablet 4 mg, 4 mg, Oral, Q6H PRN **OR** ondansetron (ZOFRAN) injection 4 mg, 4 mg, Intravenous, Q6H PRN, Cordell Miller MD    pantoprazole (PROTONIX) injection 40 mg, 40 mg, Intravenous, BID AC, Cordell Miller MD, 40 mg at 03/20/25 0853    Phosphorus Replacement - Follow Nurse / BPA Driven Protocol, , Not Applicable, PRNPaul Steven, MD    potassium chloride (KLOR-CON M20) CR tablet 40 mEq, 40 mEq, Oral, Q4H, Gabi Garcia MD, 40 mEq at 03/20/25 0853    Potassium Replacement - Follow Nurse / BPA Driven Protocol, , Not Applicable, PRN, Cordell Miller MD    sodium chloride 0.9 % flush 10 mL, 10 mL, Intravenous, PRN, Cordell Miller MD    sodium chloride 0.9 % flush 10 mL, 10 mL, Intravenous, Q12H, Cordell Miller MD,  10 mL at 03/20/25 0855    sodium chloride 0.9 % flush 10 mL, 10 mL, Intravenous, Q12H, Cordell Miller MD, 10 mL at 03/20/25 0855    sodium chloride 0.9 % flush 10 mL, 10 mL, Intravenous, PRN, Cordell Miller MD    sodium chloride 0.9 % infusion 40 mL, 40 mL, Intravenous, PRN, Cordell Miller MD, 400 mL at 03/19/25 1540    sodium chloride 0.9 % infusion 40 mL, 40 mL, Intravenous, PRPaul NICOLE Steven, MD    [Held by provider] spironolactone (ALDACTONE) tablet 25 mg, 25 mg, Oral, Daily, Felix Osborne APRN    thiamine (B-1) injection 200 mg, 200 mg, Intravenous, Q8H, 200 mg at 03/20/25 0544 **FOLLOWED BY** [START ON 3/24/2025] thiamine (VITAMIN B-1) tablet 100 mg, 100 mg, Oral, Daily, Cordell Miller MD    [Held by provider] zinc sulfate (ZINCATE) capsule 220 mg, 220 mg, Oral, Daily, Felix Osborne APRN      Objective     Vital Signs  Vitals:    03/20/25 0814 03/20/25 1109 03/20/25 1113 03/20/25 1140   BP: 110/55  96/48 112/58   BP Location: Right arm  Right arm    Patient Position: Lying  Lying    Pulse: 101 82 103 95   Resp:   13 11   Temp: 98.5 °F (36.9 °C)  97.9 °F (36.6 °C) 98.5 °F (36.9 °C)   TempSrc: Oral  Oral Oral   SpO2: 99%  98% 99%   Weight:       Height:           Physical Exam:     General Appearance:    Awake and alert, in no acute distress   Head:    Normocephalic, without obvious abnormality   Eyes:          Conjunctivae normal, anicteric sclera   Throat:   No oral lesions, no thrush, oral mucosa moist   Neck:   No adenopathy, supple, no JVD   Lungs:     respirations regular, even and unlabored   Abdomen:     Soft, non-tender, no rebound or guarding, non-distended   Rectal:     Deferred   Extremities:   No edema, no cyanosis   Skin:   No bruising or rash, no jaundice        Results Review:    Results from last 7 days   Lab Units 03/20/25  0547 03/19/25 2020 03/19/25  1234 03/19/25  0802 03/19/25  0426 03/19/25  0247 03/18/25  2244 03/18/25  1614 03/18/25  1339   WBC 10*3/mm3 6.63  --   --    "--  22.05*  --   --   --  26.56*   HEMOGLOBIN g/dL 6.6* 7.1* 6.5* 7.6* 7.9* 7.6* 6.1*   < > 7.9*   PLATELETS 10*3/mm3 58*  --   --   --  103*  --   --   --  132*    < > = values in this interval not displayed.     Results from last 7 days   Lab Units 03/20/25  0513 03/19/25  1736 03/19/25  1044 03/19/25  0426 03/19/25  0047 03/19/25  0025 03/18/25  1339   SODIUM mmol/L 134*  --   --  133* 133*  --  132*   POTASSIUM mmol/L 3.6 3.3*  --  3.5 3.7  --  2.9*   CHLORIDE mmol/L 100  --   --  89* 88*  --  80*   CO2 mmol/L 23.9  --   --  32.7* 32.1*  --  33.6*   BUN mg/dL 18  --   --  55* 58*  --  57*   CREATININE mg/dL 0.73*  --   --  1.26 1.30*  --  1.28*   GLUCOSE mg/dL 78  --   --  130* 143*  --  141*   ALBUMIN g/dL 3.1*  --   --  3.4*  --   --  3.9   BILIRUBIN mg/dL 2.6*  --   --  3.9*  --   --  4.9*   ALK PHOS U/L 66  --   --  78  --   --  89   AST (SGOT) U/L 184*  --   --  161*  --   --  156*   ALT (SGPT) U/L 63*  --   --  55*  --   --  55*   INR   --   --  2.03*  --  2.32*  --  2.33*   MAGNESIUM mg/dL 2.1  --   --   --   --  2.7* 0.9*   PHOSPHORUS mg/dL 1.6*  --   --  4.2  --   --   --    LIPASE U/L  --   --   --   --   --   --  14     Estimated Creatinine Clearance: 124.7 mL/min (A) (by C-G formula based on SCr of 0.73 mg/dL (L)).  No results found for: \"HGBA1C\"      Infection   Results from last 7 days   Lab Units 03/18/25  1506 03/18/25  1455   BLOODCX  No growth at 24 hours No growth at 24 hours   PROCALCITONIN ng/mL  --  0.67*     UA    Results from last 7 days   Lab Units 03/18/25  1434   NITRITE UA  Positive*   WBC UA /HPF 0-2   BACTERIA UA /HPF 1+*   SQUAM EPITHEL UA /HPF 0-2     Microbiology Results (last 10 days)       Procedure Component Value - Date/Time    MRSA Screen, PCR (Inpatient) - Swab, Nares [[id number]]  (Normal) Collected: 03/18/25 2043    Lab Status: Final result Specimen: Swab from Nares Updated: 03/18/25 2210     MRSA PCR No MRSA Detected    Narrative:      The negative predictive value of " this diagnostic test is high and should only be used to consider de-escalating anti-MRSA therapy. A positive result may indicate colonization with MRSA and must be correlated clinically.    Respiratory Panel PCR w/COVID-19(SARS-CoV-2) NICOLASA/NORM/JULIANNA/PAD/COR/ALBERTO In-House, NP Swab in UTM/VTM, 2 HR TAT - Swab, Nasopharynx [[id number]]  (Normal) Collected: 03/18/25 2043    Lab Status: Final result Specimen: Swab from Nasopharynx Updated: 03/18/25 2144     ADENOVIRUS, PCR Not Detected     Coronavirus 229E Not Detected     Coronavirus HKU1 Not Detected     Coronavirus NL63 Not Detected     Coronavirus OC43 Not Detected     COVID19 Not Detected     Human Metapneumovirus Not Detected     Human Rhinovirus/Enterovirus Not Detected     Influenza A PCR Not Detected     Influenza B PCR Not Detected     Parainfluenza Virus 1 Not Detected     Parainfluenza Virus 2 Not Detected     Parainfluenza Virus 3 Not Detected     Parainfluenza Virus 4 Not Detected     RSV, PCR Not Detected     Bordetella pertussis pcr Not Detected     Bordetella parapertussis PCR Not Detected     Chlamydophila pneumoniae PCR Not Detected     Mycoplasma pneumo by PCR Not Detected    Narrative:      In the setting of a positive respiratory panel with a viral infection PLUS a negative procalcitonin without other underlying concern for bacterial infection, consider observing off antibiotics or discontinuation of antibiotics and continue supportive care. If the respiratory panel is positive for atypical bacterial infection (Bordetella pertussis, Chlamydophila pneumoniae, or Mycoplasma pneumoniae), consider antibiotic de-escalation to target atypical bacterial infection.    Blood Culture - Blood, Arm, Left [[id number]]  (Normal) Collected: 03/18/25 1506    Lab Status: Preliminary result Specimen: Blood from Arm, Left Updated: 03/19/25 1515     Blood Culture No growth at 24 hours    Blood Culture - Blood, Arm, Right [[id number]]  (Normal) Collected: 03/18/25 1455    Lab  Status: Preliminary result Specimen: Blood from Arm, Right Updated: 03/19/25 1515     Blood Culture No growth at 24 hours    Narrative:      Less than seven (7) mL's of blood was collected.  Insufficient quantity may yield false negative results.          Imaging Results (Last 72 Hours)       Procedure Component Value Units Date/Time    US Liver [[id number]] Collected: 03/18/25 1908     Updated: 03/18/25 1915    Narrative:      US LIVER    Date of Exam: 3/18/2025 6:08 PM EDT    Indication: elevated LFTs    Comparison: CT abdomen and pelvis 7/18/2024    Technique: Grayscale and color Doppler ultrasound evaluation of the liver was performed.    Findings:  Liver measures up to 15.9 cm and demonstrates diffusely increased echogenicity relative to the right kidney, consistent with hepatic steatosis. No liver masses are seen on ultrasound. Minimal flow is shown in the main portal vein. Extrahepatic common   bile duct is nondilated at 6 to 7 mm in diameter. No right upper quadrant ascites seen.      Impression:      Impression:  1.Diffusely increased hepatic echogenicity, consistent with hepatic steatosis.  2.Minimal flow shown in the main portal vein, which may be due to slow flow or thrombus. This can be further evaluated with contrast-enhanced CT abdomen/pelvis (portal venous phase).        Electronically Signed: Emily Newsome    3/18/2025 7:12 PM EDT    Workstation ID: [id number]    CT Head Without Contrast [[id number]] Collected: 03/18/25 1803     Updated: 03/18/25 1806    Narrative:      CT HEAD WO CONTRAST    Date of Exam: 3/18/2025 5:22 PM EDT    Indication: fall.    Comparison: 7/27/2024    Technique: Axial CT images were obtained of the head without contrast administration.  Coronal reconstructions were performed.  Automated exposure control and iterative reconstruction methods were used.    Findings: Gray-white matter differentiation is maintained without evidence of an acute infarction. No intracranial mass or mass  effect. No extra-axial mass or collection. The ventricles and sulci are normal in size and configuration. The posterior fossa   appears normal. Sellar and suprasellar structures are normal.    Orbital and paranasal soft tissues are normal. The paranasal sinuses, ethmoid air cells, and mastoid air cells are aerated. The bony calvarium appears intact. No acute fractures. No lytic or blastic bony diseases.      Impression:      Impression: No acute intracranial pathology.          Electronically Signed: Rafiq Verma MD    3/18/2025 6:04 PM EDT    Workstation ID: [id number]            Assessment & Plan       29 y.o. male PMH acute liver failure secondary to severe alcohol hepatitis that was nonresponsive to prednisone.  He presented to the ER due to hematemesis and weakness, which is why GI was consulted.  Underwent EGD which revealed esophageal varices.    Endo:  19/25 EGD by Dr. Miller showed 4 cords of grade 2 varices, 1 had red nellie sign with total of 5 bands placed.  No active bleeding seen.  Moderately severe PHG.  No obvious gastric varices.     Labs:   Hgb 7.9, HCT 22.7, WBC 26.56, , INR 2.33,  ALT 55 alk phos 89 total bili 4.9 Na 132 K 2.9 BUN 57 Cr 1.28    Hgb 6.6 HCT 19.9 PLT 58.   ALT 63 alk phos 66 total bili 2.6  BUN 18 CR 0.73     Problem List:  Gastric varices  Alcohol cirrhosis - patient has been seen at [facility] liver transplant team but patient did not keep his follow-ups.  Anemia   History of ascites - last paracentesis 8/9/2024, undetectable total protein and albumin, negative for SBP  Electrolyte abnormalities     Plan:  -Hgb down to 6.6, transfuse 1 unit PRBC.   -Advance to full liquid diet.  -PPI BID  -Continue octreotide at least another 48 hours (d/c Saturday).  -MELD 3.0 is 28 at admission.  -Continue monitor H/H and transfuse if less than 7.  1 unit of blood has been ordered this admission.  -Electrolyte management per primary team  -Patient will need repeat EGD in the  next 1 to 2 months. Unfortunately our office does not take his insurance - he will need to follow up with [facility] for care moving forward, who he has also seen in the past 6 months.     Electronically signed by Mj Richardson PA-C, 03/20/25, 11:49 AM EDT.

## 2025-03-21 LAB
ALBUMIN SERPL-MCNC: 3.2 G/DL (ref 3.5–5.2)
ALBUMIN/GLOB SERPL: 1.3 G/DL
ALP SERPL-CCNC: 79 U/L (ref 39–117)
ALT SERPL W P-5'-P-CCNC: 79 U/L (ref 1–41)
ANION GAP SERPL CALCULATED.3IONS-SCNC: 8.1 MMOL/L (ref 5–15)
AST SERPL-CCNC: 206 U/L (ref 1–40)
BASOPHILS # BLD AUTO: 0.07 10*3/MM3 (ref 0–0.2)
BASOPHILS NFR BLD AUTO: 1.1 % (ref 0–1.5)
BH BB BLOOD EXPIRATION DATE: NORMAL
BH BB BLOOD EXPIRATION DATE: NORMAL
BH BB BLOOD TYPE BARCODE: 5100
BH BB BLOOD TYPE BARCODE: 5100
BH BB DISPENSE STATUS: NORMAL
BH BB DISPENSE STATUS: NORMAL
BH BB PRODUCT CODE: NORMAL
BH BB PRODUCT CODE: NORMAL
BH BB UNIT NUMBER: NORMAL
BH BB UNIT NUMBER: NORMAL
BILIRUB SERPL-MCNC: 3.3 MG/DL (ref 0–1.2)
BUN SERPL-MCNC: 9 MG/DL (ref 6–20)
BUN/CREAT SERPL: 12.5 (ref 7–25)
CALCIUM SPEC-SCNC: 7.7 MG/DL (ref 8.6–10.5)
CHLORIDE SERPL-SCNC: 100 MMOL/L (ref 98–107)
CO2 SERPL-SCNC: 23.9 MMOL/L (ref 22–29)
CREAT SERPL-MCNC: 0.72 MG/DL (ref 0.76–1.27)
CROSSMATCH INTERPRETATION: NORMAL
CROSSMATCH INTERPRETATION: NORMAL
DEPRECATED RDW RBC AUTO: 55 FL (ref 37–54)
EGFRCR SERPLBLD CKD-EPI 2021: 126 ML/MIN/1.73
EOSINOPHIL # BLD AUTO: 0.18 10*3/MM3 (ref 0–0.4)
EOSINOPHIL NFR BLD AUTO: 2.8 % (ref 0.3–6.2)
ERYTHROCYTE [DISTWIDTH] IN BLOOD BY AUTOMATED COUNT: 16.7 % (ref 12.3–15.4)
GLOBULIN UR ELPH-MCNC: 2.5 GM/DL
GLUCOSE SERPL-MCNC: 108 MG/DL (ref 65–99)
HCT VFR BLD AUTO: 24.7 % (ref 37.5–51)
HGB BLD-MCNC: 8.2 G/DL (ref 13–17.7)
IMM GRANULOCYTES # BLD AUTO: 0.02 10*3/MM3 (ref 0–0.05)
IMM GRANULOCYTES NFR BLD AUTO: 0.3 % (ref 0–0.5)
INR PPP: 1.6 (ref 0.9–1.1)
LYMPHOCYTES # BLD AUTO: 1.94 10*3/MM3 (ref 0.7–3.1)
LYMPHOCYTES NFR BLD AUTO: 29.8 % (ref 19.6–45.3)
MAGNESIUM SERPL-MCNC: 1.7 MG/DL (ref 1.6–2.6)
MCH RBC QN AUTO: 30.5 PG (ref 26.6–33)
MCHC RBC AUTO-ENTMCNC: 33.2 G/DL (ref 31.5–35.7)
MCV RBC AUTO: 91.8 FL (ref 79–97)
MONOCYTES # BLD AUTO: 0.69 10*3/MM3 (ref 0.1–0.9)
MONOCYTES NFR BLD AUTO: 10.6 % (ref 5–12)
NEUTROPHILS NFR BLD AUTO: 3.61 10*3/MM3 (ref 1.7–7)
NEUTROPHILS NFR BLD AUTO: 55.4 % (ref 42.7–76)
NRBC BLD AUTO-RTO: 0 /100 WBC (ref 0–0.2)
PHOSPHATE SERPL-MCNC: 1.9 MG/DL (ref 2.5–4.5)
PHOSPHATE SERPL-MCNC: 2.3 MG/DL (ref 2.5–4.5)
PLATELET # BLD AUTO: 64 10*3/MM3 (ref 140–450)
PMV BLD AUTO: 10.3 FL (ref 6–12)
POTASSIUM SERPL-SCNC: 4 MMOL/L (ref 3.5–5.2)
PROT SERPL-MCNC: 5.7 G/DL (ref 6–8.5)
PROTHROMBIN TIME: 19.1 SECONDS (ref 11.7–14.2)
RBC # BLD AUTO: 2.69 10*6/MM3 (ref 4.14–5.8)
SODIUM SERPL-SCNC: 132 MMOL/L (ref 136–145)
UNIT  ABO: NORMAL
UNIT  ABO: NORMAL
UNIT  RH: NORMAL
UNIT  RH: NORMAL
WBC NRBC COR # BLD AUTO: 6.51 10*3/MM3 (ref 3.4–10.8)

## 2025-03-21 PROCEDURE — 25010000002 THIAMINE PER 100 MG: Performed by: INTERNAL MEDICINE

## 2025-03-21 PROCEDURE — 25010000002 CEFTRIAXONE PER 250 MG: Performed by: INTERNAL MEDICINE

## 2025-03-21 PROCEDURE — 85610 PROTHROMBIN TIME: CPT

## 2025-03-21 PROCEDURE — 99232 SBSQ HOSP IP/OBS MODERATE 35: CPT

## 2025-03-21 PROCEDURE — 85025 COMPLETE CBC W/AUTO DIFF WBC: CPT | Performed by: INTERNAL MEDICINE

## 2025-03-21 PROCEDURE — 80053 COMPREHEN METABOLIC PANEL: CPT | Performed by: INTERNAL MEDICINE

## 2025-03-21 PROCEDURE — 83735 ASSAY OF MAGNESIUM: CPT | Performed by: INTERNAL MEDICINE

## 2025-03-21 PROCEDURE — 25010000002 THIAMINE HCL 200 MG/2ML SOLUTION: Performed by: INTERNAL MEDICINE

## 2025-03-21 PROCEDURE — 84100 ASSAY OF PHOSPHORUS: CPT | Performed by: INTERNAL MEDICINE

## 2025-03-21 PROCEDURE — 25010000002 OCTREOTIDE PER 25 MCG: Performed by: INTERNAL MEDICINE

## 2025-03-21 RX ADMIN — FOLIC ACID 1 MG: 5 INJECTION, SOLUTION INTRAMUSCULAR; INTRAVENOUS; SUBCUTANEOUS at 08:37

## 2025-03-21 RX ADMIN — THIAMINE HYDROCHLORIDE 200 MG: 100 INJECTION, SOLUTION INTRAMUSCULAR; INTRAVENOUS at 15:21

## 2025-03-21 RX ADMIN — CEFTRIAXONE 2000 MG: 2 INJECTION, POWDER, FOR SOLUTION INTRAMUSCULAR; INTRAVENOUS at 09:38

## 2025-03-21 RX ADMIN — PANTOPRAZOLE SODIUM 40 MG: 40 INJECTION, POWDER, LYOPHILIZED, FOR SOLUTION INTRAVENOUS at 08:24

## 2025-03-21 RX ADMIN — OCTREOTIDE ACETATE 25 MCG/HR: 500 INJECTION, SOLUTION INTRAVENOUS; SUBCUTANEOUS at 08:24

## 2025-03-21 RX ADMIN — Medication 10 ML: at 20:22

## 2025-03-21 RX ADMIN — MUPIROCIN 1 APPLICATION: 20 OINTMENT TOPICAL at 08:24

## 2025-03-21 RX ADMIN — THIAMINE HYDROCHLORIDE 200 MG: 100 INJECTION, SOLUTION INTRAMUSCULAR; INTRAVENOUS at 05:45

## 2025-03-21 RX ADMIN — MUPIROCIN 1 APPLICATION: 20 OINTMENT TOPICAL at 20:21

## 2025-03-21 RX ADMIN — PANTOPRAZOLE SODIUM 40 MG: 40 INJECTION, POWDER, LYOPHILIZED, FOR SOLUTION INTRAVENOUS at 17:02

## 2025-03-21 RX ADMIN — Medication 10 ML: at 08:24

## 2025-03-21 RX ADMIN — THIAMINE HYDROCHLORIDE 200 MG: 100 INJECTION, SOLUTION INTRAMUSCULAR; INTRAVENOUS at 21:22

## 2025-03-21 RX ADMIN — Medication 2 PACKET: at 02:49

## 2025-03-21 NOTE — PROGRESS NOTES
[facility] MEDICINE SERVICE  DAILY PROGRESS NOTE    NAME: Yonas Charles  : 1995  MRN: [id number]      LOS: 3 days     PROVIDER OF SERVICE: Gabi Garcia MD    Chief Complaint: Acute GI bleeding    Subjective:     Interval History:  History taken from: patient    No new complaint    Review of Systems:   Review of Systems   All other systems reviewed and are negative.      Objective:     Vital Signs  Temp:  [97.8 °F (36.6 °C)-98.9 °F (37.2 °C)] 98 °F (36.7 °C)  Heart Rate:  [72-99] 83  Resp:  [10-14] 10  BP: ()/(49-69) 106/69   Body mass index is 22.16 kg/m².    Physical Exam  Physical Exam  Constitutional:       Appearance: Normal appearance.   HENT:      Head: Normocephalic and atraumatic.      Nose: Nose normal.      Mouth/Throat:      Mouth: Mucous membranes are moist.   Eyes:      Extraocular Movements: Extraocular movements intact.      Conjunctiva/sclera: Conjunctivae normal.      Pupils: Pupils are equal, round, and reactive to light.   Cardiovascular:      Rate and Rhythm: Normal rate and regular rhythm.      Pulses: Normal pulses.      Heart sounds: Normal heart sounds.   Pulmonary:      Effort: Pulmonary effort is normal.      Breath sounds: Normal breath sounds.   Abdominal:      General: Abdomen is flat. Bowel sounds are normal.      Palpations: Abdomen is soft.   Musculoskeletal:         General: Normal range of motion.      Cervical back: Normal range of motion and neck supple.   Skin:     General: Skin is warm and dry.      Capillary Refill: Capillary refill takes less than 2 seconds.   Neurological:      General: No focal deficit present.      Mental Status: He is alert and oriented to person, place, and time.   Psychiatric:         Mood and Affect: Mood normal.         Behavior: Behavior normal.         Thought Content: Thought content normal.         Judgment: Judgment normal.         Current Medications:  Scheduled Meds:cefTRIAXone, 2,000 mg, Intravenous, Q24H  folic  acid 1 mg in sodium chloride 0.9 % 50 mL IVPB, 1 mg, Intravenous, Daily  mupirocin, 1 Application, Each Nare, BID  [Held by provider] nadolol, 20 mg, Oral, Q24H  pantoprazole, 40 mg, Intravenous, BID AC  sodium chloride, 10 mL, Intravenous, Q12H  sodium chloride, 10 mL, Intravenous, Q12H  spironolactone, 25 mg, Oral, Daily  thiamine (B-1) IV, 200 mg, Intravenous, Q8H   Followed by  [START ON 3/24/2025] thiamine, 100 mg, Oral, Daily  zinc sulfate, 220 mg, Oral, Daily      Continuous Infusions:octreotide (SandoSTATIN) infusion, 25 mcg/hr, Last Rate: 25 mcg/hr (03/21/25 0824)      PRN Meds:.  aluminum-magnesium hydroxide-simethicone    senna-docusate sodium **AND** polyethylene glycol **AND** bisacodyl **AND** bisacodyl    Calcium Replacement - Follow Nurse / BPA Driven Protocol    dextrose    dextrose    glucagon (human recombinant)    ipratropium-albuterol    LORazepam **OR** LORazepam **OR** LORazepam    LORazepam    Magnesium Standard Dose Replacement - Follow Nurse / BPA Driven Protocol    nitroglycerin    ondansetron ODT **OR** ondansetron    ondansetron ODT **OR** ondansetron    Phosphorus Replacement - Follow Nurse / BPA Driven Protocol    Potassium Replacement - Follow Nurse / BPA Driven Protocol    sodium chloride    sodium chloride    sodium chloride    sodium chloride       Diagnostic Data    Results from last 7 days   Lab Units 03/21/25  0027   WBC 10*3/mm3 6.51   HEMOGLOBIN g/dL 8.2*   HEMATOCRIT % 24.7*   PLATELETS 10*3/mm3 64*   GLUCOSE mg/dL 108*   CREATININE mg/dL 0.72*   BUN mg/dL 9   SODIUM mmol/L 132*   POTASSIUM mmol/L 4.0   AST (SGOT) U/L 206*   ALT (SGPT) U/L 79*   ALK PHOS U/L 79   BILIRUBIN mg/dL 3.3*   ANION GAP mmol/L 8.1       No radiology results for the last day        I reviewed the patient's new clinical results.    Assessment/Plan:     Active and Resolved Problems  Active Hospital Problems    Diagnosis  POA    **Acute GI bleeding [K92.2]  Yes    Hematemesis [K92.0]  Unknown       Resolved Hospital Problems   No resolved problems to display.       Acute upper GI bleed/hematemesis with gastric varices  Acute blood loss anemia  Alcoholic liver cirrhosis  Alcohol abuse with withdrawal/delirium tremens with new onset seizure          -Status post EGD which showed grade 2 esophageal varices with banding done by GI.  No active bleeding was seen.  It also showed moderately severe portal hypertensive gastropathy.  No gastric varices seen.  Currently on octreotide drip.  Continue Protonix.    - H&H improved status post transfusion of about 3 units of packed red blood cells.  Continue to monitor.    -Continue CIWA protocol for alcohol abuse with withdrawal.  Continue thiamine and folic acid.  Alcohol counseling.  Continue as needed benzodiazepines.  Has completed phenobarb taper upon chart review per ICU.    -Was started on ceftriaxone in the ICU for possible UTI.      -Psychiatry following for depression.    - Replace phosphorus for hypophosphatemia monitor electrolytes.    VTE Prophylaxis:  Mechanical VTE prophylaxis orders are present.      Disposition Planning:     Barriers to Discharge: Pending clinical improvement  Anticipated Date of Discharge: 03/25/2025  Place of Discharge: Home      Code Status and Medical Interventions: CPR (Attempt to Resuscitate); Full Support   Ordered at: 03/18/25 1753     Code Status (Patient has no pulse and is not breathing):    CPR (Attempt to Resuscitate)     Medical Interventions (Patient has pulse or is breathing):    Full Support       Signature: Electronically signed by Gabi Garcia MD, 03/21/25, 15:45 EDT.  [facility] Hospitalist Team

## 2025-03-21 NOTE — CASE MANAGEMENT/SOCIAL WORK
Social Work Assessment  [facility]     Patient Name: Yonas Charles  MRN: [id number]  Today's Date: 3/21/2025    Admit Date: 3/18/2025     Substance Abuse       Row Name 03/21/25 1526       Substance Use    Substance Use Status current alcohol use    Last Alcohol Use 03/18/25    Environment Typically Uses Alcohol private residence    Reported Characteristics of Alcohol Use tolerance;withdrawal symptoms    Readiness to Change Alcohol Use precontemplation    Attempts to Quit Alcohol inpatient substance abuse rehabilitation    Alcohol Withdrawal Pattern tremor(s);seizures;nausea    Previous Substance Use Treatment detox unit;inpatient rehab    Substance Use Comment LSW met with patient at bedside, introduced self/role & reason for visit related to alcohol use. Recent relapse where patient was drinking a bottle of vodka and history of withdrawal symptoms.  Patient answered orientation questions appropriately during interaction. Patient states that he has been IP historically ([facility]) and desire IOP at discharge. Patient states he had a previous intake with [facility] (? LSW unfamiliar with location), but missed appointment d/t hospital admission. LSW offered to assist in rescheduling appointment, but patient reported he wanted to do it independently. Provided new list of IP/IOP options for review/reference. Patient declined additional needs from LSW at this time.                    ZARA White, LSW, CCM  Lead   Phone: [phone]  Cell: [phone]  Fax: [phone]  [email or web address]

## 2025-03-21 NOTE — CASE MANAGEMENT/SOCIAL WORK
Continued Stay Note  [facility]     Patient Name: Yonas Charles  MRN: [id number]  Today's Date: 3/21/2025    Admit Date: 3/18/2025    Plan: Anticipate routine home with parents.   Discharge Plan       Row Name 03/21/25 1501       Plan    Plan Comments CM contacted [facility] ([phone]) to arrange new PCP appt, no answer, CM left message for office to call patient to schedule. CM added [facility] contact information to AVS. Barrier to D/C: octreotide gtt, IV abx, monitoring hgb (hgb 8.2), GI following.                      Expected Discharge Date and Time       Expected Discharge Date Expected Discharge Time    Mar 22, 2025           Phone communication or documentation only - no physical contact with patient or family.     ROSETTA AmayaN, RN, CCM    [address] [address]    Office: [phone]  Fax: [phone]

## 2025-03-21 NOTE — DISCHARGE INSTR - APPOINTMENTS
Please contact [facility] [phone] to arrange new primary care appointment. [address], [place].

## 2025-03-21 NOTE — PLAN OF CARE
Problem: Adult Inpatient Plan of Care  Goal: Plan of Care Review  Outcome: Progressing  Goal: Patient-Specific Goal (Individualized)  Outcome: Progressing  Goal: Absence of Hospital-Acquired Illness or Injury  Outcome: Progressing  Intervention: Identify and Manage Fall Risk  Recent Flowsheet Documentation  Taken 3/21/2025 1800 by Natalya Sandhu RN  Safety Promotion/Fall Prevention:   activity supervised   assistive device/personal items within reach   clutter free environment maintained   nonskid shoes/slippers when out of bed   room organization consistent   safety round/check completed  Taken 3/21/2025 1400 by Natalya Sandhu RN  Safety Promotion/Fall Prevention:   activity supervised   assistive device/personal items within reach   clutter free environment maintained   nonskid shoes/slippers when out of bed   room organization consistent   safety round/check completed  Taken 3/21/2025 1200 by Natalya Sandhu RN  Safety Promotion/Fall Prevention:   activity supervised   assistive device/personal items within reach   clutter free environment maintained   nonskid shoes/slippers when out of bed   room organization consistent   safety round/check completed  Taken 3/21/2025 1000 by Natalya Sandhu RN  Safety Promotion/Fall Prevention:   activity supervised   assistive device/personal items within reach   clutter free environment maintained   fall prevention program maintained   nonskid shoes/slippers when out of bed   room organization consistent   safety round/check completed  Taken 3/21/2025 0800 by Natalya Sandhu RN  Safety Promotion/Fall Prevention:   activity supervised   assistive device/personal items within reach   clutter free environment maintained   fall prevention program maintained   nonskid shoes/slippers when out of bed   room organization consistent   safety round/check completed  Intervention: Prevent Skin Injury  Recent Flowsheet Documentation  Taken 3/21/2025 1800 by Natalya Sandhu RN  Body  Position: position changed independently  Taken 3/21/2025 1400 by Natalya Sandhu RN  Body Position: position changed independently  Taken 3/21/2025 1200 by aNtalya Sandhu RN  Body Position: position changed independently  Skin Protection: transparent dressing maintained  Taken 3/21/2025 1000 by Natalya Sandhu RN  Body Position: position changed independently  Taken 3/21/2025 0800 by Natalya Sandhu RN  Body Position: position changed independently  Skin Protection: transparent dressing maintained  Intervention: Prevent and Manage VTE (Venous Thromboembolism) Risk  Recent Flowsheet Documentation  Taken 3/21/2025 1200 by Natalya Sandhu RN  VTE Prevention/Management: SCDs (sequential compression devices) off  Taken 3/21/2025 0800 by Natalya Sandhu RN  VTE Prevention/Management: SCDs (sequential compression devices) off  Intervention: Prevent Infection  Recent Flowsheet Documentation  Taken 3/21/2025 1800 by Natalya Sandhu RN  Infection Prevention:   hand hygiene promoted   personal protective equipment utilized   single patient room provided  Taken 3/21/2025 1400 by Natalya Sandhu RN  Infection Prevention:   personal protective equipment utilized   hand hygiene promoted   single patient room provided  Taken 3/21/2025 1200 by Natalya Sandhu RN  Infection Prevention:   hand hygiene promoted   personal protective equipment utilized   single patient room provided  Taken 3/21/2025 1000 by Natalya Sandhu RN  Infection Prevention:   hand hygiene promoted   personal protective equipment utilized   single patient room provided  Taken 3/21/2025 0800 by Natalya Sandhu RN  Infection Prevention:   hand hygiene promoted   personal protective equipment utilized   single patient room provided  Goal: Optimal Comfort and Wellbeing  Outcome: Progressing  Intervention: Provide Person-Centered Care  Recent Flowsheet Documentation  Taken 3/21/2025 1200 by Natalya Sandhu RN  Trust Relationship/Rapport:   care explained   choices  provided   questions answered   questions encouraged  Taken 3/21/2025 0800 by Natalya Sandhu RN  Trust Relationship/Rapport:   care explained   choices provided   questions answered   questions encouraged  Goal: Readiness for Transition of Care  Outcome: Progressing     Problem: Fall Injury Risk  Goal: Absence of Fall and Fall-Related Injury  Outcome: Progressing  Intervention: Promote Injury-Free Environment  Recent Flowsheet Documentation  Taken 3/21/2025 1800 by Natalya Sandhu RN  Safety Promotion/Fall Prevention:   activity supervised   assistive device/personal items within reach   clutter free environment maintained   nonskid shoes/slippers when out of bed   room organization consistent   safety round/check completed  Taken 3/21/2025 1400 by Natalya Sandhu RN  Safety Promotion/Fall Prevention:   activity supervised   assistive device/personal items within reach   clutter free environment maintained   nonskid shoes/slippers when out of bed   room organization consistent   safety round/check completed  Taken 3/21/2025 1200 by Natalya Sandhu RN  Safety Promotion/Fall Prevention:   activity supervised   assistive device/personal items within reach   clutter free environment maintained   nonskid shoes/slippers when out of bed   room organization consistent   safety round/check completed  Taken 3/21/2025 1000 by Natalya Sandhu RN  Safety Promotion/Fall Prevention:   activity supervised   assistive device/personal items within reach   clutter free environment maintained   fall prevention program maintained   nonskid shoes/slippers when out of bed   room organization consistent   safety round/check completed  Taken 3/21/2025 0800 by Natalya Sandhu RN  Safety Promotion/Fall Prevention:   activity supervised   assistive device/personal items within reach   clutter free environment maintained   fall prevention program maintained   nonskid shoes/slippers when out of bed   room organization consistent   safety  round/check completed     Problem: Skin Injury Risk Increased  Goal: Skin Health and Integrity  Outcome: Progressing  Intervention: Optimize Skin Protection  Recent Flowsheet Documentation  Taken 3/21/2025 1800 by Natalya Sandhu RN  Activity Management: activity encouraged  Taken 3/21/2025 1400 by Natalya Sandhu RN  Activity Management: ambulated to bathroom  Taken 3/21/2025 1200 by Natalya Sandhu RN  Activity Management: bedrest  Pressure Reduction Techniques: frequent weight shift encouraged  Pressure Reduction Devices: pressure-redistributing mattress utilized  Skin Protection: transparent dressing maintained  Taken 3/21/2025 1000 by Natalya Sandhu RN  Activity Management: bedrest  Taken 3/21/2025 0800 by Natalya Sandhu RN  Activity Management: bedrest  Pressure Reduction Techniques: frequent weight shift encouraged  Pressure Reduction Devices: pressure-redistributing mattress utilized  Skin Protection: transparent dressing maintained     Problem: Violence Risk or Actual  Goal: Anger and Impulse Control  Outcome: Progressing  Intervention: Minimize Safety Risk  Recent Flowsheet Documentation  Taken 3/21/2025 1800 by Natalya Sandhu RN  Enhanced Safety Measures:   bed alarm set   room near unit station  Taken 3/21/2025 1400 by Natalya Sandhu RN  Enhanced Safety Measures:   bed alarm set   room near unit station  Taken 3/21/2025 1200 by Natalya Sandhu RN  Enhanced Safety Measures:   bed alarm set   room near unit station  Taken 3/21/2025 1000 by Natalya Sandhu RN  Enhanced Safety Measures:   bed alarm set   room near unit station  Taken 3/21/2025 0800 by Natalya Sandhu RN  Enhanced Safety Measures:   bed alarm set   monitored by video   room near unit station     Problem: Sepsis/Septic Shock  Goal: Optimal Coping  Outcome: Progressing  Goal: Absence of Bleeding  Outcome: Progressing  Goal: Blood Glucose Level Within Target Range  Outcome: Progressing  Goal: Absence of Infection Signs and Symptoms  Outcome:  Progressing  Intervention: Initiate Sepsis Management  Recent Flowsheet Documentation  Taken 3/21/2025 1800 by Natalya Sandhu RN  Infection Prevention:   hand hygiene promoted   personal protective equipment utilized   single patient room provided  Taken 3/21/2025 1400 by Natalya Sandhu RN  Infection Prevention:   personal protective equipment utilized   hand hygiene promoted   single patient room provided  Taken 3/21/2025 1200 by Natalya Sandhu RN  Infection Prevention:   hand hygiene promoted   personal protective equipment utilized   single patient room provided  Isolation Precautions:   precautions maintained   protective  Taken 3/21/2025 1000 by Natalya Sandhu RN  Infection Prevention:   hand hygiene promoted   personal protective equipment utilized   single patient room provided  Isolation Precautions:   precautions maintained   protective  Taken 3/21/2025 0800 by Natalya Sandhu RN  Infection Prevention:   hand hygiene promoted   personal protective equipment utilized   single patient room provided  Intervention: Promote Recovery  Recent Flowsheet Documentation  Taken 3/21/2025 1800 by Natalya Sandhu RN  Activity Management: activity encouraged  Taken 3/21/2025 1400 by Natalya Sandhu RN  Activity Management: ambulated to bathroom  Taken 3/21/2025 1200 by Natalya Sandhu RN  Activity Management: bedrest  Taken 3/21/2025 1000 by Natalya Sandhu RN  Activity Management: bedrest  Taken 3/21/2025 0800 by Natalya Sandhu RN  Activity Management: bedrest  Goal: Optimal Nutrition Delivery  Outcome: Progressing   Goal Outcome Evaluation:

## 2025-03-21 NOTE — PROGRESS NOTES
" LOS: 3 days   Patient Care Team:  Provider, No Known as PCP - General      Subjective   \"Feeling better\"    Interval History:   LABS: Sodium 132, potassium 4, creatinine 0.72.  TB 3.3(2.6), alk phos 79,  (184), ALT 79 (63).  WBC 6.51, hemoglobin up to 8.2 from 7.5, platelets 64.      ROS:   No chest pain, shortness of breath, or cough.        Medication Review:     Current Facility-Administered Medications:     aluminum-magnesium hydroxide-simethicone (MAALOX MAX) 400-400-40 MG/5ML suspension 15 mL, 15 mL, Oral, Q6H PRN, Cordell Miller MD    sennosides-docusate (PERICOLACE) 8.6-50 MG per tablet 2 tablet, 2 tablet, Oral, BID PRN **AND** polyethylene glycol (MIRALAX) packet 17 g, 17 g, Oral, Daily PRN **AND** bisacodyl (DULCOLAX) EC tablet 5 mg, 5 mg, Oral, Daily PRN **AND** bisacodyl (DULCOLAX) suppository 10 mg, 10 mg, Rectal, Daily PRN, Cordell Miller MD    Calcium Replacement - Follow Nurse / BPA Driven Protocol, , Not Applicable, PRN, Cordell Miller MD    cefTRIAXone (ROCEPHIN) 2,000 mg in sodium chloride 0.9 % 100 mL MBP, 2,000 mg, Intravenous, Q24H, Cordell Miller MD, Last Rate: 200 mL/hr at 03/21/25 0938, 2,000 mg at 03/21/25 0938    dextrose (D50W) (25 g/50 mL) IV injection 25 g, 25 g, Intravenous, Q15 Min PRN, Cordell Miller MD    dextrose (GLUTOSE) oral gel 15 g, 15 g, Oral, Q15 Min PRN, Cordell Miller MD    folic acid 1 mg in sodium chloride 0.9 % 50 mL IVPB, 1 mg, Intravenous, Daily, Cordell Miller MD, Last Rate: 100 mL/hr at 03/21/25 0837, 1 mg at 03/21/25 0837    glucagon (GLUCAGEN) injection 1 mg, 1 mg, Subcutaneous, Q15 Min PRN, Cordell Miller MD    ipratropium-albuterol (DUO-NEB) nebulizer solution 3 mL, 3 mL, Nebulization, Q6H PRN, Cordell Miller MD    LORazepam (ATIVAN) injection 1 mg, 1 mg, Intravenous, Q1H PRN, 1 mg at 03/19/25 1120 **OR** LORazepam (ATIVAN) injection 2 mg, 2 mg, Intravenous, Q1H PRN, 2 mg at 03/19/25 1652 **OR** LORazepam (ATIVAN) injection 2 mg, 2 mg, " Intravenous, Q15 Min PRN, Cordell Miller MD, 2 mg at 03/19/25 2035    LORazepam (ATIVAN) injection 2 mg, 2 mg, Intravenous, Q5 Min PRN, Cordell Miller MD    Magnesium Standard Dose Replacement - Follow Nurse / BPA Driven Protocol, , Not Applicable, Paul HARTLEY Steven, MD    mupirocin (BACTROBAN) 2 % nasal ointment 1 Application, 1 Application, Each Nare, BID, Cordell Miller MD, 1 Application at 03/21/25 0824    [Held by provider] nadolol (CORGARD) tablet 20 mg, 20 mg, Oral, Q24H, Felix Osborne APRN    nitroglycerin (NITROSTAT) SL tablet 0.4 mg, 0.4 mg, Sublingual, Q5 Min PRN, Cordell Miller MD    [COMPLETED] octreotide (SANDOSTATIN) bolus from bag 5 mcg/mL solution 50 mcg, 50 mcg, Intravenous, Once, 50 mcg at 03/18/25 1546 **FOLLOWED BY** octreotide (sandoSTATIN) 500 mcg in sodium chloride 0.9 % 100 mL (5 mcg/mL) infusion, 25 mcg/hr, Intravenous, Continuous, Cordell Miller MD, Last Rate: 5 mL/hr at 03/21/25 0824, 25 mcg/hr at 03/21/25 0824    ondansetron ODT (ZOFRAN-ODT) disintegrating tablet 4 mg, 4 mg, Oral, Q6H PRN **OR** ondansetron (ZOFRAN) injection 4 mg, 4 mg, Intravenous, Q6H PRN, Cordell Miller MD, 4 mg at 03/19/25 0015    ondansetron ODT (ZOFRAN-ODT) disintegrating tablet 4 mg, 4 mg, Oral, Q6H PRN **OR** ondansetron (ZOFRAN) injection 4 mg, 4 mg, Intravenous, Q6H PRN, Cordell Miller MD    pantoprazole (PROTONIX) injection 40 mg, 40 mg, Intravenous, BID AC, Cordell Miller MD, 40 mg at 03/21/25 0824    Phosphorus Replacement - Follow Nurse / BPA Driven Protocol, , Not Applicable, PRN, Cordell Miller MD    Potassium Replacement - Follow Nurse / BPA Driven Protocol, , Not Applicable, Paul HARTLEY Steven, MD    sodium chloride 0.9 % flush 10 mL, 10 mL, Intravenous, PRNPaul Steven, MD    sodium chloride 0.9 % flush 10 mL, 10 mL, Intravenous, Q12H, Cordell Miller MD, 10 mL at 03/21/25 0824    sodium chloride 0.9 % flush 10 mL, 10 mL, Intravenous, Q12H, Cordell Miller MD, 10 mL at  03/21/25 0824    sodium chloride 0.9 % flush 10 mL, 10 mL, Intravenous, PRN, Cordell iMller MD    sodium chloride 0.9 % infusion 40 mL, 40 mL, Intravenous, PRNPaul Steven, MD, 400 mL at 03/19/25 1540    sodium chloride 0.9 % infusion 40 mL, 40 mL, Intravenous, PRN, Cordell Miller MD    [Held by provider] spironolactone (ALDACTONE) tablet 25 mg, 25 mg, Oral, Daily, Felix Osborne APRN    thiamine (B-1) injection 200 mg, 200 mg, Intravenous, Q8H, 200 mg at 03/21/25 0545 **FOLLOWED BY** [START ON 3/24/2025] thiamine (VITAMIN B-1) tablet 100 mg, 100 mg, Oral, Daily, Cordell Miller MD    [Held by provider] zinc sulfate (ZINCATE) capsule 220 mg, 220 mg, Oral, Daily, Felix Osborne APRN      Objective resting in the bed with blanket overhead.  No family present.  NAD.    Vital Signs  Vitals:    03/20/25 2335 03/21/25 0348 03/21/25 0504 03/21/25 0739   BP: 90/51 96/54  90/54   BP Location: Left arm Left arm  Left arm   Patient Position: Lying Lying  Lying   Pulse: 74 83     Resp: 13 12  14   Temp: 97.9 °F (36.6 °C) 98.2 °F (36.8 °C)  97.8 °F (36.6 °C)   TempSrc: Oral Oral  Oral   SpO2: 97% 100%     Weight:   60.4 kg (133 lb 2.5 oz)    Height:           Physical Exam:   General Appearance:    Awake and alert, in no acute distress   Head:    Normocephalic, without obvious abnormality   Eyes:          Conjunctivae normal, anicteric sclera   Throat:   No oral lesions, no thrush, oral mucosa moist   Neck:   No adenopathy, supple, no JVD   Lungs:     respirations regular, even and unlabored   Abdomen:     Soft, non-tender, no rebound or guarding, non-distended   Rectal:     Deferred   Extremities:   No edema, no cyanosis   Skin:   No bruising or rash, no jaundice        Results Review:    Results from last 7 days   Lab Units 03/21/25  0027 03/20/25  1645 03/20/25  0547 03/19/25  2020 03/19/25  1234 03/19/25  0802 03/19/25  0426 03/18/25  1614 03/18/25  1339   WBC 10*3/mm3 6.51  --  6.63  --   --   --  22.05*  --   "26.56*   HEMOGLOBIN g/dL 8.2* 7.5* 6.6* 7.1* 6.5* 7.6* 7.9*   < > 7.9*   PLATELETS 10*3/mm3 64*  --  58*  --   --   --  103*  --  132*    < > = values in this interval not displayed.     Results from last 7 days   Lab Units 03/21/25  0027 03/20/25  1645 03/20/25  1349 03/20/25  0513 03/19/25  1736 03/19/25  1044 03/19/25  0426 03/19/25  0047 03/19/25  0025 03/18/25  1339   SODIUM mmol/L 132*  --   --  134*  --   --  133* 133*  --  132*   POTASSIUM mmol/L 4.0  --  3.8 3.6 3.3*  --  3.5 3.7  --  2.9*   CHLORIDE mmol/L 100  --   --  100  --   --  89* 88*  --  80*   CO2 mmol/L 23.9  --   --  23.9  --   --  32.7* 32.1*  --  33.6*   BUN mg/dL 9  --   --  18  --   --  55* 58*  --  57*   CREATININE mg/dL 0.72*  --   --  0.73*  --   --  1.26 1.30*  --  1.28*   GLUCOSE mg/dL 108*  --   --  78  --   --  130* 143*  --  141*   ALBUMIN g/dL 3.2*  --   --  3.1*  --   --  3.4*  --   --  3.9   BILIRUBIN mg/dL 3.3*  --   --  2.6*  --   --  3.9*  --   --  4.9*   ALK PHOS U/L 79  --   --  66  --   --  78  --   --  89   AST (SGOT) U/L 206*  --   --  184*  --   --  161*  --   --  156*   ALT (SGPT) U/L 79*  --   --  63*  --   --  55*  --   --  55*   INR   --  1.71*  --   --   --  2.03*  --  2.32*  --  2.33*   MAGNESIUM mg/dL 1.7  --   --  2.1  --   --   --   --  2.7* 0.9*   PHOSPHORUS mg/dL 1.9*  --  1.6* 1.6*  --   --  4.2  --   --   --    LIPASE U/L  --   --   --   --   --   --   --   --   --  14     Estimated Creatinine Clearance: 128.2 mL/min (A) (by C-G formula based on SCr of 0.72 mg/dL (L)).  No results found for: \"HGBA1C\"      Infection   Results from last 7 days   Lab Units 03/18/25  1506 03/18/25  1455   BLOODCX  No growth at 2 days No growth at 2 days   PROCALCITONIN ng/mL  --  0.67*     UA    Results from last 7 days   Lab Units 03/18/25  1434   NITRITE UA  Positive*   WBC UA /HPF 0-2   BACTERIA UA /HPF 1+*   SQUAM EPITHEL UA /HPF 0-2     Microbiology Results (last 10 days)       Procedure Component Value - Date/Time    MRSA " Screen, PCR (Inpatient) - Swab, Nares [[id number]]  (Normal) Collected: 03/18/25 2043    Lab Status: Final result Specimen: Swab from Nares Updated: 03/18/25 2210     MRSA PCR No MRSA Detected    Narrative:      The negative predictive value of this diagnostic test is high and should only be used to consider de-escalating anti-MRSA therapy. A positive result may indicate colonization with MRSA and must be correlated clinically.    Respiratory Panel PCR w/COVID-19(SARS-CoV-2) NICOLASA/NORM/JULIANNA/PAD/COR/ALBERTO In-House, NP Swab in UTM/VTM, 2 HR TAT - Swab, Nasopharynx [[id number]]  (Normal) Collected: 03/18/25 2043    Lab Status: Final result Specimen: Swab from Nasopharynx Updated: 03/18/25 2144     ADENOVIRUS, PCR Not Detected     Coronavirus 229E Not Detected     Coronavirus HKU1 Not Detected     Coronavirus NL63 Not Detected     Coronavirus OC43 Not Detected     COVID19 Not Detected     Human Metapneumovirus Not Detected     Human Rhinovirus/Enterovirus Not Detected     Influenza A PCR Not Detected     Influenza B PCR Not Detected     Parainfluenza Virus 1 Not Detected     Parainfluenza Virus 2 Not Detected     Parainfluenza Virus 3 Not Detected     Parainfluenza Virus 4 Not Detected     RSV, PCR Not Detected     Bordetella pertussis pcr Not Detected     Bordetella parapertussis PCR Not Detected     Chlamydophila pneumoniae PCR Not Detected     Mycoplasma pneumo by PCR Not Detected    Narrative:      In the setting of a positive respiratory panel with a viral infection PLUS a negative procalcitonin without other underlying concern for bacterial infection, consider observing off antibiotics or discontinuation of antibiotics and continue supportive care. If the respiratory panel is positive for atypical bacterial infection (Bordetella pertussis, Chlamydophila pneumoniae, or Mycoplasma pneumoniae), consider antibiotic de-escalation to target atypical bacterial infection.    Blood Culture - Blood, Arm, Left [[id number]]  (Normal)  Collected: 03/18/25 1506    Lab Status: Preliminary result Specimen: Blood from Arm, Left Updated: 03/20/25 1515     Blood Culture No growth at 2 days    Blood Culture - Blood, Arm, Right [[id number]]  (Normal) Collected: 03/18/25 1455    Lab Status: Preliminary result Specimen: Blood from Arm, Right Updated: 03/20/25 1515     Blood Culture No growth at 2 days    Narrative:      Less than seven (7) mL's of blood was collected.  Insufficient quantity may yield false negative results.          Imaging Results (Last 72 Hours)       Procedure Component Value Units Date/Time    US Liver [[id number]] Collected: 03/18/25 1908     Updated: 03/18/25 1915    Narrative:      US LIVER    Date of Exam: 3/18/2025 6:08 PM EDT    Indication: elevated LFTs    Comparison: CT abdomen and pelvis 7/18/2024    Technique: Grayscale and color Doppler ultrasound evaluation of the liver was performed.    Findings:  Liver measures up to 15.9 cm and demonstrates diffusely increased echogenicity relative to the right kidney, consistent with hepatic steatosis. No liver masses are seen on ultrasound. Minimal flow is shown in the main portal vein. Extrahepatic common   bile duct is nondilated at 6 to 7 mm in diameter. No right upper quadrant ascites seen.      Impression:      Impression:  1.Diffusely increased hepatic echogenicity, consistent with hepatic steatosis.  2.Minimal flow shown in the main portal vein, which may be due to slow flow or thrombus. This can be further evaluated with contrast-enhanced CT abdomen/pelvis (portal venous phase).        Electronically Signed: Emily Newsome    3/18/2025 7:12 PM EDT    Workstation ID: [id number]    CT Head Without Contrast [[id number]] Collected: 03/18/25 1803     Updated: 03/18/25 1806    Narrative:      CT HEAD WO CONTRAST    Date of Exam: 3/18/2025 5:22 PM EDT    Indication: fall.    Comparison: 7/27/2024    Technique: Axial CT images were obtained of the head without contrast administration.   Coronal reconstructions were performed.  Automated exposure control and iterative reconstruction methods were used.    Findings: Gray-white matter differentiation is maintained without evidence of an acute infarction. No intracranial mass or mass effect. No extra-axial mass or collection. The ventricles and sulci are normal in size and configuration. The posterior fossa   appears normal. Sellar and suprasellar structures are normal.    Orbital and paranasal soft tissues are normal. The paranasal sinuses, ethmoid air cells, and mastoid air cells are aerated. The bony calvarium appears intact. No acute fractures. No lytic or blastic bony diseases.      Impression:      Impression: No acute intracranial pathology.          Electronically Signed: Rafiq Verma MD    3/18/2025 6:04 PM EDT    Workstation ID: [id number]            Assessment & Plan   Gastric varices  Alcohol cirrhosis - patient has been [facility] at [facility] liver transplant team but patient did not keep his follow-ups.  Anemia   History of ascites - last paracentesis 8/9/2024, undetectable total protein and albumin, negative for SBP  Electrolyte abnormalities    3/19/25 EGD by Dr. Miller showed 4 cords of grade 2 varices, 1 had red nellie sign with total of 5 bands placed.  No active bleeding seen.  Moderately severe PHG.  No obvious gastric varices.     Plan:  29 y.o. male PMH acute liver failure secondary to severe alcohol hepatitis that was nonresponsive to prednisone.  He presented to the ER due to hematemesis and weakness, which is why GI was consulted.  Underwent EGD which revealed esophageal varices.    -Hgb is up to 8.2 today.  Continue to monitor H&H and transfuse as needed.  -Continue full liquid diet.  Tolerating.    -PPI BID  -Continue octreotide until 3/22/2025.    -MELD 3.0 is 28 at admission.  -Electrolyte management per primary team  Continue Rocephin for SBP prophylaxis.  Continue folic acid and thiamine.  Will restart zinc and  spironolactone..    -Repeat EGD in the next 1 to 2 months. Unfortunately our office does not take his insurance - he will need to follow up with [facility] for care moving forward, who he has also seen in the past 6 months.   Discussed needing to follow-up with [facility] as an outpatient.  Patient agrees.      Electronically signed by MARI Horn, 03/21/25, 10:51 AM EDT.

## 2025-03-21 NOTE — CONSULTS
Nutrition Services  Patient Name: Yonas Charles  YOB: 1995  MRN: [id number]  Admission date: 3/18/2025    *See MSA at bottom of this note    Moderate chronic disease related malnutrition R/t impaired nutrient assimilation and diminished overall macronutrient intake in the setting of EtOH use disorder; as evidenced by moderate muscle and fat wasting pattern.    Continue thamine and folate supplementation.    Of note, in addition to folate being helpful during withdrawal precautions, pt also had a deficient folate lab value in July 2024 -- may consider re-drawing folate level this admission to re-assess -- potentially higher dose may be needed for repletion.     NUTRITION SCREENING      Trending Narrative: 3/21: Pt assessed due to concerns for ongoing NPO/liquid diet status. Pt with advanced liver Dz including EtOH related cirrhosis, Hx ascites. Previously had been referred to transplant clinic at [facility]; GI following. Pt with significant hematemesis following EtOH intake prior to admission. B-1 and folate are in place per withdrawal protocol; diet so far has advanced to full liquids, though overall intake remains minimal due to ongoing nature of liquid diet. Pt with chronic low weight status.       PO Diet: Diet: Liquid; Full Liquid; Fluid Consistency: Thin (IDDSI 0)   PO Supplements: None ordered    Trending PO Intake:  Minimal -- ongoing liquid diet        Nutritionally-Pertinent Medications RDN Reviewed, C/W clinical course         Labs (reviewed below): Elevated LFTs - monitor  Hypophosphatemia - replaced today using Phos-Nak     Results from last 7 days   Lab Units 03/21/25  0027 03/20/25  1349 03/20/25  0513 03/19/25  1736 03/19/25  0426   SODIUM mmol/L 132*  --  134*  --  133*   POTASSIUM mmol/L 4.0 3.8 3.6   < > 3.5   CHLORIDE mmol/L 100  --  100  --  89*   CO2 mmol/L 23.9  --  23.9  --  32.7*   BUN mg/dL 9  --  18  --  55*   CREATININE mg/dL 0.72*  --  0.73*  --  1.26   CALCIUM mg/dL 7.7*  --  " 7.5*  --  7.6*   BILIRUBIN mg/dL 3.3*  --  2.6*  --  3.9*   ALK PHOS U/L 79  --  66  --  78   ALT (SGPT) U/L 79*  --  63*  --  55*   AST (SGOT) U/L 206*  --  184*  --  161*   GLUCOSE mg/dL 108*  --  78  --  130*    < > = values in this interval not displayed.     Results from last 7 days   Lab Units 03/21/25  1018 03/21/25  0027 03/20/25  0547 03/20/25  0513 03/19/25  0802 03/19/25  0426 03/19/25  0247 03/19/25  0025   MAGNESIUM mg/dL  --  1.7  --  2.1  --   --   --  2.7*   PHOSPHORUS mg/dL 2.3* 1.9*   < > 1.6*  --  4.2   < >  --    HEMOGLOBIN g/dL  --  8.2*   < >  --    < > 7.9*   < >  --    HEMATOCRIT %  --  24.7*   < >  --    < > 23.3*   < >  --    TRIGLYCERIDES mg/dL  --   --   --   --   --  118  --   --     < > = values in this interval not displayed.     No results found for: \"HGBA1C\"       GI Function:  Has had some nausea this admission  Concern for GIB (GI service is following)       Skin: No breakdown documented        Weight Review: Estimated body mass index is 22.16 kg/m² as calculated from the following:    Height as of this encounter: 165.1 cm (65\").    Weight as of this encounter: 60.4 kg (133 lb 2.5 oz).    Comment:   3/21: Scale weight 60.4 kg - ~8.4% gain in 8 months    Wt Readings from Last 30 Encounters:   03/21/25 0504 60.4 kg (133 lb 2.5 oz)   03/20/25 0500 59.6 kg (131 lb 6.3 oz)   03/19/25 2133 59.6 kg (131 lb 6.3 oz)   03/19/25 0430 58.3 kg (128 lb 8.5 oz)   03/18/25 1800 55.7 kg (122 lb 12.7 oz)   03/18/25 1720 55.7 kg (122 lb 12.7 oz)   03/18/25 1314 55.8 kg (123 lb)   08/01/24 0827 64 kg (141 lb)   07/25/24 0305 55.7 kg (122 lb 12.7 oz)   07/18/24 0855 55.7 kg (122 lb 12.7 oz)              Trending Physical   Appearance, NFPE 3/21: NFPE completed, consistent with nutrition diagnosis of malnutrition using AND/ASPEN criteria. See MSA below.             Nutrition Problem Statement: Moderate chronic disease related malnutrition R/t impaired nutrient assimilation and diminished overall " macronutrient intake in the setting of EtOH use disorder; as evidenced by moderate muscle and fat wasting pattern.        Nutrition Intervention: Holding off on ONS at this time so as not to induce an aversion to them with ongoing nausea. Could be a good candidate for ONS when tolerating PO better.     Will continue full liquid diet and monitor for readiness to advance diet.          Monitoring/Evaluation PO intake, Pertinent labs, Weight, Skin status, GI status, POC/GOC        Malnutrition Severity Assessment      Patient meets criteria for : Moderate (non-severe) Malnutrition  Malnutrition Type (Last 8 Hours)       Malnutrition Severity Assessment       Row Name 03/22/25 1007       Malnutrition Severity Assessment    Malnutrition Type Chronic Disease - Related Malnutrition      Row Name 03/22/25 1007       Muscle Loss    Loss of Muscle Mass Findings Moderate    Scientologist Region Moderate - slight depression    Clavicle Bone Region Moderate - some protrusion in females, visible in males    Patellar Region Moderate - patella more prominent, less muscle definition around patella    Posterior Calf Region Moderate - some roundness, slight firmness      Row Name 03/22/25 1007       Fat Loss    Subcutaneous Fat Loss Findings Moderate    Orbital Region  Moderate -  somewhat hollowness, slightly dark circles      Row Name 03/22/25 1007       Criteria Met (Must meet criteria for severity in at least 2 of these categories: M Wasting, Fat Loss, Fluid, Secondary Signs, Wt. Status, Intake)    Patient meets criteria for  Moderate (non-severe) Malnutrition                       RD to follow up per protocol.    Electronically signed by:  Shereen Sosa RD  03/21/25 15:45 EDT

## 2025-03-21 NOTE — PLAN OF CARE
Pt on octreotide gtt, finished 2U of PRBC. No pt c/o of pain. Pt hadn't peed for shift. Bladder scan showed 475, IC and got 400mL out.  Problem: Adult Inpatient Plan of Care  Goal: Plan of Care Review  Outcome: Progressing  Goal: Patient-Specific Goal (Individualized)  Outcome: Progressing  Goal: Absence of Hospital-Acquired Illness or Injury  Outcome: Progressing  Intervention: Identify and Manage Fall Risk  Intervention: Prevent Skin Injury  Intervention: Prevent Infection  Goal: Optimal Comfort and Wellbeing  Outcome: Progressing  Intervention: Provide Person-Centered Care  Goal: Readiness for Transition of Care  Outcome: Progressing     Problem: Fall Injury Risk  Goal: Absence of Fall and Fall-Related Injury  Outcome: Progressing  Intervention: Identify and Manage Contributors  Intervention: Promote Injury-Free Environment     Problem: Skin Injury Risk Increased  Goal: Skin Health and Integrity  Outcome: Progressing  Intervention: Optimize Skin Protection     Problem: Violence Risk or Actual  Goal: Anger and Impulse Control  Outcome: Progressing  Intervention: Minimize Safety Risk  Intervention: Promote Self-Control     Problem: Sepsis/Septic Shock  Goal: Optimal Coping  Outcome: Progressing  Intervention: Support Patient and Family Response  Goal: Absence of Bleeding  Outcome: Progressing  Goal: Blood Glucose Level Within Target Range  Outcome: Progressing  Goal: Absence of Infection Signs and Symptoms  Outcome: Progressing  Intervention: Initiate Sepsis Management  Intervention: Promote Recovery  Goal: Optimal Nutrition Delivery  Outcome: Progressing   Goal Outcome Evaluation:

## 2025-03-22 PROBLEM — E44.0 MODERATE PROTEIN-CALORIE MALNUTRITION: Status: ACTIVE | Noted: 2025-03-22

## 2025-03-22 LAB
ALBUMIN SERPL-MCNC: 3.1 G/DL (ref 3.5–5.2)
ALBUMIN/GLOB SERPL: 1.2 G/DL
ALP SERPL-CCNC: 94 U/L (ref 39–117)
ALT SERPL W P-5'-P-CCNC: 75 U/L (ref 1–41)
ANION GAP SERPL CALCULATED.3IONS-SCNC: 7.9 MMOL/L (ref 5–15)
AST SERPL-CCNC: 147 U/L (ref 1–40)
BASOPHILS # BLD AUTO: 0.08 10*3/MM3 (ref 0–0.2)
BASOPHILS NFR BLD AUTO: 1.3 % (ref 0–1.5)
BILIRUB SERPL-MCNC: 2.6 MG/DL (ref 0–1.2)
BUN SERPL-MCNC: 6 MG/DL (ref 6–20)
BUN/CREAT SERPL: 7.5 (ref 7–25)
CALCIUM SPEC-SCNC: 8.1 MG/DL (ref 8.6–10.5)
CHLORIDE SERPL-SCNC: 103 MMOL/L (ref 98–107)
CO2 SERPL-SCNC: 21.1 MMOL/L (ref 22–29)
CREAT SERPL-MCNC: 0.8 MG/DL (ref 0.76–1.27)
DEPRECATED RDW RBC AUTO: 55.8 FL (ref 37–54)
EGFRCR SERPLBLD CKD-EPI 2021: 122.1 ML/MIN/1.73
EOSINOPHIL # BLD AUTO: 0.21 10*3/MM3 (ref 0–0.4)
EOSINOPHIL NFR BLD AUTO: 3.4 % (ref 0.3–6.2)
ERYTHROCYTE [DISTWIDTH] IN BLOOD BY AUTOMATED COUNT: 16.9 % (ref 12.3–15.4)
GLOBULIN UR ELPH-MCNC: 2.6 GM/DL
GLUCOSE SERPL-MCNC: 117 MG/DL (ref 65–99)
HCT VFR BLD AUTO: 25.2 % (ref 37.5–51)
HGB BLD-MCNC: 8.4 G/DL (ref 13–17.7)
IMM GRANULOCYTES # BLD AUTO: 0.03 10*3/MM3 (ref 0–0.05)
IMM GRANULOCYTES NFR BLD AUTO: 0.5 % (ref 0–0.5)
INR PPP: 1.59 (ref 0.9–1.1)
LYMPHOCYTES # BLD AUTO: 1.82 10*3/MM3 (ref 0.7–3.1)
LYMPHOCYTES NFR BLD AUTO: 29.7 % (ref 19.6–45.3)
MAGNESIUM SERPL-MCNC: 1.5 MG/DL (ref 1.6–2.6)
MAGNESIUM SERPL-MCNC: 2.3 MG/DL (ref 1.6–2.6)
MCH RBC QN AUTO: 30.8 PG (ref 26.6–33)
MCHC RBC AUTO-ENTMCNC: 33.3 G/DL (ref 31.5–35.7)
MCV RBC AUTO: 92.3 FL (ref 79–97)
MONOCYTES # BLD AUTO: 0.65 10*3/MM3 (ref 0.1–0.9)
MONOCYTES NFR BLD AUTO: 10.6 % (ref 5–12)
NEUTROPHILS NFR BLD AUTO: 3.33 10*3/MM3 (ref 1.7–7)
NEUTROPHILS NFR BLD AUTO: 54.5 % (ref 42.7–76)
NRBC BLD AUTO-RTO: 0 /100 WBC (ref 0–0.2)
PHOSPHATE SERPL-MCNC: 3 MG/DL (ref 2.5–4.5)
PLATELET # BLD AUTO: 78 10*3/MM3 (ref 140–450)
PMV BLD AUTO: 10.3 FL (ref 6–12)
POTASSIUM SERPL-SCNC: 3.8 MMOL/L (ref 3.5–5.2)
PROT SERPL-MCNC: 5.7 G/DL (ref 6–8.5)
PROTHROMBIN TIME: 19 SECONDS (ref 11.7–14.2)
RBC # BLD AUTO: 2.73 10*6/MM3 (ref 4.14–5.8)
SODIUM SERPL-SCNC: 132 MMOL/L (ref 136–145)
WBC NRBC COR # BLD AUTO: 6.12 10*3/MM3 (ref 3.4–10.8)

## 2025-03-22 PROCEDURE — 85025 COMPLETE CBC W/AUTO DIFF WBC: CPT | Performed by: INTERNAL MEDICINE

## 2025-03-22 PROCEDURE — 83735 ASSAY OF MAGNESIUM: CPT | Performed by: INTERNAL MEDICINE

## 2025-03-22 PROCEDURE — 25010000002 OCTREOTIDE PER 25 MCG: Performed by: INTERNAL MEDICINE

## 2025-03-22 PROCEDURE — 84100 ASSAY OF PHOSPHORUS: CPT | Performed by: INTERNAL MEDICINE

## 2025-03-22 PROCEDURE — 25010000002 CEFTRIAXONE PER 250 MG: Performed by: INTERNAL MEDICINE

## 2025-03-22 PROCEDURE — 85610 PROTHROMBIN TIME: CPT

## 2025-03-22 PROCEDURE — 25010000002 MAGNESIUM SULFATE 2 GM/50ML SOLUTION: Performed by: INTERNAL MEDICINE

## 2025-03-22 PROCEDURE — 25010000002 THIAMINE PER 100 MG: Performed by: INTERNAL MEDICINE

## 2025-03-22 PROCEDURE — 80053 COMPREHEN METABOLIC PANEL: CPT | Performed by: INTERNAL MEDICINE

## 2025-03-22 RX ORDER — FUROSEMIDE 20 MG/1
20 TABLET ORAL DAILY
Status: DISCONTINUED | OUTPATIENT
Start: 2025-03-22 | End: 2025-03-23 | Stop reason: HOSPADM

## 2025-03-22 RX ORDER — MAGNESIUM SULFATE HEPTAHYDRATE 40 MG/ML
2 INJECTION, SOLUTION INTRAVENOUS
Status: COMPLETED | OUTPATIENT
Start: 2025-03-22 | End: 2025-03-22

## 2025-03-22 RX ORDER — PANTOPRAZOLE SODIUM 40 MG/1
40 TABLET, DELAYED RELEASE ORAL
Status: DISCONTINUED | OUTPATIENT
Start: 2025-03-22 | End: 2025-03-23 | Stop reason: HOSPADM

## 2025-03-22 RX ADMIN — MUPIROCIN 1 APPLICATION: 20 OINTMENT TOPICAL at 21:52

## 2025-03-22 RX ADMIN — PANTOPRAZOLE SODIUM 40 MG: 40 TABLET, DELAYED RELEASE ORAL at 16:35

## 2025-03-22 RX ADMIN — FOLIC ACID 1 MG: 5 INJECTION, SOLUTION INTRAMUSCULAR; INTRAVENOUS; SUBCUTANEOUS at 08:00

## 2025-03-22 RX ADMIN — CEFTRIAXONE 2000 MG: 2 INJECTION, POWDER, FOR SOLUTION INTRAMUSCULAR; INTRAVENOUS at 09:20

## 2025-03-22 RX ADMIN — FUROSEMIDE 20 MG: 20 TABLET ORAL at 11:13

## 2025-03-22 RX ADMIN — OCTREOTIDE ACETATE 25 MCG/HR: 500 INJECTION, SOLUTION INTRAVENOUS; SUBCUTANEOUS at 07:49

## 2025-03-22 RX ADMIN — SPIRONOLACTONE 25 MG: 25 TABLET ORAL at 08:00

## 2025-03-22 RX ADMIN — THIAMINE HYDROCHLORIDE 200 MG: 100 INJECTION, SOLUTION INTRAMUSCULAR; INTRAVENOUS at 13:52

## 2025-03-22 RX ADMIN — Medication 10 ML: at 21:52

## 2025-03-22 RX ADMIN — MAGNESIUM SULFATE HEPTAHYDRATE 2 G: 40 INJECTION, SOLUTION INTRAVENOUS at 03:46

## 2025-03-22 RX ADMIN — MAGNESIUM SULFATE HEPTAHYDRATE 2 G: 40 INJECTION, SOLUTION INTRAVENOUS at 07:12

## 2025-03-22 RX ADMIN — Medication 10 ML: at 08:00

## 2025-03-22 RX ADMIN — MUPIROCIN 1 APPLICATION: 20 OINTMENT TOPICAL at 08:00

## 2025-03-22 RX ADMIN — Medication 220 MG: at 08:00

## 2025-03-22 RX ADMIN — THIAMINE HYDROCHLORIDE 200 MG: 100 INJECTION, SOLUTION INTRAMUSCULAR; INTRAVENOUS at 05:36

## 2025-03-22 RX ADMIN — MAGNESIUM SULFATE HEPTAHYDRATE 2 G: 40 INJECTION, SOLUTION INTRAVENOUS at 05:36

## 2025-03-22 RX ADMIN — PANTOPRAZOLE SODIUM 40 MG: 40 INJECTION, POWDER, LYOPHILIZED, FOR SOLUTION INTRAVENOUS at 07:56

## 2025-03-22 RX ADMIN — THIAMINE HYDROCHLORIDE 200 MG: 100 INJECTION, SOLUTION INTRAMUSCULAR; INTRAVENOUS at 21:52

## 2025-03-22 NOTE — PROGRESS NOTES
[facility] MEDICINE SERVICE  DAILY PROGRESS NOTE    NAME: Yonas Charles  : 1995  MRN: [id number]      LOS: 4 days     PROVIDER OF SERVICE: Gabi Garcia MD    Chief Complaint: Acute GI bleeding    Subjective:     Interval History:  History taken from: patient    No new complaint    Review of Systems:   Review of Systems   All other systems reviewed and are negative.      Objective:     Vital Signs  Temp:  [97.4 °F (36.3 °C)-98.3 °F (36.8 °C)] 97.7 °F (36.5 °C)  Heart Rate:  [] 95  Resp:  [12-16] 14  BP: ()/(54-76) 107/60   Body mass index is 22.23 kg/m².    Physical Exam  Physical Exam  Constitutional:       Appearance: Normal appearance.   HENT:      Head: Normocephalic and atraumatic.      Nose: Nose normal.      Mouth/Throat:      Mouth: Mucous membranes are moist.   Eyes:      Extraocular Movements: Extraocular movements intact.      Conjunctiva/sclera: Conjunctivae normal.      Pupils: Pupils are equal, round, and reactive to light.   Cardiovascular:      Rate and Rhythm: Normal rate and regular rhythm.      Pulses: Normal pulses.      Heart sounds: Normal heart sounds.   Pulmonary:      Effort: Pulmonary effort is normal.      Breath sounds: Normal breath sounds.   Abdominal:      General: Abdomen is flat. Bowel sounds are normal.      Palpations: Abdomen is soft.   Musculoskeletal:         General: Normal range of motion.      Cervical back: Normal range of motion and neck supple.   Skin:     General: Skin is warm and dry.      Capillary Refill: Capillary refill takes less than 2 seconds.   Neurological:      General: No focal deficit present.      Mental Status: He is alert and oriented to person, place, and time.   Psychiatric:         Mood and Affect: Mood normal.         Behavior: Behavior normal.         Thought Content: Thought content normal.         Judgment: Judgment normal.         Current Medications:  Scheduled Meds:cefTRIAXone, 2,000 mg, Intravenous,  Q24H  folic acid 1 mg in sodium chloride 0.9 % 50 mL IVPB, 1 mg, Intravenous, Daily  furosemide, 20 mg, Oral, Daily  mupirocin, 1 Application, Each Nare, BID  [Held by provider] nadolol, 20 mg, Oral, Q24H  pantoprazole, 40 mg, Oral, BID AC  sodium chloride, 10 mL, Intravenous, Q12H  sodium chloride, 10 mL, Intravenous, Q12H  spironolactone, 25 mg, Oral, Daily  thiamine (B-1) IV, 200 mg, Intravenous, Q8H   Followed by  [START ON 3/24/2025] thiamine, 100 mg, Oral, Daily  zinc sulfate, 220 mg, Oral, Daily      Continuous Infusions:     PRN Meds:.  aluminum-magnesium hydroxide-simethicone    senna-docusate sodium **AND** polyethylene glycol **AND** bisacodyl **AND** bisacodyl    Calcium Replacement - Follow Nurse / BPA Driven Protocol    dextrose    dextrose    glucagon (human recombinant)    ipratropium-albuterol    LORazepam **OR** LORazepam **OR** LORazepam    LORazepam    Magnesium Standard Dose Replacement - Follow Nurse / BPA Driven Protocol    nitroglycerin    ondansetron ODT **OR** ondansetron    ondansetron ODT **OR** ondansetron    Phosphorus Replacement - Follow Nurse / BPA Driven Protocol    Potassium Replacement - Follow Nurse / BPA Driven Protocol    sodium chloride    sodium chloride    sodium chloride    sodium chloride       Diagnostic Data    Results from last 7 days   Lab Units 03/22/25  0141   WBC 10*3/mm3 6.12   HEMOGLOBIN g/dL 8.4*   HEMATOCRIT % 25.2*   PLATELETS 10*3/mm3 78*   GLUCOSE mg/dL 117*   CREATININE mg/dL 0.80   BUN mg/dL 6   SODIUM mmol/L 132*   POTASSIUM mmol/L 3.8   AST (SGOT) U/L 147*   ALT (SGPT) U/L 75*   ALK PHOS U/L 94   BILIRUBIN mg/dL 2.6*   ANION GAP mmol/L 7.9       No radiology results for the last day        I reviewed the patient's new clinical results.    Assessment/Plan:     Active and Resolved Problems  Active Hospital Problems    Diagnosis  POA    **Acute GI bleeding [K92.2]  Yes    Hematemesis [K92.0]  Unknown      Resolved Hospital Problems   No resolved problems to  display.       Acute upper GI bleed/hematemesis with gastric varices  Acute blood loss anemia  Alcoholic liver cirrhosis  Alcohol abuse with withdrawal/delirium tremens with new onset seizure          -Status post EGD which showed grade 2 esophageal varices with banding done by GI.  No active bleeding was seen.  It also showed moderately severe portal hypertensive gastropathy.  No gastric varices seen.  Currently on octreotide drip.  Continue Protonix.    - H&H improved status post transfusion of about 3 units of packed red blood cells.  Continue to monitor.    -Continue CIWA protocol for alcohol abuse with withdrawal.  Continue thiamine and folic acid.  Alcohol counseling.  Continue as needed benzodiazepines.  Has completed phenobarb taper upon chart review per ICU.    -Was started on ceftriaxone in the ICU for possible UTI.      -Psychiatry following for depression.    - Hypophosphatemia is resolved.    VTE Prophylaxis:  Mechanical VTE prophylaxis orders are present.      Disposition Planning:     Barriers to Discharge: Pending clinical improvement  Anticipated Date of Discharge: 03/25/2025  Place of Discharge: Home      Code Status and Medical Interventions: CPR (Attempt to Resuscitate); Full Support   Ordered at: 03/18/25 1753     Code Status (Patient has no pulse and is not breathing):    CPR (Attempt to Resuscitate)     Medical Interventions (Patient has pulse or is breathing):    Full Support       Signature: Electronically signed by Gabi Garcia MD, 03/22/25, 17:12 EDT.  [facility] Hospitalist Team

## 2025-03-22 NOTE — PLAN OF CARE
Problem: Adult Inpatient Plan of Care  Goal: Plan of Care Review  Outcome: Progressing  Goal: Patient-Specific Goal (Individualized)  Outcome: Progressing  Goal: Absence of Hospital-Acquired Illness or Injury  Outcome: Progressing  Intervention: Identify and Manage Fall Risk  Recent Flowsheet Documentation  Taken 3/22/2025 1400 by Gabby Marsh LPN  Safety Promotion/Fall Prevention:   assistive device/personal items within reach   activity supervised   clutter free environment maintained   fall prevention program maintained   gait belt   nonskid shoes/slippers when out of bed   safety round/check completed  Taken 3/22/2025 1200 by Gabby Marsh LPN  Safety Promotion/Fall Prevention:   activity supervised   assistive device/personal items within reach   clutter free environment maintained   fall prevention program maintained   gait belt   nonskid shoes/slippers when out of bed   safety round/check completed  Taken 3/22/2025 1000 by Gabby Marsh LPN  Safety Promotion/Fall Prevention:   activity supervised   clutter free environment maintained   assistive device/personal items within reach   fall prevention program maintained   gait belt   nonskid shoes/slippers when out of bed   safety round/check completed  Taken 3/22/2025 0800 by Gabby Marsh LPN  Safety Promotion/Fall Prevention:   activity supervised   assistive device/personal items within reach   clutter free environment maintained   fall prevention program maintained   gait belt   nonskid shoes/slippers when out of bed   safety round/check completed  Intervention: Prevent Skin Injury  Recent Flowsheet Documentation  Taken 3/22/2025 1200 by Gabby Marsh LPN  Skin Protection: transparent dressing maintained  Taken 3/22/2025 0800 by Gabby Marsh LPN  Skin Protection: transparent dressing maintained  Intervention: Prevent Infection  Recent Flowsheet Documentation  Taken 3/22/2025 1400 by Gabby Marsh LPN  Infection Prevention:   hand hygiene  promoted   single patient room provided   rest/sleep promoted  Taken 3/22/2025 1200 by Gabby Marsh LPN  Infection Prevention:   hand hygiene promoted   single patient room provided   rest/sleep promoted  Taken 3/22/2025 1000 by Gabby Marsh LPN  Infection Prevention:   hand hygiene promoted   single patient room provided   rest/sleep promoted  Taken 3/22/2025 0800 by Gabby Marsh LPN  Infection Prevention:   hand hygiene promoted   single patient room provided   rest/sleep promoted  Goal: Optimal Comfort and Wellbeing  Outcome: Progressing  Intervention: Provide Person-Centered Care  Recent Flowsheet Documentation  Taken 3/22/2025 1200 by Gabby Marsh LPN  Trust Relationship/Rapport:   care explained   thoughts/feelings acknowledged   reassurance provided   questions encouraged   questions answered  Taken 3/22/2025 0800 by Gabby Marsh LPN  Trust Relationship/Rapport:   care explained   thoughts/feelings acknowledged   reassurance provided   questions encouraged   questions answered  Goal: Readiness for Transition of Care  Outcome: Progressing     Problem: Fall Injury Risk  Goal: Absence of Fall and Fall-Related Injury  Outcome: Progressing  Intervention: Identify and Manage Contributors  Recent Flowsheet Documentation  Taken 3/22/2025 1400 by Gabby Marsh LPN  Medication Review/Management: medications reviewed  Taken 3/22/2025 1200 by Gabby Marsh LPN  Medication Review/Management: medications reviewed  Taken 3/22/2025 1000 by Gabby Marsh LPN  Medication Review/Management: medications reviewed  Taken 3/22/2025 0800 by Gabby Marsh LPN  Medication Review/Management: medications reviewed  Intervention: Promote Injury-Free Environment  Recent Flowsheet Documentation  Taken 3/22/2025 1400 by Gabby Marsh LPN  Safety Promotion/Fall Prevention:   assistive device/personal items within reach   activity supervised   clutter free environment maintained   fall prevention program maintained    gait belt   nonskid shoes/slippers when out of bed   safety round/check completed  Taken 3/22/2025 1200 by Gabby Marsh LPN  Safety Promotion/Fall Prevention:   activity supervised   assistive device/personal items within reach   clutter free environment maintained   fall prevention program maintained   gait belt   nonskid shoes/slippers when out of bed   safety round/check completed  Taken 3/22/2025 1000 by Gabby Marsh LPN  Safety Promotion/Fall Prevention:   activity supervised   clutter free environment maintained   assistive device/personal items within reach   fall prevention program maintained   gait belt   nonskid shoes/slippers when out of bed   safety round/check completed  Taken 3/22/2025 0800 by Gabby Marsh LPN  Safety Promotion/Fall Prevention:   activity supervised   assistive device/personal items within reach   clutter free environment maintained   fall prevention program maintained   gait belt   nonskid shoes/slippers when out of bed   safety round/check completed     Problem: Skin Injury Risk Increased  Goal: Skin Health and Integrity  Outcome: Progressing  Intervention: Optimize Skin Protection  Recent Flowsheet Documentation  Taken 3/22/2025 1200 by Gabby Marsh LPN  Pressure Reduction Techniques: frequent weight shift encouraged  Pressure Reduction Devices:   pressure-redistributing mattress utilized   positioning supports utilized  Skin Protection: transparent dressing maintained  Taken 3/22/2025 0800 by Gabby Marsh LPN  Pressure Reduction Techniques: frequent weight shift encouraged  Pressure Reduction Devices: pressure-redistributing mattress utilized  Skin Protection: transparent dressing maintained     Problem: Violence Risk or Actual  Goal: Anger and Impulse Control  Outcome: Progressing  Intervention: Minimize Safety Risk  Recent Flowsheet Documentation  Taken 3/22/2025 1400 by Gabby Marsh LPN  Enhanced Safety Measures:   bed alarm set   room near unit station  Taken  3/22/2025 1200 by Gabby Marsh LPN  Sensory Stimulation Regulation: care clustered  Enhanced Safety Measures:   bed alarm set   room near unit station  Taken 3/22/2025 1000 by Gabby Marsh LPN  Enhanced Safety Measures:   bed alarm set   room near unit station  Taken 3/22/2025 0800 by Gabby Marsh LPN  Sensory Stimulation Regulation: care clustered  Enhanced Safety Measures:   bed alarm set   room near unit station  Intervention: Promote Self-Control  Recent Flowsheet Documentation  Taken 3/22/2025 1200 by Gabby Marsh LPN  Supportive Measures: active listening utilized  Taken 3/22/2025 0800 by Gabby Marsh LPN  Supportive Measures: active listening utilized     Problem: Sepsis/Septic Shock  Goal: Optimal Coping  Outcome: Progressing  Intervention: Support Patient and Family Response  Recent Flowsheet Documentation  Taken 3/22/2025 1200 by Gabby Marsh LPN  Supportive Measures: active listening utilized  Family/Support System Care: self-care encouraged  Taken 3/22/2025 0800 by Gabby Marsh LPN  Supportive Measures: active listening utilized  Family/Support System Care: self-care encouraged  Goal: Absence of Bleeding  Outcome: Progressing  Goal: Blood Glucose Level Within Target Range  Outcome: Progressing  Goal: Absence of Infection Signs and Symptoms  Outcome: Progressing  Intervention: Initiate Sepsis Management  Recent Flowsheet Documentation  Taken 3/22/2025 1400 by Gabby Marsh LPN  Infection Prevention:   hand hygiene promoted   single patient room provided   rest/sleep promoted  Taken 3/22/2025 1200 by Gabby Marsh LPN  Infection Prevention:   hand hygiene promoted   single patient room provided   rest/sleep promoted  Taken 3/22/2025 1000 by Gabby Marsh LPN  Infection Prevention:   hand hygiene promoted   single patient room provided   rest/sleep promoted  Taken 3/22/2025 0800 by Gabby Marsh LPN  Infection Prevention:   hand hygiene promoted   single patient room provided    rest/sleep promoted  Goal: Optimal Nutrition Delivery  Outcome: Progressing     Problem: Malnutrition  Goal: Improved Nutritional Intake  Outcome: Progressing   Goal Outcome Evaluation:

## 2025-03-22 NOTE — PROGRESS NOTES
" LOS: 4 days   Patient Care Team:  Provider, No Known as PCP - General      Subjective \" a lot better\"    Interval History:   LABS: Sodium 132, creatinine 0.8.  TB 2.6 (3.3), alk phos 94,  (206), ALT 75 (79).  INR 1.59.  WBC 6.12, hemoglobin up to 8.4, platelets 78.  States he was sober since October 2024 until recently when he lost his uncle and found out his fiancée was in an open relationship and had been cheating on him.  States therapy has helped but due to the lack of funding he had only been seeing therapist once a month instead of every 2 weeks.    ROS:   No chest pain, shortness of breath, or cough.        Medication Review:     Current Facility-Administered Medications:     aluminum-magnesium hydroxide-simethicone (MAALOX MAX) 400-400-40 MG/5ML suspension 15 mL, 15 mL, Oral, Q6H PRN, Cordell Miller MD    sennosides-docusate (PERICOLACE) 8.6-50 MG per tablet 2 tablet, 2 tablet, Oral, BID PRN **AND** polyethylene glycol (MIRALAX) packet 17 g, 17 g, Oral, Daily PRN **AND** bisacodyl (DULCOLAX) EC tablet 5 mg, 5 mg, Oral, Daily PRN **AND** bisacodyl (DULCOLAX) suppository 10 mg, 10 mg, Rectal, Daily PRN, Cordell Miller MD    Calcium Replacement - Follow Nurse / BPA Driven Protocol, , Not Applicable, PRN, Cordell Miller MD    cefTRIAXone (ROCEPHIN) 2,000 mg in sodium chloride 0.9 % 100 mL MBP, 2,000 mg, Intravenous, Q24H, Cordell Miller MD, Last Rate: 200 mL/hr at 03/22/25 0920, 2,000 mg at 03/22/25 0920    dextrose (D50W) (25 g/50 mL) IV injection 25 g, 25 g, Intravenous, Q15 Min PRN, Cordell Miller MD    dextrose (GLUTOSE) oral gel 15 g, 15 g, Oral, Q15 Min PRN, Cordell Miller MD    folic acid 1 mg in sodium chloride 0.9 % 50 mL IVPB, 1 mg, Intravenous, Daily, Cordell Miller MD, Last Rate: 100 mL/hr at 03/22/25 0800, 1 mg at 03/22/25 0800    glucagon (GLUCAGEN) injection 1 mg, 1 mg, Subcutaneous, Q15 Min PRN, Cordell Miller MD    ipratropium-albuterol (DUO-NEB) nebulizer solution 3 mL, " 3 mL, Nebulization, Q6H PRN, Cordell Miller MD    LORazepam (ATIVAN) injection 1 mg, 1 mg, Intravenous, Q1H PRN, 1 mg at 03/19/25 1120 **OR** LORazepam (ATIVAN) injection 2 mg, 2 mg, Intravenous, Q1H PRN, 2 mg at 03/19/25 1652 **OR** LORazepam (ATIVAN) injection 2 mg, 2 mg, Intravenous, Q15 Min PRN, Cordell Miller MD, 2 mg at 03/19/25 2035    LORazepam (ATIVAN) injection 2 mg, 2 mg, Intravenous, Q5 Min PRN, Cordell Miller MD    Magnesium Standard Dose Replacement - Follow Nurse / BPA Driven Protocol, , Not Applicable, PRN, Cordell Miller MD    mupirocin (BACTROBAN) 2 % nasal ointment 1 Application, 1 Application, Each Nare, BID, Cordell Miller MD, 1 Application at 03/22/25 0800    [Held by provider] nadolol (CORGARD) tablet 20 mg, 20 mg, Oral, Q24H, Felix Osborne APRN    nitroglycerin (NITROSTAT) SL tablet 0.4 mg, 0.4 mg, Sublingual, Q5 Min PRN, Cordell Miller MD    [COMPLETED] octreotide (SANDOSTATIN) bolus from bag 5 mcg/mL solution 50 mcg, 50 mcg, Intravenous, Once, 50 mcg at 03/18/25 1546 **FOLLOWED BY** octreotide (sandoSTATIN) 500 mcg in sodium chloride 0.9 % 100 mL (5 mcg/mL) infusion, 25 mcg/hr, Intravenous, Continuous, Cordell Miller MD, Last Rate: 5 mL/hr at 03/22/25 0749, 25 mcg/hr at 03/22/25 0749    ondansetron ODT (ZOFRAN-ODT) disintegrating tablet 4 mg, 4 mg, Oral, Q6H PRN **OR** ondansetron (ZOFRAN) injection 4 mg, 4 mg, Intravenous, Q6H PRN, Cordell Miller MD, 4 mg at 03/19/25 0015    ondansetron ODT (ZOFRAN-ODT) disintegrating tablet 4 mg, 4 mg, Oral, Q6H PRN **OR** ondansetron (ZOFRAN) injection 4 mg, 4 mg, Intravenous, Q6H PRN, Cordell Miller MD    pantoprazole (PROTONIX) injection 40 mg, 40 mg, Intravenous, BID AC, Cordell Miller MD, 40 mg at 03/22/25 0756    Phosphorus Replacement - Follow Nurse / BPA Driven Protocol, , Not Applicable, Paul HARTLEY Steven, MD    Potassium Replacement - Follow Nurse / BPA Driven Protocol, , Not Applicable, Paul HARTLEY Steven, MD     sodium chloride 0.9 % flush 10 mL, 10 mL, Intravenous, PRN, Cordell Miller MD    sodium chloride 0.9 % flush 10 mL, 10 mL, Intravenous, Q12H, Cordell Miller MD, 10 mL at 03/22/25 0800    sodium chloride 0.9 % flush 10 mL, 10 mL, Intravenous, Q12H, Cordell Miller MD, 10 mL at 03/22/25 0800    sodium chloride 0.9 % flush 10 mL, 10 mL, Intravenous, PRN, Cordell Miller MD    sodium chloride 0.9 % infusion 40 mL, 40 mL, Intravenous, PRN, Cordell Miller MD, 400 mL at 03/19/25 1540    sodium chloride 0.9 % infusion 40 mL, 40 mL, Intravenous, PRN, Cordell Miller MD    spironolactone (ALDACTONE) tablet 25 mg, 25 mg, Oral, Daily, Kaci Polo APRN, 25 mg at 03/22/25 0800    thiamine (B-1) injection 200 mg, 200 mg, Intravenous, Q8H, 200 mg at 03/22/25 0536 **FOLLOWED BY** [START ON 3/24/2025] thiamine (VITAMIN B-1) tablet 100 mg, 100 mg, Oral, Daily, Cordell Miller MD    zinc sulfate (ZINCATE) capsule 220 mg, 220 mg, Oral, Daily, Kaci Polo APRN, 220 mg at 03/22/25 0800      Objective resting in the bed. No family present.  NAD.    Vital Signs  Vitals:    03/21/25 2003 03/22/25 0000 03/22/25 0445 03/22/25 0745   BP: 103/57 98/62 92/55 103/67   BP Location: Left arm Left arm Left arm Left arm   Patient Position: Lying Lying Lying Lying   Pulse: 78 102 89 80   Resp: 13 15 12 13   Temp: 98 °F (36.7 °C) 98.2 °F (36.8 °C) 98 °F (36.7 °C) 98.3 °F (36.8 °C)   TempSrc: Oral Oral Oral Oral   SpO2: 98% 94% 99% 98%   Weight:   60.6 kg (133 lb 9.6 oz)    Height:           Physical Exam:   General Appearance:    Awake and alert, in no acute distress   Head:    Normocephalic, without obvious abnormality   Eyes:          Conjunctivae normal, anicteric sclera   Throat:   No oral lesions, no thrush, oral mucosa moist   Neck:   No adenopathy, supple, no JVD   Lungs:     respirations regular, even and unlabored   Abdomen:     Soft, non-tender, no rebound or guarding, non-distended   Rectal:     Deferred   Extremities:    No edema, no cyanosis   Skin:   No bruising or rash, no jaundice        Results Review:    Results from last 7 days   Lab Units 03/22/25  0141 03/21/25  0027 03/20/25  1645 03/20/25  0547 03/19/25  2020 03/19/25  1234 03/19/25  0802 03/19/25  0426 03/18/25  1614 03/18/25  1339   WBC 10*3/mm3 6.12 6.51  --  6.63  --   --   --  22.05*  --  26.56*   HEMOGLOBIN g/dL 8.4* 8.2* 7.5* 6.6* 7.1* 6.5* 7.6* 7.9*   < > 7.9*   PLATELETS 10*3/mm3 78* 64*  --  58*  --   --   --  103*  --  132*    < > = values in this interval not displayed.     Results from last 7 days   Lab Units 03/22/25  0141 03/21/25  1044 03/21/25  1018 03/21/25  0027 03/20/25  1645 03/20/25  1349 03/20/25  0513 03/19/25  1736 03/19/25  1044 03/19/25  0426 03/19/25  0047 03/19/25  0025 03/18/25  1339   SODIUM mmol/L 132*  --   --  132*  --   --  134*  --   --  133* 133*  --  132*   POTASSIUM mmol/L 3.8  --   --  4.0  --  3.8 3.6 3.3*  --  3.5 3.7  --  2.9*   CHLORIDE mmol/L 103  --   --  100  --   --  100  --   --  89* 88*  --  80*   CO2 mmol/L 21.1*  --   --  23.9  --   --  23.9  --   --  32.7* 32.1*  --  33.6*   BUN mg/dL 6  --   --  9  --   --  18  --   --  55* 58*  --  57*   CREATININE mg/dL 0.80  --   --  0.72*  --   --  0.73*  --   --  1.26 1.30*  --  1.28*   GLUCOSE mg/dL 117*  --   --  108*  --   --  78  --   --  130* 143*  --  141*   ALBUMIN g/dL 3.1*  --   --  3.2*  --   --  3.1*  --   --  3.4*  --   --  3.9   BILIRUBIN mg/dL 2.6*  --   --  3.3*  --   --  2.6*  --   --  3.9*  --   --  4.9*   ALK PHOS U/L 94  --   --  79  --   --  66  --   --  78  --   --  89   AST (SGOT) U/L 147*  --   --  206*  --   --  184*  --   --  161*  --   --  156*   ALT (SGPT) U/L 75*  --   --  79*  --   --  63*  --   --  55*  --   --  55*   INR  1.59* 1.60*  --   --  1.71*  --   --   --  2.03*  --  2.32*  --  2.33*   MAGNESIUM mg/dL 1.5*  --   --  1.7  --   --  2.1  --   --   --   --  2.7* 0.9*   PHOSPHORUS mg/dL 3.0  --  2.3* 1.9*  --  1.6* 1.6*  --   --  4.2  --   --   --  "   LIPASE U/L  --   --   --   --   --   --   --   --   --   --   --   --  14     Estimated Creatinine Clearance: 115.7 mL/min (by C-G formula based on SCr of 0.8 mg/dL).  No results found for: \"HGBA1C\"      Infection   Results from last 7 days   Lab Units 03/18/25  1506 03/18/25  1455   BLOODCX  No growth at 3 days No growth at 3 days   PROCALCITONIN ng/mL  --  0.67*     UA    Results from last 7 days   Lab Units 03/18/25  1434   NITRITE UA  Positive*   WBC UA /HPF 0-2   BACTERIA UA /HPF 1+*   SQUAM EPITHEL UA /HPF 0-2     Microbiology Results (last 10 days)       Procedure Component Value - Date/Time    MRSA Screen, PCR (Inpatient) - Swab, Nares [[id number]]  (Normal) Collected: 03/18/25 2043    Lab Status: Final result Specimen: Swab from Nares Updated: 03/18/25 2210     MRSA PCR No MRSA Detected    Narrative:      The negative predictive value of this diagnostic test is high and should only be used to consider de-escalating anti-MRSA therapy. A positive result may indicate colonization with MRSA and must be correlated clinically.    Respiratory Panel PCR w/COVID-19(SARS-CoV-2) NICOLASA/NORM/JULIANNA/PAD/COR/ALBERTO In-House, NP Swab in UTM/VTM, 2 HR TAT - Swab, Nasopharynx [[id number]]  (Normal) Collected: 03/18/25 2043    Lab Status: Final result Specimen: Swab from Nasopharynx Updated: 03/18/25 2144     ADENOVIRUS, PCR Not Detected     Coronavirus 229E Not Detected     Coronavirus HKU1 Not Detected     Coronavirus NL63 Not Detected     Coronavirus OC43 Not Detected     COVID19 Not Detected     Human Metapneumovirus Not Detected     Human Rhinovirus/Enterovirus Not Detected     Influenza A PCR Not Detected     Influenza B PCR Not Detected     Parainfluenza Virus 1 Not Detected     Parainfluenza Virus 2 Not Detected     Parainfluenza Virus 3 Not Detected     Parainfluenza Virus 4 Not Detected     RSV, PCR Not Detected     Bordetella pertussis pcr Not Detected     Bordetella parapertussis PCR Not Detected     Chlamydophila " pneumoniae PCR Not Detected     Mycoplasma pneumo by PCR Not Detected    Narrative:      In the setting of a positive respiratory panel with a viral infection PLUS a negative procalcitonin without other underlying concern for bacterial infection, consider observing off antibiotics or discontinuation of antibiotics and continue supportive care. If the respiratory panel is positive for atypical bacterial infection (Bordetella pertussis, Chlamydophila pneumoniae, or Mycoplasma pneumoniae), consider antibiotic de-escalation to target atypical bacterial infection.    Blood Culture - Blood, Arm, Left [[id number]]  (Normal) Collected: 03/18/25 1506    Lab Status: Preliminary result Specimen: Blood from Arm, Left Updated: 03/21/25 1515     Blood Culture No growth at 3 days    Blood Culture - Blood, Arm, Right [[id number]]  (Normal) Collected: 03/18/25 1455    Lab Status: Preliminary result Specimen: Blood from Arm, Right Updated: 03/21/25 1515     Blood Culture No growth at 3 days    Narrative:      Less than seven (7) mL's of blood was collected.  Insufficient quantity may yield false negative results.          Imaging Results (Last 72 Hours)       ** No results found for the last 72 hours. **            Assessment & Plan   Gastric varices  Alcohol cirrhosis - patient has been seen at [facility] liver transplant team but patient did not keep his follow-ups.  Anemia   History of ascites - last paracentesis 8/9/2024, undetectable total protein and albumin, negative for SBP  Electrolyte abnormalities    3/19/25 EGD by Dr. Miller showed 4 cords of grade 2 varices, 1 had red nellie sign with total of 5 bands placed.  No active bleeding seen.  Moderately severe PHG.  No obvious gastric varices.     Plan:  29 y.o. male PMH acute liver failure secondary to severe alcohol hepatitis that was nonresponsive to prednisone.  He presented to the ER due to hematemesis and weakness, which is why GI was consulted.  Underwent EGD which revealed  esophageal varices.    -Hgb is up to 8.4 today.  Continue to monitor H&H and transfuse as needed.  -Patient is now on GI soft diet and tolerating.  Feels much better since he said real food.  -PPI BID change to PO.   -Continue octreotide until 3/22/2025.  Will be continued today.  -MELD 3.0 is 28 at admission. MELD 3.0 is 18 today.  -Electrolyte management per primary team  Continue Rocephin for SBP prophylaxis.  Continue folic acid, thiamine, zinc, & spironolactone. Will add lasix for balance, Cr 0.8.    -Repeat EGD in the next 1 to 2 months. Unfortunately our office does not take his insurance - he will need to follow up with [facility] for care moving forward, who he has also seen in the past 6 months.   Discussed needing to follow-up with [facility] as an outpatient.  Patient agrees.        Electronically signed by MARI Horn, 03/22/25, 10:42 AM EDT.

## 2025-03-22 NOTE — PLAN OF CARE
No pt c/o of pain. Mg replaced  Problem: Adult Inpatient Plan of Care  Goal: Plan of Care Review  Outcome: Progressing  Goal: Patient-Specific Goal (Individualized)  Outcome: Progressing  Goal: Absence of Hospital-Acquired Illness or Injury  Outcome: Progressing  Intervention: Identify and Manage Fall Risk  Intervention: Prevent Skin Injury  Intervention: Prevent Infection  Goal: Optimal Comfort and Wellbeing  Outcome: Progressing  Intervention: Provide Person-Centered Care  Goal: Readiness for Transition of Care  Outcome: Progressing     Problem: Fall Injury Risk  Goal: Absence of Fall and Fall-Related Injury  Outcome: Progressing  Intervention: Identify and Manage Contributors  Intervention: Promote Injury-Free Environment     Problem: Skin Injury Risk Increased  Goal: Skin Health and Integrity  Outcome: Progressing  Intervention: Optimize Skin Protection     Problem: Violence Risk or Actual  Goal: Anger and Impulse Control  Outcome: Progressing  Intervention: Minimize Safety Risk  Intervention: Promote Self-Control     Problem: Sepsis/Septic Shock  Goal: Optimal Coping  Outcome: Progressing  Intervention: Support Patient and Family Response  Goal: Absence of Bleeding  Outcome: Progressing  Goal: Blood Glucose Level Within Target Range  Outcome: Progressing  Goal: Absence of Infection Signs and Symptoms  Outcome: Progressing  Intervention: Initiate Sepsis Management  Intervention: Promote Recovery  Goal: Optimal Nutrition Delivery  Outcome: Progressing   Goal Outcome Evaluation:

## 2025-03-22 NOTE — PROGRESS NOTES
"  Chief complaint \"I am feeling a lot better\"    Subjective .     History of present illness:   The patient is a 29 y.o. male who was admitted secondary to witnessed seizure.  PMH of alcohol use disorder, cirrhosis, HTN       Psychiatry consulted secondary to alcohol abuse and depression.   Patient has been previously seen by psychiatry during previous admission with similar presentation, at the time was experiencing passive SI without plan or intent.     Patient is alert upright and oriented x 4  Extensive history of alcohol abuse-reports drinking approximately 1 L of alcohol per day- Attempted to stop drinking on his own at home-experiencing seizure, and then admitted to the hospital.  [facility] and able to maintain 4-month period of sobriety.  Patient expressed desire to stop drinking alcohol, acknowledging significant impact on his health and wellbeing. Established with psychotherapist outpatient-expressed interest in medication therapy for depression.  Endorsing symptoms of depression associated with ruminating feelings of inadequacy and guilt associated with alcohol use, poor motivation, persistent depressed mood.  Denied feeling hopeless or helpless.  Denies recent or current SI or HI.  Denied previous psychiatric hospitalizations, denied history of suicide attempts,   Denies perceptual disturbances  Endorsing mild to moderate symptoms of nausea-sleep bilateral hand tremor-mild headache.  Negative for agitation, aggressive behavior   Social support with family-currently living with his parents.      Today patient reports being more optimistic, feeling better. Discussed limitations with medications secondary to medical condition.  Patient reported long history of ERIK discussed and offered gabapentin for withdrawal and anxiety.  Patient declined  Expressed intent to pursue outpatient care with [facility] upon discharge.   RN reports no events overnight      The following " "portions of the patient's history were reviewed and updated as appropriate: allergies, current medications, past family history, past medical history, past social history, past surgical history and problem list.    History    Objective     Vital Signs   /67 (BP Location: Left arm, Patient Position: Lying)   Pulse 80   Temp 98.3 °F (36.8 °C) (Oral)   Resp 13   Ht 165.1 cm (65\")   Wt 60.6 kg (133 lb 9.6 oz)   SpO2 98%   BMI 22.23 kg/m²       MENTAL STATUS EXAM   General Appearance:  Cleanly groomed and dressed  Eye Contact:  Good eye contact  Attitude:  Cooperative and polite  Motor Activity:  Slow  Speech:  Normal rate, tone, volume  Mood and affect:  Normal, pleasant  Hopelessness:  Denies  Loneliness: Denies  Thought Process:  Logical, goal-directed and linear  Associations/ Thought Content:  No delusions  Hallucinations:  None  Suicidal Ideations:  Not present  Homicidal Ideation:  Not present  Sensorium:  Alert and clear  Orientation:  Person, place, time and situation  Fund of Knowledge:  Limited  Intellectual Functioning:  Average range  Insight:  Limited  Judgement:  Fair  Reliability:  Fair  Impulse Control:  Fair         Assessment & Plan       Acute GI bleeding    Hematemesis       Assessment: Alcohol dependence, withdrawal, MDD , ERIK    Treatment Plan: Patient presents with sxs of alcohol dependence and withdrawal.   Continued alcohol use has significantly impacted patient's health and current medical condition-limiting medication options secondary to liver enzymes,  electrolyte abnormalities, and decreased blood/plt count. Discussed and offered gabapentin for withdrawal and anxiety-declined.  Patient expressed desire to stop drinking -pursuing outpatient care with [facility]-initiated process prior to admission.   Declining services from psychiatry at this time   Will follow prn   Treatment Plan discussed with: Patient    I discussed the patients findings and my recommendations with patient    I " have reviewed and approved the behavioral health treatment plans and problem list. Yes  Thank you for the consult   Referring MD has access to consult report and progress notes in EMR     Ines Almazan DNP, APRN  03/22/25  08:52 EDT

## 2025-03-23 VITALS
HEART RATE: 80 BPM | WEIGHT: 134.26 LBS | BODY MASS INDEX: 22.37 KG/M2 | RESPIRATION RATE: 11 BRPM | TEMPERATURE: 97.6 F | HEIGHT: 65 IN | OXYGEN SATURATION: 98 % | DIASTOLIC BLOOD PRESSURE: 69 MMHG | SYSTOLIC BLOOD PRESSURE: 116 MMHG

## 2025-03-23 LAB
ALBUMIN SERPL-MCNC: 3.3 G/DL (ref 3.5–5.2)
ALBUMIN/GLOB SERPL: 1.2 G/DL
ALP SERPL-CCNC: 153 U/L (ref 39–117)
ALT SERPL W P-5'-P-CCNC: 73 U/L (ref 1–41)
ANION GAP SERPL CALCULATED.3IONS-SCNC: 8.9 MMOL/L (ref 5–15)
AST SERPL-CCNC: 128 U/L (ref 1–40)
BACTERIA SPEC AEROBE CULT: NORMAL
BACTERIA SPEC AEROBE CULT: NORMAL
BASOPHILS # BLD AUTO: 0.07 10*3/MM3 (ref 0–0.2)
BASOPHILS NFR BLD AUTO: 1 % (ref 0–1.5)
BILIRUB SERPL-MCNC: 2.1 MG/DL (ref 0–1.2)
BUN SERPL-MCNC: 6 MG/DL (ref 6–20)
BUN/CREAT SERPL: 7.4 (ref 7–25)
CALCIUM SPEC-SCNC: 8.2 MG/DL (ref 8.6–10.5)
CHLORIDE SERPL-SCNC: 99 MMOL/L (ref 98–107)
CO2 SERPL-SCNC: 21.1 MMOL/L (ref 22–29)
CREAT SERPL-MCNC: 0.81 MG/DL (ref 0.76–1.27)
DEPRECATED RDW RBC AUTO: 55.8 FL (ref 37–54)
EGFRCR SERPLBLD CKD-EPI 2021: 121.6 ML/MIN/1.73
EOSINOPHIL # BLD AUTO: 0.27 10*3/MM3 (ref 0–0.4)
EOSINOPHIL NFR BLD AUTO: 3.8 % (ref 0.3–6.2)
ERYTHROCYTE [DISTWIDTH] IN BLOOD BY AUTOMATED COUNT: 16.6 % (ref 12.3–15.4)
GLOBULIN UR ELPH-MCNC: 2.7 GM/DL
GLUCOSE SERPL-MCNC: 115 MG/DL (ref 65–99)
HCT VFR BLD AUTO: 26.3 % (ref 37.5–51)
HGB BLD-MCNC: 8.9 G/DL (ref 13–17.7)
IMM GRANULOCYTES # BLD AUTO: 0.04 10*3/MM3 (ref 0–0.05)
IMM GRANULOCYTES NFR BLD AUTO: 0.6 % (ref 0–0.5)
INR PPP: 1.63 (ref 0.9–1.1)
LYMPHOCYTES # BLD AUTO: 1.58 10*3/MM3 (ref 0.7–3.1)
LYMPHOCYTES NFR BLD AUTO: 22.4 % (ref 19.6–45.3)
MAGNESIUM SERPL-MCNC: 1.5 MG/DL (ref 1.6–2.6)
MAGNESIUM SERPL-MCNC: 2.3 MG/DL (ref 1.6–2.6)
MCH RBC QN AUTO: 31.1 PG (ref 26.6–33)
MCHC RBC AUTO-ENTMCNC: 33.8 G/DL (ref 31.5–35.7)
MCV RBC AUTO: 92 FL (ref 79–97)
MONOCYTES # BLD AUTO: 0.92 10*3/MM3 (ref 0.1–0.9)
MONOCYTES NFR BLD AUTO: 13 % (ref 5–12)
NEUTROPHILS NFR BLD AUTO: 4.18 10*3/MM3 (ref 1.7–7)
NEUTROPHILS NFR BLD AUTO: 59.2 % (ref 42.7–76)
NRBC BLD AUTO-RTO: 0 /100 WBC (ref 0–0.2)
PHOSPHATE SERPL-MCNC: 3.8 MG/DL (ref 2.5–4.5)
PLATELET # BLD AUTO: 88 10*3/MM3 (ref 140–450)
PMV BLD AUTO: 10.1 FL (ref 6–12)
POTASSIUM SERPL-SCNC: 4 MMOL/L (ref 3.5–5.2)
PROT SERPL-MCNC: 6 G/DL (ref 6–8.5)
PROTHROMBIN TIME: 19.3 SECONDS (ref 11.7–14.2)
RBC # BLD AUTO: 2.86 10*6/MM3 (ref 4.14–5.8)
SODIUM SERPL-SCNC: 129 MMOL/L (ref 136–145)
WBC NRBC COR # BLD AUTO: 7.06 10*3/MM3 (ref 3.4–10.8)

## 2025-03-23 PROCEDURE — 85610 PROTHROMBIN TIME: CPT

## 2025-03-23 PROCEDURE — 83735 ASSAY OF MAGNESIUM: CPT | Performed by: INTERNAL MEDICINE

## 2025-03-23 PROCEDURE — 25010000002 THIAMINE PER 100 MG: Performed by: INTERNAL MEDICINE

## 2025-03-23 PROCEDURE — 84100 ASSAY OF PHOSPHORUS: CPT | Performed by: INTERNAL MEDICINE

## 2025-03-23 PROCEDURE — 80053 COMPREHEN METABOLIC PANEL: CPT | Performed by: INTERNAL MEDICINE

## 2025-03-23 PROCEDURE — 85025 COMPLETE CBC W/AUTO DIFF WBC: CPT | Performed by: INTERNAL MEDICINE

## 2025-03-23 PROCEDURE — 25010000002 MAGNESIUM SULFATE 2 GM/50ML SOLUTION: Performed by: INTERNAL MEDICINE

## 2025-03-23 RX ORDER — PANTOPRAZOLE SODIUM 40 MG/1
40 TABLET, DELAYED RELEASE ORAL
Qty: 60 TABLET | Refills: 0 | Status: SHIPPED | OUTPATIENT
Start: 2025-03-23 | End: 2025-04-22

## 2025-03-23 RX ORDER — FUROSEMIDE 20 MG/1
20 TABLET ORAL DAILY
Qty: 30 TABLET | Refills: 0 | Status: SHIPPED | OUTPATIENT
Start: 2025-03-24 | End: 2025-04-23

## 2025-03-23 RX ORDER — FOLIC ACID 1 MG/1
1 TABLET ORAL DAILY
Qty: 30 TABLET | Refills: 0 | Status: SHIPPED | OUTPATIENT
Start: 2025-03-23 | End: 2025-04-22

## 2025-03-23 RX ORDER — LANOLIN ALCOHOL/MO/W.PET/CERES
100 CREAM (GRAM) TOPICAL DAILY
Qty: 30 TABLET | Refills: 0 | Status: SHIPPED | OUTPATIENT
Start: 2025-03-24 | End: 2025-04-23

## 2025-03-23 RX ORDER — MAGNESIUM SULFATE HEPTAHYDRATE 40 MG/ML
2 INJECTION, SOLUTION INTRAVENOUS
Status: COMPLETED | OUTPATIENT
Start: 2025-03-23 | End: 2025-03-23

## 2025-03-23 RX ADMIN — MAGNESIUM SULFATE HEPTAHYDRATE 2 G: 40 INJECTION, SOLUTION INTRAVENOUS at 06:45

## 2025-03-23 RX ADMIN — Medication 10 ML: at 08:00

## 2025-03-23 RX ADMIN — SPIRONOLACTONE 25 MG: 25 TABLET ORAL at 08:00

## 2025-03-23 RX ADMIN — Medication 220 MG: at 08:00

## 2025-03-23 RX ADMIN — MUPIROCIN 1 APPLICATION: 20 OINTMENT TOPICAL at 08:00

## 2025-03-23 RX ADMIN — MAGNESIUM SULFATE HEPTAHYDRATE 2 G: 40 INJECTION, SOLUTION INTRAVENOUS at 08:48

## 2025-03-23 RX ADMIN — FUROSEMIDE 20 MG: 20 TABLET ORAL at 08:00

## 2025-03-23 RX ADMIN — PANTOPRAZOLE SODIUM 40 MG: 40 TABLET, DELAYED RELEASE ORAL at 08:00

## 2025-03-23 RX ADMIN — MAGNESIUM SULFATE HEPTAHYDRATE 2 G: 40 INJECTION, SOLUTION INTRAVENOUS at 05:27

## 2025-03-23 RX ADMIN — FOLIC ACID 1 MG: 5 INJECTION, SOLUTION INTRAMUSCULAR; INTRAVENOUS; SUBCUTANEOUS at 08:00

## 2025-03-23 RX ADMIN — THIAMINE HYDROCHLORIDE 200 MG: 100 INJECTION, SOLUTION INTRAMUSCULAR; INTRAVENOUS at 05:27

## 2025-03-23 NOTE — DISCHARGE SUMMARY
[facility] Medicine Services  Discharge Summary    Date of Service: 3/23/2025  Patient Name: Yonas Charles  : 1995  MRN: [id number]    Date of Admission: 3/18/2025  Discharge Diagnosis:     Acute upper GI bleed/hematemesis with gastric varices  Acute blood loss anemia  Alcoholic liver cirrhosis  Alcohol abuse with withdrawal seizures    Date of Discharge: 3/23/2025  Primary Care Physician: Provider, No Known      Hospital Course       Hospital Course:    This patient is a 29-year-old gentleman with a history of alcohol abuse and liver cirrhosis who presented with alcohol withdrawal with a witnessed seizure.  He was started on a phenobarbital taper for alcohol withdrawal and admitted to the ICU.  He was started on thiamine and folic acid as well as as needed benzodiazepines.  Patient reported that he drank a bottle of vodka prior to presentation over the weekend.  He also admitted to suicidal ideation after a recent break-up.  He was seen by psychiatry throughout the hospital course.  Psychiatry documented that they offered gabapentin for withdrawal and anxiety however the patient declined.  Psychiatry documented that the patient declined further services from them and said that they would follow as needed.    Patient also admitted to hematemesis with alcohol abuse and so gastroenterology was consulted.  He was started on octreotide drip and Protonix in view of liver cirrhosis with varices and hematemesis.  An EGD was done which showed grade 2 esophageal varices with banding done by GI. No active bleeding was seen. It also showed moderately severe portal hypertensive gastropathy. No gastric varices seen.     Patient received packed red blood cell transfusions for acute blood loss anemia due to hematemesis.  Hemoglobin dropped as low as 6.1.  Hemoglobin improved afterwards and trended upwards.    The patient has improved overall and is being discharged home.      DISCHARGE Follow Up  Recommendations:-Follow-up with PCP in 1 week, follow-up with GI in 1 week, follow-up with psychiatry in 1 week      Day of Discharge     Vital Signs:  Temp:  [97.6 °F (36.4 °C)-98.4 °F (36.9 °C)] 97.6 °F (36.4 °C)  Heart Rate:  [67-95] 80  Resp:  [11-17] 11  BP: ()/(59-76) 116/69    Physical Exam:  Physical Exam  Constitutional:       Appearance: Normal appearance.   HENT:      Head: Normocephalic and atraumatic.      Nose: Nose normal.      Mouth/Throat:      Mouth: Mucous membranes are moist.   Eyes:      Extraocular Movements: Extraocular movements intact.      Conjunctiva/sclera: Conjunctivae normal.      Pupils: Pupils are equal, round, and reactive to light.   Cardiovascular:      Rate and Rhythm: Normal rate and regular rhythm.      Pulses: Normal pulses.      Heart sounds: Normal heart sounds.   Pulmonary:      Effort: Pulmonary effort is normal.      Breath sounds: Normal breath sounds.   Abdominal:      General: Abdomen is flat. Bowel sounds are normal.      Palpations: Abdomen is soft.   Musculoskeletal:         General: Normal range of motion.      Cervical back: Normal range of motion and neck supple.   Skin:     General: Skin is warm and dry.      Capillary Refill: Capillary refill takes less than 2 seconds.   Neurological:      General: No focal deficit present.      Mental Status: He is alert and oriented to person, place, and time.   Psychiatric:         Mood and Affect: Mood normal.         Behavior: Behavior normal.         Thought Content: Thought content normal.         Judgment: Judgment normal.           Pertinent  and/or Most Recent Results     LAB RESULTS:      Lab 03/23/25  0136 03/22/25  0141 03/21/25  1044 03/21/25  0027 03/20/25  1645 03/20/25  0547 03/19/25  1234 03/19/25  1044 03/19/25  0802 03/19/25  0426 03/18/25  2244 03/18/25  2040 03/18/25  1614 03/18/25  1512 03/18/25  1455   WBC 7.06 6.12  --  6.51  --  6.63  --   --   --  22.05*  --   --   --   --   --    HEMOGLOBIN 8.9*  8.4*  --  8.2* 7.5* 6.6*   < >  --    < > 7.9*   < > 6.7*   < >  --   --    HEMATOCRIT 26.3* 25.2*  --  24.7* 22.9* 19.9*   < >  --    < > 23.3*   < > 19.3*   < >  --   --    PLATELETS 88* 78*  --  64*  --  58*  --   --   --  103*  --   --   --   --   --    NEUTROS ABS 4.18 3.33  --  3.61  --  3.82  --   --   --  16.52*  --   --   --   --   --    IMMATURE GRANS (ABS) 0.04 0.03  --  0.02  --  0.02  --   --   --  0.12*  --   --   --   --   --    LYMPHS ABS 1.58 1.82  --  1.94  --  1.98  --   --   --  3.20*  --   --   --   --   --    MONOS ABS 0.92* 0.65  --  0.69  --  0.61  --   --   --  2.10*  --   --   --   --   --    EOS ABS 0.27 0.21  --  0.18  --  0.16  --   --   --  0.05  --   --   --   --   --    MCV 92.0 92.3  --  91.8  --  94.8  --   --   --  93.6  --   --   --   --   --    PROCALCITONIN  --   --   --   --   --   --   --   --   --   --   --   --   --   --  0.67*   LACTATE  --   --   --   --   --   --   --   --   --   --   --  2.0  --  2.8*  --    PROTIME 19.3* 19.0* 19.1*  --  20.1*  --   --  23.1*  --   --    < >  --   --   --   --     < > = values in this interval not displayed.         Lab 03/23/25  0136 03/22/25  1126 03/22/25  0141 03/21/25  1018 03/21/25  0027 03/20/25  1349 03/20/25  0513 03/19/25  1736 03/19/25  0426   SODIUM 129*  --  132*  --  132*  --  134*  --  133*   POTASSIUM 4.0  --  3.8  --  4.0 3.8 3.6   < > 3.5   CHLORIDE 99  --  103  --  100  --  100  --  89*   CO2 21.1*  --  21.1*  --  23.9  --  23.9  --  32.7*   ANION GAP 8.9  --  7.9  --  8.1  --  10.1  --  11.3   BUN 6  --  6  --  9  --  18  --  55*   CREATININE 0.81  --  0.80  --  0.72*  --  0.73*  --  1.26   EGFR 121.6  --  122.1  --  126.0  --  125.5  --  78.7   GLUCOSE 115*  --  117*  --  108*  --  78  --  130*   CALCIUM 8.2*  --  8.1*  --  7.7*  --  7.5*  --  7.6*   MAGNESIUM 1.5* 2.3 1.5*  --  1.7  --  2.1  --   --    PHOSPHORUS 3.8  --  3.0 2.3* 1.9* 1.6* 1.6*  --  4.2    < > = values in this interval not displayed.         Lab  03/23/25  0136 03/22/25  0141 03/21/25  0027 03/20/25  0513 03/19/25  0426 03/18/25  1339   TOTAL PROTEIN 6.0 5.7* 5.7* 5.6* 6.1 7.0   ALBUMIN 3.3* 3.1* 3.2* 3.1* 3.4* 3.9   GLOBULIN 2.7 2.6 2.5 2.5 2.7 3.1   ALT (SGPT) 73* 75* 79* 63* 55* 55*   AST (SGOT) 128* 147* 206* 184* 161* 156*   BILIRUBIN 2.1* 2.6* 3.3* 2.6* 3.9* 4.9*   ALK PHOS 153* 94 79 66 78 89   LIPASE  --   --   --   --   --  14         Lab 03/23/25  0136 03/22/25  0141 03/21/25  1044 03/20/25  1645 03/19/25  1044   PROTIME 19.3* 19.0* 19.1* 20.1* 23.1*   INR 1.63* 1.59* 1.60* 1.71* 2.03*         Lab 03/19/25  0426   CHOLESTEROL 93   LDL CHOL 41   HDL CHOL 30*   TRIGLYCERIDES 118         Lab 03/18/25  1506   ABO TYPING O   RH TYPING Positive   ANTIBODY SCREEN Negative         Brief Urine Lab Results  (Last result in the past 365 days)        Color   Clarity   Blood   Leuk Est   Nitrite   Protein   CREAT   Urine HCG        03/18/25 1434 Dark Yellow  Comment: Result checked     Cloudy   Small (1+)   Trace   Positive   Trace                 Microbiology Results (last 10 days)       Procedure Component Value - Date/Time    MRSA Screen, PCR (Inpatient) - Swab, Nares [[id number]]  (Normal) Collected: 03/18/25 2043    Lab Status: Final result Specimen: Swab from Nares Updated: 03/18/25 2210     MRSA PCR No MRSA Detected    Narrative:      The negative predictive value of this diagnostic test is high and should only be used to consider de-escalating anti-MRSA therapy. A positive result may indicate colonization with MRSA and must be correlated clinically.    Respiratory Panel PCR w/COVID-19(SARS-CoV-2) NICOLASA/NORM/JULIANNA/PAD/COR/ALBERTO In-House, NP Swab in UTM/VTM, 2 HR TAT - Swab, Nasopharynx [[id number]]  (Normal) Collected: 03/18/25 2043    Lab Status: Final result Specimen: Swab from Nasopharynx Updated: 03/18/25 2144     ADENOVIRUS, PCR Not Detected     Coronavirus 229E Not Detected     Coronavirus HKU1 Not Detected     Coronavirus NL63 Not Detected     Coronavirus  OC43 Not Detected     COVID19 Not Detected     Human Metapneumovirus Not Detected     Human Rhinovirus/Enterovirus Not Detected     Influenza A PCR Not Detected     Influenza B PCR Not Detected     Parainfluenza Virus 1 Not Detected     Parainfluenza Virus 2 Not Detected     Parainfluenza Virus 3 Not Detected     Parainfluenza Virus 4 Not Detected     RSV, PCR Not Detected     Bordetella pertussis pcr Not Detected     Bordetella parapertussis PCR Not Detected     Chlamydophila pneumoniae PCR Not Detected     Mycoplasma pneumo by PCR Not Detected    Narrative:      In the setting of a positive respiratory panel with a viral infection PLUS a negative procalcitonin without other underlying concern for bacterial infection, consider observing off antibiotics or discontinuation of antibiotics and continue supportive care. If the respiratory panel is positive for atypical bacterial infection (Bordetella pertussis, Chlamydophila pneumoniae, or Mycoplasma pneumoniae), consider antibiotic de-escalation to target atypical bacterial infection.    Blood Culture - Blood, Arm, Left [[id number]]  (Normal) Collected: 03/18/25 1506    Lab Status: Preliminary result Specimen: Blood from Arm, Left Updated: 03/22/25 1515     Blood Culture No growth at 4 days    Blood Culture - Blood, Arm, Right [[id number]]  (Normal) Collected: 03/18/25 1455    Lab Status: Preliminary result Specimen: Blood from Arm, Right Updated: 03/22/25 1515     Blood Culture No growth at 4 days    Narrative:      Less than seven (7) mL's of blood was collected.  Insufficient quantity may yield false negative results.            US Liver  Result Date: 3/18/2025  Impression: Impression: 1.Diffusely increased hepatic echogenicity, consistent with hepatic steatosis. 2.Minimal flow shown in the main portal vein, which may be due to slow flow or thrombus. This can be further evaluated with contrast-enhanced CT abdomen/pelvis (portal venous phase). Electronically Signed:  Emily Newsome  3/18/2025 7:12 PM EDT  Workstation ID: [id number]    CT Head Without Contrast  Result Date: 3/18/2025  Impression: Impression: No acute intracranial pathology. Electronically Signed: Rafiq Verma MD  3/18/2025 6:04 PM EDT  Workstation ID: [id number]                  Labs Pending at Discharge:  Pending Results       Procedure [Order ID] Specimen - Date/Time    Magnesium [[id number]]     Specimen: Blood             Procedures Performed  Procedure(s):  ESOPHAGOGASTRODUODENOSCOPY with banding of esophageal varices x 5         Consults:   Consults       Date and Time Order Name Status Description    3/19/2025 11:50 AM Inpatient Hospitalist Consult      3/18/2025  5:26 PM Inpatient Psychiatrist Consult Completed     3/18/2025  3:16 PM Inpatient Gastroenterology Consult Completed     3/18/2025  2:33 PM Hospitalist (on-call MD unless specified)                Discharge Details        Discharge Medications        New Medications        Instructions Start Date   folic acid 1 MG tablet  Commonly known as: FOLVITE   1 mg, Oral, Daily      furosemide 20 MG tablet  Commonly known as: LASIX   20 mg, Oral, Daily   Start Date: March 24, 2025     thiamine 100 MG tablet  Commonly known as: VITAMIN B1   100 mg, Oral, Daily   Start Date: March 24, 2025            Continue These Medications        Instructions Start Date   nadolol 20 MG tablet  Commonly known as: CORGARD   Take 1 tablet by mouth once a day as directed for 30 days      spironolactone 25 MG tablet  Commonly known as: ALDACTONE   25 mg, Oral, Daily      Zinc Sulfate 220 (50 Zn) MG tablet   220 mg, Oral, Daily           Also being discharged on Protonix 40 mg oral twice daily per GI recommendations.    Allergies   Allergen Reactions    Reglan [Metoclopramide] Mental Status Change         Discharge Disposition: Home  Home or Self Care    Diet:  Hospital:  Diet Order   Procedures    Diet: Gastrointestinal, Cardiac; Low Sodium (2g); Fiber-Restricted; Texture: Soft to  Chew (NDD 3); Soft to Chew: Whole Meat; Fluid Consistency: Thin (IDDSI 0)         Discharge Activity:   Activity Instructions       Activity as Tolerated                CODE STATUS:  Code Status and Medical Interventions: CPR (Attempt to Resuscitate); Full Support   Ordered at: 03/18/25 3488     Code Status (Patient has no pulse and is not breathing):    CPR (Attempt to Resuscitate)     Medical Interventions (Patient has pulse or is breathing):    Full Support         No future appointments.    Additional Instructions for the Follow-ups that You Need to Schedule       Discharge Follow-up with PCP   As directed       Currently Documented PCP:    Provider, No Known    PCP Phone Number:    None     Follow Up Details: 1 week        Discharge Follow-up with Specified Provider: GI; 1 Week   As directed      To: GI   Follow Up: 1 Week        Discharge Follow-up with Specified Provider: Psychiatry; 1 Week   As directed      To: Psychiatry   Follow Up: 1 Week                Time spent on Discharge including face to face service: Greater than 30 minutes    Signature: Electronically signed by Gabi Garcia MD, 03/23/25, 13:08 EDT.  [facility] Hospitalist Team

## 2025-03-23 NOTE — PLAN OF CARE
Problem: Adult Inpatient Plan of Care  Goal: Plan of Care Review  Outcome: Progressing  Goal: Patient-Specific Goal (Individualized)  Outcome: Progressing  Goal: Absence of Hospital-Acquired Illness or Injury  Outcome: Progressing  Intervention: Identify and Manage Fall Risk  Intervention: Prevent Skin Injury  Intervention: Prevent and Manage VTE (Venous Thromboembolism) Risk  Intervention: Prevent Infection  Goal: Optimal Comfort and Wellbeing  Outcome: Progressing  Intervention: Provide Person-Centered Care  Goal: Readiness for Transition of Care  Outcome: Progressing     Problem: Fall Injury Risk  Goal: Absence of Fall and Fall-Related Injury  Outcome: Progressing  Intervention: Identify and Manage Contributors  Intervention: Promote Injury-Free Environment     Problem: Skin Injury Risk Increased  Goal: Skin Health and Integrity  Outcome: Progressing  Intervention: Optimize Skin Protection     Problem: Violence Risk or Actual  Goal: Anger and Impulse Control  Outcome: Progressing  Intervention: Minimize Safety Risk  Intervention: Promote Self-Control     Problem: Sepsis/Septic Shock  Goal: Optimal Coping  Outcome: Progressing  Intervention: Support Patient and Family Response  Goal: Absence of Bleeding  Outcome: Progressing  Goal: Blood Glucose Level Within Target Range  Outcome: Progressing  Goal: Absence of Infection Signs and Symptoms  Outcome: Progressing  Intervention: Initiate Sepsis Management  Intervention: Promote Recovery  Goal: Optimal Nutrition Delivery  Outcome: Progressing     Problem: Malnutrition  Goal: Improved Nutritional Intake  Outcome: Progressing   Goal Outcome Evaluation:

## 2025-03-23 NOTE — PLAN OF CARE
Pt will be discharging home this afternoon, pt was made aware of all discharge instructions and follow up appointments needed.  Problem: Adult Inpatient Plan of Care  Goal: Plan of Care Review  Outcome: Adequate for Care Transition  Goal: Patient-Specific Goal (Individualized)  Outcome: Adequate for Care Transition  Goal: Absence of Hospital-Acquired Illness or Injury  Outcome: Adequate for Care Transition  Intervention: Identify and Manage Fall Risk  Recent Flowsheet Documentation  Taken 3/23/2025 1200 by Gabby Marsh LPN  Safety Promotion/Fall Prevention:   activity supervised   clutter free environment maintained   assistive device/personal items within reach   fall prevention program maintained   gait belt   nonskid shoes/slippers when out of bed   safety round/check completed  Taken 3/23/2025 1000 by Gabby Marsh LPN  Safety Promotion/Fall Prevention:   activity supervised   assistive device/personal items within reach   clutter free environment maintained   gait belt   fall prevention program maintained   nonskid shoes/slippers when out of bed   safety round/check completed  Taken 3/23/2025 0800 by Gabby Marsh LPN  Safety Promotion/Fall Prevention:   activity supervised   assistive device/personal items within reach   clutter free environment maintained   gait belt   fall prevention program maintained   nonskid shoes/slippers when out of bed   safety round/check completed  Intervention: Prevent Skin Injury  Recent Flowsheet Documentation  Taken 3/23/2025 1200 by Gabby Marsh LPN  Skin Protection: transparent dressing maintained  Taken 3/23/2025 0800 by Gabby Marsh LPN  Skin Protection: transparent dressing maintained  Intervention: Prevent Infection  Recent Flowsheet Documentation  Taken 3/23/2025 1200 by Gabby Marsh LPN  Infection Prevention:   hand hygiene promoted   rest/sleep promoted   single patient room provided  Taken 3/23/2025 1000 by Gabby Marsh LPN  Infection Prevention:    hand hygiene promoted   single patient room provided   rest/sleep promoted  Taken 3/23/2025 0800 by Gabby Marsh LPN  Infection Prevention:   hand hygiene promoted   single patient room provided   rest/sleep promoted  Goal: Optimal Comfort and Wellbeing  Outcome: Adequate for Care Transition  Intervention: Provide Person-Centered Care  Recent Flowsheet Documentation  Taken 3/23/2025 1200 by Gabby Marsh LPN  Trust Relationship/Rapport:   care explained   thoughts/feelings acknowledged   reassurance provided   questions encouraged   questions answered  Taken 3/23/2025 0800 by Gabby Marsh LPN  Trust Relationship/Rapport:   care explained   thoughts/feelings acknowledged   reassurance provided   questions encouraged   questions answered  Goal: Readiness for Transition of Care  Outcome: Adequate for Care Transition     Problem: Fall Injury Risk  Goal: Absence of Fall and Fall-Related Injury  Outcome: Adequate for Care Transition  Intervention: Identify and Manage Contributors  Recent Flowsheet Documentation  Taken 3/23/2025 1200 by Gabby Marsh LPN  Medication Review/Management: medications reviewed  Taken 3/23/2025 1000 by Gabby Marsh LPN  Medication Review/Management: medications reviewed  Taken 3/23/2025 0800 by Gabby Marsh LPN  Medication Review/Management: medications reviewed  Intervention: Promote Injury-Free Environment  Recent Flowsheet Documentation  Taken 3/23/2025 1200 by Gabby Marsh LPN  Safety Promotion/Fall Prevention:   activity supervised   clutter free environment maintained   assistive device/personal items within reach   fall prevention program maintained   gait belt   nonskid shoes/slippers when out of bed   safety round/check completed  Taken 3/23/2025 1000 by Gabby Marsh LPN  Safety Promotion/Fall Prevention:   activity supervised   assistive device/personal items within reach   clutter free environment maintained   gait belt   fall prevention program maintained    nonskid shoes/slippers when out of bed   safety round/check completed  Taken 3/23/2025 0800 by Gabby Marsh LPN  Safety Promotion/Fall Prevention:   activity supervised   assistive device/personal items within reach   clutter free environment maintained   gait belt   fall prevention program maintained   nonskid shoes/slippers when out of bed   safety round/check completed     Problem: Skin Injury Risk Increased  Goal: Skin Health and Integrity  Outcome: Adequate for Care Transition  Intervention: Optimize Skin Protection  Recent Flowsheet Documentation  Taken 3/23/2025 1200 by Gabby Marsh LPN  Pressure Reduction Techniques: frequent weight shift encouraged  Pressure Reduction Devices:   pressure-redistributing mattress utilized   positioning supports utilized  Skin Protection: transparent dressing maintained  Taken 3/23/2025 0800 by Gabby Marsh LPN  Pressure Reduction Techniques: frequent weight shift encouraged  Pressure Reduction Devices:   pressure-redistributing mattress utilized   positioning supports utilized  Skin Protection: transparent dressing maintained     Problem: Violence Risk or Actual  Goal: Anger and Impulse Control  Outcome: Adequate for Care Transition  Intervention: Minimize Safety Risk  Recent Flowsheet Documentation  Taken 3/23/2025 1200 by Gabby Marsh LPN  Sensory Stimulation Regulation: care clustered  Enhanced Safety Measures:   bed alarm set   room near unit station  Taken 3/23/2025 1000 by Gabby Marsh LPN  Enhanced Safety Measures:   bed alarm set   room near unit station  Taken 3/23/2025 0800 by Gabby Marsh LPN  Sensory Stimulation Regulation: care clustered  Enhanced Safety Measures:   bed alarm set   room near unit station  Intervention: Promote Self-Control  Recent Flowsheet Documentation  Taken 3/23/2025 1200 by Gabby Marsh LPN  Supportive Measures: active listening utilized  Taken 3/23/2025 0800 by Gabby Marsh LPN  Supportive Measures: active listening  utilized     Problem: Sepsis/Septic Shock  Goal: Optimal Coping  Outcome: Adequate for Care Transition  Intervention: Support Patient and Family Response  Recent Flowsheet Documentation  Taken 3/23/2025 1200 by Gabby Marsh LPN  Supportive Measures: active listening utilized  Family/Support System Care: self-care encouraged  Taken 3/23/2025 0800 by Gabby Marsh LPN  Supportive Measures: active listening utilized  Family/Support System Care: self-care encouraged  Goal: Absence of Bleeding  Outcome: Adequate for Care Transition  Goal: Blood Glucose Level Within Target Range  Outcome: Adequate for Care Transition  Goal: Absence of Infection Signs and Symptoms  Outcome: Adequate for Care Transition  Intervention: Initiate Sepsis Management  Recent Flowsheet Documentation  Taken 3/23/2025 1200 by Gabby Marsh LPN  Infection Prevention:   hand hygiene promoted   rest/sleep promoted   single patient room provided  Taken 3/23/2025 1000 by Gabby Marsh LPN  Infection Prevention:   hand hygiene promoted   single patient room provided   rest/sleep promoted  Taken 3/23/2025 0800 by Gabby Marsh LPN  Infection Prevention:   hand hygiene promoted   single patient room provided   rest/sleep promoted  Intervention: Promote Stabilization  Recent Flowsheet Documentation  Taken 3/23/2025 0800 by Gabby Marsh LPN  Fluid/Electrolyte Management: fluids provided  Goal: Optimal Nutrition Delivery  Outcome: Adequate for Care Transition     Problem: Malnutrition  Goal: Improved Nutritional Intake  Outcome: Adequate for Care Transition   Goal Outcome Evaluation:

## 2025-03-23 NOTE — PROGRESS NOTES
" LOS: 5 days   Patient Care Team:  Provider, No Known as PCP - General      Subjective    \"Feeling better every day\"    Interval History:   LABS: Na 129, K+ 4, Cr 0.81. TB 2.1 (2.6), alk phos 153 (4),  (147), ALT 73 (75).  INR 1.63.  WBC 7.06, hemoglobin 8.9 (8.4), platelets 88.  Having brown stool now.   Has a plan for discharge. He is going to lean on his parents financially while he is out of a job.     ROS:   No chest pain, shortness of breath, or cough.        Medication Review:     Current Facility-Administered Medications:     aluminum-magnesium hydroxide-simethicone (MAALOX MAX) 400-400-40 MG/5ML suspension 15 mL, 15 mL, Oral, Q6H PRN, Cordell Miller MD    sennosides-docusate (PERICOLACE) 8.6-50 MG per tablet 2 tablet, 2 tablet, Oral, BID PRN **AND** polyethylene glycol (MIRALAX) packet 17 g, 17 g, Oral, Daily PRN **AND** bisacodyl (DULCOLAX) EC tablet 5 mg, 5 mg, Oral, Daily PRN **AND** bisacodyl (DULCOLAX) suppository 10 mg, 10 mg, Rectal, Daily PRN, Cordell Miller MD    Calcium Replacement - Follow Nurse / BPA Driven Protocol, , Not Applicable, PRN, Cordell Miller MD    dextrose (D50W) (25 g/50 mL) IV injection 25 g, 25 g, Intravenous, Q15 Min PRN, Cordell Miller MD    dextrose (GLUTOSE) oral gel 15 g, 15 g, Oral, Q15 Min PRN, Cordell Miller MD    folic acid 1 mg in sodium chloride 0.9 % 50 mL IVPB, 1 mg, Intravenous, Daily, Cordell Miller MD, Last Rate: 100 mL/hr at 03/23/25 0800, 1 mg at 03/23/25 0800    furosemide (LASIX) tablet 20 mg, 20 mg, Oral, Daily, Kaci Polo, APRN, 20 mg at 03/23/25 0800    glucagon (GLUCAGEN) injection 1 mg, 1 mg, Subcutaneous, Q15 Min PRN, Cordell Miller MD    ipratropium-albuterol (DUO-NEB) nebulizer solution 3 mL, 3 mL, Nebulization, Q6H PRN, Cordell Miller MD    LORazepam (ATIVAN) injection 1 mg, 1 mg, Intravenous, Q1H PRN, 1 mg at 03/19/25 1120 **OR** LORazepam (ATIVAN) injection 2 mg, 2 mg, Intravenous, Q1H PRN, 2 mg at 03/19/25 1652 **OR** " LORazepam (ATIVAN) injection 2 mg, 2 mg, Intravenous, Q15 Min PRN, Cordell Miller MD, 2 mg at 03/19/25 2035    LORazepam (ATIVAN) injection 2 mg, 2 mg, Intravenous, Q5 Min PRN, Cordell Miller MD    Magnesium Standard Dose Replacement - Follow Nurse / BPA Driven Protocol, , Not Applicable, Paul HARTLEY Steven, MD    [Held by provider] nadolol (CORGARD) tablet 20 mg, 20 mg, Oral, Q24H, Felix Osborne APRN    nitroglycerin (NITROSTAT) SL tablet 0.4 mg, 0.4 mg, Sublingual, Q5 Min PRNPaul Steven, MD    ondansetron ODT (ZOFRAN-ODT) disintegrating tablet 4 mg, 4 mg, Oral, Q6H PRN **OR** ondansetron (ZOFRAN) injection 4 mg, 4 mg, Intravenous, Q6H PRN, Cordell Miller MD, 4 mg at 03/19/25 0015    ondansetron ODT (ZOFRAN-ODT) disintegrating tablet 4 mg, 4 mg, Oral, Q6H PRN **OR** ondansetron (ZOFRAN) injection 4 mg, 4 mg, Intravenous, Q6H PRNPaul Steven, MD    pantoprazole (PROTONIX) EC tablet 40 mg, 40 mg, Oral, BID Christ GARCIA Donna Ann, APRN, 40 mg at 03/23/25 0800    Phosphorus Replacement - Follow Nurse / BPA Driven Protocol, , Not Applicable, Paul HARTLEY Steven, MD    Potassium Replacement - Follow Nurse / BPA Driven Protocol, , Not Applicable, Paul HARTLEY Steven, MD    sodium chloride 0.9 % flush 10 mL, 10 mL, Intravenous, PRNPaul Steven, MD    sodium chloride 0.9 % flush 10 mL, 10 mL, Intravenous, Q12H, Cordell Miller MD, 10 mL at 03/23/25 0800    sodium chloride 0.9 % flush 10 mL, 10 mL, Intravenous, Q12H, Cordell Miller MD, 10 mL at 03/23/25 0800    sodium chloride 0.9 % flush 10 mL, 10 mL, Intravenous, PRN, Cordell Miller MD    sodium chloride 0.9 % infusion 40 mL, 40 mL, Intravenous, PRN, Cordell Miller MD, 400 mL at 03/19/25 1540    sodium chloride 0.9 % infusion 40 mL, 40 mL, Intravenous, PRN, Cordell Miller MD    spironolactone (ALDACTONE) tablet 25 mg, 25 mg, Oral, Daily, Kaci Polo, APRN, 25 mg at 03/23/25 0800    thiamine (B-1) injection 200 mg, 200 mg, Intravenous,  Q8H, 200 mg at 03/23/25 0527 **FOLLOWED BY** [START ON 3/24/2025] thiamine (VITAMIN B-1) tablet 100 mg, 100 mg, Oral, Daily, Cordell Miller MD    zinc sulfate (ZINCATE) capsule 220 mg, 220 mg, Oral, Daily, Kaci Polo APRN, 220 mg at 03/23/25 0800      Objective resting in the bed. No family present.  NAD.    Vital Signs  Vitals:    03/22/25 2047 03/23/25 0003 03/23/25 0426 03/23/25 0739   BP: 114/70 104/59 98/63 108/63   BP Location: Left arm  Left arm Left arm   Patient Position: Lying Lying Lying Lying   Pulse: 94 75 67 77   Resp: 16 17 13 13   Temp: 98.3 °F (36.8 °C) 98.1 °F (36.7 °C) 98 °F (36.7 °C) 98.4 °F (36.9 °C)   TempSrc: Oral Oral Oral Oral   SpO2: 99% 98% 100% 99%   Weight:   60.9 kg (134 lb 4.2 oz)    Height:           Physical Exam:   General Appearance:    Awake and alert, in no acute distress   Head:    Normocephalic, without obvious abnormality   Eyes:          Conjunctivae normal, icteric sclera   Throat:   No oral lesions, no thrush, oral mucosa moist   Neck:   No adenopathy, supple, no JVD   Lungs:     respirations regular, even and unlabored   Abdomen:     Soft, non-tender, no rebound or guarding, non-distended   Rectal:     Deferred   Extremities:   No edema, no cyanosis   Skin:   No bruising or rash, + jaundice        Results Review:    Results from last 7 days   Lab Units 03/23/25  0136 03/22/25  0141 03/21/25  0027 03/20/25  1645 03/20/25  0547 03/19/25  2020 03/19/25  1234 03/19/25  0802 03/19/25  0426 03/18/25  1614 03/18/25  1339   WBC 10*3/mm3 7.06 6.12 6.51  --  6.63  --   --   --  22.05*  --  26.56*   HEMOGLOBIN g/dL 8.9* 8.4* 8.2* 7.5* 6.6* 7.1* 6.5*   < > 7.9*   < > 7.9*   PLATELETS 10*3/mm3 88* 78* 64*  --  58*  --   --   --  103*  --  132*    < > = values in this interval not displayed.     Results from last 7 days   Lab Units 03/23/25  0136 03/22/25  1126 03/22/25  0141 03/21/25  1044 03/21/25  1018 03/21/25  0027 03/20/25  1645 03/20/25  1349 03/20/25  0513 03/19/25  1736  "03/19/25  1044 03/19/25  0426 03/19/25  0047 03/19/25  0025 03/18/25  1339   SODIUM mmol/L 129*  --  132*  --   --  132*  --   --  134*  --   --  133* 133*  --  132*   POTASSIUM mmol/L 4.0  --  3.8  --   --  4.0  --  3.8 3.6 3.3*  --  3.5 3.7  --  2.9*   CHLORIDE mmol/L 99  --  103  --   --  100  --   --  100  --   --  89* 88*  --  80*   CO2 mmol/L 21.1*  --  21.1*  --   --  23.9  --   --  23.9  --   --  32.7* 32.1*  --  33.6*   BUN mg/dL 6  --  6  --   --  9  --   --  18  --   --  55* 58*  --  57*   CREATININE mg/dL 0.81  --  0.80  --   --  0.72*  --   --  0.73*  --   --  1.26 1.30*  --  1.28*   GLUCOSE mg/dL 115*  --  117*  --   --  108*  --   --  78  --   --  130* 143*  --  141*   ALBUMIN g/dL 3.3*  --  3.1*  --   --  3.2*  --   --  3.1*  --   --  3.4*  --   --  3.9   BILIRUBIN mg/dL 2.1*  --  2.6*  --   --  3.3*  --   --  2.6*  --   --  3.9*  --   --  4.9*   ALK PHOS U/L 153*  --  94  --   --  79  --   --  66  --   --  78  --   --  89   AST (SGOT) U/L 128*  --  147*  --   --  206*  --   --  184*  --   --  161*  --   --  156*   ALT (SGPT) U/L 73*  --  75*  --   --  79*  --   --  63*  --   --  55*  --   --  55*   INR  1.63*  --  1.59* 1.60*  --   --  1.71*  --   --   --  2.03*  --  2.32*  --  2.33*   MAGNESIUM mg/dL 1.5* 2.3 1.5*  --   --  1.7  --   --  2.1  --   --   --   --  2.7* 0.9*   PHOSPHORUS mg/dL 3.8  --  3.0  --  2.3* 1.9*  --  1.6* 1.6*  --   --  4.2  --   --   --    LIPASE U/L  --   --   --   --   --   --   --   --   --   --   --   --   --   --  14     Estimated Creatinine Clearance: 114.9 mL/min (by C-G formula based on SCr of 0.81 mg/dL).  No results found for: \"HGBA1C\"      Infection   Results from last 7 days   Lab Units 03/18/25  1506 03/18/25  1455   BLOODCX  No growth at 4 days No growth at 4 days   PROCALCITONIN ng/mL  --  0.67*     UA    Results from last 7 days   Lab Units 03/18/25  1434   NITRITE UA  Positive*   WBC UA /HPF 0-2   BACTERIA UA /HPF 1+*   SQUAM EPITHEL UA /HPF 0-2 "     Microbiology Results (last 10 days)       Procedure Component Value - Date/Time    MRSA Screen, PCR (Inpatient) - Swab, Nares [[id number]]  (Normal) Collected: 03/18/25 2043    Lab Status: Final result Specimen: Swab from Nares Updated: 03/18/25 2210     MRSA PCR No MRSA Detected    Narrative:      The negative predictive value of this diagnostic test is high and should only be used to consider de-escalating anti-MRSA therapy. A positive result may indicate colonization with MRSA and must be correlated clinically.    Respiratory Panel PCR w/COVID-19(SARS-CoV-2) NICOLASA/NORM/JULIANNA/PAD/COR/ALBERTO In-House, NP Swab in UTM/VTM, 2 HR TAT - Swab, Nasopharynx [[id number]]  (Normal) Collected: 03/18/25 2043    Lab Status: Final result Specimen: Swab from Nasopharynx Updated: 03/18/25 2144     ADENOVIRUS, PCR Not Detected     Coronavirus 229E Not Detected     Coronavirus HKU1 Not Detected     Coronavirus NL63 Not Detected     Coronavirus OC43 Not Detected     COVID19 Not Detected     Human Metapneumovirus Not Detected     Human Rhinovirus/Enterovirus Not Detected     Influenza A PCR Not Detected     Influenza B PCR Not Detected     Parainfluenza Virus 1 Not Detected     Parainfluenza Virus 2 Not Detected     Parainfluenza Virus 3 Not Detected     Parainfluenza Virus 4 Not Detected     RSV, PCR Not Detected     Bordetella pertussis pcr Not Detected     Bordetella parapertussis PCR Not Detected     Chlamydophila pneumoniae PCR Not Detected     Mycoplasma pneumo by PCR Not Detected    Narrative:      In the setting of a positive respiratory panel with a viral infection PLUS a negative procalcitonin without other underlying concern for bacterial infection, consider observing off antibiotics or discontinuation of antibiotics and continue supportive care. If the respiratory panel is positive for atypical bacterial infection (Bordetella pertussis, Chlamydophila pneumoniae, or Mycoplasma pneumoniae), consider antibiotic de-escalation to  target atypical bacterial infection.    Blood Culture - Blood, Arm, Left [[id number]]  (Normal) Collected: 03/18/25 1506    Lab Status: Preliminary result Specimen: Blood from Arm, Left Updated: 03/22/25 1515     Blood Culture No growth at 4 days    Blood Culture - Blood, Arm, Right [[id number]]  (Normal) Collected: 03/18/25 1455    Lab Status: Preliminary result Specimen: Blood from Arm, Right Updated: 03/22/25 1515     Blood Culture No growth at 4 days    Narrative:      Less than seven (7) mL's of blood was collected.  Insufficient quantity may yield false negative results.          Imaging Results (Last 72 Hours)       ** No results found for the last 72 hours. **            Assessment & Plan   Gastric varices  Alcohol cirrhosis - patient has been seen at [facility] team but patient did not keep his follow-ups.  Anemia   History of ascites - last paracentesis 8/9/2024, undetectable total protein and albumin, negative for SBP  Electrolyte abnormalities    3/19/25 EGD by Dr. Miller showed 4 cords of grade 2 varices, 1 had red nellie sign with total of 5 bands placed.  No active bleeding seen.  Moderately severe PHG.  No obvious gastric varices.     Plan:  29 y.o. male PMH acute liver failure secondary to severe alcohol hepatitis that was nonresponsive to prednisone.  He presented to the ER due to hematemesis and weakness, which is why GI was consulted.  Underwent EGD which revealed esophageal varices.    -Hgb continues to rise and is up to 8.9 today. Continue to monitor H&H and transfuse as needed. Having brown stool.   -GI soft diet and tolerating.   -PPI BID po.   -Octreotide stopped yesterday, 3/22/2025.    -MELD 3.0 is 28 at admission. MELD 3.0 is 19 today.  -Electrolyte management per primary team  Continue Rocephin for SBP prophylaxis.  Continue folic acid, thiamine, zinc, Lasix, & spironolactone. Cr 0.81.    -Repeat EGD in the next 1 to 2 months. Unfortunately our office does not take his  insurance - he will need to follow up with [facility] for care moving forward, who he has also seen in the past 6 months.   Discussed needing to follow-up with [facility] as an outpatient.  Patient agrees.  -Little else to add from GI standpoint. OK for d/c with follow up at [facility]      Electronically signed by MARI Horn, 03/23/25, 11:11 AM EDT.

## 2025-03-24 ENCOUNTER — READMISSION MANAGEMENT (OUTPATIENT)
Dept: CALL CENTER | Facility: HOSPITAL | Age: 30
End: 2025-03-24
Payer: MEDICAID

## 2025-03-24 NOTE — CASE MANAGEMENT/SOCIAL WORK
Case Management Discharge Note      Final Note: Routine home.         Selected Continued Care - Discharged on 3/23/2025 Admission date: 3/18/2025 - Discharge disposition: Home or Self Care       Transportation Services  Private: Car    Final Discharge Disposition Code: 01 - home or self-care

## 2025-03-24 NOTE — OUTREACH NOTE
Prep Survey      Flowsheet Row Responses   [facility] patient discharged from? [facility]   Is LACE score < 7 ? No   Eligibility Readm Mgmt   Discharge diagnosis Acute GI Bleeding   Does the patient have one of the following disease processes/diagnoses(primary or secondary)? Other   Does the patient have Home health ordered? No   Is there a DME ordered? No   Comments regarding appointments Follow-up with PCP in 1 week, follow-up with GI in 1 week, follow-up with psychiatry in 1 week   Medication alerts for this patient see avs   Prep survey completed? Yes            Paola YIN - Registered Nurse

## 2025-03-24 NOTE — PAYOR COMM NOTE
"This is discharge notification for Trenton Charles.    Discharged on 3/23/25 routine home.     AUTHORIZATION: [id number]     THANK YOU.      Poornima Priest RN, [facility]  Utilization Nurse  [address] [place] [address]   [phone]   [phone]     Trenton Charles (29 y.o. Male)       Date of Birth   1995    Social Security Number       Address   [address] [place] [address] [place] [address]    Home Phone   [phone]    MRN   [id number]       Mosque   None    Marital Status   Single                            Admission Date   3/18/2025    Admission Type   Emergency    Admitting Provider   Mickey Solitario MD    Attending Provider       Department, Room/Bed   [facility] PROGRESS CARE,        Discharge Date   3/23/2025    Discharge Disposition   Home or Self Care    Discharge Destination                                 Attending Provider: (none)   Allergies: Reglan [Metoclopramide]    Isolation: None   Infection: None   Code Status: Prior    Ht: 165.1 cm (65\")   Wt: 60.9 kg (134 lb 4.2 oz)    Admission Cmt: None   Principal Problem: Acute GI bleeding [K92.2]                   Active Insurance as of 3/18/2025       Primary Coverage       Payor Plan Insurance Group Employer/Plan Group    [id number]        Payor Plan Address Payor Plan Phone Number Payor Plan Fax Number Effective Dates    [address]   2024 - None Entered    [facility] [phone]         Subscriber Name Subscriber Birth Date Member ID       TRENTON CHARLES 1995 [id number]                     Emergency Contacts        (Rel.) Home Phone Work Phone Mobile Phone    Wilbur Charles (Father) [phone] -- --                 Discharge Summary        Gabi Gacria MD at 25 1308                       [facility] Medicine Services  Discharge Summary    Date of Service: 3/23/2025  Patient Name: Trenton Charles  : " 1995  MRN: [id number]    Date of Admission: 3/18/2025  Discharge Diagnosis:     Acute upper GI bleed/hematemesis with gastric varices  Acute blood loss anemia  Alcoholic liver cirrhosis  Alcohol abuse with withdrawal seizures    Date of Discharge: 3/23/2025  Primary Care Physician: Provider, No Known      Hospital Course       Hospital Course:    This patient is a 29-year-old gentleman with a history of alcohol abuse and liver cirrhosis who presented with alcohol withdrawal with a witnessed seizure.  He was started on a phenobarbital taper for alcohol withdrawal and admitted to the ICU.  He was started on thiamine and folic acid as well as as needed benzodiazepines.  Patient reported that he drank a bottle of vodka prior to presentation over the weekend.  He also admitted to suicidal ideation after a recent break-up.  He was seen by psychiatry throughout the hospital course.  Psychiatry documented that they offered gabapentin for withdrawal and anxiety however the patient declined.  Psychiatry documented that the patient declined further services from them and said that they would follow as needed.    Patient also admitted to hematemesis with alcohol abuse and so gastroenterology was consulted.  He was started on octreotide drip and Protonix in view of liver cirrhosis with varices and hematemesis.  An EGD was done which showed grade 2 esophageal varices with banding done by GI. No active bleeding was seen. It also showed moderately severe portal hypertensive gastropathy. No gastric varices seen.     Patient received packed red blood cell transfusions for acute blood loss anemia due to hematemesis.  Hemoglobin dropped as low as 6.1.  Hemoglobin improved afterwards and trended upwards.    The patient has improved overall and is being discharged home.      DISCHARGE Follow Up Recommendations:-Follow-up with PCP in 1 week, follow-up with GI in 1 week, follow-up with psychiatry in 1 week      Day of Discharge      Vital Signs:  Temp:  [97.6 °F (36.4 °C)-98.4 °F (36.9 °C)] 97.6 °F (36.4 °C)  Heart Rate:  [67-95] 80  Resp:  [11-17] 11  BP: ()/(59-76) 116/69    Physical Exam:  Physical Exam  Constitutional:       Appearance: Normal appearance.   HENT:      Head: Normocephalic and atraumatic.      Nose: Nose normal.      Mouth/Throat:      Mouth: Mucous membranes are moist.   Eyes:      Extraocular Movements: Extraocular movements intact.      Conjunctiva/sclera: Conjunctivae normal.      Pupils: Pupils are equal, round, and reactive to light.   Cardiovascular:      Rate and Rhythm: Normal rate and regular rhythm.      Pulses: Normal pulses.      Heart sounds: Normal heart sounds.   Pulmonary:      Effort: Pulmonary effort is normal.      Breath sounds: Normal breath sounds.   Abdominal:      General: Abdomen is flat. Bowel sounds are normal.      Palpations: Abdomen is soft.   Musculoskeletal:         General: Normal range of motion.      Cervical back: Normal range of motion and neck supple.   Skin:     General: Skin is warm and dry.      Capillary Refill: Capillary refill takes less than 2 seconds.   Neurological:      General: No focal deficit present.      Mental Status: He is alert and oriented to person, place, and time.   Psychiatric:         Mood and Affect: Mood normal.         Behavior: Behavior normal.         Thought Content: Thought content normal.         Judgment: Judgment normal.           Pertinent  and/or Most Recent Results     LAB RESULTS:      Lab 03/23/25  0136 03/22/25  0141 03/21/25  1044 03/21/25  0027 03/20/25  1645 03/20/25  0547 03/19/25  1234 03/19/25  1044 03/19/25  0802 03/19/25  0426 03/18/25  2244 03/18/25  2040 03/18/25  1614 03/18/25  1512 03/18/25  1455   WBC 7.06 6.12  --  6.51  --  6.63  --   --   --  22.05*  --   --   --   --   --    HEMOGLOBIN 8.9* 8.4*  --  8.2* 7.5* 6.6*   < >  --    < > 7.9*   < > 6.7*   < >  --   --    HEMATOCRIT 26.3* 25.2*  --  24.7* 22.9* 19.9*   < >  --     < > 23.3*   < > 19.3*   < >  --   --    PLATELETS 88* 78*  --  64*  --  58*  --   --   --  103*  --   --   --   --   --    NEUTROS ABS 4.18 3.33  --  3.61  --  3.82  --   --   --  16.52*  --   --   --   --   --    IMMATURE GRANS (ABS) 0.04 0.03  --  0.02  --  0.02  --   --   --  0.12*  --   --   --   --   --    LYMPHS ABS 1.58 1.82  --  1.94  --  1.98  --   --   --  3.20*  --   --   --   --   --    MONOS ABS 0.92* 0.65  --  0.69  --  0.61  --   --   --  2.10*  --   --   --   --   --    EOS ABS 0.27 0.21  --  0.18  --  0.16  --   --   --  0.05  --   --   --   --   --    MCV 92.0 92.3  --  91.8  --  94.8  --   --   --  93.6  --   --   --   --   --    PROCALCITONIN  --   --   --   --   --   --   --   --   --   --   --   --   --   --  0.67*   LACTATE  --   --   --   --   --   --   --   --   --   --   --  2.0  --  2.8*  --    PROTIME 19.3* 19.0* 19.1*  --  20.1*  --   --  23.1*  --   --    < >  --   --   --   --     < > = values in this interval not displayed.         Lab 03/23/25  0136 03/22/25  1126 03/22/25  0141 03/21/25  1018 03/21/25  0027 03/20/25  1349 03/20/25  0513 03/19/25  1736 03/19/25  0426   SODIUM 129*  --  132*  --  132*  --  134*  --  133*   POTASSIUM 4.0  --  3.8  --  4.0 3.8 3.6   < > 3.5   CHLORIDE 99  --  103  --  100  --  100  --  89*   CO2 21.1*  --  21.1*  --  23.9  --  23.9  --  32.7*   ANION GAP 8.9  --  7.9  --  8.1  --  10.1  --  11.3   BUN 6  --  6  --  9  --  18  --  55*   CREATININE 0.81  --  0.80  --  0.72*  --  0.73*  --  1.26   EGFR 121.6  --  122.1  --  126.0  --  125.5  --  78.7   GLUCOSE 115*  --  117*  --  108*  --  78  --  130*   CALCIUM 8.2*  --  8.1*  --  7.7*  --  7.5*  --  7.6*   MAGNESIUM 1.5* 2.3 1.5*  --  1.7  --  2.1  --   --    PHOSPHORUS 3.8  --  3.0 2.3* 1.9* 1.6* 1.6*  --  4.2    < > = values in this interval not displayed.         Lab 03/23/25  0136 03/22/25  0141 03/21/25  0027 03/20/25  0513 03/19/25  0426 03/18/25  1339   TOTAL PROTEIN 6.0 5.7* 5.7* 5.6* 6.1 7.0    ALBUMIN 3.3* 3.1* 3.2* 3.1* 3.4* 3.9   GLOBULIN 2.7 2.6 2.5 2.5 2.7 3.1   ALT (SGPT) 73* 75* 79* 63* 55* 55*   AST (SGOT) 128* 147* 206* 184* 161* 156*   BILIRUBIN 2.1* 2.6* 3.3* 2.6* 3.9* 4.9*   ALK PHOS 153* 94 79 66 78 89   LIPASE  --   --   --   --   --  14         Lab 03/23/25  0136 03/22/25  0141 03/21/25  1044 03/20/25  1645 03/19/25  1044   PROTIME 19.3* 19.0* 19.1* 20.1* 23.1*   INR 1.63* 1.59* 1.60* 1.71* 2.03*         Lab 03/19/25  0426   CHOLESTEROL 93   LDL CHOL 41   HDL CHOL 30*   TRIGLYCERIDES 118         Lab 03/18/25  1506   ABO TYPING O   RH TYPING Positive   ANTIBODY SCREEN Negative         Brief Urine Lab Results  (Last result in the past 365 days)        Color   Clarity   Blood   Leuk Est   Nitrite   Protein   CREAT   Urine HCG        03/18/25 1434 Dark Yellow  Comment: Result checked     Cloudy   Small (1+)   Trace   Positive   Trace                 Microbiology Results (last 10 days)       Procedure Component Value - Date/Time    MRSA Screen, PCR (Inpatient) - Swab, Nares [[id number]]  (Normal) Collected: 03/18/25 2043    Lab Status: Final result Specimen: Swab from Nares Updated: 03/18/25 2210     MRSA PCR No MRSA Detected    Narrative:      The negative predictive value of this diagnostic test is high and should only be used to consider de-escalating anti-MRSA therapy. A positive result may indicate colonization with MRSA and must be correlated clinically.    Respiratory Panel PCR w/COVID-19(SARS-CoV-2) NICOLASA/NORM/JULIANNA/PAD/COR/ALBERTO In-House, NP Swab in UTM/VTM, 2 HR TAT - Swab, Nasopharynx [[id number]]  (Normal) Collected: 03/18/25 2043    Lab Status: Final result Specimen: Swab from Nasopharynx Updated: 03/18/25 2144     ADENOVIRUS, PCR Not Detected     Coronavirus 229E Not Detected     Coronavirus HKU1 Not Detected     Coronavirus NL63 Not Detected     Coronavirus OC43 Not Detected     COVID19 Not Detected     Human Metapneumovirus Not Detected     Human Rhinovirus/Enterovirus Not Detected      Influenza A PCR Not Detected     Influenza B PCR Not Detected     Parainfluenza Virus 1 Not Detected     Parainfluenza Virus 2 Not Detected     Parainfluenza Virus 3 Not Detected     Parainfluenza Virus 4 Not Detected     RSV, PCR Not Detected     Bordetella pertussis pcr Not Detected     Bordetella parapertussis PCR Not Detected     Chlamydophila pneumoniae PCR Not Detected     Mycoplasma pneumo by PCR Not Detected    Narrative:      In the setting of a positive respiratory panel with a viral infection PLUS a negative procalcitonin without other underlying concern for bacterial infection, consider observing off antibiotics or discontinuation of antibiotics and continue supportive care. If the respiratory panel is positive for atypical bacterial infection (Bordetella pertussis, Chlamydophila pneumoniae, or Mycoplasma pneumoniae), consider antibiotic de-escalation to target atypical bacterial infection.    Blood Culture - Blood, Arm, Left [[id number]]  (Normal) Collected: 03/18/25 1506    Lab Status: Preliminary result Specimen: Blood from Arm, Left Updated: 03/22/25 1515     Blood Culture No growth at 4 days    Blood Culture - Blood, Arm, Right [[id number]]  (Normal) Collected: 03/18/25 1455    Lab Status: Preliminary result Specimen: Blood from Arm, Right Updated: 03/22/25 1515     Blood Culture No growth at 4 days    Narrative:      Less than seven (7) mL's of blood was collected.  Insufficient quantity may yield false negative results.            US Liver  Result Date: 3/18/2025  Impression: Impression: 1.Diffusely increased hepatic echogenicity, consistent with hepatic steatosis. 2.Minimal flow shown in the main portal vein, which may be due to slow flow or thrombus. This can be further evaluated with contrast-enhanced CT abdomen/pelvis (portal venous phase). Electronically Signed: Emily Newsome  3/18/2025 7:12 PM EDT  Workstation ID: [id number]    CT Head Without Contrast  Result Date: 3/18/2025  Impression:  Impression: No acute intracranial pathology. Electronically Signed: Rafiq Verma MD  3/18/2025 6:04 PM EDT  Workstation ID: [id number]                  Labs Pending at Discharge:  Pending Results       Procedure [Order ID] Specimen - Date/Time    Magnesium [[id number]]     Specimen: Blood             Procedures Performed  Procedure(s):  ESOPHAGOGASTRODUODENOSCOPY with banding of esophageal varices x 5         Consults:   Consults       Date and Time Order Name Status Description    3/19/2025 11:50 AM Inpatient Hospitalist Consult      3/18/2025  5:26 PM Inpatient Psychiatrist Consult Completed     3/18/2025  3:16 PM Inpatient Gastroenterology Consult Completed     3/18/2025  2:33 PM Hospitalist (on-call MD unless specified)                Discharge Details        Discharge Medications        New Medications        Instructions Start Date   folic acid 1 MG tablet  Commonly known as: FOLVITE   1 mg, Oral, Daily      furosemide 20 MG tablet  Commonly known as: LASIX   20 mg, Oral, Daily   Start Date: March 24, 2025     thiamine 100 MG tablet  Commonly known as: VITAMIN B1   100 mg, Oral, Daily   Start Date: March 24, 2025            Continue These Medications        Instructions Start Date   nadolol 20 MG tablet  Commonly known as: CORGARD   Take 1 tablet by mouth once a day as directed for 30 days      spironolactone 25 MG tablet  Commonly known as: ALDACTONE   25 mg, Oral, Daily      Zinc Sulfate 220 (50 Zn) MG tablet   220 mg, Oral, Daily           Also being discharged on Protonix 40 mg oral twice daily per GI recommendations.    Allergies   Allergen Reactions    Reglan [Metoclopramide] Mental Status Change         Discharge Disposition: Home  Home or Self Care    Diet:  Hospital:  Diet Order   Procedures    Diet: Gastrointestinal, Cardiac; Low Sodium (2g); Fiber-Restricted; Texture: Soft to Chew (NDD 3); Soft to Chew: Whole Meat; Fluid Consistency: Thin (IDDSI 0)         Discharge Activity:   Activity Instructions        Activity as Tolerated                CODE STATUS:  Code Status and Medical Interventions: CPR (Attempt to Resuscitate); Full Support   Ordered at: 03/18/25 9330     Code Status (Patient has no pulse and is not breathing):    CPR (Attempt to Resuscitate)     Medical Interventions (Patient has pulse or is breathing):    Full Support         No future appointments.    Additional Instructions for the Follow-ups that You Need to Schedule       Discharge Follow-up with PCP   As directed       Currently Documented PCP:    Provider, No Known    PCP Phone Number:    None     Follow Up Details: 1 week        Discharge Follow-up with Specified Provider: GI; 1 Week   As directed      To: GI   Follow Up: 1 Week        Discharge Follow-up with Specified Provider: Psychiatry; 1 Week   As directed      To: Psychiatry   Follow Up: 1 Week                Time spent on Discharge including face to face service: Greater than 30 minutes    Signature: Electronically signed by Gabi Garcia MD, 03/23/25, 13:08 EDT.  [facility] Hospitalist Team     Electronically signed by Gabi Garcia MD at 03/23/25 [id number]

## 2025-03-28 ENCOUNTER — READMISSION MANAGEMENT (OUTPATIENT)
Dept: CALL CENTER | Facility: HOSPITAL | Age: 30
End: 2025-03-28
Payer: MEDICAID

## 2025-03-28 NOTE — OUTREACH NOTE
Medical Week 1 Survey      Flowsheet Row Responses   [facility] facility patient discharged from? [facility]   Does the patient have one of the following disease processes/diagnoses(primary or secondary)? Other   Week 1 attempt successful? No   Unsuccessful attempts Attempt 1            Leela TAVAREZ - Registered Nurse

## 2025-04-10 ENCOUNTER — READMISSION MANAGEMENT (OUTPATIENT)
Dept: CALL CENTER | Facility: HOSPITAL | Age: 30
End: 2025-04-10
Payer: MEDICAID

## 2025-04-10 NOTE — OUTREACH NOTE
Medical Week 3 Survey      Flowsheet Row Responses   [facility] facility patient discharged from? [facility]   Does the patient have one of the following disease processes/diagnoses(primary or secondary)? Other   Week 3 attempt successful? No   Unsuccessful attempts Attempt 1            Roopa PICHARDO - Registered Nurse

## 2025-04-14 ENCOUNTER — READMISSION MANAGEMENT (OUTPATIENT)
Dept: CALL CENTER | Facility: HOSPITAL | Age: 30
End: 2025-04-14
Payer: MEDICAID

## 2025-04-14 NOTE — OUTREACH NOTE
Medical Week 3 Survey      Flowsheet Row Responses   [facility] facility patient discharged from? [facility]   Does the patient have one of the following disease processes/diagnoses(primary or secondary)? Other   Week 3 attempt successful? No   Unsuccessful attempts Attempt 2            Roopa PICHARDO - Registered Nurse

## (undated) DEVICE — BITEBLOCK ENDO W/STRAP 60F A/ LF DISP

## (undated) DEVICE — MULTIPLE BAND LIGATOR: Brand: SPEEDBAND SUPERVIEW SUPER 7

## (undated) DEVICE — PK ENDO GI 50